# Patient Record
Sex: FEMALE | Race: BLACK OR AFRICAN AMERICAN | NOT HISPANIC OR LATINO | Employment: FULL TIME | URBAN - METROPOLITAN AREA
[De-identification: names, ages, dates, MRNs, and addresses within clinical notes are randomized per-mention and may not be internally consistent; named-entity substitution may affect disease eponyms.]

---

## 2017-01-05 ENCOUNTER — OFFICE VISIT (OUTPATIENT)
Dept: OBSTETRICS AND GYNECOLOGY | Facility: CLINIC | Age: 37
End: 2017-01-05
Attending: OBSTETRICS & GYNECOLOGY
Payer: COMMERCIAL

## 2017-01-05 VITALS
SYSTOLIC BLOOD PRESSURE: 100 MMHG | DIASTOLIC BLOOD PRESSURE: 60 MMHG | WEIGHT: 191.13 LBS | HEIGHT: 61 IN | BODY MASS INDEX: 36.09 KG/M2

## 2017-01-05 DIAGNOSIS — N93.9 ABNORMAL UTERINE BLEEDING (AUB): Primary | ICD-10-CM

## 2017-01-05 DIAGNOSIS — D25.9 UTERINE LEIOMYOMA, UNSPECIFIED LOCATION: ICD-10-CM

## 2017-01-05 DIAGNOSIS — Z87.42 HISTORY OF ABNORMAL CERVICAL PAP SMEAR: ICD-10-CM

## 2017-01-05 PROCEDURE — 3074F SYST BP LT 130 MM HG: CPT | Mod: S$GLB,,, | Performed by: OBSTETRICS & GYNECOLOGY

## 2017-01-05 PROCEDURE — 3078F DIAST BP <80 MM HG: CPT | Mod: S$GLB,,, | Performed by: OBSTETRICS & GYNECOLOGY

## 2017-01-05 PROCEDURE — 99999 PR PBB SHADOW E&M-EST. PATIENT-LVL II: CPT | Mod: PBBFAC,,, | Performed by: OBSTETRICS & GYNECOLOGY

## 2017-01-05 PROCEDURE — 99213 OFFICE O/P EST LOW 20 MIN: CPT | Mod: S$GLB,,, | Performed by: OBSTETRICS & GYNECOLOGY

## 2017-01-05 PROCEDURE — 1159F MED LIST DOCD IN RCRD: CPT | Mod: S$GLB,,, | Performed by: OBSTETRICS & GYNECOLOGY

## 2017-01-05 NOTE — LETTER
January 8, 2017      Davian Damon MD  [address] [facility]  [address]  [facility] [address]           [facility] [address]  [address] [facility] [address]  Phone: [phone]          Patient: Marlyn Camacho   MR Number: [id number]   YOB: 1980   Date of Visit: 1/5/2017       Dear Dr. Davian Damon:    Thank you for referring Marlyn Camacho to me for evaluation. Attached you will find relevant portions of my assessment and plan of care.    If you have questions, please do not hesitate to call me. I look forward to following Marlyn Camacho along with you.    Sincerely,    Lucy Daly MD    Enclosure  CC:  No Recipients    If you would like to receive this communication electronically, please contact [email or web address] or [phone] to request more information on [employer] Link access.    For providers and/or their staff who would like to refer a patient to [facility], please contact us through our one-stop-shop provider referral line, [facility], at [phone].    If you feel you have received this communication in error or would no longer like to receive these types of communications, please e-mail [email or web address]

## 2017-01-05 NOTE — MR AVS SNAPSHOT
[facility] [address]  [address] [facility] [address]  Phone: [phone]                  Marlyn MURILLO Doug   2017 1:00 PM   Office Visit    Description:  Female : 1980   Provider:  Lucy Daly MD   Department:  [facility] [address]           Reason for Visit     Discuss Surgery                To Do List           Goals (5 Years of Data)     None      [facility]     [facility] Nurse Care Line -  Assistance  Registered nurses in the [facility] On Call Center provide clinical advisement, health education, appointment booking, and other advisory services.  Call for this free service at [phone].             Medications           Message regarding Medications     Verify the changes and/or additions to your medication regime listed below are the same as discussed with your clinician today.  If any of these changes or additions are incorrect, please notify your healthcare provider.        STOP taking these medications     predniSONE (DELTASONE) 20 MG tablet     ranitidine (ZANTAC) 150 MG tablet            Verify that the below list of medications is an accurate representation of the medications you are currently taking.  If none reported, the list may be blank. If incorrect, please contact your healthcare provider. Carry this list with you in case of emergency.           Current Medications     desoximetasone 0.25 % ointment Apply topically 2 (two) times daily. To affected areas on arms and neck    epinephrine (EPIPEN) 0.3 mg/0.3 mL AtIn Inject 0.3 mLs (0.3 mg total) into the muscle once.    hydrochlorothiazide (HYDRODIURIL) 25 MG tablet Take 1 tablet (25 mg total) by mouth once daily.    triamcinolone acetonide 0.1% (KENALOG) 0.1 % cream Apply topically 2 (two) times daily.           Clinical Reference Information           Vital Signs - Last Recorded  Most recent update: 2017  1:17 PM by Clare Laurent MA    BP Ht Wt LMP BMI    100/60 (BP Location:  "Right arm, Patient Position: Sitting, BP Method: Manual) 5' 1" (1.549 m) 86.7 kg (191 lb 2.2 oz) 12/23/2016 (Approximate) 36.12 kg/m2      Blood Pressure          Most Recent Value    BP  100/60      Allergies as of 1/5/2017     Dye      Immunizations Administered on Date of Encounter - 1/5/2017     None      "

## 2017-01-09 NOTE — PROGRESS NOTES
SUBJECTIVE:   36 y.o. female   for AUB. Patient's last menstrual period was 2016 (approximate)..  Reports heavy periods.  Ultrasound done shows normal uterus with 2 cm fibroids.  H/o BTL.  Has h/o abnormal PAP and CKC done for JENARO 2.  PAP 3- was normal.  Desires mgt with hysterectomy.         Past Medical History   Diagnosis Date    Abnormal Pap smear of vagina     Allergy     Joint pain      Past Surgical History   Procedure Laterality Date    Tubal ligation      Colposcopy      Ckc, ecc, fractional d&c      Conization-cervix      Dilation and curettage of uterus       Social History     Social History    Marital status:      Spouse name: N/A    Number of children: N/A    Years of education: N/A     Occupational History    Not on file.     Social History Main Topics    Smoking status: Never Smoker    Smokeless tobacco: Not on file    Alcohol use Yes      Comment: occasional    Drug use: No    Sexual activity: Yes     Partners: Male     Birth control/ protection: Other-see comments      Comment: Tubal Ligation     Other Topics Concern    Are You Pregnant Or Think You May Be? No    Breast-Feeding No     Social History Narrative     Family History   Problem Relation Age of Onset    Eczema Mother     Ovarian cancer Mother     Eczema Son     Eczema Maternal Grandmother     Eczema Son     Melanoma Neg Hx     Lupus Neg Hx     Psoriasis Neg Hx     Breast cancer Neg Hx     Colon cancer Neg Hx      OB History    Para Term  AB SAB TAB Ectopic Multiple Living   3 3        3      # Outcome Date GA Lbr Bennie/2nd Weight Sex Delivery Anes PTL Lv   3 Para      Vag-Spont   Y   2 Para      Vag-Spont   Y   1 Para      Vag-Spont   Y            Current Outpatient Prescriptions   Medication Sig Dispense Refill    desoximetasone 0.25 % ointment Apply topically 2 (two) times daily. To affected areas on arms and neck 60 g 2    epinephrine (EPIPEN) 0.3 mg/0.3 mL AtIn Inject 0.3  "mLs (0.3 mg total) into the muscle once. 2 Device 5    hydrochlorothiazide (HYDRODIURIL) 25 MG tablet Take 1 tablet (25 mg total) by mouth once daily. 90 tablet 3    triamcinolone acetonide 0.1% (KENALOG) 0.1 % cream Apply topically 2 (two) times daily. 80 g 1     No current facility-administered medications for this visit.      Allergies: Dye     ROS:  Constitutional: no weight loss, weight gain, fever, fatigue  Eyes:  No vision changes, glasses/contacts  ENT/Mouth: No ulcers, sinus problems, ears ringing, headache  Cardiovascular: No inability to lie flat, chest pain, exercise intolerance, swelling, heart palpitations  Respiratory: No wheezing, coughing blood, shortness of breath, or cough  Gastrointestinal: No diarrhea, bloody stool, nausea/vomiting, constipation, gas, hemorrhoids  Genitourinary: No blood in urine, painful urination, urgency of urination, frequency of urination, incomplete emptying, incontinence, +abnormal bleeding, +painful periods, heavy periods, vaginal discharge, vaginal odor, painful intercourse, sexual problems, bleeding after intercourse.  Musculoskeletal: No muscle weakness  Skin/Breast: No painful breasts, nipple discharge, masses, rash, ulcers  Neurological: No passing out, seizures, numbness, headache  Endocrine: No diabetes, hypothyroid, hyperthyroid, hot flashes, hair loss, abnormal hair growth, ance  Psychiatric: No depression, crying  Hematologic: No bruises, bleeding, swollen lymph nodes, anemia.      OBJECTIVE:   The patient appears well, alert, oriented x 3, in no distress.  Visit Vitals    /60 (BP Location: Right arm, Patient Position: Sitting, BP Method: Manual)    Ht 5' 1" (1.549 m)    Wt 86.7 kg (191 lb 2.2 oz)    LMP 12/23/2016 (Approximate)    BMI 36.12 kg/m2       ASSESSMENT:   1. Abnormal uterine bleeding (AUB)     2. Uterine leiomyoma, unspecified location     3. History of abnormal cervical Pap smear         PLAN:       Discussed AUB, surgical mgt options " with TLH/ BS.  Return to clinic for pre-op

## 2017-01-13 ENCOUNTER — TELEPHONE (OUTPATIENT)
Dept: OBSTETRICS AND GYNECOLOGY | Facility: CLINIC | Age: 37
End: 2017-01-13

## 2017-01-13 DIAGNOSIS — N93.9 ABNORMAL UTERINE BLEEDING: Primary | ICD-10-CM

## 2017-02-09 ENCOUNTER — PATIENT MESSAGE (OUTPATIENT)
Dept: OBSTETRICS AND GYNECOLOGY | Facility: CLINIC | Age: 37
End: 2017-02-09

## 2017-03-03 ENCOUNTER — PATIENT MESSAGE (OUTPATIENT)
Dept: OBSTETRICS AND GYNECOLOGY | Facility: CLINIC | Age: 37
End: 2017-03-03

## 2017-03-09 ENCOUNTER — OFFICE VISIT (OUTPATIENT)
Dept: OBSTETRICS AND GYNECOLOGY | Facility: CLINIC | Age: 37
End: 2017-03-09
Attending: OBSTETRICS & GYNECOLOGY
Payer: COMMERCIAL

## 2017-03-09 ENCOUNTER — HOSPITAL ENCOUNTER (OUTPATIENT)
Dept: PREADMISSION TESTING | Facility: OTHER | Age: 37
Discharge: HOME | End: 2017-03-09
Attending: OBSTETRICS & GYNECOLOGY
Payer: COMMERCIAL

## 2017-03-09 ENCOUNTER — ANESTHESIA EVENT (OUTPATIENT)
Dept: SURGERY | Facility: OTHER | Age: 37
End: 2017-03-09
Payer: COMMERCIAL

## 2017-03-09 VITALS
WEIGHT: 185 LBS | OXYGEN SATURATION: 100 % | TEMPERATURE: 99 F | DIASTOLIC BLOOD PRESSURE: 79 MMHG | BODY MASS INDEX: 34.93 KG/M2 | SYSTOLIC BLOOD PRESSURE: 117 MMHG | HEART RATE: 62 BPM | HEIGHT: 61 IN

## 2017-03-09 VITALS
HEIGHT: 61 IN | BODY MASS INDEX: 35.01 KG/M2 | DIASTOLIC BLOOD PRESSURE: 70 MMHG | SYSTOLIC BLOOD PRESSURE: 120 MMHG | WEIGHT: 185.44 LBS

## 2017-03-09 DIAGNOSIS — Z01.818 PREOPERATIVE EXAM FOR GYNECOLOGIC SURGERY: Primary | ICD-10-CM

## 2017-03-09 DIAGNOSIS — N93.9 ABNORMAL UTERINE BLEEDING (AUB): ICD-10-CM

## 2017-03-09 DIAGNOSIS — N87.1 MODERATE CERVICAL DYSPLASIA: ICD-10-CM

## 2017-03-09 DIAGNOSIS — D25.1 INTRAMURAL LEIOMYOMA OF UTERUS: ICD-10-CM

## 2017-03-09 LAB
ABO + RH BLD: NORMAL
ANION GAP SERPL CALC-SCNC: 5 MMOL/L
BASOPHILS # BLD AUTO: 0.03 K/UL
BASOPHILS NFR BLD: 0.4 %
BLD GP AB SCN CELLS X3 SERPL QL: NORMAL
BUN SERPL-MCNC: 6 MG/DL
CALCIUM SERPL-MCNC: 9.5 MG/DL
CANDIDA RRNA VAG QL PROBE: NEGATIVE
CHLORIDE SERPL-SCNC: 106 MMOL/L
CO2 SERPL-SCNC: 28 MMOL/L
CREAT SERPL-MCNC: 0.8 MG/DL
DIFFERENTIAL METHOD: NORMAL
EOSINOPHIL # BLD AUTO: 0.1 K/UL
EOSINOPHIL NFR BLD: 1.1 %
ERYTHROCYTE [DISTWIDTH] IN BLOOD BY AUTOMATED COUNT: 13.8 %
EST. GFR  (AFRICAN AMERICAN): >60 ML/MIN/1.73 M^2
EST. GFR  (NON AFRICAN AMERICAN): >60 ML/MIN/1.73 M^2
G VAGINALIS RRNA GENITAL QL PROBE: POSITIVE
GLUCOSE SERPL-MCNC: 60 MG/DL
HCT VFR BLD AUTO: 39.8 %
HGB BLD-MCNC: 13.5 G/DL
LYMPHOCYTES # BLD AUTO: 2.5 K/UL
LYMPHOCYTES NFR BLD: 33.7 %
MCH RBC QN AUTO: 30.8 PG
MCHC RBC AUTO-ENTMCNC: 33.9 %
MCV RBC AUTO: 91 FL
MONOCYTES # BLD AUTO: 0.5 K/UL
MONOCYTES NFR BLD: 7.3 %
NEUTROPHILS # BLD AUTO: 4.2 K/UL
NEUTROPHILS NFR BLD: 57.4 %
PLATELET # BLD AUTO: 329 K/UL
PMV BLD AUTO: 9.5 FL
POTASSIUM SERPL-SCNC: 4.2 MMOL/L
RBC # BLD AUTO: 4.38 M/UL
SODIUM SERPL-SCNC: 139 MMOL/L
T VAGINALIS RRNA GENITAL QL PROBE: NEGATIVE
WBC # BLD AUTO: 7.26 K/UL

## 2017-03-09 PROCEDURE — 87480 CANDIDA DNA DIR PROBE: CPT

## 2017-03-09 PROCEDURE — 86900 BLOOD TYPING SEROLOGIC ABO: CPT

## 2017-03-09 PROCEDURE — 99999 PR PBB SHADOW E&M-EST. PATIENT-LVL II: CPT | Mod: PBBFAC,,, | Performed by: OBSTETRICS & GYNECOLOGY

## 2017-03-09 PROCEDURE — 99499 UNLISTED E&M SERVICE: CPT | Mod: S$GLB,,, | Performed by: OBSTETRICS & GYNECOLOGY

## 2017-03-09 PROCEDURE — 80048 BASIC METABOLIC PNL TOTAL CA: CPT

## 2017-03-09 PROCEDURE — 86850 RBC ANTIBODY SCREEN: CPT

## 2017-03-09 PROCEDURE — 85025 COMPLETE CBC W/AUTO DIFF WBC: CPT

## 2017-03-09 RX ORDER — MIDAZOLAM HYDROCHLORIDE 1 MG/ML
5 INJECTION INTRAMUSCULAR; INTRAVENOUS
Status: ACTIVE | OUTPATIENT
Start: 2017-03-09 | End: 2017-03-10

## 2017-03-09 RX ORDER — LIDOCAINE HYDROCHLORIDE 10 MG/ML
1 INJECTION, SOLUTION EPIDURAL; INFILTRATION; INTRACAUDAL; PERINEURAL ONCE
Status: CANCELLED | OUTPATIENT
Start: 2017-03-09 | End: 2017-03-09

## 2017-03-09 RX ORDER — SODIUM CHLORIDE, SODIUM LACTATE, POTASSIUM CHLORIDE, CALCIUM CHLORIDE 600; 310; 30; 20 MG/100ML; MG/100ML; MG/100ML; MG/100ML
INJECTION, SOLUTION INTRAVENOUS CONTINUOUS
Status: CANCELLED | OUTPATIENT
Start: 2017-03-09

## 2017-03-09 NOTE — IP AVS SNAPSHOT
[facility] [place], [facility]  [address] [place], [place] [address]  Phone: [phone]           Patient Discharge Instructions    Our goal is to set you up for success. This packet includes information on your condition, medications, and your home care. It will help you to care for yourself so you don't get sicker.     Please ask your nurse if you have any questions.        There are many details to remember when preparing for your surgery. Here is what you will need to do, please ask your nurse if there are more specific instructions and if you have any questions:    1. 24 hours before procedure Do not smoke or drink alcoholic beverages 24 hours prior to your procedure    2. Eating before procedure Do not eat or drink anything 8 hours before your procedure - this includes gum, mints, and candy.     3. Day of procedure Please remove all jewelry for the procedure. If you wear contact lenses, dentures, hearing aids or glasses, bring a container to put them in during your surgery and give to a family member for safekeeping.  If your doctor has scheduled you for an overnight stay, bring a small overnight bag with any personal items that you need.    4. After procedure Make arrangements in advance for transportation home by a responsible adult. It is not safe to drive a vehicle during the 24 hours following surgery.     PLEASE NOTE: You may be contacted the day before your surgery to confirm your surgery date and arrival time. The Surgery schedule has many variables which may affect the time of your surgery case. Family members should be available if your surgery time changes.                [facility]  Unless otherwise directed by your provider, please contact [facility], our nurse care line that is available for 24/7 assistance.     [phone] (toll-free)    Registered nurses in the [facility] On Call Center provide clinical advisement, health education, appointment booking, and other  advisory services.                    ** Verify the list of medication(s) below is accurate and up to date. Carry this with you in case of emergency. If your medications have changed, please notify your healthcare provider.             Medication List      TAKE these medications        Additional Info                      desoximetasone 0.25 % ointment   Quantity:  60 g   Refills:  2    Instructions:  Apply topically 2 (two) times daily. To affected areas on arms and neck     Begin Date    AM    Noon    PM    Bedtime       epinephrine 0.3 mg/0.3 mL Atin   Commonly known as:  EPIPEN   Quantity:  2 Device   Refills:  5   Dose:  1 each    Instructions:  Inject 0.3 mLs (0.3 mg total) into the muscle once.     Begin Date    AM    Noon    PM    Bedtime       hydrochlorothiazide 25 MG tablet   Commonly known as:  HYDRODIURIL   Quantity:  90 tablet   Refills:  3   Dose:  25 mg    Instructions:  Take 1 tablet (25 mg total) by mouth once daily.     Begin Date    AM    Noon    PM    Bedtime       triamcinolone acetonide 0.1% 0.1 % cream   Commonly known as:  KENALOG   Quantity:  80 g   Refills:  1    Instructions:  Apply topically 2 (two) times daily.     Begin Date    AM    Noon    PM    Bedtime                  Please bring to all follow up appointments:    1. A copy of your discharge instructions.  2. All medicines you are currently taking in their original bottles.  3. Identification and insurance card.    Please arrive 15 minutes ahead of scheduled appointment time.    Please call 24 hours in advance if you must reschedule your appointment and/or time.        Your Future Surgeries/Procedures     Mar 13, 2017   Surgery with Lucy Daly MD   [facility] ([facility])    [address] [facility] [address]   [phone]                  Discharge Instructions       PRE-ADMIT TESTING -  [phone]    [address] [facility]        OUTPATIENT SURGERY UNIT -  [phone]    Your surgery has been scheduled at [facility]. We are pleased to have the opportunity to serve you. For Further Information please call [phone].    On the day of surgery please report to the Information Desk on the 1st floor.    CONTACT YOUR PHYSICIAN'S OFFICE THE DAY PRIOR TO YOUR SURGERY TO OBTAIN YOUR ARRIVAL TIME.     The evening before surgery do not eat anything after 9 p.m. ( this includes hard candy, chewing gum and mints).  You may have GATORADE, POWERADE AND WATER FROM 9 p.m. until leaving home to come to the hospital.   DO NOT DRINK ANY LIQUIDS ON THE WAY TO THE HOSPITAL.     SPECIAL MEDICATION INSTRUCTIONS: TAKE medications checked off by the Anesthesiologist on your Medication List.    Angiogram Patients: Take medications as instructed by your physician, including aspirin.     Surgery Patients:    If you take ASPIRIN - Your PHYSICIAN/SURGEON will need to inform you IF/OR when you need to stop taking aspirin prior to your surgery.     Do Not take any medications containing IBUPROFEN.  Do Not Wear any make-up or dark nail polish   (especially eye make-up) to surgery. If you come to surgery with makeup on you will be required to remove the makeup or nail polish.    Do not shave your surgical area at least 5 days prior to your surgery. The surgical prep will be performed at the hospital according to Infection Control regulations.    Leave all valuables at home.   Do Not wear any jewelry or watches, including any metal in body piercings.  Contact Lens must be removed before surgery. Either do not wear the contact lens or bring a case and solution for storage.  Please bring a container for eyeglasses or dentures as required.  Bring any paperwork your physician has provided, such as consent forms,  history and physicals, doctor's orders, etc.   Bring comfortable clothes that are loose fitting to wear upon discharge. Take into consideration the type of surgery being  "performed.  Maintain your diet as advised per your physician the day prior to surgery.      Adequate rest the night before surgery is advised.   Park in the Parking lot behind the hospital or in the [place] [facility] across the street from the parking lot. Parking is complimentary.  If you will be discharged the same day as your procedure, please arrange for a responsible adult to drive you home or to accompany you if traveling by taxi.   YOU WILL NOT BE PERMITTED TO DRIVE OR TO LEAVE THE HOSPITAL ALONE AFTER SURGERY.   It is strongly recommended that you arrange for someone to remain with you for the first 24 hrs following your surgery.       Thank you for your cooperation.  The Staff of [facility].        Bathing Instructions                                                                 Please shower the evening before and morning of your procedure with    ANTIBACTERIAL SOAP. ( DIAL, etc )  Concentrate on the surgical area   for at least 3 minutes and rinse completely. Dry off as usual.   Do not use any deodorant, powder, body lotions, perfume, after shave or    cologne.                                                Admission Information     Date & Time Provider Department CSN    3/9/2017 11:00 AM Lucy Daly MD [facility] [phone]      Care Providers     Provider Role Specialty Primary office phone    Lucy Daly MD Attending Provider Obstetrics [phone]      Your Vitals Were     BP Pulse Temp Height Weight Last Period    117/79 62 98.6 °F (37 °C) (Oral) 5' 1" (1.549 m) 83.9 kg (185 lb) 02/14/2017 (Approximate)    SpO2 BMI             100% 34.96 kg/m2         Recent Lab Values     No lab values to display.      Allergies as of 3/9/2017        Reactions    Dye Anaphylaxis    Hair lightener/bleach      Advance Directives     An advance directive is a document which, in the event you are no longer able to make decisions for yourself, tells your " healthcare team what kind of treatment you do or do not want to receive, or who you would like to make those decisions for you.  If you do not currently have an advance directive, [facility] encourages you to create one.  For more information call:  [phone]), [phone] ([phone]),  or log on to [email or web address].        Language Assistance Services     ATTENTION: Language assistance services are available, free of charge. Please call [phone].      ATENCIÓN: Si habla español, tiene a bernal disposición servicios gratuitos de asistencia lingüística. Llame al [phone].     CHÚ Ý: N?u b?n nói Ti?ng Vi?t, có các d?ch v? h? tr? ngôn ng? mi?n phí dành cho b?n. G?i s? [phone].         [facility] complies with applicable Federal civil rights laws and does not discriminate on the basis of race, color, national origin, age, disability, or sex.

## 2017-03-09 NOTE — MR AVS SNAPSHOT
[facility] [address]  [address] [facility] [address]  Phone: [phone]                  Marlyn Camacho   3/9/2017 9:45 AM   Office Visit    Description:  Female : 1980   Provider:  Lucy Daly MD   Department:  [facility] [address]           Reason for Visit     Pre-op Exam           Diagnoses this Visit        Comments    Preoperative exam for gynecologic surgery    -  Primary     Abnormal uterine bleeding (AUB)         Intramural leiomyoma of uterus         Moderate cervical dysplasia                To Do List           Future Appointments        Provider Department Dept Phone    3/9/2017 9:45 AM Lucy Daly MD [facility] [address] [phone]    3/9/2017 11:00 AM PRE-ADMIT, [facility] [phone]      Your Future Surgeries/Procedures     Mar 13, 2017   Surgery with Lucy Daly MD   [facility]    [address] [facility] [address] [phone]              Goals (5 Years of Data)     None      [facility]     [facility] On [facility] Nurse Care Line -  Assistance  Registered nurses in the [facility] On Call Center provide clinical advisement, health education, appointment booking, and other advisory services.  Call for this free service at [phone].             Medications           Message regarding Medications     Verify the changes and/or additions to your medication regime listed below are the same as discussed with your clinician today.  If any of these changes or additions are incorrect, please notify your healthcare provider.             Verify that the below list of medications is an accurate representation of the medications you are currently taking.  If none reported, the list may be blank. If incorrect, please contact your healthcare provider. Carry this list with you in case of emergency.           Current Medications      "hydrochlorothiazide (HYDRODIURIL) 25 MG tablet Take 1 tablet (25 mg total) by mouth once daily.    desoximetasone 0.25 % ointment Apply topically 2 (two) times daily. To affected areas on arms and neck    epinephrine (EPIPEN) 0.3 mg/0.3 mL AtIn Inject 0.3 mLs (0.3 mg total) into the muscle once.    triamcinolone acetonide 0.1% (KENALOG) 0.1 % cream Apply topically 2 (two) times daily.           Clinical Reference Information           Your Vitals Were     BP Height Weight Last Period BMI    120/70 (BP Location: Right arm, Patient Position: Sitting, BP Method: Manual) 5' 1" (1.549 m) 84.1 kg (185 lb 6.5 oz) 02/14/2017 (Approximate) 35.03 kg/m2      Blood Pressure          Most Recent Value    BP  120/70      Allergies as of 3/9/2017     Dye      Immunizations Administered on Date of Encounter - 3/9/2017     None      Orders Placed During Today's Visit      Normal Orders This Visit    Vaginosis Screen by DNA Probe       Language Assistance Services     ATTENTION: Language assistance services are available, free of charge. Please call [phone].      ATENCIÓN: Si habla nate, tiene a bernal disposición servicios gratuitos de asistencia lingüística. Llame al [phone].     CHÚ Ý: N?u b?n nói Ti?ng Vi?t, có các d?ch v? h? tr? ngôn ng? mi?n phí dành cho b?n. G?i s? [phone].         [facility] [address] complies with applicable Federal civil rights laws and does not discriminate on the basis of race, color, national origin, age, disability, or sex.        "

## 2017-03-09 NOTE — DISCHARGE INSTRUCTIONS
PRE-ADMIT TESTING -  [phone]    [address]  [facility]        OUTPATIENT SURGERY UNIT - [phone]    Your surgery has been scheduled at [facility]. We are pleased to have the opportunity to serve you. For Further Information please call [phone].    On the day of surgery please report to the Information Desk on the 1st floor.    CONTACT YOUR PHYSICIAN'S OFFICE THE DAY PRIOR TO YOUR SURGERY TO OBTAIN YOUR ARRIVAL TIME.     The evening before surgery do not eat anything after 9 p.m. ( this includes hard candy, chewing gum and mints).  You may have GATORADE, POWERADE AND WATER FROM 9 p.m. until leaving home to come to the hospital.   DO NOT DRINK ANY LIQUIDS ON THE WAY TO THE HOSPITAL.     SPECIAL MEDICATION INSTRUCTIONS: TAKE medications checked off by the Anesthesiologist on your Medication List.    Angiogram Patients: Take medications as instructed by your physician, including aspirin.     Surgery Patients:    If you take ASPIRIN - Your PHYSICIAN/SURGEON will need to inform you IF/OR when you need to stop taking aspirin prior to your surgery.     Do Not take any medications containing IBUPROFEN.  Do Not Wear any make-up or dark nail polish   (especially eye make-up) to surgery. If you come to surgery with makeup on you will be required to remove the makeup or nail polish.    Do not shave your surgical area at least 5 days prior to your surgery. The surgical prep will be performed at the hospital according to Infection Control regulations.    Leave all valuables at home.   Do Not wear any jewelry or watches, including any metal in body piercings.  Contact Lens must be removed before surgery. Either do not wear the contact lens or bring a case and solution for storage.  Please bring a container for eyeglasses or dentures as required.  Bring any paperwork your physician has provided, such as consent forms,  history and physicals, doctor's orders, etc.   Bring comfortable  clothes that are loose fitting to wear upon discharge. Take into consideration the type of surgery being performed.  Maintain your diet as advised per your physician the day prior to surgery.      Adequate rest the night before surgery is advised.   Park in the Parking lot behind the hospital or in the [place] [facility] across the street from the parking lot. Parking is complimentary.  If you will be discharged the same day as your procedure, please arrange for a responsible adult to drive you home or to accompany you if traveling by taxi.   YOU WILL NOT BE PERMITTED TO DRIVE OR TO LEAVE THE HOSPITAL ALONE AFTER SURGERY.   It is strongly recommended that you arrange for someone to remain with you for the first 24 hrs following your surgery.       Thank you for your cooperation.  The Staff of [facility].        Bathing Instructions                                                                 Please shower the evening before and morning of your procedure with    ANTIBACTERIAL SOAP. ( DIAL, etc )  Concentrate on the surgical area   for at least 3 minutes and rinse completely. Dry off as usual.   Do not use any deodorant, powder, body lotions, perfume, after shave or    cologne.

## 2017-03-09 NOTE — ANESTHESIA PREPROCEDURE EVALUATION
03/09/2017  Marlyn Camacho is a 37 y.o., female.    [facility] Anesthesia Evaluation    I have reviewed the Patient Summary Reports.    I have reviewed the Nursing Notes.   I have reviewed the Medications.     Review of Systems  Anesthesia Hx:  Denies Family Hx of Anesthesia complications.   Denies Personal Hx of Anesthesia complications.   Social:  Non-Smoker    Hematology/Oncology:  Hematology Normal   Oncology Normal     EENT/Dental:EENT/Dental Normal   Cardiovascular:   Hypertension    Pulmonary:  Pulmonary Normal    Renal/:  Renal/ Normal     Hepatic/GI:  Hepatic/GI Normal    Musculoskeletal:  Musculoskeletal Normal    Neurological:  Neurology Normal    Endocrine:  Endocrine Normal    Dermatological:  Skin Normal    Psych:  Psychiatric Normal           Physical Exam  General:  Obesity    Airway/Jaw/Neck:  Airway Findings: Mouth Opening: Normal Tongue: Normal  General Airway Assessment: Adult  Mallampati: II  TM Distance: Normal, at least 6 cm      Dental:  Dental Findings: lower braces, upper braces, In tact        Mental Status:  Mental Status Findings:  Alert and Oriented, Cooperative         Anesthesia Plan  Type of Anesthesia, risks & benefits discussed:  Anesthesia Type:  general  Patient's Preference:   Intra-op Monitoring Plan:   Intra-op Monitoring Plan Comments:   Post Op Pain Control Plan:   Post Op Pain Control Plan Comments:   Induction:   IV  Beta Blocker:         Informed Consent: Patient understands risks and agrees with Anesthesia plan.  Questions answered. Anesthesia consent signed with patient.  ASA Score: 2     Day of Surgery Review of History & Physical:    H&P update referred to the surgeon.         Ready For Surgery From Anesthesia Perspective.

## 2017-03-10 DIAGNOSIS — N76.0 BV (BACTERIAL VAGINOSIS): Primary | ICD-10-CM

## 2017-03-10 DIAGNOSIS — B96.89 BV (BACTERIAL VAGINOSIS): Primary | ICD-10-CM

## 2017-03-10 RX ORDER — METRONIDAZOLE 500 MG/1
500 TABLET ORAL 2 TIMES DAILY
Qty: 14 TABLET | Refills: 0 | Status: SHIPPED | OUTPATIENT
Start: 2017-03-10 | End: 2017-03-17

## 2017-03-13 ENCOUNTER — HOSPITAL ENCOUNTER (OUTPATIENT)
Facility: OTHER | Age: 37
Discharge: HOME | End: 2017-03-14
Attending: OBSTETRICS & GYNECOLOGY | Admitting: OBSTETRICS & GYNECOLOGY
Payer: COMMERCIAL

## 2017-03-13 ENCOUNTER — ANESTHESIA (OUTPATIENT)
Dept: SURGERY | Facility: OTHER | Age: 37
End: 2017-03-13
Payer: COMMERCIAL

## 2017-03-13 DIAGNOSIS — N93.9 ABNORMAL UTERINE BLEEDING (AUB): ICD-10-CM

## 2017-03-13 PROBLEM — Z90.710 S/P TOTAL HYSTERECTOMY: Status: ACTIVE | Noted: 2017-03-13

## 2017-03-13 LAB
B-HCG UR QL: NEGATIVE
CTP QC/QA: YES

## 2017-03-13 PROCEDURE — 63600175 PHARM REV CODE 636 W HCPCS: Performed by: OBSTETRICS & GYNECOLOGY

## 2017-03-13 PROCEDURE — 63600175 PHARM REV CODE 636 W HCPCS: Performed by: ANESTHESIOLOGY

## 2017-03-13 PROCEDURE — 25000003 PHARM REV CODE 250: Performed by: OBSTETRICS & GYNECOLOGY

## 2017-03-13 PROCEDURE — 88342 IMHCHEM/IMCYTCHM 1ST ANTB: CPT | Performed by: PATHOLOGY

## 2017-03-13 PROCEDURE — 37000009 HC ANESTHESIA EA ADD 15 MINS: Performed by: OBSTETRICS & GYNECOLOGY

## 2017-03-13 PROCEDURE — 71000039 HC RECOVERY, EACH ADD'L HOUR: Performed by: OBSTETRICS & GYNECOLOGY

## 2017-03-13 PROCEDURE — 25000003 PHARM REV CODE 250: Performed by: NURSE ANESTHETIST, CERTIFIED REGISTERED

## 2017-03-13 PROCEDURE — 36000711: Performed by: OBSTETRICS & GYNECOLOGY

## 2017-03-13 PROCEDURE — 27201423 OPTIME MED/SURG SUP & DEVICES STERILE SUPPLY: Performed by: OBSTETRICS & GYNECOLOGY

## 2017-03-13 PROCEDURE — 25000003 PHARM REV CODE 250: Performed by: ANESTHESIOLOGY

## 2017-03-13 PROCEDURE — 71000033 HC RECOVERY, INTIAL HOUR: Performed by: OBSTETRICS & GYNECOLOGY

## 2017-03-13 PROCEDURE — 63600175 PHARM REV CODE 636 W HCPCS: Performed by: NURSE ANESTHETIST, CERTIFIED REGISTERED

## 2017-03-13 PROCEDURE — 88309 TISSUE EXAM BY PATHOLOGIST: CPT | Mod: 26,,, | Performed by: PATHOLOGY

## 2017-03-13 PROCEDURE — 36000710: Performed by: OBSTETRICS & GYNECOLOGY

## 2017-03-13 PROCEDURE — 37000008 HC ANESTHESIA 1ST 15 MINUTES: Performed by: OBSTETRICS & GYNECOLOGY

## 2017-03-13 PROCEDURE — 88342 IMHCHEM/IMCYTCHM 1ST ANTB: CPT | Mod: 26,,, | Performed by: PATHOLOGY

## 2017-03-13 PROCEDURE — 58571 TLH W/T/O 250 G OR LESS: CPT | Mod: ,,, | Performed by: OBSTETRICS & GYNECOLOGY

## 2017-03-13 PROCEDURE — 94799 UNLISTED PULMONARY SVC/PX: CPT

## 2017-03-13 PROCEDURE — 81025 URINE PREGNANCY TEST: CPT | Performed by: OBSTETRICS & GYNECOLOGY

## 2017-03-13 RX ORDER — OXYCODONE AND ACETAMINOPHEN 10; 325 MG/1; MG/1
1 TABLET ORAL EVERY 4 HOURS PRN
Status: DISCONTINUED | OUTPATIENT
Start: 2017-03-13 | End: 2017-03-14 | Stop reason: HOSPADM

## 2017-03-13 RX ORDER — CEFAZOLIN SODIUM 2 G/50ML
2 SOLUTION INTRAVENOUS
Status: COMPLETED | OUTPATIENT
Start: 2017-03-13 | End: 2017-03-13

## 2017-03-13 RX ORDER — ONDANSETRON 8 MG/1
8 TABLET, ORALLY DISINTEGRATING ORAL EVERY 8 HOURS PRN
Status: DISCONTINUED | OUTPATIENT
Start: 2017-03-13 | End: 2017-03-14 | Stop reason: HOSPADM

## 2017-03-13 RX ORDER — LIDOCAINE HYDROCHLORIDE 10 MG/ML
1 INJECTION, SOLUTION EPIDURAL; INFILTRATION; INTRACAUDAL; PERINEURAL ONCE
Status: DISCONTINUED | OUTPATIENT
Start: 2017-03-13 | End: 2017-03-13 | Stop reason: HOSPADM

## 2017-03-13 RX ORDER — OXYCODONE AND ACETAMINOPHEN 5; 325 MG/1; MG/1
1 TABLET ORAL EVERY 4 HOURS PRN
Status: DISCONTINUED | OUTPATIENT
Start: 2017-03-13 | End: 2017-03-14 | Stop reason: HOSPADM

## 2017-03-13 RX ORDER — FENTANYL CITRATE 50 UG/ML
INJECTION, SOLUTION INTRAMUSCULAR; INTRAVENOUS
Status: DISCONTINUED | OUTPATIENT
Start: 2017-03-13 | End: 2017-03-13

## 2017-03-13 RX ORDER — LIDOCAINE HCL/PF 100 MG/5ML
SYRINGE (ML) INTRAVENOUS
Status: DISCONTINUED | OUTPATIENT
Start: 2017-03-13 | End: 2017-03-13

## 2017-03-13 RX ORDER — ONDANSETRON 2 MG/ML
INJECTION INTRAMUSCULAR; INTRAVENOUS
Status: DISCONTINUED | OUTPATIENT
Start: 2017-03-13 | End: 2017-03-13

## 2017-03-13 RX ORDER — OXYCODONE AND ACETAMINOPHEN 5; 325 MG/1; MG/1
1 TABLET ORAL EVERY 4 HOURS PRN
Qty: 30 TABLET | Refills: 0 | Status: SHIPPED | OUTPATIENT
Start: 2017-03-13 | End: 2017-08-23 | Stop reason: ALTCHOICE

## 2017-03-13 RX ORDER — FENTANYL CITRATE 50 UG/ML
25 INJECTION, SOLUTION INTRAMUSCULAR; INTRAVENOUS EVERY 5 MIN PRN
Status: DISCONTINUED | OUTPATIENT
Start: 2017-03-13 | End: 2017-03-13 | Stop reason: HOSPADM

## 2017-03-13 RX ORDER — ACETAMINOPHEN 325 MG/1
650 TABLET ORAL EVERY 4 HOURS PRN
Status: DISCONTINUED | OUTPATIENT
Start: 2017-03-13 | End: 2017-03-14 | Stop reason: HOSPADM

## 2017-03-13 RX ORDER — SODIUM CHLORIDE 9 MG/ML
INJECTION, SOLUTION INTRAVENOUS CONTINUOUS
Status: DISCONTINUED | OUTPATIENT
Start: 2017-03-13 | End: 2017-03-14 | Stop reason: HOSPADM

## 2017-03-13 RX ORDER — DEXAMETHASONE SODIUM PHOSPHATE 4 MG/ML
INJECTION, SOLUTION INTRA-ARTICULAR; INTRALESIONAL; INTRAMUSCULAR; INTRAVENOUS; SOFT TISSUE
Status: DISCONTINUED | OUTPATIENT
Start: 2017-03-13 | End: 2017-03-13

## 2017-03-13 RX ORDER — SODIUM CHLORIDE, SODIUM LACTATE, POTASSIUM CHLORIDE, CALCIUM CHLORIDE 600; 310; 30; 20 MG/100ML; MG/100ML; MG/100ML; MG/100ML
INJECTION, SOLUTION INTRAVENOUS CONTINUOUS
Status: DISCONTINUED | OUTPATIENT
Start: 2017-03-13 | End: 2017-03-13

## 2017-03-13 RX ORDER — KETOROLAC TROMETHAMINE 30 MG/ML
30 INJECTION, SOLUTION INTRAMUSCULAR; INTRAVENOUS EVERY 6 HOURS
Status: COMPLETED | OUTPATIENT
Start: 2017-03-13 | End: 2017-03-14

## 2017-03-13 RX ORDER — ONDANSETRON 2 MG/ML
4 INJECTION INTRAMUSCULAR; INTRAVENOUS EVERY 4 HOURS PRN
Status: DISCONTINUED | OUTPATIENT
Start: 2017-03-13 | End: 2017-03-13 | Stop reason: HOSPADM

## 2017-03-13 RX ORDER — KETOROLAC TROMETHAMINE 30 MG/ML
INJECTION, SOLUTION INTRAMUSCULAR; INTRAVENOUS
Status: DISCONTINUED | OUTPATIENT
Start: 2017-03-13 | End: 2017-03-13

## 2017-03-13 RX ORDER — PROPOFOL 10 MG/ML
VIAL (ML) INTRAVENOUS
Status: DISCONTINUED | OUTPATIENT
Start: 2017-03-13 | End: 2017-03-13

## 2017-03-13 RX ORDER — SODIUM CHLORIDE 0.9 % (FLUSH) 0.9 %
3 SYRINGE (ML) INJECTION EVERY 8 HOURS
Status: DISCONTINUED | OUTPATIENT
Start: 2017-03-13 | End: 2017-03-13 | Stop reason: HOSPADM

## 2017-03-13 RX ORDER — NEOSTIGMINE METHYLSULFATE 1 MG/ML
INJECTION, SOLUTION INTRAVENOUS
Status: DISCONTINUED | OUTPATIENT
Start: 2017-03-13 | End: 2017-03-13

## 2017-03-13 RX ORDER — MEPERIDINE HYDROCHLORIDE 50 MG/ML
12.5 INJECTION INTRAMUSCULAR; INTRAVENOUS; SUBCUTANEOUS ONCE AS NEEDED
Status: DISCONTINUED | OUTPATIENT
Start: 2017-03-13 | End: 2017-03-13 | Stop reason: HOSPADM

## 2017-03-13 RX ORDER — DEXTROSE MONOHYDRATE, SODIUM CHLORIDE, AND POTASSIUM CHLORIDE 50; 1.49; 4.5 G/1000ML; G/1000ML; G/1000ML
INJECTION, SOLUTION INTRAVENOUS CONTINUOUS
Status: DISCONTINUED | OUTPATIENT
Start: 2017-03-13 | End: 2017-03-13

## 2017-03-13 RX ORDER — IBUPROFEN 400 MG/1
800 TABLET ORAL EVERY 8 HOURS
Status: DISCONTINUED | OUTPATIENT
Start: 2017-03-14 | End: 2017-03-14 | Stop reason: HOSPADM

## 2017-03-13 RX ORDER — HYDROMORPHONE HYDROCHLORIDE 2 MG/ML
0.4 INJECTION, SOLUTION INTRAMUSCULAR; INTRAVENOUS; SUBCUTANEOUS EVERY 5 MIN PRN
Status: DISCONTINUED | OUTPATIENT
Start: 2017-03-13 | End: 2017-03-13 | Stop reason: HOSPADM

## 2017-03-13 RX ORDER — SIMETHICONE 80 MG
80 TABLET,CHEWABLE ORAL EVERY 4 HOURS PRN
Status: DISCONTINUED | OUTPATIENT
Start: 2017-03-13 | End: 2017-03-14 | Stop reason: HOSPADM

## 2017-03-13 RX ORDER — HYDROMORPHONE HYDROCHLORIDE 1 MG/ML
0.5 INJECTION, SOLUTION INTRAMUSCULAR; INTRAVENOUS; SUBCUTANEOUS EVERY 4 HOURS PRN
Status: DISCONTINUED | OUTPATIENT
Start: 2017-03-13 | End: 2017-03-14 | Stop reason: HOSPADM

## 2017-03-13 RX ORDER — DIPHENHYDRAMINE HCL 25 MG
25 CAPSULE ORAL EVERY 4 HOURS PRN
Status: DISCONTINUED | OUTPATIENT
Start: 2017-03-13 | End: 2017-03-14 | Stop reason: HOSPADM

## 2017-03-13 RX ORDER — GLYCOPYRROLATE 0.2 MG/ML
INJECTION INTRAMUSCULAR; INTRAVENOUS
Status: DISCONTINUED | OUTPATIENT
Start: 2017-03-13 | End: 2017-03-13

## 2017-03-13 RX ORDER — SODIUM CHLORIDE 0.9 % (FLUSH) 0.9 %
3 SYRINGE (ML) INJECTION
Status: DISCONTINUED | OUTPATIENT
Start: 2017-03-13 | End: 2017-03-13

## 2017-03-13 RX ORDER — OXYCODONE HYDROCHLORIDE 5 MG/1
5 TABLET ORAL
Status: DISCONTINUED | OUTPATIENT
Start: 2017-03-13 | End: 2017-03-13 | Stop reason: HOSPADM

## 2017-03-13 RX ORDER — ROCURONIUM BROMIDE 10 MG/ML
INJECTION, SOLUTION INTRAVENOUS
Status: DISCONTINUED | OUTPATIENT
Start: 2017-03-13 | End: 2017-03-13

## 2017-03-13 RX ADMIN — NEOSTIGMINE METHYLSULFATE 5 MG: 1 INJECTION INTRAVENOUS at 09:03

## 2017-03-13 RX ADMIN — HYDROMORPHONE HYDROCHLORIDE 0.4 MG: 2 INJECTION, SOLUTION INTRAMUSCULAR; INTRAVENOUS; SUBCUTANEOUS at 09:03

## 2017-03-13 RX ADMIN — SODIUM CHLORIDE: 0.9 INJECTION, SOLUTION INTRAVENOUS at 11:03

## 2017-03-13 RX ADMIN — GLYCOPYRROLATE 0.8 MG: 0.2 INJECTION, SOLUTION INTRAMUSCULAR; INTRAVENOUS at 09:03

## 2017-03-13 RX ADMIN — SODIUM CHLORIDE: 0.9 INJECTION, SOLUTION INTRAVENOUS at 06:03

## 2017-03-13 RX ADMIN — KETOROLAC TROMETHAMINE 30 MG: 30 INJECTION, SOLUTION INTRAMUSCULAR at 03:03

## 2017-03-13 RX ADMIN — OXYCODONE HYDROCHLORIDE AND ACETAMINOPHEN 1 TABLET: 5; 325 TABLET ORAL at 08:03

## 2017-03-13 RX ADMIN — SODIUM CHLORIDE, SODIUM LACTATE, POTASSIUM CHLORIDE, AND CALCIUM CHLORIDE: 600; 310; 30; 20 INJECTION, SOLUTION INTRAVENOUS at 08:03

## 2017-03-13 RX ADMIN — OXYCODONE HYDROCHLORIDE 5 MG: 5 TABLET ORAL at 10:03

## 2017-03-13 RX ADMIN — OXYCODONE HYDROCHLORIDE AND ACETAMINOPHEN 1 TABLET: 5; 325 TABLET ORAL at 01:03

## 2017-03-13 RX ADMIN — ONDANSETRON 8 MG: 8 TABLET, ORALLY DISINTEGRATING ORAL at 01:03

## 2017-03-13 RX ADMIN — PROPOFOL 200 MG: 10 INJECTION, EMULSION INTRAVENOUS at 07:03

## 2017-03-13 RX ADMIN — FENTANYL CITRATE 100 MCG: 50 INJECTION, SOLUTION INTRAMUSCULAR; INTRAVENOUS at 07:03

## 2017-03-13 RX ADMIN — LIDOCAINE HYDROCHLORIDE 60 MG: 20 INJECTION, SOLUTION INTRAVENOUS at 07:03

## 2017-03-13 RX ADMIN — GLYCOPYRROLATE 0.2 MG: 0.2 INJECTION, SOLUTION INTRAMUSCULAR; INTRAVENOUS at 07:03

## 2017-03-13 RX ADMIN — DEXAMETHASONE SODIUM PHOSPHATE 8 MG: 4 INJECTION, SOLUTION INTRAMUSCULAR; INTRAVENOUS at 07:03

## 2017-03-13 RX ADMIN — PROPOFOL 20 MG: 10 INJECTION, EMULSION INTRAVENOUS at 09:03

## 2017-03-13 RX ADMIN — FENTANYL CITRATE 25 MCG: 50 INJECTION, SOLUTION INTRAMUSCULAR; INTRAVENOUS at 07:03

## 2017-03-13 RX ADMIN — HYDROMORPHONE HYDROCHLORIDE 0.4 MG: 2 INJECTION, SOLUTION INTRAMUSCULAR; INTRAVENOUS; SUBCUTANEOUS at 10:03

## 2017-03-13 RX ADMIN — ROCURONIUM BROMIDE 50 MG: 10 INJECTION, SOLUTION INTRAVENOUS at 07:03

## 2017-03-13 RX ADMIN — FENTANYL CITRATE 25 MCG: 50 INJECTION, SOLUTION INTRAMUSCULAR; INTRAVENOUS at 08:03

## 2017-03-13 RX ADMIN — CARBOXYMETHYLCELLULOSE SODIUM 2 DROP: 2.5 SOLUTION/ DROPS OPHTHALMIC at 07:03

## 2017-03-13 RX ADMIN — CEFAZOLIN SODIUM 2 G: 2 SOLUTION INTRAVENOUS at 07:03

## 2017-03-13 RX ADMIN — KETOROLAC TROMETHAMINE 30 MG: 30 INJECTION, SOLUTION INTRAMUSCULAR; INTRAVENOUS at 09:03

## 2017-03-13 RX ADMIN — SODIUM CHLORIDE, SODIUM LACTATE, POTASSIUM CHLORIDE, AND CALCIUM CHLORIDE: 600; 310; 30; 20 INJECTION, SOLUTION INTRAVENOUS at 06:03

## 2017-03-13 RX ADMIN — KETOROLAC TROMETHAMINE 30 MG: 30 INJECTION, SOLUTION INTRAMUSCULAR at 11:03

## 2017-03-13 RX ADMIN — ONDANSETRON 4 MG: 2 INJECTION INTRAMUSCULAR; INTRAVENOUS at 08:03

## 2017-03-13 NOTE — H&P
SUBJECTIVE:   37 y.o. female  for preoperative gyn exam. Patient's last menstrual period was 2017 (approximate).. Reports heavy periods. Ultrasound done shows normal uterus with 2 cm fibroids. H/o BTL. Has h/o abnormal PAP and CKC done for JENARO 2. PAP 3- was normal. Desires mgt with hysterectomy.                Past Medical History:   Diagnosis Date    Abnormal Pap smear of vagina      Allergy      Hypertension      Joint pain              Past Surgical History:   Procedure Laterality Date    CKC, ECC, fractional D&C        COLPOSCOPY        CONIZATION-CERVIX   2015    DILATION AND CURETTAGE OF UTERUS        TUBAL LIGATION           Social History    Social History      Social History    Marital status:        Spouse name: N/A    Number of children: N/A    Years of education: N/A          Occupational History    Not on file.              Social History Main Topics    Smoking status: Never Smoker    Smokeless tobacco: Not on file    Alcohol use Yes          Comment: occasional    Drug use: No    Sexual activity: Yes       Partners: Male       Birth control/ protection: Other-see comments         Comment: Tubal Ligation           Other Topics Concern    Are You Pregnant Or Think You May Be? No    Breast-Feeding No      Social History Narrative               Family History   Problem Relation Age of Onset    Eczema Mother      Ovarian cancer Mother      Eczema Son      Eczema Maternal Grandmother      Eczema Son      Melanoma Neg Hx      Lupus Neg Hx      Psoriasis Neg Hx      Breast cancer Neg Hx      Colon cancer Neg Hx                              OB History    Para Term  AB SAB TAB Ectopic Multiple Living   3 3               3       # Outcome Date GA Lbr Bennie/2nd Weight Sex Delivery Anes PTL Lv   3 Para           Vag-Spont     Y   2 Para           Vag-Spont     Y   1 Para           Vag-Spont     Y                 Current Medications           Current  Outpatient Prescriptions   Medication Sig Dispense Refill    hydrochlorothiazide (HYDRODIURIL) 25 MG tablet Take 1 tablet (25 mg total) by mouth once daily. 90 tablet 3    desoximetasone 0.25 % ointment Apply topically 2 (two) times daily. To affected areas on arms and neck 60 g 2    epinephrine (EPIPEN) 0.3 mg/0.3 mL AtIn Inject 0.3 mLs (0.3 mg total) into the muscle once. 2 Device 5    metronidazole (FLAGYL) 500 MG tablet Take 1 tablet (500 mg total) by mouth 2 (two) times daily. 14 tablet 0    triamcinolone acetonide 0.1% (KENALOG) 0.1 % cream Apply topically 2 (two) times daily. 80 g 1      No current facility-administered medications for this visit.          Allergies: Dye      ROS:  Constitutional: no weight loss, weight gain, fever, fatigue  Eyes: No vision changes, glasses/contacts  ENT/Mouth: No ulcers, sinus problems, ears ringing, headache  Cardiovascular: No inability to lie flat, chest pain, exercise intolerance, swelling, heart palpitations  Respiratory: No wheezing, coughing blood, shortness of breath, or cough  Gastrointestinal: No diarrhea, bloody stool, nausea/vomiting, constipation, gas, hemorrhoids  Genitourinary: No blood in urine, painful urination, urgency of urination, frequency of urination, incomplete emptying, incontinence,+abnormal bleeding, painful periods, heavy periods, vaginal discharge, vaginal odor, painful intercourse, sexual problems, bleeding after intercourse.  Musculoskeletal: No muscle weakness  Skin/Breast: No painful breasts, nipple discharge, masses, rash, ulcers  Neurological: No passing out, seizures, numbness, headache  Endocrine: No diabetes, hypothyroid, hyperthyroid, hot flashes, hair loss, abnormal hair growth, ance  Psychiatric: No depression, crying  Hematologic: No bruises, bleeding, swollen lymph nodes, anemia.        OBJECTIVE:   The patient appears well, alert, oriented x 3, in no distress.  /70 (BP Location: Right arm, Patient Position: Sitting, BP  "Method: Manual)  Ht 5' 1" (1.549 m)  Wt 84.1 kg (185 lb 6.5 oz)  LMP 02/14/2017 (Approximate)  BMI 35.03 kg/m2  NECK: no thyromegaly, trachea midline  SKIN: no acne, striae, hirsutism  CHEST: CTAB  CV: RRR  BREAST EXAM: breasts appear normal, no suspicious masses, no skin or nipple changes or axillary nodes  ABDOMEN: no hernias, masses, or hepatosplenomegaly  GENITALIA: normal external genitalia, no erythema, no discharge  URETHRA: normal urethra, normal urethral meatus  VAGINA: Normal  CERVIX: no lesions or cervical motion tenderness  UTERUS: normal size, contour, position, consistency, mobility, non-tender  ADNEXA: no mass, fullness, tenderness        ASSESSMENT:   1. Preoperative exam for gynecologic surgery  Vaginosis Screen by DNA Probe   2. Abnormal uterine bleeding (AUB)  Vaginosis Screen by DNA Probe   3. Intramural leiomyoma of uterus  Vaginosis Screen by DNA Probe   4. Moderate cervical dysplasia  Vaginosis Screen by DNA Probe         PLAN:       Orders Placed This Encounter    Vaginosis Screen by DNA Probe      TLH / BS  Return to clinic for post op    "

## 2017-03-13 NOTE — OP NOTE
OPERATIVE REPORT    DATE: 03/13/2017    PREOPERATIVE DIAGNOSIS  1. AUB-L  2. History of cervical dysplasia    POSTOPERATIVE DIAGNOSIS  1.same    PROCEDURE:  1. Total Laparoscopic Hysterectomy  2. Bilateral salpingectomy    SURGEON: Dr. Lucy Daly    ASSISTANT: Dr. Tia Soto    ANESTHESIA: General    COMPLICATIONS: None    EBL: 50 cc    IV FLUIDS: 1100 cc    URINE OUTPUT: 960 cc    FINDINGS: Enlarged uterus approximately 10 week size, normal tubes and ovaries bilaterally    PROCEDURE IN DETAIL:  Patient was taken to the operating room where general anesthesia was administered and found to be adequate. She was prepped and draped in the dorsal lithotomy position. A weighted sterile speculum was placed in the vagina. The anterior lip of the cervix was grasped with a single tooth tenaculum. The uterus was sounded to approximately 10 cm. A stay suture of 0-Vicryl was placed at 12 o'clock and 6 o'clock on the cervix. A BEN manipulator was placed inside the endometrial cavity. Cervical cap and balloon occluder were also placed.    Gloves were changed. A Veress needle was inserted into the umbilicus under tenting of the anterior abdominal wall. Placement into the peritoneal cavity was confirmed via saline drop test. The abdomen was insufflated to 15mm Hg using Carbon dioxide. A 10 mm infraumbilical skin incision was made with the scalpel. A 10 mm optiview trocar was advanced through this incision. Excellent hemostasis was noted. The patient was placed in deep Trendelenburg. A 5 mm left lateral skin incision was made with a scalpel and a 5 mm trocar was advanced through this incision under visualization of the camera. A 5 mm right lateral skin incision was made with the scalpel. A 5 mm trocar was advanced through this incision under visualization of the camera. Excellent hemostasis was noted.     A survey of the pelvis was taken.  Ureters were identified.  Attention was turned to the left round ligament. This was  cauterized and transected with the monopolar scissors and continued anteriorly to create the bladder flap to the midline. The left fallopian tube was then grasped at the fimbriated end and transected across the mesosalpinx with the Ligasure. Attention was turned to the left utero-ovarian ligament. This was doubly cauterized and transected with the Ligasure and carried down to the level of the uterine vessels. The vessels were doubly cauterized but not transected. Attention was turned to the right round ligament. It was cauterized and transected with the monopolar scissors and continued anteriorly to finish creating the bladder flap. The right fallopian tube was then grasped at the fimbriated end and transected across the mesosalpinx with the Ligasure. Attention was turned to the right utero-ovarian ligament. This was doubly cauterized and transected with the Ligasure and carried down to the level of the uterine vessels. The vessels were doubly cauterized but not transected. Attention was turned to the anterior portion of the cervix. The bladder was dissected off the cervix. Attention was turned to the posterior portion of the uterus. The posterior colpotomy was created with the spatula and carried around to the level of the uterine vessels bilaterally. TThe anterior colpotomy was created with the spatula. This was continued to the level of the uterine vessels bilaterally. The anterior and posterior colpotomies were connected.     The uterus was removed vaginally. The vaginal cuff angles were grasped with Allis clamps, and both vaginal cuff angles were closed with 0-Vicryl suture using a figure of eight stitch. The remainder of the vaginal cuff was closed using 0-Vicryl interrupted suture. Excellent hemostasis was noted.     Gloves were changed and the laparoscope was reinserted through the trocar. The pelvis was copiously irrigated and noted to be hemostatic. Ureters were peristalsing.  The 5mm camera was then used  to visualize closure of the fascia at the 10mm port using the Apolinar Thomasen in the normal fashion.    Attention was turned to the anterior abdominal wall. The abdomen was deflated and all trocars were removed. The skin was closed with 4-0 Monocryl in a subcuticular fashion. Sponge, lap, and needle counts were correct x 2. The patient was taken to the recovery room in stable condition with the hernandez draining urine.     Tia Soto MD  PGY-4  Pager: [phone]

## 2017-03-13 NOTE — PROGRESS NOTES
[facility]  Obstetrics & Gynecology  Progress Note    Patient Name: Marlyn Camacho  MRN: [id number]  Admission Date: 3/13/2017  Primary Care Provider: Mindi Donahue MD  Principal Problem: Abnormal uterine bleeding (AUB)    Subjective:     Interval History: Pt is POD#0 s/p TLH/BS for management of AUB-L.     Patient is doing well this afternoon. Pain is well controlled. Tolerating sips of water without complications. She denies fever/chills, nausea/vomiting, CP, and SOB. Sanchez in place, draining clear urine. She has not ambulated. Denies passing flatus or BM.         Scheduled Meds:   ketorolac  30 mg Intravenous Q6H    Followed by    [START ON 3/14/2017] ibuprofen  800 mg Oral Q8H     Continuous Infusions:   sodium chloride 0.9% 125 mL/hr at 03/13/17 1108     PRN Meds:acetaminophen, diphenhydrAMINE, HYDROmorphone, ondansetron, oxycodone-acetaminophen, oxycodone-acetaminophen, promethazine (PHENERGAN) IVPB, simethicone    Review of patient's allergies indicates:   Allergen Reactions    Dye Anaphylaxis     Hair lightener/bleach       Objective:     Vital Signs (Most Recent):  Temp: 98.4 °F (36.9 °C) (03/13/17 1537)  Pulse: 68 (03/13/17 1537)  Resp: 18 (03/13/17 1537)  BP: 119/71 (03/13/17 1537)  SpO2: 97 % (03/13/17 1537) Vital Signs (24h Range):  Temp:  [97.9 °F (36.6 °C)-98.7 °F (37.1 °C)] 98.4 °F (36.9 °C)  Pulse:  [61-75] 68  Resp:  [18] 18  SpO2:  [95 %-100 %] 97 %  BP: (119-141)/(67-87) 119/71     Weight: 83.9 kg (184 lb 15.5 oz)  Body mass index is 34.95 kg/(m^2).  Patient's last menstrual period was 02/14/2017 (approximate).    I&O (Last 24H):    Intake/Output Summary (Last 24 hours) at 03/13/17 1640  Last data filed at 03/13/17 1030   Gross per 24 hour   Intake             1700 ml   Output             1310 ml   Net              390 ml       Physical Exam:   Constitutional: She is oriented to person, place, and time. She appears well-developed and well-nourished. No distress.     HENT:   Head: Normocephalic and atraumatic.   Right Ear: External ear normal.   Left Ear: External ear normal.      Cardiovascular: Normal rate and regular rhythm.     Pulmonary/Chest: Effort normal and breath sounds normal. No respiratory distress.        Abdominal: Soft. She exhibits abdominal incision (incision sites clean dry and intact). She exhibits no distension. There is no tenderness.             Musculoskeletal: Normal range of motion. She exhibits no tenderness.       Neurological: She is alert and oriented to person, place, and time.    Skin: Skin is warm and dry. She is not diaphoretic.        Laboratory:  CBC: No results for input(s): WBC, RBC, HGB, HCT, PLT, MCV, MCH, MCHC in the last 48 hours.      Assessment/Plan:     Active Diagnoses:    Diagnosis Date Noted POA    PRINCIPAL PROBLEM:  Abnormal uterine bleeding (AUB) [N93.9] 12/23/2016 Yes    S/P TLH/BS [Z90.710] 03/13/2017 No    Fibroid, uterine [D25.9] 12/23/2016 Yes    Moderate cervical dysplasia [N87.1] 02/19/2014 Yes      Problems Resolved During this Admission:    Diagnosis Date Noted Date Resolved POA       POD#0 s/p TLH/BS for management of AUB-L    1. POSTOP  - continue routine postop care  - toradol scheduled q6 x24hrs  - PRN pain medication  - advanced diet as tolerated  - hernandez in place, remove in AM  - encourage ambulation  - IS to bedside    2. HTN  - BPs stable  - restart HCTZ on d/c    Philip Null MD  Obstetrics & Gynecology  [facility]

## 2017-03-13 NOTE — PROGRESS NOTES
Pt arrived to floor via stretcher with JADEN Shearer and transferred self to bed. VS taken and stable on RA and afebrile. IVF started, SCDs applied, oriented to room, call light placed within reach, bed low and locked, and family at bedside. Pt complains of pain 7 /10.  Sanchez noted draining clear yellow urine to gravity. Incisions noted to abdomen; lap sites x3, CDI. No acute distress noted at this time. Will continue to monitor.

## 2017-03-13 NOTE — IP AVS SNAPSHOT
[facility] Location ([facility])  [address] [place] [address]  Phone: [phone]           Patient Discharge Instructions     Our goal is to set you up for success. This packet includes information on your condition, medications, and your home care. It will help you to care for yourself so you don't get sicker and need to go back to the hospital.     Please ask your nurse if you have any questions.        There are many details to remember when preparing to leave the hospital. Here is what you will need to do:    1. Take your medicine. If you are prescribed medications, review your Medication List in the following pages. You may have new medications to  at the pharmacy and others that you'll need to stop taking. Review the instructions for how and when to take your medications. Talk with your doctor or nurses if you are unsure of what to do.     2. Go to your follow-up appointments. Specific follow-up information is listed in the following pages. Your may be contacted by a transition nurse or clinical provider about future appointments. Be sure we have all of the phone numbers to reach you, if needed. Please contact your provider's office if you are unable to make an appointment.     3. Watch for warning signs. Your doctor or nurse will give you detailed warning signs to watch for and when to call for assistance. These instructions may also include educational information about your condition. If you experience any of warning signs to your health, call your doctor.               ** Verify the list of medication(s) below is accurate and up to date. Carry this with you in case of emergency. If your medications have changed, please notify your healthcare provider.             Medication List      START taking these medications        Additional Info                      ibuprofen 600 MG tablet   Commonly known as:  ADVIL,MOTRIN   Quantity:  30 tablet   Refills:  3   Dose:  600 mg     Instructions:  Take 1 tablet (600 mg total) by mouth every 6 (six) hours as needed for Pain.     Begin Date    AM    Noon    PM    Bedtime       oxycodone-acetaminophen 5-325 mg per tablet   Commonly known as:  PERCOCET   Quantity:  30 tablet   Refills:  0   Dose:  1 tablet    Last time this was given:  1 tablet on 3/13/2017  8:35 PM   Instructions:  Take 1 tablet by mouth every 4 (four) hours as needed.     Begin Date    AM    Noon    PM    Bedtime         CONTINUE taking these medications        Additional Info                      desoximetasone 0.25 % ointment   Quantity:  60 g   Refills:  2    Instructions:  Apply topically 2 (two) times daily. To affected areas on arms and neck     Begin Date    AM    Noon    PM    Bedtime       epinephrine 0.3 mg/0.3 mL Atin   Commonly known as:  EPIPEN   Quantity:  2 Device   Refills:  5   Dose:  1 each    Instructions:  Inject 0.3 mLs (0.3 mg total) into the muscle once.     Begin Date    AM    Noon    PM    Bedtime       hydrochlorothiazide 25 MG tablet   Commonly known as:  HYDRODIURIL   Quantity:  90 tablet   Refills:  3   Dose:  25 mg    Instructions:  Take 1 tablet (25 mg total) by mouth once daily.     Begin Date    AM    Noon    PM    Bedtime       metronidazole 500 MG tablet   Commonly known as:  FLAGYL   Quantity:  14 tablet   Refills:  0   Dose:  500 mg    Instructions:  Take 1 tablet (500 mg total) by mouth 2 (two) times daily.     Begin Date    AM    Noon    PM    Bedtime         STOP taking these medications     triamcinolone acetonide 0.1% 0.1 % cream   Commonly known as:  KENALOG            Where to Get Your Medications      These medications were sent to [facility] [id number] [facility] [address] [place] [address]     Phone:  [phone]     ibuprofen 600 MG tablet         You can get these medications from any pharmacy     Bring a paper prescription for each of these  medications     oxycodone-acetaminophen 5-325 mg per tablet                  Please bring to all follow up appointments:    1. A copy of your discharge instructions.  2. All medicines you are currently taking in their original bottles.  3. Identification and insurance card.    Please arrive 15 minutes ahead of scheduled appointment time.    Please call 24 hours in advance if you must reschedule your appointment and/or time.        Your Scheduled Appointments     Apr 24, 2017  1:15 PM CDT   Post OP with Lucy Daly MD   [facility] [address] ([facility])    [address] [facility] [address] [phone]              Follow-up Information     Follow up with Lucy Daly MD. Schedule an appointment as soon as possible for a visit in 6 weeks.    Specialties:  Obstetrics, Obstetrics and Gynecology    Why:  surgery follow up    Contact information:    [address] [place] [address] [address]  [facility] [id number] [phone]          Discharge Instructions     Future Orders    Activity as tolerated     Call MD for:  difficulty breathing or increased cough     Call MD for:  persistent dizziness, light-headedness, or visual disturbances     Call MD for:  persistent nausea and vomiting or diarrhea     Call MD for:  redness, tenderness, or signs of infection (pain, swelling, redness, odor or green/yellow discharge around incision site)     Call MD for:  severe uncontrolled pain     Call MD for:  temperature >100.4     Diet general     Questions:    Total calories:      Fat restriction, if any:      Protein restriction, if any:      Na restriction, if any:      Fluid restriction:      Additional restrictions:        Discharge References/Attachments     HYSTERECTOMY, LAPAROSCOPIC: YOUR HOME RECOVERY (ENGLISH)        Primary Diagnosis     Your primary diagnosis was:  Abnormal Bleeding From Uterus      Admission Information     Date & Time Provider Department [facility]    3/13/2017  5:04 AM Lucy Daly MD  "[facility] [id number]      Care Providers     Provider Role Specialty Primary office phone    Lucy Daly MD Attending Provider Obstetrics [phone]    Lucy Daly MD Surgeon  Obstetrics [phone]      Your Vitals Were     BP Pulse Temp Resp Height Weight    114/75 (BP Location: Right arm, Patient Position: Lying, BP Method: Automatic) 74 98.4 °F (36.9 °C) (Oral) 18 5' 1" (1.549 m) 83.9 kg (184 lb 15.5 oz)    Last Period SpO2 BMI          02/14/2017 (Approximate) 98% 34.95 kg/m2        Recent Lab Values     No lab values to display.      Pending Labs     Order Current Status    Specimen to Pathology - Surgery In process      Allergies as of 3/14/2017        Reactions    Dye Anaphylaxis    Hair lightener/bleach      [facility]     [facility] Nurse Care Line - 24/7 Assistance  Unless otherwise directed by your provider, please contact [facility], our nurse care line that is available for 24/7 assistance.     Registered nurses in the [facility] provide clinical advisement, health education, appointment booking, and other advisory services.  Call for this free service at [phone].        Advance Directives     An advance directive is a document which, in the event you are no longer able to make decisions for yourself, tells your healthcare team what kind of treatment you do or do not want to receive, or who you would like to make those decisions for you.  If you do not currently have an advance directive, [facility] encourages you to create one.  For more information call:  [phone]), [phone] ([phone]),  or log on to [email or web address].        Language Assistance Services     ATTENTION: Language assistance services are available, free of charge. Please call [phone].      ATENCIÓN: Si habla español, tiene a bernal disposición servicios gratuitos de asistencia lingüística. Llame al [phone].     CHÚ Ý: N?u " b?n nói Ti?ng Vi?t, có các d?ch v? h? tr? ngôn ng? mi?n phí dành cho b?n. G?i s? [phone].         [facility] complies with applicable Federal civil rights laws and does not discriminate on the basis of race, color, national origin, age, disability, or sex.

## 2017-03-13 NOTE — TRANSFER OF CARE
"Anesthesia Transfer of Care Note    Patient: Marlyn Camacho    Procedure(s) Performed: Procedure(s) (LRB):  HYSTERECTOMY-TOTAL LAPAROSCOPIC (TLH) (N/A)    Patient location: PACU    Anesthesia Type: general    Transport from OR: Transported from OR on 2-3 L/min O2 by NC with adequate spontaneous ventilation    Post pain: adequate analgesia    Post assessment: no apparent anesthetic complications    Post vital signs: stable    Level of consciousness: awake    Nausea/Vomiting: no nausea/vomiting    Complications: none          Last vitals:   Visit Vitals    /70 (BP Location: Right arm, Patient Position: Lying, BP Method: Automatic)    Pulse 67    Temp 37.1 °C (98.7 °F) (Oral)    Resp 18    Ht 5' 1" (1.549 m)    Wt 83.9 kg (185 lb)    LMP 02/14/2017 (Approximate)    SpO2 100%    Breastfeeding No    BMI 34.96 kg/m2     "

## 2017-03-13 NOTE — PLAN OF CARE
Problem: Patient Care Overview  Goal: Plan of Care Review  Outcome: Ongoing (interventions implemented as appropriate)  IS done

## 2017-03-13 NOTE — PROGRESS NOTES
SUBJECTIVE:   37 y.o. female   for preoperative gyn exam. Patient's last menstrual period was 2017 (approximate)..  Reports heavy periods. Ultrasound done shows normal uterus with 2 cm fibroids. H/o BTL. Has h/o abnormal PAP and CKC done for JENARO 2. PAP 3- was normal. Desires mgt with hysterectomy.         Past Medical History:   Diagnosis Date    Abnormal Pap smear of vagina     Allergy     Hypertension     Joint pain      Past Surgical History:   Procedure Laterality Date    CKC, ECC, fractional D&C      COLPOSCOPY      CONIZATION-CERVIX  2015    DILATION AND CURETTAGE OF UTERUS      TUBAL LIGATION       Social History     Social History    Marital status:      Spouse name: N/A    Number of children: N/A    Years of education: N/A     Occupational History    Not on file.     Social History Main Topics    Smoking status: Never Smoker    Smokeless tobacco: Not on file    Alcohol use Yes      Comment: occasional    Drug use: No    Sexual activity: Yes     Partners: Male     Birth control/ protection: Other-see comments      Comment: Tubal Ligation     Other Topics Concern    Are You Pregnant Or Think You May Be? No    Breast-Feeding No     Social History Narrative     Family History   Problem Relation Age of Onset    Eczema Mother     Ovarian cancer Mother     Eczema Son     Eczema Maternal Grandmother     Eczema Son     Melanoma Neg Hx     Lupus Neg Hx     Psoriasis Neg Hx     Breast cancer Neg Hx     Colon cancer Neg Hx      OB History    Para Term  AB SAB TAB Ectopic Multiple Living   3 3        3      # Outcome Date GA Lbr Bennie/2nd Weight Sex Delivery Anes PTL Lv   3 Para      Vag-Spont   Y   2 Para      Vag-Spont   Y   1 Para      Vag-Spont   Y            Current Outpatient Prescriptions   Medication Sig Dispense Refill    hydrochlorothiazide (HYDRODIURIL) 25 MG tablet Take 1 tablet (25 mg total) by mouth once daily. 90 tablet 3     "desoximetasone 0.25 % ointment Apply topically 2 (two) times daily. To affected areas on arms and neck 60 g 2    epinephrine (EPIPEN) 0.3 mg/0.3 mL AtIn Inject 0.3 mLs (0.3 mg total) into the muscle once. 2 Device 5    metronidazole (FLAGYL) 500 MG tablet Take 1 tablet (500 mg total) by mouth 2 (two) times daily. 14 tablet 0    triamcinolone acetonide 0.1% (KENALOG) 0.1 % cream Apply topically 2 (two) times daily. 80 g 1     No current facility-administered medications for this visit.      Allergies: Dye     ROS:  Constitutional: no weight loss, weight gain, fever, fatigue  Eyes:  No vision changes, glasses/contacts  ENT/Mouth: No ulcers, sinus problems, ears ringing, headache  Cardiovascular: No inability to lie flat, chest pain, exercise intolerance, swelling, heart palpitations  Respiratory: No wheezing, coughing blood, shortness of breath, or cough  Gastrointestinal: No diarrhea, bloody stool, nausea/vomiting, constipation, gas, hemorrhoids  Genitourinary: No blood in urine, painful urination, urgency of urination, frequency of urination, incomplete emptying, incontinence,+abnormal bleeding, painful periods, heavy periods, vaginal discharge, vaginal odor, painful intercourse, sexual problems, bleeding after intercourse.  Musculoskeletal: No muscle weakness  Skin/Breast: No painful breasts, nipple discharge, masses, rash, ulcers  Neurological: No passing out, seizures, numbness, headache  Endocrine: No diabetes, hypothyroid, hyperthyroid, hot flashes, hair loss, abnormal hair growth, ance  Psychiatric: No depression, crying  Hematologic: No bruises, bleeding, swollen lymph nodes, anemia.      OBJECTIVE:   The patient appears well, alert, oriented x 3, in no distress.  /70 (BP Location: Right arm, Patient Position: Sitting, BP Method: Manual)  Ht 5' 1" (1.549 m)  Wt 84.1 kg (185 lb 6.5 oz)  LMP 02/14/2017 (Approximate)  BMI 35.03 kg/m2  NECK: no thyromegaly, trachea midline  SKIN: no acne, striae, " hirsutism  CHEST: CTAB  CV: RRR  BREAST EXAM: breasts appear normal, no suspicious masses, no skin or nipple changes or axillary nodes  ABDOMEN: no hernias, masses, or hepatosplenomegaly  GENITALIA: normal external genitalia, no erythema, no discharge  URETHRA: normal urethra, normal urethral meatus  VAGINA: Normal  CERVIX: no lesions or cervical motion tenderness  UTERUS: normal size, contour, position, consistency, mobility, non-tender  ADNEXA: no mass, fullness, tenderness      ASSESSMENT:   1. Preoperative exam for gynecologic surgery  Vaginosis Screen by DNA Probe   2. Abnormal uterine bleeding (AUB)  Vaginosis Screen by DNA Probe   3. Intramural leiomyoma of uterus  Vaginosis Screen by DNA Probe   4. Moderate cervical dysplasia  Vaginosis Screen by DNA Probe       PLAN:   Orders Placed This Encounter    Vaginosis Screen by DNA Probe     Discussed AUB, TLH, consents signed  Return to clinic for pos op

## 2017-03-13 NOTE — PLAN OF CARE
Problem: Patient Care Overview  Goal: Plan of Care Review  Outcome: Outcome(s) achieved Date Met:  03/13/17  Remains free from fall, injury, and skin breakdown. VSS stable on RA and afebrile. Positions self independently. Pain mildly controlled with PO PRN meds. TEDs/SCDs maintained.Tolerating ordered diet. Sanchez catheter care performed. Lap sites x3, CDI.  IV site WNL. Plan of care reviewed with patient and all questions answered. Bed low, locked w/ bed alarm on. Call light within reach. Purposeful rounding performed. No other complaints at this time.

## 2017-03-13 NOTE — ANESTHESIA POSTPROCEDURE EVALUATION
"Anesthesia Post Evaluation    Patient: Marlyn Camacho    Procedure(s) Performed: Procedure(s) (LRB):  HYSTERECTOMY-TOTAL LAPAROSCOPIC (TLH) (N/A)    Final Anesthesia Type: general  Patient location during evaluation: PACU  Patient participation: Yes- Able to Participate  Level of consciousness: awake and alert  Post-procedure vital signs: reviewed and stable  Pain management: adequate  Airway patency: patent  PONV status at discharge: No PONV  Anesthetic complications: no      Cardiovascular status: blood pressure returned to baseline  Respiratory status: unassisted  Hydration status: euvolemic  Follow-up not needed.        Visit Vitals    /67 (BP Location: Right arm, Patient Position: Lying, BP Method: Automatic)    Pulse 75    Temp 36.8 °C (98.2 °F) (Oral)    Resp 18    Ht 5' 1" (1.549 m)    Wt 83.9 kg (184 lb 15.5 oz)    LMP 02/14/2017 (Approximate)    SpO2 97%    Breastfeeding No    BMI 34.95 kg/m2       Pain/Miguel Score: Pain Assessment Performed: Yes (3/13/2017 11:22 AM)  Presence of Pain: complains of pain/discomfort (3/13/2017 11:22 AM)  Pain Rating Prior to Med Admin: 5 (3/13/2017  1:04 PM)  Pain Rating Post Med Admin: 2 (3/13/2017 10:30 AM)  Miguel Score: 8 (3/13/2017 10:30 AM)      "

## 2017-03-13 NOTE — PLAN OF CARE
Patient prefers to have  Sidney Camacho present for discharge teaching. Please contact them @[phone].

## 2017-03-13 NOTE — INTERVAL H&P NOTE
The patient has been examined and the H&P has been reviewed:    I concur with the findings and no changes have occurred since H&P was written.      Active Hospital Problems    Diagnosis  POA    *Abnormal uterine bleeding (AUB) [N93.9]  Yes    Fibroid, uterine [D25.9]  Yes    Moderate cervical dysplasia [N87.1]  Yes      Resolved Hospital Problems    Diagnosis Date Resolved POA   No resolved problems to display.

## 2017-03-14 VITALS
HEART RATE: 74 BPM | BODY MASS INDEX: 34.92 KG/M2 | RESPIRATION RATE: 18 BRPM | WEIGHT: 184.94 LBS | SYSTOLIC BLOOD PRESSURE: 114 MMHG | OXYGEN SATURATION: 98 % | TEMPERATURE: 98 F | HEIGHT: 61 IN | DIASTOLIC BLOOD PRESSURE: 75 MMHG

## 2017-03-14 LAB
BASOPHILS # BLD AUTO: 0.01 K/UL
BASOPHILS NFR BLD: 0.1 %
DIFFERENTIAL METHOD: ABNORMAL
EOSINOPHIL # BLD AUTO: 0 K/UL
EOSINOPHIL NFR BLD: 0.2 %
ERYTHROCYTE [DISTWIDTH] IN BLOOD BY AUTOMATED COUNT: 13.6 %
HCT VFR BLD AUTO: 36 %
HGB BLD-MCNC: 12.1 G/DL
LYMPHOCYTES # BLD AUTO: 2.4 K/UL
LYMPHOCYTES NFR BLD: 19 %
MCH RBC QN AUTO: 30.6 PG
MCHC RBC AUTO-ENTMCNC: 33.6 %
MCV RBC AUTO: 91 FL
MONOCYTES # BLD AUTO: 0.6 K/UL
MONOCYTES NFR BLD: 4.5 %
NEUTROPHILS # BLD AUTO: 9.4 K/UL
NEUTROPHILS NFR BLD: 75.9 %
PLATELET # BLD AUTO: 280 K/UL
PMV BLD AUTO: 9.5 FL
RBC # BLD AUTO: 3.96 M/UL
WBC # BLD AUTO: 12.39 K/UL

## 2017-03-14 PROCEDURE — 85025 COMPLETE CBC W/AUTO DIFF WBC: CPT

## 2017-03-14 PROCEDURE — 63600175 PHARM REV CODE 636 W HCPCS: Performed by: OBSTETRICS & GYNECOLOGY

## 2017-03-14 PROCEDURE — 25000003 PHARM REV CODE 250: Performed by: OBSTETRICS & GYNECOLOGY

## 2017-03-14 PROCEDURE — 94799 UNLISTED PULMONARY SVC/PX: CPT

## 2017-03-14 PROCEDURE — 36415 COLL VENOUS BLD VENIPUNCTURE: CPT

## 2017-03-14 RX ORDER — IBUPROFEN 600 MG/1
600 TABLET ORAL EVERY 6 HOURS PRN
Qty: 30 TABLET | Refills: 3 | Status: SHIPPED | OUTPATIENT
Start: 2017-03-14 | End: 2017-08-23 | Stop reason: ALTCHOICE

## 2017-03-14 RX ADMIN — SODIUM CHLORIDE: 0.9 INJECTION, SOLUTION INTRAVENOUS at 05:03

## 2017-03-14 RX ADMIN — KETOROLAC TROMETHAMINE 30 MG: 30 INJECTION, SOLUTION INTRAMUSCULAR at 05:03

## 2017-03-14 RX ADMIN — KETOROLAC TROMETHAMINE 30 MG: 30 INJECTION, SOLUTION INTRAMUSCULAR at 12:03

## 2017-03-14 NOTE — NURSING
Pt resting quietly in bed with family at bedside. Medicated x1 for c/opain. Pain moderately controlled on current regimen. VSS, 97 % on RA.NS @ 125 infusing per order.  Sanchez cath to gravity removed per order. Tolerated well. Ambulated in hallway x1 this shift. Teds/SCDs on. purposeful rounding done. Safety maintained. No acute distress noted at this time. Will continue to monitor.

## 2017-03-14 NOTE — PROGRESS NOTES
[facility]  Obstetrics & Gynecology  Progress Note    Patient Name: Marlyn Camacho  MRN: [id number]  Admission Date: 3/13/2017  Primary Care Provider: Mindi Donahue MD  Principal Problem: Abnormal uterine bleeding (AUB)    Subjective:     Interval History: Pt is POD#1 s/p TLH/BS for management of AUB-L.     Patient is doing well this morning. Pain is well controlled. Tolerated chicken noodle soup last night without complications. She denies fever/chills, nausea/vomiting, CP, and SOB. Sanchez removed this morning but patient has not attempted to void. She has ambulated up and down the hallway. She has passedflatus but denies BM.         Scheduled Meds:   ketorolac  30 mg Intravenous Q6H    Followed by    ibuprofen  800 mg Oral Q8H     Continuous Infusions:   sodium chloride 0.9% 125 mL/hr at 03/14/17 0529     PRN Meds:acetaminophen, diphenhydrAMINE, HYDROmorphone, ondansetron, oxycodone-acetaminophen, oxycodone-acetaminophen, promethazine (PHENERGAN) IVPB, simethicone    Review of patient's allergies indicates:   Allergen Reactions    Dye Anaphylaxis     Hair lightener/bleach       Objective:     Vital Signs (Most Recent):  Temp: 98.3 °F (36.8 °C) (03/14/17 0400)  Pulse: 72 (03/14/17 0400)  Resp: 18 (03/14/17 0400)  BP: 114/64 (03/14/17 0400)  SpO2: 97 % (03/14/17 0400) Vital Signs (24h Range):  Temp:  [97.9 °F (36.6 °C)-98.7 °F (37.1 °C)] 98.3 °F (36.8 °C)  Pulse:  [61-78] 72  Resp:  [16-18] 18  SpO2:  [95 %-100 %] 97 %  BP: (114-141)/(64-87) 114/64     Weight: 83.9 kg (184 lb 15.5 oz)  Body mass index is 34.95 kg/(m^2).  Patient's last menstrual period was 02/14/2017 (approximate).    I&O (Last 24H):    Intake/Output Summary (Last 24 hours) at 03/14/17 0605  Last data filed at 03/14/17 0000   Gross per 24 hour   Intake             4438 ml   Output             3860 ml   Net              578 ml       Physical Exam:   Constitutional: She is oriented to person, place, and time. She  appears well-developed and well-nourished. No distress.    HENT:   Head: Normocephalic and atraumatic.   Right Ear: External ear normal.   Left Ear: External ear normal.      Cardiovascular: Normal rate and regular rhythm.     Pulmonary/Chest: Effort normal and breath sounds normal. No respiratory distress.        Abdominal: Soft. She exhibits abdominal incision (incision sites clean dry and intact). She exhibits no distension. There is no tenderness.     Genitourinary:   Genitourinary Comments: Vaginal spotting           Musculoskeletal: Normal range of motion. She exhibits no tenderness.       Neurological: She is alert and oriented to person, place, and time.    Skin: Skin is warm and dry. She is not diaphoretic.        Laboratory:  CBC:     Recent Labs  Lab 03/14/17  0441   WBC 12.39   RBC 3.96*   HGB 12.1   HCT 36.0*      MCV 91   MCH 30.6   MCHC 33.6         Assessment/Plan:     Active Diagnoses:    Diagnosis Date Noted POA    PRINCIPAL PROBLEM:  Abnormal uterine bleeding (AUB) [N93.9] 12/23/2016 Yes    S/P TLH/BS [Z90.710] 03/13/2017 No    Fibroid, uterine [D25.9] 12/23/2016 Yes    Moderate cervical dysplasia [N87.1] 02/19/2014 Yes      Problems Resolved During this Admission:    Diagnosis Date Noted Date Resolved POA       POD#1 s/p TLH/BS for management of AUB-L. Patient is recovering well.    1. POSTOP  - continue routine postop care  - toradol scheduled q6 x24hrs  - PRN pain medication. Percocet, ibuprofen. Dilaudid for breakthrough pain  - Regular diet  - Sanchez removed, patient has not voided.  - Ambulating  - AM H/h 12.1/36 from 13.5/39.8  - IS to bedside    2. HTN  - BPs stable  - BP: (114-141)/(64-87) 114/64  - restart HCTZ on d/c    Dispo: patient is doing well this morning. Plan to discharge today as she continues to meet postoperative goals.    Philip Null MD  Obstetrics & Gynecology  [facility]

## 2017-03-14 NOTE — DISCHARGE SUMMARY
[facility]  Obstetrics & Gynecology  Discharge Summary    Patient Name: Marlyn Camacho  MRN: [id number]  Admission Date: 3/13/2017  Hospital Length of Stay: 0 days  Discharge Date and Time:  03/14/2017 6:48 AM  Attending Physician: Lucy Daly MD   Discharging Provider: Philip Null MD  Primary Care Provider: Mindi Donahue MD    HPI: Pt admitted for scheduled procedure. See op note for details. Postop course was uneventful. On POD#1, patient is meeting all postop milestones and is ready for discharge. Standard discharge precautions given. Patient discharged home in stable condition.    Hospital Course: see above    Procedure(s) (LRB):  HYSTERECTOMY-TOTAL LAPAROSCOPIC (TLH) (N/A)     Consults:     Significant Diagnostic Studies: Labs: CBC     Recent Labs  Lab 03/14/17  0441   WBC 12.39   HGB 12.1   HCT 36.0*          Pending Diagnostic Studies:     None        Final Active Diagnoses:    Diagnosis Date Noted POA    PRINCIPAL PROBLEM:  Abnormal uterine bleeding (AUB) [N93.9] 12/23/2016 Yes    S/P TLH/BS [Z90.710] 03/13/2017 No    Fibroid, uterine [D25.9] 12/23/2016 Yes    Moderate cervical dysplasia [N87.1] 02/19/2014 Yes      Problems Resolved During this Admission:    Diagnosis Date Noted Date Resolved POA        Discharged Condition: good    Disposition: Home or Self Care    Follow Up:  Follow-up Information     Follow up with Lucy Daly MD. Schedule an appointment as soon as possible for a visit in 6 weeks.    Specialties:  Obstetrics, Obstetrics and Gynecology    Why:  surgery follow up    Contact information:    [address] [place] [id number]  [phone]          Patient Instructions:     Diet general     Activity as tolerated     Call MD for:  temperature >100.4     Call MD for:  persistent nausea and vomiting or diarrhea     Call MD for:  severe uncontrolled pain     Call MD for:  redness, tenderness, or signs of infection  (pain, swelling, redness, odor or green/yellow discharge around incision site)     Call MD for:  difficulty breathing or increased cough     Call MD for:  persistent dizziness, light-headedness, or visual disturbances       Medications:  Reconciled Home Medications:   Current Discharge Medication List      START taking these medications    Details   ibuprofen (ADVIL,MOTRIN) 600 MG tablet Take 1 tablet (600 mg total) by mouth every 6 (six) hours as needed for Pain.  Qty: 30 tablet, Refills: 3      oxycodone-acetaminophen (PERCOCET) 5-325 mg per tablet Take 1 tablet by mouth every 4 (four) hours as needed.  Qty: 30 tablet, Refills: 0         CONTINUE these medications which have NOT CHANGED    Details   desoximetasone 0.25 % ointment Apply topically 2 (two) times daily. To affected areas on arms and neck  Qty: 60 g, Refills: 2    Associated Diagnoses: Eczema      hydrochlorothiazide (HYDRODIURIL) 25 MG tablet Take 1 tablet (25 mg total) by mouth once daily.  Qty: 90 tablet, Refills: 3      metronidazole (FLAGYL) 500 MG tablet Take 1 tablet (500 mg total) by mouth 2 (two) times daily.  Qty: 14 tablet, Refills: 0    Associated Diagnoses: BV (bacterial vaginosis)      epinephrine (EPIPEN) 0.3 mg/0.3 mL AtIn Inject 0.3 mLs (0.3 mg total) into the muscle once.  Qty: 2 Device, Refills: 5         STOP taking these medications       triamcinolone acetonide 0.1% (KENALOG) 0.1 % cream Comments:   Reason for Stopping:             Follow-up Information     Follow up with Lucy Daly MD. Schedule an appointment as soon as possible for a visit in 6 weeks.    Specialties:  Obstetrics, Obstetrics and Gynecology    Why:  surgery follow up    Contact information:    [address] [id number] [phone]            Philip Null MD  Obstetrics & Gynecology  [facility]

## 2017-03-14 NOTE — PLAN OF CARE
Problem: Patient Care Overview  Goal: Plan of Care Review  No significant events this shift. Remains free from fall, injury, and skin breakdown. Ambulates independently to bathroom. VSS stable on RA and afebrile. Positions self independently. Denied pain. Voided spontaneously; Dr Soto, GYN resident made aware and OK patient's DC. TEDs/SCDs maintained.Tolerating ordered diet. IV site WNL. Plan of care reviewed with patient and all questions answered. Bed low, locked w/ bed alarm on. Call light within reach. Purposeful rounding performed. No other complaints at this time.     Eager & in agreement w/ DC. VU of DC instructions-- [facility] paperwork & prescriptions passed. IV removed w/ cath tip intact, WNL. To be DCd home w/ family-- will be escorted downstairs via  transport team once dressed, ready & ride arrives. Free from falls, injury, or skin breakdown this hospital admission.

## 2017-04-24 ENCOUNTER — OFFICE VISIT (OUTPATIENT)
Dept: OBSTETRICS AND GYNECOLOGY | Facility: CLINIC | Age: 37
End: 2017-04-24
Attending: OBSTETRICS & GYNECOLOGY
Payer: COMMERCIAL

## 2017-04-24 VITALS
WEIGHT: 184.06 LBS | DIASTOLIC BLOOD PRESSURE: 78 MMHG | SYSTOLIC BLOOD PRESSURE: 118 MMHG | BODY MASS INDEX: 34.75 KG/M2 | HEIGHT: 61 IN

## 2017-04-24 DIAGNOSIS — Z09 POSTOP CHECK: Primary | ICD-10-CM

## 2017-04-24 PROBLEM — Z90.710 S/P TOTAL HYSTERECTOMY: Status: RESOLVED | Noted: 2017-03-13 | Resolved: 2017-04-24

## 2017-04-24 PROCEDURE — 99999 PR PBB SHADOW E&M-EST. PATIENT-LVL II: CPT | Mod: PBBFAC,,, | Performed by: OBSTETRICS & GYNECOLOGY

## 2017-04-24 PROCEDURE — 99024 POSTOP FOLLOW-UP VISIT: CPT | Mod: S$GLB,,, | Performed by: OBSTETRICS & GYNECOLOGY

## 2017-04-24 RX ORDER — EPINEPHRINE 0.3 MG/.3ML
INJECTION INTRAMUSCULAR
Refills: 4 | COMMUNITY
Start: 2017-04-15 | End: 2020-09-16 | Stop reason: SDUPTHER

## 2017-04-24 NOTE — MR AVS SNAPSHOT
[facility] [address]  [address] [address]  Phone: [phone]                  Marlyn Camacho   2017 1:15 PM   Office Visit    Description:  Female : 1980   Provider:  Lucy Daly MD   Department:  [facility] [address]           Reason for Visit     Follow-up                To Do List           Goals (5 Years of Data)     None      [facility]     [facility] On Call Nurse Care Line -  Assistance  Unless otherwise directed by your provider, please contact [facility] On-Call, our nurse care line that is available for  assistance.     Registered nurses in the [facility] On Call Center provide: appointment scheduling, clinical advisement, health education, and other advisory services.  Call: [phone] (toll free)               Medications           Message regarding Medications     Verify the changes and/or additions to your medication regime listed below are the same as discussed with your clinician today.  If any of these changes or additions are incorrect, please notify your healthcare provider.             Verify that the below list of medications is an accurate representation of the medications you are currently taking.  If none reported, the list may be blank. If incorrect, please contact your healthcare provider. Carry this list with you in case of emergency.           Current Medications     hydrochlorothiazide (HYDRODIURIL) 25 MG tablet Take 1 tablet (25 mg total) by mouth once daily.    desoximetasone 0.25 % ointment Apply topically 2 (two) times daily. To affected areas on arms and neck    EPIPEN 2-CHRISTY 0.3 mg/0.3 mL AtIn INJECT 1 PEN INTO THE MUSCLE ONCE UTD    ibuprofen (ADVIL,MOTRIN) 600 MG tablet Take 1 tablet (600 mg total) by mouth every 6 (six) hours as needed for Pain.    oxycodone-acetaminophen (PERCOCET) 5-325 mg per tablet Take 1 tablet by mouth every 4 (four) hours as needed.           Clinical Reference Information     "       Your Vitals Were     BP Height Weight Last Period BMI    118/78 5' 1" (1.549 m) 83.5 kg (184 lb 1.4 oz) 02/15/2017 (Approximate) 34.78 kg/m2      Blood Pressure          Most Recent Value    BP  118/78      Allergies as of 4/24/2017     Dye      Immunizations Administered on Date of Encounter - 4/24/2017     None      [facility] Sign-Up     Activating your [facility] account is as easy as 1-2-3!     1) Visit [email or web address], select Sign Up Now, enter this activation code and your date of birth, then select Next.  Activation code not generated  Current Patient Portal Status: Account disabled      2) Create a username and password to use when you visit [facility] in the future and select a security question in case you lose your password and select Next.    3) Enter your e-mail address and click Sign Up!    Additional Information  If you have questions, please e-mail [email or web address] or call [phone] to talk to our [facility] staff. Remember, [facility] is NOT to be used for urgent needs. For medical emergencies, dial 911.         Language Assistance Services     ATTENTION: Language assistance services are available, free of charge. Please call [phone].      ATENCIÓN: Si habla raymundodeion, tiene a bernal disposición servicios gratuitos de asistencia lingüística. Llame al [phone].     [facility] Ý: N?u b?n nói Ti?ng Vi?t, có các d?ch v? h? tr? ngôn ng? mi?n phí dành cho b?n. G?i s? [phone].         [facility] [address] complies with applicable Federal civil rights laws and does not discriminate on the basis of race, color, national origin, age, disability, or sex.        "

## 2017-04-24 NOTE — PROGRESS NOTES
SUBJECTIVE:   37 y.o. female   for post op check Patient's last menstrual period was 02/15/2017 (approximate)..  She has no unusual complaints.  Had TLH/ BS for AUB/ fibroids.  Path- neg         Past Medical History:   Diagnosis Date    Abnormal Pap smear of vagina     Allergy     Hypertension     Joint pain      Past Surgical History:   Procedure Laterality Date    CKC, ECC, fractional D&C      COLPOSCOPY      CONIZATION-CERVIX  2015    DILATION AND CURETTAGE OF UTERUS      HYSTERECTOMY  2017    AUB, fibroids    TUBAL LIGATION       Social History     Social History    Marital status:      Spouse name: N/A    Number of children: N/A    Years of education: N/A     Occupational History    Not on file.     Social History Main Topics    Smoking status: Never Smoker    Smokeless tobacco: Not on file    Alcohol use Yes      Comment: occasional    Drug use: No    Sexual activity: Yes     Partners: Male     Birth control/ protection: Other-see comments      Comment: Tubal Ligation     Other Topics Concern    Are You Pregnant Or Think You May Be? No    Breast-Feeding No     Social History Narrative     Family History   Problem Relation Age of Onset    Eczema Mother     Ovarian cancer Mother     Eczema Son     Eczema Maternal Grandmother     Eczema Son     Melanoma Neg Hx     Lupus Neg Hx     Psoriasis Neg Hx     Breast cancer Neg Hx     Colon cancer Neg Hx      OB History    Para Term  AB SAB TAB Ectopic Multiple Living   3 3        3      # Outcome Date GA Lbr Bennie/2nd Weight Sex Delivery Anes PTL Lv   3 Para      Vag-Spont   Y   2 Para      Vag-Spont   Y   1 Para      Vag-Spont   Y            Current Outpatient Prescriptions   Medication Sig Dispense Refill    hydrochlorothiazide (HYDRODIURIL) 25 MG tablet Take 1 tablet (25 mg total) by mouth once daily. 90 tablet 3    desoximetasone 0.25 % ointment Apply topically 2 (two) times daily. To affected areas on  "arms and neck 60 g 2    EPIPEN 2-CHRISTY 0.3 mg/0.3 mL AtIn INJECT 1 PEN INTO THE MUSCLE ONCE UTD  4    ibuprofen (ADVIL,MOTRIN) 600 MG tablet Take 1 tablet (600 mg total) by mouth every 6 (six) hours as needed for Pain. 30 tablet 3    oxycodone-acetaminophen (PERCOCET) 5-325 mg per tablet Take 1 tablet by mouth every 4 (four) hours as needed. 30 tablet 0     No current facility-administered medications for this visit.      Allergies: Dye     ROS:  Constitutional: no weight loss, weight gain, fever, fatigue  Eyes:  No vision changes, glasses/contacts  ENT/Mouth: No ulcers, sinus problems, ears ringing, headache  Cardiovascular: No inability to lie flat, chest pain, exercise intolerance, swelling, heart palpitations  Respiratory: No wheezing, coughing blood, shortness of breath, or cough  Gastrointestinal: No diarrhea, bloody stool, nausea/vomiting, constipation, gas, hemorrhoids  Genitourinary: No blood in urine, painful urination, urgency of urination, frequency of urination, incomplete emptying, incontinence, abnormal bleeding, painful periods, heavy periods, vaginal discharge, vaginal odor, painful intercourse, sexual problems, bleeding after intercourse.  Musculoskeletal: No muscle weakness  Skin/Breast: No painful breasts, nipple discharge, masses, rash, ulcers  Neurological: No passing out, seizures, numbness, headache  Endocrine: No diabetes, hypothyroid, hyperthyroid, hot flashes, hair loss, abnormal hair growth, ance  Psychiatric: No depression, crying  Hematologic: No bruises, bleeding, swollen lymph nodes, anemia.      OBJECTIVE:   The patient appears well, alert, oriented x 3, in no distress.  /78  Ht 5' 1" (1.549 m)  Wt 83.5 kg (184 lb 1.4 oz)  LMP 02/15/2017 (Approximate)  BMI 34.78 kg/m2  NECK: no thyromegaly, trachea midline  SKIN: no acne, striae, hirsutism  CHEST: CTAB  CV: RRR  BREAST EXAM: breasts appear normal, no suspicious masses, no skin or nipple changes or axillary nodes  ABDOMEN: " no hernias, masses, or hepatosplenomegaly  GENITALIA: normal external genitalia, no erythema, no discharge  URETHRA: normal urethra, normal urethral meatus  VAGINA: Normal  CERVIX: absent  UTERUS: absent  ADNEXA: no mass, fullness, tenderness      ASSESSMENT:   1. Postop check         PLAN:       Discussed surgery, path, etc.  Resume normal activity  Return to clinic in 1 year

## 2017-05-08 ENCOUNTER — PATIENT MESSAGE (OUTPATIENT)
Dept: OBSTETRICS AND GYNECOLOGY | Facility: CLINIC | Age: 37
End: 2017-05-08

## 2017-05-08 ENCOUNTER — TELEPHONE (OUTPATIENT)
Dept: OBSTETRICS AND GYNECOLOGY | Facility: CLINIC | Age: 37
End: 2017-05-08

## 2017-05-08 NOTE — TELEPHONE ENCOUNTER
Patient complains of vaginal discharge and irritation-scheduled for urgent care [address] 05/09/2017

## 2017-08-23 ENCOUNTER — LAB VISIT (OUTPATIENT)
Dept: LAB | Facility: HOSPITAL | Age: 37
End: 2017-08-23
Attending: FAMILY MEDICINE
Payer: COMMERCIAL

## 2017-08-23 ENCOUNTER — OFFICE VISIT (OUTPATIENT)
Dept: FAMILY MEDICINE | Facility: CLINIC | Age: 37
End: 2017-08-23
Attending: FAMILY MEDICINE
Payer: COMMERCIAL

## 2017-08-23 VITALS
DIASTOLIC BLOOD PRESSURE: 80 MMHG | HEART RATE: 66 BPM | SYSTOLIC BLOOD PRESSURE: 116 MMHG | OXYGEN SATURATION: 99 % | BODY MASS INDEX: 31.94 KG/M2 | HEIGHT: 61 IN | WEIGHT: 169.19 LBS

## 2017-08-23 DIAGNOSIS — I10 HTN (HYPERTENSION), BENIGN: ICD-10-CM

## 2017-08-23 DIAGNOSIS — Z00.00 ANNUAL PHYSICAL EXAM: ICD-10-CM

## 2017-08-23 DIAGNOSIS — Z00.00 ANNUAL PHYSICAL EXAM: Primary | ICD-10-CM

## 2017-08-23 DIAGNOSIS — K64.9 HEMORRHOIDS, UNSPECIFIED HEMORRHOID TYPE: ICD-10-CM

## 2017-08-23 DIAGNOSIS — K62.89 RECTAL PAIN: ICD-10-CM

## 2017-08-23 LAB
ALBUMIN SERPL BCP-MCNC: 3.8 G/DL
ALP SERPL-CCNC: 58 U/L
ALT SERPL W/O P-5'-P-CCNC: 11 U/L
ANION GAP SERPL CALC-SCNC: 11 MMOL/L
AST SERPL-CCNC: 18 U/L
BASOPHILS # BLD AUTO: 0.04 K/UL
BASOPHILS NFR BLD: 0.6 %
BILIRUB SERPL-MCNC: 0.8 MG/DL
BILIRUB SERPL-MCNC: NEGATIVE MG/DL
BLOOD URINE, POC: NEGATIVE
BUN SERPL-MCNC: 13 MG/DL
CALCIUM SERPL-MCNC: 10 MG/DL
CHLORIDE SERPL-SCNC: 103 MMOL/L
CHOLEST/HDLC SERPL: 3.3 {RATIO}
CO2 SERPL-SCNC: 25 MMOL/L
COLOR, POC UA: YELLOW
CREAT SERPL-MCNC: 1.2 MG/DL
DIFFERENTIAL METHOD: ABNORMAL
EOSINOPHIL # BLD AUTO: 0.1 K/UL
EOSINOPHIL NFR BLD: 1 %
ERYTHROCYTE [DISTWIDTH] IN BLOOD BY AUTOMATED COUNT: 13.4 %
EST. GFR  (AFRICAN AMERICAN): >60 ML/MIN/1.73 M^2
EST. GFR  (NON AFRICAN AMERICAN): 57.1 ML/MIN/1.73 M^2
GLUCOSE SERPL-MCNC: 81 MG/DL
GLUCOSE UR QL STRIP: NORMAL
HCT VFR BLD AUTO: 40.3 %
HDL/CHOLESTEROL RATIO: 30.6 %
HDLC SERPL-MCNC: 183 MG/DL
HDLC SERPL-MCNC: 56 MG/DL
HGB BLD-MCNC: 14.1 G/DL
KETONES UR QL STRIP: NEGATIVE
LDLC SERPL CALC-MCNC: 113.4 MG/DL
LEUKOCYTE ESTERASE URINE, POC: NEGATIVE
LYMPHOCYTES # BLD AUTO: 2.5 K/UL
LYMPHOCYTES NFR BLD: 36.3 %
MCH RBC QN AUTO: 31.5 PG
MCHC RBC AUTO-ENTMCNC: 35 G/DL
MCV RBC AUTO: 90 FL
MONOCYTES # BLD AUTO: 0.4 K/UL
MONOCYTES NFR BLD: 6.2 %
NEUTROPHILS # BLD AUTO: 3.8 K/UL
NEUTROPHILS NFR BLD: 55.8 %
NITRITE, POC UA: NEGATIVE
NONHDLC SERPL-MCNC: 127 MG/DL
PH, POC UA: 7
PLATELET # BLD AUTO: 302 K/UL
PMV BLD AUTO: 10.1 FL
POTASSIUM SERPL-SCNC: 4.1 MMOL/L
PROT SERPL-MCNC: 7.9 G/DL
PROTEIN, POC: NEGATIVE
RBC # BLD AUTO: 4.47 M/UL
SODIUM SERPL-SCNC: 139 MMOL/L
SPECIFIC GRAVITY, POC UA: 1.01
TRIGL SERPL-MCNC: 68 MG/DL
TSH SERPL DL<=0.005 MIU/L-ACNC: 0.42 UIU/ML
UROBILINOGEN, POC UA: NORMAL
WBC # BLD AUTO: 6.89 K/UL

## 2017-08-23 PROCEDURE — 99395 PREV VISIT EST AGE 18-39: CPT | Mod: S$GLB,,, | Performed by: FAMILY MEDICINE

## 2017-08-23 PROCEDURE — 90715 TDAP VACCINE 7 YRS/> IM: CPT | Mod: S$GLB,,, | Performed by: FAMILY MEDICINE

## 2017-08-23 PROCEDURE — 81001 URINALYSIS AUTO W/SCOPE: CPT | Mod: S$GLB,,, | Performed by: FAMILY MEDICINE

## 2017-08-23 PROCEDURE — 36415 COLL VENOUS BLD VENIPUNCTURE: CPT | Mod: PO

## 2017-08-23 PROCEDURE — 99999 PR PBB SHADOW E&M-EST. PATIENT-LVL III: CPT | Mod: PBBFAC,,, | Performed by: FAMILY MEDICINE

## 2017-08-23 PROCEDURE — 80053 COMPREHEN METABOLIC PANEL: CPT

## 2017-08-23 PROCEDURE — 85025 COMPLETE CBC W/AUTO DIFF WBC: CPT

## 2017-08-23 PROCEDURE — 84443 ASSAY THYROID STIM HORMONE: CPT

## 2017-08-23 PROCEDURE — 90471 IMMUNIZATION ADMIN: CPT | Mod: S$GLB,,, | Performed by: FAMILY MEDICINE

## 2017-08-23 PROCEDURE — 80061 LIPID PANEL: CPT

## 2017-08-23 RX ORDER — HYDROCHLOROTHIAZIDE 25 MG/1
25 TABLET ORAL DAILY
Qty: 90 TABLET | Refills: 3 | Status: SHIPPED | OUTPATIENT
Start: 2017-08-23 | End: 2018-10-24 | Stop reason: SDUPTHER

## 2017-08-23 NOTE — PROGRESS NOTES
Patient was given Tdap IM in Left Deltoid as per orders from Dr. Donahue. Aseptic tech used and pt tolerated well. Pt was monitored for 15 mins with no reaction noted.

## 2017-08-23 NOTE — MEDICAL/APP STUDENT
Subjective:       Patient ID: Marlyn Camacho is a 37 y.o. female.    Chief Complaint: GI Problem    HPI     Patient reports noticing blood in stool  for 1 month.  She states it is bright red within stool and visible upon wiping. Hemorrhoids have worsened over the past week. She had rubber ligand surgery for hemorr 3 years ago. Intensity waxes and wanes. She reports an increase in constipation, notices hemorrhoids worse when constipated. Constipation 3 days out of the week, citrate provides some relief.       Review of Systems   Constitutional: Negative for chills, diaphoresis and fever.   HENT: Negative for congestion, rhinorrhea, sinus pressure, sneezing and sore throat.    Eyes: Negative for visual disturbance.   Respiratory: Negative for chest tightness and shortness of breath.    Cardiovascular: Negative for chest pain and palpitations.   Gastrointestinal: Positive for blood in stool (Bright red mixed with stool), constipation (Chronic for 2 years) and nausea. Negative for abdominal pain and diarrhea.   Endocrine: Negative for polydipsia and polyuria.   Genitourinary: Negative for difficulty urinating, dysuria, hematuria, vaginal bleeding and vaginal discharge.   Musculoskeletal: Negative for arthralgias and back pain.   Skin: Negative for rash.   Neurological: Negative for dizziness, light-headedness, numbness and headaches.   Hematological: Does not bruise/bleed easily.   Psychiatric/Behavioral: Negative for decreased concentration and sleep disturbance. The patient is not nervous/anxious.        Objective:      Physical Exam   Constitutional: She is oriented to person, place, and time. She appears well-developed and well-nourished.   HENT:   Mouth/Throat: Oropharynx is clear and moist.   Eyes: EOM are normal. Pupils are equal, round, and reactive to light.   Neck: Normal range of motion. Neck supple. No thyromegaly present.   Cardiovascular: Normal rate, regular rhythm, normal heart sounds and intact  distal pulses.    Pulmonary/Chest: Effort normal and breath sounds normal.   Abdominal: Soft. She exhibits no distension and no mass. Bowel sounds are decreased. There is no hepatosplenomegaly. There is no tenderness.   Genitourinary:   Genitourinary Comments: SOLO not performed   Musculoskeletal: Normal range of motion. She exhibits no edema, tenderness or deformity.   Lymphadenopathy:     She has no cervical adenopathy.   Neurological: She is alert and oriented to person, place, and time. She has normal reflexes.   Skin: Skin is warm and dry. Capillary refill takes less than 2 seconds.   Psychiatric: She has a normal mood and affect. Her behavior is normal. Judgment and thought content normal.       Assessment:     Marlyn Camacho is a 36 yo female for per rectum blood loss.     Plan:     1. Hematochizia  - Advised to increase intake of dietary fiber intake and water intake (at least 1/2 gallon/day)  - Start using hydrocortisone-pramoxine rectal foam. Apply 2 times daily  - Referral to colorectal surgery    2. Health maintenance  - CBC  - CMP  - Lipid panel  - TSH  - POCT Urinalysis  - To receive Tdap vaccine in office    Valentine Romero  MS-4  -

## 2017-08-23 NOTE — PROGRESS NOTES
Subjective:       Patient ID: Marlyn Camacho is a 37 y.o. female.    Chief Complaint: Annual Exam and Hypertension    HPI   Pt is here for annual exam pt is well no sob/cp pt has hemorrhoids she has not been on a low fat high fiber diet had a recent flare   Pt has htn stable on hctz no muscle cramps admits she does not drink enough water no light headedness but bowels can be sluggish problem with h/o  Hemorrhoids    Review of Systems   Constitutional: Negative for activity change, chills, diaphoresis, fatigue and fever.   HENT: Negative for congestion, ear discharge, ear pain, hearing loss, postnasal drip, rhinorrhea, sinus pressure, sneezing, sore throat, trouble swallowing and voice change.    Eyes: Negative for photophobia, discharge, redness, itching and visual disturbance.   Respiratory: Negative for cough, chest tightness, shortness of breath and wheezing.    Cardiovascular: Negative for chest pain, palpitations and leg swelling.   Gastrointestinal: Negative for abdominal pain, anal bleeding, blood in stool, constipation, diarrhea, nausea, rectal pain and vomiting.   Genitourinary: Negative for dyspareunia, dysuria, flank pain, frequency, hematuria, menstrual problem, pelvic pain, urgency, vaginal bleeding and vaginal discharge.   Musculoskeletal: Negative for arthralgias, back pain, joint swelling and neck pain.   Skin: Negative for color change and rash.   Neurological: Negative for dizziness, speech difficulty, weakness, light-headedness, numbness and headaches.   Hematological: Does not bruise/bleed easily.   Psychiatric/Behavioral: Negative for agitation, confusion, decreased concentration, sleep disturbance and suicidal ideas. The patient is not nervous/anxious.        Objective:      Physical Exam   Constitutional: She appears well-developed and well-nourished.   HENT:   Head: Normocephalic and atraumatic.   Right Ear: External ear normal.   Left Ear: External ear normal.   Nose: Nose normal.  "  Mouth/Throat: Oropharynx is clear and moist.   Eyes: Conjunctivae and EOM are normal. Pupils are equal, round, and reactive to light. Right eye exhibits no discharge. Left eye exhibits no discharge.   Neck: Normal range of motion. Neck supple. No thyromegaly present.   Cardiovascular: Normal rate, regular rhythm, normal heart sounds and intact distal pulses.  Exam reveals no gallop and no friction rub.    Pulmonary/Chest: Effort normal and breath sounds normal. She has no wheezes. She has no rales.   Abdominal: Soft. Bowel sounds are normal. She exhibits no distension. There is no tenderness. There is no rebound and no guarding.   Genitourinary:   Genitourinary Comments: declines   Musculoskeletal: Normal range of motion. She exhibits no edema or tenderness.   Lymphadenopathy:     She has no cervical adenopathy.   Neurological: She is alert. No cranial nerve deficit. She exhibits normal muscle tone. Coordination normal.   Skin: Skin is warm and dry. No rash noted. No erythema.   Psychiatric: She has a normal mood and affect. Her behavior is normal. Judgment and thought content normal.       Assessment:       1. Annual physical exam    2. Hemorrhoids, unspecified hemorrhoid type    3. HTN (hypertension), benign        Plan:     orders cmp cbc lipid tsh urine  Cont meds  Low fat low salt diet  Graded exercise    Health maintenance  Pap with gyn  Breast exam with pap  Lipid ordered  Flu shot in fall  Tetanus q 10 years  rtc annually and prn        "This note will not be shared with the patient."   "

## 2017-09-26 ENCOUNTER — HOSPITAL ENCOUNTER (EMERGENCY)
Facility: HOSPITAL | Age: 37
Discharge: HOME | End: 2017-09-26
Attending: FAMILY MEDICINE
Payer: COMMERCIAL

## 2017-09-26 VITALS
WEIGHT: 186 LBS | DIASTOLIC BLOOD PRESSURE: 66 MMHG | HEART RATE: 85 BPM | OXYGEN SATURATION: 99 % | SYSTOLIC BLOOD PRESSURE: 116 MMHG | RESPIRATION RATE: 18 BRPM | BODY MASS INDEX: 35.12 KG/M2 | TEMPERATURE: 98 F | HEIGHT: 61 IN

## 2017-09-26 DIAGNOSIS — R51.9 NONINTRACTABLE HEADACHE, UNSPECIFIED CHRONICITY PATTERN, UNSPECIFIED HEADACHE TYPE: ICD-10-CM

## 2017-09-26 DIAGNOSIS — M79.10 MYALGIA: Primary | ICD-10-CM

## 2017-09-26 DIAGNOSIS — R11.2 NON-INTRACTABLE VOMITING WITH NAUSEA, UNSPECIFIED VOMITING TYPE: ICD-10-CM

## 2017-09-26 LAB
ALBUMIN SERPL BCP-MCNC: 3.6 G/DL
ALP SERPL-CCNC: 61 U/L
ALT SERPL W/O P-5'-P-CCNC: 10 U/L
ANION GAP SERPL CALC-SCNC: 5 MMOL/L
AST SERPL-CCNC: 17 U/L
BASOPHILS # BLD AUTO: 0.02 K/UL
BASOPHILS NFR BLD: 0.3 %
BILIRUB SERPL-MCNC: 0.7 MG/DL
BILIRUB UR QL STRIP: NEGATIVE
BUN SERPL-MCNC: 11 MG/DL
CALCIUM SERPL-MCNC: 9.7 MG/DL
CHLORIDE SERPL-SCNC: 104 MMOL/L
CLARITY UR REFRACT.AUTO: CLEAR
CO2 SERPL-SCNC: 32 MMOL/L
COLOR UR AUTO: NORMAL
CREAT SERPL-MCNC: 0.9 MG/DL
DIFFERENTIAL METHOD: ABNORMAL
EOSINOPHIL # BLD AUTO: 0.1 K/UL
EOSINOPHIL NFR BLD: 1.9 %
ERYTHROCYTE [DISTWIDTH] IN BLOOD BY AUTOMATED COUNT: 13 %
EST. GFR  (AFRICAN AMERICAN): >60 ML/MIN/1.73 M^2
EST. GFR  (NON AFRICAN AMERICAN): >60 ML/MIN/1.73 M^2
FLUAV AG SPEC QL IA: NEGATIVE
FLUBV AG SPEC QL IA: NEGATIVE
GLUCOSE SERPL-MCNC: 76 MG/DL
GLUCOSE UR QL STRIP: NEGATIVE
HCT VFR BLD AUTO: 37.9 %
HGB BLD-MCNC: 13.6 G/DL
HGB UR QL STRIP: NEGATIVE
KETONES UR QL STRIP: NEGATIVE
LEUKOCYTE ESTERASE UR QL STRIP: NEGATIVE
LIPASE SERPL-CCNC: 28 U/L
LYMPHOCYTES # BLD AUTO: 2.8 K/UL
LYMPHOCYTES NFR BLD: 45.2 %
MCH RBC QN AUTO: 31.6 PG
MCHC RBC AUTO-ENTMCNC: 35.9 G/DL
MCV RBC AUTO: 88 FL
MONOCYTES # BLD AUTO: 0.5 K/UL
MONOCYTES NFR BLD: 7.2 %
NEUTROPHILS # BLD AUTO: 2.8 K/UL
NEUTROPHILS NFR BLD: 45.1 %
NITRITE UR QL STRIP: NEGATIVE
PH UR STRIP: 5 [PH] (ref 5–8)
PLATELET # BLD AUTO: 312 K/UL
PMV BLD AUTO: 9.4 FL
POTASSIUM SERPL-SCNC: 3.9 MMOL/L
PROT SERPL-MCNC: 7.5 G/DL
PROT UR QL STRIP: NEGATIVE
RBC # BLD AUTO: 4.3 M/UL
SODIUM SERPL-SCNC: 141 MMOL/L
SP GR UR STRIP: 1.03 (ref 1–1.03)
SPECIMEN SOURCE: NORMAL
URN SPEC COLLECT METH UR: NORMAL
UROBILINOGEN UR STRIP-ACNC: 4 EU/DL
WBC # BLD AUTO: 6.22 K/UL

## 2017-09-26 PROCEDURE — 99283 EMERGENCY DEPT VISIT LOW MDM: CPT | Mod: ,,, | Performed by: PHYSICIAN ASSISTANT

## 2017-09-26 PROCEDURE — 85025 COMPLETE CBC W/AUTO DIFF WBC: CPT

## 2017-09-26 PROCEDURE — 63600175 PHARM REV CODE 636 W HCPCS: Performed by: PHYSICIAN ASSISTANT

## 2017-09-26 PROCEDURE — 80053 COMPREHEN METABOLIC PANEL: CPT

## 2017-09-26 PROCEDURE — 96374 THER/PROPH/DIAG INJ IV PUSH: CPT

## 2017-09-26 PROCEDURE — 83690 ASSAY OF LIPASE: CPT

## 2017-09-26 PROCEDURE — 87400 INFLUENZA A/B EACH AG IA: CPT

## 2017-09-26 PROCEDURE — 81003 URINALYSIS AUTO W/O SCOPE: CPT

## 2017-09-26 PROCEDURE — 99283 EMERGENCY DEPT VISIT LOW MDM: CPT | Mod: 25

## 2017-09-26 PROCEDURE — 25000003 PHARM REV CODE 250: Performed by: PHYSICIAN ASSISTANT

## 2017-09-26 RX ORDER — ONDANSETRON 4 MG/1
4 TABLET, ORALLY DISINTEGRATING ORAL EVERY 8 HOURS PRN
Qty: 12 TABLET | Refills: 0 | Status: SHIPPED | OUTPATIENT
Start: 2017-09-26 | End: 2017-11-21

## 2017-09-26 RX ORDER — ONDANSETRON 2 MG/ML
4 INJECTION INTRAMUSCULAR; INTRAVENOUS
Status: COMPLETED | OUTPATIENT
Start: 2017-09-26 | End: 2017-09-26

## 2017-09-26 RX ORDER — ACETAMINOPHEN 325 MG/1
650 TABLET ORAL
Status: COMPLETED | OUTPATIENT
Start: 2017-09-26 | End: 2017-09-26

## 2017-09-26 RX ADMIN — ACETAMINOPHEN 650 MG: 325 TABLET ORAL at 01:09

## 2017-09-26 RX ADMIN — ONDANSETRON 4 MG: 2 INJECTION INTRAMUSCULAR; INTRAVENOUS at 01:09

## 2017-09-26 RX ADMIN — SODIUM CHLORIDE 1000 ML: 0.9 INJECTION, SOLUTION INTRAVENOUS at 01:09

## 2017-09-26 NOTE — ED TRIAGE NOTES
For past 3 days report, headache, body aches, chills, didn't check temp.  Nauseated , emesis X3. No appetite.

## 2017-09-26 NOTE — ED PROVIDER NOTES
"Encounter Date: 9/26/2017       History     Chief Complaint   Patient presents with    Generalized Body Aches     nausea, started sat.      37-year-old -American female with past medical history of hypertension presents to the ED complaining of generalized myalgias since Friday.  She endorses associated headache, nausea, vomiting (×2), chills, generalized abdominal pain that is intermittent and "sharp" 4/10, constipation (last BM Saturday), lightheadedness, postnasal drip, decreased appetite.  She has taken over-the-counter Aleve and relief of her symptoms.  She denies any sick contacts.  She denies any recent travel and has not yet had a flu vaccine.  She denies chest pain, dysuria, hematuria, numbness, weakness.  She occasionally drinks alcohol and denies tobacco or drug use.      The history is provided by the patient.     Review of patient's allergies indicates:   Allergen Reactions    Dye Anaphylaxis     Hair lightener/bleach     Past Medical History:   Diagnosis Date    Abnormal Pap smear of vagina     Allergy     Hypertension     Joint pain      Past Surgical History:   Procedure Laterality Date    CKC, ECC, fractional D&C      COLPOSCOPY      CONIZATION-CERVIX  02/2015    DILATION AND CURETTAGE OF UTERUS      HYSTERECTOMY  03/2017    AUB, fibroids    TUBAL LIGATION       Family History   Problem Relation Age of Onset    Eczema Mother     Ovarian cancer Mother     Eczema Son     Eczema Maternal Grandmother     Eczema Son     Melanoma Neg Hx     Lupus Neg Hx     Psoriasis Neg Hx     Breast cancer Neg Hx     Colon cancer Neg Hx      Social History   Substance Use Topics    Smoking status: Never Smoker    Smokeless tobacco: Never Used    Alcohol use Yes      Comment: occasional     Review of Systems   Constitutional: Positive for appetite change (decreased), chills and fatigue. Negative for fever.   HENT: Positive for postnasal drip (chronic). Negative for congestion, rhinorrhea " and sore throat.    Eyes: Negative for photophobia and visual disturbance.   Respiratory: Positive for shortness of breath. Negative for cough.    Cardiovascular: Negative for chest pain, palpitations and leg swelling.   Gastrointestinal: Positive for abdominal pain, constipation, nausea and vomiting. Negative for diarrhea.   Genitourinary: Negative for dysuria and hematuria.   Musculoskeletal: Positive for myalgias. Negative for neck pain and neck stiffness.   Skin: Negative for rash and wound.   Neurological: Positive for light-headedness and headaches. Negative for dizziness, syncope, weakness and numbness.   Psychiatric/Behavioral: Negative for confusion.       Physical Exam     Initial Vitals [09/26/17 1141]   BP Pulse Resp Temp SpO2   116/66 85 18 98.3 °F (36.8 °C) 99 %      MAP       82.67         Physical Exam    Nursing note and vitals reviewed.  Constitutional: She appears well-developed and well-nourished. She is not diaphoretic. No distress.   HENT:   Head: Normocephalic and atraumatic.   Neck: Normal range of motion. Neck supple. Normal range of motion present. No neck rigidity.   Cardiovascular: Normal rate, regular rhythm and normal heart sounds. Exam reveals no gallop and no friction rub.    No murmur heard.  Pulmonary/Chest: Breath sounds normal. She has no wheezes. She has no rhonchi. She has no rales.   Abdominal: Soft. Bowel sounds are normal. There is generalized tenderness. There is no rigidity, no rebound, no guarding and no CVA tenderness.   Musculoskeletal: Normal range of motion.   Neurological: She is alert and oriented to person, place, and time.   Skin: Skin is warm and dry. No rash noted. No erythema.   Psychiatric: She has a normal mood and affect.         ED Course   Procedures  Labs Reviewed   CBC W/ AUTO DIFFERENTIAL - Abnormal; Notable for the following:        Result Value    MCH 31.6 (*)     All other components within normal limits   COMPREHENSIVE METABOLIC PANEL - Abnormal;  Notable for the following:     CO2 32 (*)     Anion Gap 5 (*)     All other components within normal limits   LIPASE   URINALYSIS, REFLEX TO URINE CULTURE    Narrative:     Preferred Collection Type->Urine, Clean Catch   INFLUENZA A AND B ANTIGEN             Medical Decision Making:   History:   Old Medical Records: I decided to obtain old medical records.  Clinical Tests:   Lab Tests: Ordered and Reviewed       APC / Resident Notes:   37-year-old -American female with past medical history of hypertension presents to the ED complaining of generalized myalgias since Friday.  Vital signs stable.  Regular rate and rhythm.  Lungs are clear.  Abdomen is soft generalized tenderness to palpation.  No CVA tenderness.  Differential diagnosis includes but isn't limited to influenza, viral syndrome, dehydration, acute kidney injury, electrolyte abnormality, UTI, pancreatitis.  Will obtain labs, give IV fluids, Zofran, Tylenol reassess.    CBC with no leukocytosis. CMP unremarkable. Lipase WNL. Influenza negative. UA with no infection.    She reports improvement of symptoms with treatment provided in the ED. Symptoms likely due to viral syndrome. Stable for discharge.    She was discharged with a prescription for zofran.  She will follow up with her PCP.  All of the patient's questions were answered.  I reviewed the patient's chart and labs and discussed the case with my supervising physician.                 ED Course      Clinical Impression:   The primary encounter diagnosis was Myalgia. Diagnoses of Non-intractable vomiting with nausea, unspecified vomiting type and Nonintractable headache, unspecified chronicity pattern, unspecified headache type were also pertinent to this visit.    Disposition:   Disposition: Discharged  Condition: Stable                        Rebecca Rivers PA-C  09/26/17 2041

## 2017-11-20 ENCOUNTER — PATIENT MESSAGE (OUTPATIENT)
Dept: FAMILY MEDICINE | Facility: CLINIC | Age: 37
End: 2017-11-20

## 2017-11-20 NOTE — TELEPHONE ENCOUNTER
Patient would like to know if she has to come in for an appt for a possible UTI?  Or would you be willing to treat her and is not any better come in for a follow up appt?  Please advise. thanks

## 2017-11-21 ENCOUNTER — OFFICE VISIT (OUTPATIENT)
Dept: FAMILY MEDICINE | Facility: CLINIC | Age: 37
End: 2017-11-21
Attending: FAMILY MEDICINE
Payer: COMMERCIAL

## 2017-11-21 VITALS
WEIGHT: 168.69 LBS | OXYGEN SATURATION: 97 % | BODY MASS INDEX: 31.85 KG/M2 | HEIGHT: 61 IN | SYSTOLIC BLOOD PRESSURE: 110 MMHG | RESPIRATION RATE: 16 BRPM | HEART RATE: 78 BPM | DIASTOLIC BLOOD PRESSURE: 76 MMHG

## 2017-11-21 DIAGNOSIS — N39.0 URINARY TRACT INFECTION WITHOUT HEMATURIA, SITE UNSPECIFIED: Primary | ICD-10-CM

## 2017-11-21 PROCEDURE — 87086 URINE CULTURE/COLONY COUNT: CPT

## 2017-11-21 PROCEDURE — 87077 CULTURE AEROBIC IDENTIFY: CPT

## 2017-11-21 PROCEDURE — 87186 SC STD MICRODIL/AGAR DIL: CPT

## 2017-11-21 PROCEDURE — 87088 URINE BACTERIA CULTURE: CPT

## 2017-11-21 PROCEDURE — 99999 PR PBB SHADOW E&M-EST. PATIENT-LVL III: CPT | Mod: PBBFAC,,, | Performed by: FAMILY MEDICINE

## 2017-11-21 PROCEDURE — 99213 OFFICE O/P EST LOW 20 MIN: CPT | Mod: S$GLB,,, | Performed by: FAMILY MEDICINE

## 2017-11-21 RX ORDER — NITROFURANTOIN 25; 75 MG/1; MG/1
100 CAPSULE ORAL 2 TIMES DAILY
Qty: 10 CAPSULE | Refills: 0 | Status: SHIPPED | OUTPATIENT
Start: 2017-11-21 | End: 2017-11-21 | Stop reason: SDUPTHER

## 2017-11-21 RX ORDER — NITROFURANTOIN 25; 75 MG/1; MG/1
100 CAPSULE ORAL 2 TIMES DAILY
Qty: 10 CAPSULE | Refills: 0 | Status: SHIPPED | OUTPATIENT
Start: 2017-11-21 | End: 2018-05-03

## 2017-11-21 NOTE — PATIENT INSTRUCTIONS
Your test results will be communicated to you via : [facility], Telephone or Letter.   If you have not received your test results in one week, please contact the clinic at [phone].

## 2017-11-24 LAB — BACTERIA UR CULT: NORMAL

## 2017-11-27 NOTE — PROGRESS NOTES
"Subjective:       Patient ID: Marlyn Camacho is a 37 y.o. female.    Chief Complaint: Urinary Tract Infection    HPI   Pt is here for several days of dysuria with frequency no pelvic pain no flank pain no fever/chills no vag discharge   Review of Systems   Constitutional: Negative for chills.   Respiratory: Negative for cough, chest tightness and shortness of breath.    Cardiovascular: Negative for chest pain and palpitations.   Gastrointestinal: Negative for abdominal distention, abdominal pain, constipation, nausea and vomiting.   Genitourinary: Positive for dysuria and frequency. Negative for flank pain, hematuria and urgency.   Skin: Negative for rash.       Objective:      Physical Exam   Constitutional: She appears well-developed and well-nourished. No distress.   Cardiovascular: Normal rate and regular rhythm.  Exam reveals no gallop.    Pulmonary/Chest: Effort normal and breath sounds normal. No respiratory distress. She has no rales.   Abdominal: Soft. Bowel sounds are normal. She exhibits no distension. There is no tenderness.     poct reviewed with pt   Assessment:       1. Urinary tract infection without hematuria, site unspecified        Plan:     orders poc ua urine culture   Increase water add cranberry juice  Start macrobid  rtc 2 weeks el       "This note will not be shared with the patient."   "

## 2018-02-10 ENCOUNTER — HOSPITAL ENCOUNTER (EMERGENCY)
Facility: OTHER | Age: 38
Discharge: HOME | End: 2018-02-10
Attending: EMERGENCY MEDICINE
Payer: COMMERCIAL

## 2018-02-10 VITALS
BODY MASS INDEX: 27.11 KG/M2 | HEART RATE: 88 BPM | DIASTOLIC BLOOD PRESSURE: 78 MMHG | RESPIRATION RATE: 14 BRPM | HEIGHT: 63 IN | SYSTOLIC BLOOD PRESSURE: 115 MMHG | TEMPERATURE: 99 F | WEIGHT: 153 LBS | OXYGEN SATURATION: 98 %

## 2018-02-10 DIAGNOSIS — J11.1 INFLUENZA: Primary | ICD-10-CM

## 2018-02-10 LAB
CTP QC/QA: YES
FLUAV AG NPH QL: NEGATIVE
FLUBV AG NPH QL: NEGATIVE

## 2018-02-10 PROCEDURE — 87804 INFLUENZA ASSAY W/OPTIC: CPT

## 2018-02-10 PROCEDURE — 99284 EMERGENCY DEPT VISIT MOD MDM: CPT

## 2018-02-10 PROCEDURE — 25000003 PHARM REV CODE 250: Performed by: EMERGENCY MEDICINE

## 2018-02-10 RX ORDER — IBUPROFEN 600 MG/1
600 TABLET ORAL
Status: COMPLETED | OUTPATIENT
Start: 2018-02-10 | End: 2018-02-10

## 2018-02-10 RX ORDER — FLUTICASONE PROPIONATE 50 MCG
1 SPRAY, SUSPENSION (ML) NASAL 2 TIMES DAILY PRN
Qty: 15 G | Refills: 0 | Status: SHIPPED | OUTPATIENT
Start: 2018-02-10 | End: 2018-03-03

## 2018-02-10 RX ORDER — OSELTAMIVIR PHOSPHATE 75 MG/1
75 CAPSULE ORAL 2 TIMES DAILY
Qty: 10 CAPSULE | Refills: 0 | Status: SHIPPED | OUTPATIENT
Start: 2018-02-10 | End: 2018-02-15

## 2018-02-10 RX ORDER — PROMETHAZINE HYDROCHLORIDE AND DEXTROMETHORPHAN HYDROBROMIDE 6.25; 15 MG/5ML; MG/5ML
5 SYRUP ORAL 3 TIMES DAILY PRN
Qty: 100 ML | Refills: 0 | Status: SHIPPED | OUTPATIENT
Start: 2018-02-10 | End: 2018-02-20

## 2018-02-10 RX ORDER — IBUPROFEN 600 MG/1
600 TABLET ORAL EVERY 6 HOURS PRN
Qty: 20 TABLET | Refills: 0 | Status: SHIPPED | OUTPATIENT
Start: 2018-02-10 | End: 2018-05-03

## 2018-02-10 RX ADMIN — IBUPROFEN 600 MG: 600 TABLET, FILM COATED ORAL at 10:02

## 2018-02-10 NOTE — ED NOTES
Neuro: AAOx3  Resp: Airway patent, respirations even/unlabored  Cardiac: Skin pink/warm/dry, pulses intact  Abdomen: Soft, non-tender to palpation, denies N/V/D  Musculoskeletal: Moves all extremities equally, ROM intact  Reports intermittent generalized abdominal pain for the past 3 days

## 2018-02-10 NOTE — ED NOTES
Neuro: AAOx3  Resp: Airway patent, respirations even/unlabored  Cardiac: Skin pink/warm/dry, pulses intact  Abdomen: Soft, non-tender to palpation, denies N/V/D  Musculoskeletal: Moves all extremities equally, ROM intact  C/o headache, congestion and fever

## 2018-02-10 NOTE — ED PROVIDER NOTES
Encounter Date: 2/10/2018       History     Chief Complaint   Patient presents with    Cough    Nasal Congestion    Generalized Body Aches     The history is provided by the patient.   URI   The primary symptoms include fever, cough and myalgias. Primary symptoms do not include sore throat, swollen glands, wheezing, abdominal pain, nausea or rash. The current episode started yesterday. This is a new problem. The problem has been gradually worsening. The fever began yesterday. The fever has been gradually worsening since its onset. The temperature was taken by no thermometer. The maximum temperature recorded prior to her arrival was unknown.   The cough began yesterday. The cough is non-productive.   Myalgias began yesterday. The myalgias have been gradually worsening since their onset. The myalgias are generalized. The myalgias are not associated with weakness, tenderness or swelling. The myalgia pain is at a severity of 6/10.   The onset of the illness is associated with exposure to sick contacts. Symptoms associated with the illness include chills, congestion and rhinorrhea. The illness is not associated with facial pain or sinus pressure. The following treatments were addressed: Acetaminophen was not tried. A decongestant was not tried. Aspirin was not tried. NSAIDs were not tried.     Review of patient's allergies indicates:   Allergen Reactions    Dye Anaphylaxis     Hair lightener/bleach     Past Medical History:   Diagnosis Date    Abnormal Pap smear of vagina     Allergy     Hypertension     Joint pain      Past Surgical History:   Procedure Laterality Date    CKC, ECC, fractional D&C      COLPOSCOPY      CONIZATION-CERVIX  02/2015    DILATION AND CURETTAGE OF UTERUS      HYSTERECTOMY  03/2017    AUB, fibroids    TUBAL LIGATION       Family History   Problem Relation Age of Onset    Eczema Mother     Ovarian cancer Mother     Eczema Son     Eczema Maternal Grandmother     Eczema Son      Melanoma Neg Hx     Lupus Neg Hx     Psoriasis Neg Hx     Breast cancer Neg Hx     Colon cancer Neg Hx      Social History   Substance Use Topics    Smoking status: Never Smoker    Smokeless tobacco: Never Used    Alcohol use Yes      Comment: occasional     Review of Systems   Constitutional: Positive for chills and fever.   HENT: Positive for congestion and rhinorrhea. Negative for sinus pressure and sore throat.    Respiratory: Positive for cough. Negative for shortness of breath and wheezing.    Cardiovascular: Negative for chest pain.   Gastrointestinal: Negative for abdominal pain and nausea.   Genitourinary: Negative for dysuria.   Musculoskeletal: Positive for myalgias. Negative for back pain.   Skin: Negative for rash.   Neurological: Negative for weakness.   Hematological: Does not bruise/bleed easily.   All other systems reviewed and are negative.      Physical Exam     Initial Vitals [02/10/18 0852]   BP Pulse Resp Temp SpO2   115/78 88 14 99.2 °F (37.3 °C) 98 %      MAP       90.33         Physical Exam    Nursing note and vitals reviewed.  Constitutional: She appears well-developed and well-nourished.   HENT:   Head: Normocephalic and atraumatic.   Right Ear: Tympanic membrane and external ear normal.   Left Ear: Tympanic membrane and external ear normal.   Nose: Mucosal edema and rhinorrhea present.   Mouth/Throat: Uvula is midline. Mucous membranes are not pale, not dry and not cyanotic. Posterior oropharyngeal erythema present.   Eyes: Conjunctivae and EOM are normal. Pupils are equal, round, and reactive to light. Right eye exhibits no discharge. Left eye exhibits no discharge.   Neck: Normal range of motion. Neck supple.   Cardiovascular: Normal rate, regular rhythm, normal heart sounds and intact distal pulses. Exam reveals no gallop and no friction rub.    No murmur heard.  Pulmonary/Chest: Breath sounds normal. No respiratory distress. She has no wheezes. She has no rhonchi. She has no  rales. She exhibits no tenderness.   Abdominal: Soft. Bowel sounds are normal. She exhibits no distension and no mass. There is no tenderness. There is no rebound and no guarding.   Musculoskeletal: Normal range of motion. She exhibits no edema or tenderness.   Neurological: She is alert and oriented to person, place, and time. She has normal strength and normal reflexes. She displays normal reflexes. No cranial nerve deficit or sensory deficit.   Skin: Skin is warm and dry. Capillary refill takes less than 2 seconds. No rash noted. No pallor.   Psychiatric: She has a normal mood and affect.         ED Course   Procedures  Labs Reviewed   POCT INFLUENZA A/B                               ED Course    CC fever, congestion, and cough  DDx Viral illness, influenza,  URI, and pharyngitis.  Treatment in the ED Physical Exam.    Patient is in NAD.  Awake alert and interactive.    Tolerating PO without difficulty.     Discussed labs, imaging results, and out patient treatment plan.    Fill and take prescriptions as directed.  Return to the ED if symptoms worsen or do not resolve.   Answered questions and discussed discharge plan.    Follow up with PCP in 1days.  Clinical Impression:   The encounter diagnosis was Influenza.                           Marsha Colvin DO  02/10/18 1047

## 2018-04-17 ENCOUNTER — TELEPHONE (OUTPATIENT)
Dept: OBSTETRICS AND GYNECOLOGY | Facility: CLINIC | Age: 38
End: 2018-04-17

## 2018-04-17 ENCOUNTER — PATIENT MESSAGE (OUTPATIENT)
Dept: OBSTETRICS AND GYNECOLOGY | Facility: CLINIC | Age: 38
End: 2018-04-17

## 2018-04-17 DIAGNOSIS — Z12.31 VISIT FOR SCREENING MAMMOGRAM: Primary | ICD-10-CM

## 2018-04-17 NOTE — TELEPHONE ENCOUNTER
Received a portal message to place an order for Mammogram-message sent to also, schedule annual exam.

## 2018-05-03 ENCOUNTER — HOSPITAL ENCOUNTER (OUTPATIENT)
Dept: RADIOLOGY | Facility: OTHER | Age: 38
Discharge: HOME | End: 2018-05-03
Attending: OBSTETRICS & GYNECOLOGY
Payer: COMMERCIAL

## 2018-05-03 ENCOUNTER — OFFICE VISIT (OUTPATIENT)
Dept: OBSTETRICS AND GYNECOLOGY | Facility: CLINIC | Age: 38
End: 2018-05-03
Attending: OBSTETRICS & GYNECOLOGY
Payer: COMMERCIAL

## 2018-05-03 DIAGNOSIS — Z12.31 VISIT FOR SCREENING MAMMOGRAM: ICD-10-CM

## 2018-05-03 DIAGNOSIS — Z01.419 WELL FEMALE EXAM WITH ROUTINE GYNECOLOGICAL EXAM: Primary | ICD-10-CM

## 2018-05-03 PROCEDURE — 77063 BREAST TOMOSYNTHESIS BI: CPT | Mod: 26,,, | Performed by: RADIOLOGY

## 2018-05-03 PROCEDURE — 99396 PREV VISIT EST AGE 40-64: CPT | Mod: S$GLB,,, | Performed by: OBSTETRICS & GYNECOLOGY

## 2018-05-03 PROCEDURE — 99999 PR PBB SHADOW E&M-EST. PATIENT-LVL II: CPT | Mod: PBBFAC,,, | Performed by: OBSTETRICS & GYNECOLOGY

## 2018-05-03 PROCEDURE — 77067 SCR MAMMO BI INCL CAD: CPT | Mod: 26,,, | Performed by: RADIOLOGY

## 2018-05-03 PROCEDURE — 77067 SCR MAMMO BI INCL CAD: CPT | Mod: TC

## 2018-05-04 NOTE — PROGRESS NOTES
SUBJECTIVE:   38 y.o. female   for routine gyn exam. Patient's last menstrual period was 02/15/2017 (approximate)..  She has no unusual complaints          Past Medical History:   Diagnosis Date    Abnormal Pap smear of vagina     Allergy     Hypertension     Joint pain      Past Surgical History:   Procedure Laterality Date    CKC, ECC, fractional D&C      COLPOSCOPY      CONIZATION-CERVIX  2015    DILATION AND CURETTAGE OF UTERUS      HYSTERECTOMY  2017    AUB, fibroids    TUBAL LIGATION       Social History     Social History    Marital status:      Spouse name: N/A    Number of children: N/A    Years of education: N/A     Occupational History    Not on file.     Social History Main Topics    Smoking status: Never Smoker    Smokeless tobacco: Never Used    Alcohol use Yes      Comment: occasional    Drug use: No    Sexual activity: Yes     Partners: Male     Birth control/ protection: Other-see comments, See Surgical Hx      Comment: Tubal Ligation/Hysterectomy     Other Topics Concern    Are You Pregnant Or Think You May Be? No    Breast-Feeding No     Social History Narrative    No narrative on file     Family History   Problem Relation Age of Onset    Eczema Mother     Ovarian cancer Mother     Eczema Son     Eczema Maternal Grandmother     Eczema Son     Breast cancer Maternal Aunt         THREE AUNTS    Melanoma Neg Hx     Lupus Neg Hx     Psoriasis Neg Hx     Colon cancer Neg Hx      OB History    Para Term  AB Living   3 3 0     3   SAB TAB Ectopic Multiple Live Births           3      # Outcome Date GA Lbr Bennie/2nd Weight Sex Delivery Anes PTL Lv   3 Para      Vag-Spont   KATIE   2 Para      Vag-Spont   KATIE   1 Para      Vag-Spont   KATIE            Current Outpatient Prescriptions   Medication Sig Dispense Refill    EPIPEN 2-CHRISTY 0.3 mg/0.3 mL AtIn INJECT 1 PEN INTO THE MUSCLE ONCE UTD  4    hydrochlorothiazide (HYDRODIURIL) 25 MG tablet Take  1 tablet (25 mg total) by mouth once daily. 90 tablet 3     No current facility-administered medications for this visit.      Allergies: Dye     ROS:  Constitutional: no weight loss, weight gain, fever, fatigue  Eyes:  No vision changes, glasses/contacts  ENT/Mouth: No ulcers, sinus problems, ears ringing, headache  Cardiovascular: No inability to lie flat, chest pain, exercise intolerance, swelling, heart palpitations  Respiratory: No wheezing, coughing blood, shortness of breath, or cough  Gastrointestinal: No diarrhea, bloody stool, nausea/vomiting, constipation, gas, hemorrhoids  Genitourinary: No blood in urine, painful urination, urgency of urination, frequency of urination, incomplete emptying, incontinence, abnormal bleeding, painful periods, heavy periods, vaginal discharge, vaginal odor, painful intercourse, sexual problems, bleeding after intercourse.  Musculoskeletal: No muscle weakness  Skin/Breast: No painful breasts, nipple discharge, masses, rash, ulcers  Neurological: No passing out, seizures, numbness, headache  Endocrine: No diabetes, hypothyroid, hyperthyroid, hot flashes, hair loss, abnormal hair growth, ance  Psychiatric: No depression, crying  Hematologic: No bruises, bleeding, swollen lymph nodes, anemia.      OBJECTIVE:   The patient appears well, alert, oriented x 3, in no distress.  LMP 02/15/2017 (Approximate)   NECK: no thyromegaly, trachea midline  SKIN: no acne, striae, hirsutism  CHEST: CTAB  CV: RRR  BREAST EXAM: breasts appear normal, no suspicious masses, no skin or nipple changes or axillary nodes  ABDOMEN: no hernias, masses, or hepatosplenomegaly  GENITALIA: normal external genitalia, no erythema, no discharge  URETHRA: normal urethra, normal urethral meatus  VAGINA: Normal  CERVIX: absent  UTERUS: absent  ADNEXA: no mass, fullness, tenderness      ASSESSMENT:   1. Well female exam with routine gynecological exam         PLAN:      Return to clinic in 1 year

## 2018-06-13 ENCOUNTER — HOSPITAL ENCOUNTER (EMERGENCY)
Facility: HOSPITAL | Age: 38
Discharge: HOME | End: 2018-06-13
Attending: EMERGENCY MEDICINE

## 2018-06-13 VITALS
TEMPERATURE: 98 F | HEIGHT: 61 IN | OXYGEN SATURATION: 97 % | BODY MASS INDEX: 32.85 KG/M2 | DIASTOLIC BLOOD PRESSURE: 92 MMHG | SYSTOLIC BLOOD PRESSURE: 134 MMHG | WEIGHT: 174 LBS | RESPIRATION RATE: 18 BRPM | HEART RATE: 92 BPM

## 2018-06-13 DIAGNOSIS — R06.02 SOB (SHORTNESS OF BREATH): ICD-10-CM

## 2018-06-13 DIAGNOSIS — M54.9 BACK PAIN, UNSPECIFIED BACK LOCATION, UNSPECIFIED BACK PAIN LATERALITY, UNSPECIFIED CHRONICITY: Primary | ICD-10-CM

## 2018-06-13 DIAGNOSIS — R52 PAIN: ICD-10-CM

## 2018-06-13 PROCEDURE — 63600175 PHARM REV CODE 636 W HCPCS: Performed by: PHYSICIAN ASSISTANT

## 2018-06-13 PROCEDURE — 96372 THER/PROPH/DIAG INJ SC/IM: CPT

## 2018-06-13 PROCEDURE — 99284 EMERGENCY DEPT VISIT MOD MDM: CPT | Mod: 25

## 2018-06-13 PROCEDURE — 25000003 PHARM REV CODE 250: Performed by: PHYSICIAN ASSISTANT

## 2018-06-13 PROCEDURE — 99284 EMERGENCY DEPT VISIT MOD MDM: CPT | Mod: ,,, | Performed by: PHYSICIAN ASSISTANT

## 2018-06-13 RX ORDER — KETOROLAC TROMETHAMINE 30 MG/ML
10 INJECTION, SOLUTION INTRAMUSCULAR; INTRAVENOUS
Status: COMPLETED | OUTPATIENT
Start: 2018-06-13 | End: 2018-06-13

## 2018-06-13 RX ORDER — TRAMADOL HYDROCHLORIDE 50 MG/1
50 TABLET ORAL EVERY 6 HOURS PRN
Qty: 5 TABLET | Refills: 0 | Status: SHIPPED | OUTPATIENT
Start: 2018-06-13 | End: 2018-06-23

## 2018-06-13 RX ORDER — LIDOCAINE 50 MG/G
1 PATCH TOPICAL DAILY
Qty: 10 PATCH | Refills: 0 | Status: SHIPPED | OUTPATIENT
Start: 2018-06-13 | End: 2019-04-04

## 2018-06-13 RX ORDER — NAPROXEN 500 MG/1
500 TABLET ORAL 2 TIMES DAILY WITH MEALS
Qty: 14 TABLET | Refills: 0 | Status: SHIPPED | OUTPATIENT
Start: 2018-06-13 | End: 2018-06-20

## 2018-06-13 RX ORDER — TRAMADOL HYDROCHLORIDE 50 MG/1
50 TABLET ORAL
Status: COMPLETED | OUTPATIENT
Start: 2018-06-13 | End: 2018-06-13

## 2018-06-13 RX ORDER — LIDOCAINE 50 MG/G
1 PATCH TOPICAL DAILY
Status: DISCONTINUED | OUTPATIENT
Start: 2018-06-13 | End: 2018-06-13 | Stop reason: HOSPADM

## 2018-06-13 RX ADMIN — KETOROLAC TROMETHAMINE 10 MG: 30 INJECTION, SOLUTION INTRAMUSCULAR at 09:06

## 2018-06-13 RX ADMIN — TRAMADOL HYDROCHLORIDE 50 MG: 50 TABLET, FILM COATED ORAL at 09:06

## 2018-06-13 RX ADMIN — LIDOCAINE 1 PATCH: 50 PATCH TOPICAL at 09:06

## 2018-06-13 NOTE — ED TRIAGE NOTES
Patient started having right shoulder pain about 530 am and pain started to get worse. Patient also started to experience numbness to right hand. Denies any chest pain, pain is mainly in shoulder and back.

## 2018-06-13 NOTE — ED PROVIDER NOTES
Encounter Date: 6/13/2018       History     Chief Complaint   Patient presents with    Shoulder Pain     awakened w/ rt shoulder pain , painful to breath and tingling in rt arm. Denies trauma. Took a flexeril PTA.     Patient is a 38-year-old female with past medical history of hypertension who presents to the emergency department due to shoulder and back pain.  Patient states that she woke this morning and was having pain in her right shoulder around 5:00 a.m..  Patient states that the pain radiated to her right upper back and caused a decreased sensation in her left hand.  Patient states that the pain is worse when she takes of breath. 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  Patient denies any fevers, chills, chest pain, shortness of breath, or any other complaints.          Review of patient's allergies indicates:   Allergen Reactions    Dye Anaphylaxis     Hair lightener/bleach     Past Medical History:   Diagnosis Date    Abnormal Pap smear of vagina     Allergy     Hypertension     Joint pain      Past Surgical History:   Procedure Laterality Date    CKC, ECC, fractional D&C      COLPOSCOPY      CONIZATION-CERVIX  02/2015    DILATION AND CURETTAGE OF UTERUS      HYSTERECTOMY  03/2017    AUB, fibroids    TUBAL LIGATION       Family History   Problem Relation Age of Onset    Eczema Mother     Ovarian cancer Mother     Eczema Son     Eczema Maternal Grandmother     Eczema Son     Breast cancer Maternal Aunt         THREE AUNTS    Melanoma Neg Hx     Lupus Neg Hx     Psoriasis Neg Hx     Colon cancer Neg Hx      Social History   Substance Use Topics    Smoking status: Never Smoker    Smokeless tobacco: Never Used    Alcohol use Yes      Comment: occasional     Review of Systems   Constitutional: Negative for activity change, appetite change, diaphoresis, fatigue and fever.   HENT: Negative for congestion, dental problem,  drooling, ear pain, facial swelling, sore throat and trouble swallowing.    Eyes: Negative for pain, discharge and visual disturbance.   Respiratory: Negative for apnea, cough, chest tightness and shortness of breath.    Cardiovascular: Negative for chest pain and palpitations.   Gastrointestinal: Negative for abdominal distention, anal bleeding, blood in stool, diarrhea, nausea and vomiting.   Endocrine: Negative for cold intolerance and polydipsia.   Genitourinary: Negative for decreased urine volume, difficulty urinating, enuresis, frequency and hematuria.   Musculoskeletal: Positive for back pain. Negative for arthralgias, gait problem, myalgias and neck stiffness.        Shoulder pain   Skin: Negative for color change and pallor.   Allergic/Immunologic: Negative for environmental allergies.   Neurological: Negative for dizziness, syncope, numbness and headaches.   Psychiatric/Behavioral: Negative for agitation, confusion and dysphoric mood.       Physical Exam     Initial Vitals [06/13/18 0832]   BP Pulse Resp Temp SpO2   (!) 134/92 92 18 98.4 °F (36.9 °C) 97 %      MAP       --         Physical Exam    Nursing note and vitals reviewed.  Constitutional: She appears well-developed and well-nourished. She is not diaphoretic. No distress.   HENT:   Head: Normocephalic and atraumatic.   Neck: Normal range of motion. Neck supple.   Cardiovascular: Normal rate, regular rhythm and normal heart sounds. Exam reveals no gallop and no friction rub.    No murmur heard.  Pulmonary/Chest: Breath sounds normal. She has no wheezes. She has no rhonchi. She has no rales.   Abdominal: Soft. Bowel sounds are normal. There is no tenderness. There is no rebound and no guarding.   Musculoskeletal: Normal range of motion.   Pain on palpation to right back    Neurological: She is alert and oriented to person, place, and time.   Skin: Skin is warm and dry. No rash noted. No erythema.   Psychiatric: She has a normal mood and affect.          ED Course   Procedures  Labs Reviewed - No data to display       X-Ray Cervical Spine AP And Lateral   Final Result      Normal findings         Electronically signed by: Yung Cat MD   Date:    06/13/2018   Time:    10:03      X-Ray Chest 1 View   Final Result      No active cardiopulmonary disease and no significant interval change         Electronically signed by: Yung Cat MD   Date:    06/13/2018   Time:    10:01                 APC / Resident Notes:   Patient is a 38-year-old female with past medical history of hypertension who presents to the emergency department due to shoulder and back pain. Physical exam reveals female no acute distress.  Heart regular rate and rhythm.  Lungs clear to auscultation bilaterally.  Abdomen soft nontender nondistended. Pain on palpation to right upper back.  Will obtain x-ray and give patient Toradol, tramadol and lidocaine patch.  Will reassess.    Patient states that symptoms improved with above treatment.  Patient will be discharged home with lidocaine patches, Ultram, and naproxen.  Patient is to follow up PCP in near future.  Plan treatment discussed with attending physician she is agreeable.                 Clinical Impression:   The primary encounter diagnosis was Back pain, unspecified back location, unspecified back pain laterality, unspecified chronicity. Diagnoses of Pain and SOB (shortness of breath) were also pertinent to this visit.      Disposition:   Disposition: Discharged  Condition: Stable                        Debbie Berman PA-C  06/13/18 3720

## 2018-07-16 ENCOUNTER — PATIENT MESSAGE (OUTPATIENT)
Dept: FAMILY MEDICINE | Facility: CLINIC | Age: 38
End: 2018-07-16

## 2018-10-24 RX ORDER — HYDROCHLOROTHIAZIDE 25 MG/1
TABLET ORAL
Qty: 90 TABLET | Refills: 0 | Status: SHIPPED | OUTPATIENT
Start: 2018-10-24 | End: 2019-07-15 | Stop reason: SDUPTHER

## 2018-11-27 ENCOUNTER — OFFICE VISIT (OUTPATIENT)
Dept: OBSTETRICS AND GYNECOLOGY | Facility: CLINIC | Age: 38
End: 2018-11-27
Payer: COMMERCIAL

## 2018-11-27 VITALS
DIASTOLIC BLOOD PRESSURE: 74 MMHG | SYSTOLIC BLOOD PRESSURE: 122 MMHG | HEIGHT: 61 IN | BODY MASS INDEX: 35.53 KG/M2 | WEIGHT: 188.19 LBS

## 2018-11-27 DIAGNOSIS — N89.8 VAGINAL ITCHING: Primary | ICD-10-CM

## 2018-11-27 DIAGNOSIS — N89.8 VAGINAL DISCHARGE: ICD-10-CM

## 2018-11-27 DIAGNOSIS — Z11.3 SCREEN FOR STD (SEXUALLY TRANSMITTED DISEASE): ICD-10-CM

## 2018-11-27 LAB
CANDIDA RRNA VAG QL PROBE: NEGATIVE
G VAGINALIS RRNA GENITAL QL PROBE: POSITIVE
T VAGINALIS RRNA GENITAL QL PROBE: POSITIVE

## 2018-11-27 PROCEDURE — 3008F BODY MASS INDEX DOCD: CPT | Mod: CPTII,S$GLB,, | Performed by: OBSTETRICS & GYNECOLOGY

## 2018-11-27 PROCEDURE — 87491 CHLMYD TRACH DNA AMP PROBE: CPT

## 2018-11-27 PROCEDURE — 87591 N.GONORRHOEAE DNA AMP PROB: CPT

## 2018-11-27 PROCEDURE — 99999 PR PBB SHADOW E&M-EST. PATIENT-LVL III: CPT | Mod: PBBFAC,,,

## 2018-11-27 PROCEDURE — 3078F DIAST BP <80 MM HG: CPT | Mod: CPTII,S$GLB,, | Performed by: OBSTETRICS & GYNECOLOGY

## 2018-11-27 PROCEDURE — 99213 OFFICE O/P EST LOW 20 MIN: CPT | Mod: S$GLB,,, | Performed by: OBSTETRICS & GYNECOLOGY

## 2018-11-27 PROCEDURE — 87660 TRICHOMONAS VAGIN DIR PROBE: CPT

## 2018-11-27 PROCEDURE — 3074F SYST BP LT 130 MM HG: CPT | Mod: CPTII,S$GLB,, | Performed by: OBSTETRICS & GYNECOLOGY

## 2018-11-27 NOTE — PROGRESS NOTES
History & Physical  Gynecology      SUBJECTIVE:     Chief Complaint: Vaginal Itching (Patient is also experiencing odor.)       History of Present Illness:  Marlyn Camacho is a 38 y.o. here today with vaginal itching, watery discharge and odor.   Patient is s/p TLH/BS in 2017.   Sexually active with male partner, no known exposure to STDs but does desire screen.       Review of patient's allergies indicates:   Allergen Reactions    Dye Anaphylaxis     Hair lightener/bleach       Past Medical History:   Diagnosis Date    Abnormal Pap smear of cervix     Abnormal Pap smear of vagina     Allergy     Hypertension     Joint pain      Past Surgical History:   Procedure Laterality Date    CKC, ECC, fractional D&C      COLPOSCOPY      CONE BIOPSY, CERVIX, USING COLD KNIFE N/A 2014    [facility]    CONIZATION-CERVIX  2015    DILATION AND CURETTAGE OF UTERUS      DILATION AND CURETTAGE, UTERUS, FRACTIONAL N/A 2014    [facility] at [facility] OR    HYSTERECTOMY  2017    AUB, fibroids    HYSTERECTOMY-TOTAL LAPAROSCOPIC ([facility]) N/A 3/13/2017    Performed by Lucy [facility]aly [facility] at [facility] OR    SALPINGECTOMY-LAPAROSCOPIC Bilateral 3/13/2017    [facility] at [facility] OR    TUBAL LIGATION       OB History      Para Term  AB Living    3 3 0     3    SAB TAB Ectopic Multiple Live Births            3        Family History   Problem Relation Age of Onset    Eczema Mother     Ovarian cancer Mother     Eczema Son     Eczema Maternal Grandmother     Eczema Son     Breast cancer Maternal Aunt         THREE AUNTS    Melanoma Neg Hx     Lupus Neg Hx     Psoriasis Neg Hx     Colon cancer Neg Hx      Social History     Tobacco Use    Smoking status: Never Smoker    Smokeless tobacco: Never Used   Substance Use Topics    Alcohol use: Yes     Comment: occasional    Drug use: No       Current  Outpatient Medications   Medication Sig    EPIPEN 2-CHRISTY 0.3 mg/0.3 mL AtIn INJECT 1 PEN INTO THE MUSCLE ONCE UTD    hydroCHLOROthiazide (HYDRODIURIL) 25 MG tablet TAKE 1 TABLET(25 MG) BY MOUTH EVERY DAY    lidocaine (LIDODERM) 5 % Place 1 patch onto the skin once daily. Remove & Discard patch within 12 hours or as directed by MD     No current facility-administered medications for this visit.          Review of Systems:  Review of Systems     OBJECTIVE:     Physical Exam:  Physical Exam   Constitutional: She is oriented to person, place, and time. She appears well-developed and well-nourished.   Cardiovascular: Normal rate.   Pulmonary/Chest: Effort normal. No respiratory distress.   Abdominal: Soft. She exhibits no distension and no mass. There is no tenderness. There is no guarding.   Genitourinary:   Genitourinary Comments: URETHRA: normal appearance, no lesions  VULVA: normal appearance, no lesions   VAGINA: normal appearing vaginal mucosa, no lesions, no masses, no bleeding. +white/yellow vaginal discharge   CERVIX: SURGICALLY ABSENT  UTERUS: SURGICALLY ABSENT   ADNEXA: No palpable masses, no tenderness      Neurological: She is alert and oriented to person, place, and time.   Psychiatric: She has a normal mood and affect. Her behavior is normal.   Vitals reviewed.        ASSESSMENT:       ICD-10-CM ICD-9-CM    1. Vaginal itching N89.8 698.1 Vaginosis Screen by DNA Probe   2. Vaginal discharge N89.8 623.5 Vaginosis Screen by DNA Probe   3. Screen for STD (sexually transmitted disease) Z11.3 V74.5 C. trachomatis/N. gonorrhoeae by AMP DNA          Plan:      Marlyn was seen today for vaginal itching.    Diagnoses and all orders for this visit:    Vaginal itching  Vaginal discharge  Screen for STD (sexually transmitted disease)  -     Vaginosis Screen by DNA Probe  -     C. trachomatis/N. gonorrhoeae by AMP DNA    Treatment pending results.       Orders Placed This Encounter   Procedures    Vaginosis Screen  by DNA Probe    C. trachomatis/N. gonorrhoeae by AMP DNA       Follow-up if symptoms worsen or fail to improve.     Counseling time: 15 mins     Niyah Polanco

## 2018-11-28 ENCOUNTER — PATIENT MESSAGE (OUTPATIENT)
Dept: OBSTETRICS AND GYNECOLOGY | Facility: CLINIC | Age: 38
End: 2018-11-28

## 2018-11-28 ENCOUNTER — TELEPHONE (OUTPATIENT)
Dept: OBSTETRICS AND GYNECOLOGY | Facility: CLINIC | Age: 38
End: 2018-11-28

## 2018-11-28 LAB
C TRACH DNA SPEC QL NAA+PROBE: NOT DETECTED
N GONORRHOEA DNA SPEC QL NAA+PROBE: NOT DETECTED

## 2018-11-28 RX ORDER — METRONIDAZOLE 500 MG/1
2000 TABLET ORAL ONCE
Qty: 4 TABLET | Refills: 0 | Status: SHIPPED | OUTPATIENT
Start: 2018-11-28 | End: 2018-11-28

## 2019-03-28 ENCOUNTER — PATIENT OUTREACH (OUTPATIENT)
Dept: ADMINISTRATIVE | Facility: HOSPITAL | Age: 39
End: 2019-03-28

## 2019-03-28 DIAGNOSIS — Z12.39 SCREENING FOR BREAST CANCER: Primary | ICD-10-CM

## 2019-04-04 ENCOUNTER — OFFICE VISIT (OUTPATIENT)
Dept: FAMILY MEDICINE | Facility: CLINIC | Age: 39
End: 2019-04-04
Attending: FAMILY MEDICINE
Payer: COMMERCIAL

## 2019-04-04 VITALS
HEIGHT: 61 IN | DIASTOLIC BLOOD PRESSURE: 84 MMHG | TEMPERATURE: 98 F | OXYGEN SATURATION: 97 % | HEART RATE: 64 BPM | SYSTOLIC BLOOD PRESSURE: 122 MMHG | BODY MASS INDEX: 35.75 KG/M2 | WEIGHT: 189.38 LBS

## 2019-04-04 DIAGNOSIS — J30.1 SEASONAL ALLERGIC RHINITIS DUE TO POLLEN: Primary | ICD-10-CM

## 2019-04-04 DIAGNOSIS — K21.9 GASTROESOPHAGEAL REFLUX DISEASE, ESOPHAGITIS PRESENCE NOT SPECIFIED: ICD-10-CM

## 2019-04-04 PROCEDURE — 3074F PR MOST RECENT SYSTOLIC BLOOD PRESSURE < 130 MM HG: ICD-10-PCS | Mod: CPTII,S$GLB,, | Performed by: FAMILY MEDICINE

## 2019-04-04 PROCEDURE — 3079F PR MOST RECENT DIASTOLIC BLOOD PRESSURE 80-89 MM HG: ICD-10-PCS | Mod: CPTII,S$GLB,, | Performed by: FAMILY MEDICINE

## 2019-04-04 PROCEDURE — 99214 PR OFFICE/OUTPT VISIT, EST, LEVL IV, 30-39 MIN: ICD-10-PCS | Mod: S$GLB,,, | Performed by: FAMILY MEDICINE

## 2019-04-04 PROCEDURE — 3079F DIAST BP 80-89 MM HG: CPT | Mod: CPTII,S$GLB,, | Performed by: FAMILY MEDICINE

## 2019-04-04 PROCEDURE — 3074F SYST BP LT 130 MM HG: CPT | Mod: CPTII,S$GLB,, | Performed by: FAMILY MEDICINE

## 2019-04-04 PROCEDURE — 3008F BODY MASS INDEX DOCD: CPT | Mod: CPTII,S$GLB,, | Performed by: FAMILY MEDICINE

## 2019-04-04 PROCEDURE — 3008F PR BODY MASS INDEX (BMI) DOCUMENTED: ICD-10-PCS | Mod: CPTII,S$GLB,, | Performed by: FAMILY MEDICINE

## 2019-04-04 PROCEDURE — 99999 PR PBB SHADOW E&M-EST. PATIENT-LVL III: ICD-10-PCS | Mod: PBBFAC,,, | Performed by: FAMILY MEDICINE

## 2019-04-04 PROCEDURE — 99999 PR PBB SHADOW E&M-EST. PATIENT-LVL III: CPT | Mod: PBBFAC,,, | Performed by: FAMILY MEDICINE

## 2019-04-04 PROCEDURE — 99214 OFFICE O/P EST MOD 30 MIN: CPT | Mod: S$GLB,,, | Performed by: FAMILY MEDICINE

## 2019-04-04 RX ORDER — OMEPRAZOLE 40 MG/1
40 CAPSULE, DELAYED RELEASE ORAL DAILY
Qty: 30 CAPSULE | Refills: 0 | Status: SHIPPED | OUTPATIENT
Start: 2019-04-04 | End: 2019-04-30 | Stop reason: SDUPTHER

## 2019-04-04 RX ORDER — FLUTICASONE PROPIONATE 50 MCG
1 SPRAY, SUSPENSION (ML) NASAL DAILY
Qty: 16 G | Refills: 2 | Status: SHIPPED | OUTPATIENT
Start: 2019-04-04 | End: 2020-08-02 | Stop reason: SDUPTHER

## 2019-04-04 RX ORDER — LORATADINE 10 MG/1
10 TABLET ORAL DAILY
Qty: 90 TABLET | Refills: 0 | Status: SHIPPED | OUTPATIENT
Start: 2019-04-04 | End: 2020-08-02 | Stop reason: SDUPTHER

## 2019-04-04 NOTE — PATIENT INSTRUCTIONS
Marlyn,     We are always striving for excellence. Should you receive a patient experience survey in the mail, we would appreciate if you would take a few moments to give us your feedback. These surveys let us know our strengths as well as areas of opportunity for improvement to better serve you.    Thank you for your time,  Arian Dumas MA    Your test results will be communicated to you via : My [facility], Telephone or Letter.   If you have not received your test results in one week, please contact the clinic at [phone].

## 2019-04-22 NOTE — PROGRESS NOTES
"Subjective:       Patient ID: Marlyn Camacho is a 39 y.o. female.    Chief Complaint: Sinus Problem    HPI   Pt is here for c/o nasal congestion with post nasal drip and sneezing several weeks taking prn otc but nothing on a schedule.  No facial pain no fever/chills pt has gerd no break through heart burn when on ppi no sob/cp  Review of Systems   Constitutional: Negative for chills, fatigue and fever.   HENT: Positive for congestion, postnasal drip and sneezing. Negative for ear pain, sinus pressure and sore throat.    Respiratory: Negative for cough, chest tightness and shortness of breath.    Cardiovascular: Negative for chest pain and palpitations.   Gastrointestinal: Negative for abdominal distention and abdominal pain.       Objective:      Physical Exam   Constitutional: She appears well-developed and well-nourished. No distress.   HENT:   Head: Normocephalic and atraumatic.   Mouth/Throat: Oropharynx is clear and moist.   Tm pearly bilaterally nares patent bilaterally with thin clear mucus no facial tenderness   Cardiovascular: Normal rate and regular rhythm. Exam reveals no gallop.   Pulmonary/Chest: Effort normal and breath sounds normal. No stridor. No respiratory distress.   Abdominal: Soft. Bowel sounds are normal. She exhibits no distension.       Assessment:       1. Seasonal allergic rhinitis due to pollen    2. Gastroesophageal reflux disease, esophagitis presence not specified        Plan:     avoid allergens/triggers   cont ppi  Start flonase and claritin daily during season  rtc prn    "This note will not be shared with the patient."   "

## 2019-05-01 RX ORDER — OMEPRAZOLE 40 MG/1
CAPSULE, DELAYED RELEASE ORAL
Qty: 30 CAPSULE | Refills: 0 | Status: SHIPPED | OUTPATIENT
Start: 2019-05-01 | End: 2019-07-15

## 2019-05-07 ENCOUNTER — HOSPITAL ENCOUNTER (OUTPATIENT)
Dept: RADIOLOGY | Facility: OTHER | Age: 39
Discharge: HOME | End: 2019-05-07
Attending: FAMILY MEDICINE
Payer: COMMERCIAL

## 2019-05-07 DIAGNOSIS — Z12.39 SCREENING FOR BREAST CANCER: ICD-10-CM

## 2019-05-07 PROCEDURE — 77067 MAMMO DIGITAL SCREENING BILAT WITH TOMOSYNTHESIS_CAD: ICD-10-PCS | Mod: 26,,, | Performed by: RADIOLOGY

## 2019-05-07 PROCEDURE — 77067 SCR MAMMO BI INCL CAD: CPT | Mod: TC

## 2019-05-07 PROCEDURE — 77063 MAMMO DIGITAL SCREENING BILAT WITH TOMOSYNTHESIS_CAD: ICD-10-PCS | Mod: 26,,, | Performed by: RADIOLOGY

## 2019-05-07 PROCEDURE — 77067 SCR MAMMO BI INCL CAD: CPT | Mod: 26,,, | Performed by: RADIOLOGY

## 2019-05-07 PROCEDURE — 77063 BREAST TOMOSYNTHESIS BI: CPT | Mod: 26,,, | Performed by: RADIOLOGY

## 2019-05-09 ENCOUNTER — OFFICE VISIT (OUTPATIENT)
Dept: OBSTETRICS AND GYNECOLOGY | Facility: CLINIC | Age: 39
End: 2019-05-09
Attending: OBSTETRICS & GYNECOLOGY
Payer: COMMERCIAL

## 2019-05-09 VITALS
WEIGHT: 190.94 LBS | DIASTOLIC BLOOD PRESSURE: 70 MMHG | BODY MASS INDEX: 36.05 KG/M2 | SYSTOLIC BLOOD PRESSURE: 110 MMHG | HEIGHT: 61 IN

## 2019-05-09 DIAGNOSIS — Z01.419 WELL FEMALE EXAM WITH ROUTINE GYNECOLOGICAL EXAM: Primary | ICD-10-CM

## 2019-05-09 DIAGNOSIS — L91.0 KELOID SCAR OF SKIN: ICD-10-CM

## 2019-05-09 PROCEDURE — 3074F PR MOST RECENT SYSTOLIC BLOOD PRESSURE < 130 MM HG: ICD-10-PCS | Mod: CPTII,S$GLB,, | Performed by: OBSTETRICS & GYNECOLOGY

## 2019-05-09 PROCEDURE — 99999 PR PBB SHADOW E&M-EST. PATIENT-LVL III: CPT | Mod: PBBFAC,,, | Performed by: OBSTETRICS & GYNECOLOGY

## 2019-05-09 PROCEDURE — 99999 PR PBB SHADOW E&M-EST. PATIENT-LVL III: ICD-10-PCS | Mod: PBBFAC,,, | Performed by: OBSTETRICS & GYNECOLOGY

## 2019-05-09 PROCEDURE — 3078F PR MOST RECENT DIASTOLIC BLOOD PRESSURE < 80 MM HG: ICD-10-PCS | Mod: CPTII,S$GLB,, | Performed by: OBSTETRICS & GYNECOLOGY

## 2019-05-09 PROCEDURE — 99396 PREV VISIT EST AGE 40-64: CPT | Mod: S$GLB,,, | Performed by: OBSTETRICS & GYNECOLOGY

## 2019-05-09 PROCEDURE — 3074F SYST BP LT 130 MM HG: CPT | Mod: CPTII,S$GLB,, | Performed by: OBSTETRICS & GYNECOLOGY

## 2019-05-09 PROCEDURE — 3078F DIAST BP <80 MM HG: CPT | Mod: CPTII,S$GLB,, | Performed by: OBSTETRICS & GYNECOLOGY

## 2019-05-09 PROCEDURE — 99396 PR PREVENTIVE VISIT,EST,40-64: ICD-10-PCS | Mod: S$GLB,,, | Performed by: OBSTETRICS & GYNECOLOGY

## 2019-05-10 NOTE — PROGRESS NOTES
SUBJECTIVE:   39 y.o. female   for routine gyn exam. Patient's last menstrual period was 02/15/2017 (approximate)..  She reports bothersome keloid scars at trocar sites from [facility].         Past Medical History:   Diagnosis Date    Abnormal Pap smear of cervix     Allergy     Hypertension     Joint pain      Past Surgical History:   Procedure Laterality Date    CKC, ECC, fractional D&C      COLPOSCOPY      CONE BIOPSY, CERVIX, USING COLD KNIFE N/A 2014    Performed by [facility] at [facility] OR    CONIZATION-CERVIX  2015    DILATION AND CURETTAGE OF UTERUS      DILATION AND CURETTAGE, UTERUS, FRACTIONAL N/A 2014    [facility] at [facility] OR    HYSTERECTOMY  2017    AUB, fibroids    HYSTERECTOMY-TOTAL LAPAROSCOPIC ([facility]) N/A 3/13/2017    Performed by Lucy [facility]aly [facility] at [facility] OR    SALPINGECTOMY-LAPAROSCOPIC Bilateral 3/13/2017    [facility] OR    TUBAL LIGATION       Social History     Socioeconomic History    Marital status:      Spouse name: Not on file    Number of children: Not on file    Years of education: Not on file    Highest education level: Not on file   Occupational History    Not on file   Social Needs    Financial resource strain: Not on file    Food insecurity:     Worry: Not on file     Inability: Not on file    Transportation needs:     Medical: Not on file     Non-medical: Not on file   Tobacco Use    Smoking status: Never Smoker    Smokeless tobacco: Never Used   Substance and Sexual Activity    Alcohol use: Yes     Comment: occasional    Drug use: No    Sexual activity: Yes     Partners: Male     Birth control/protection: Other-see comments, See Surgical Hx     Comment: Tubal Ligation/Hysterectomy   Lifestyle    Physical activity:     Days per week: Not on file     Minutes per session: Not on file    Stress: Not on file   Relationships    Social connections:     Talks on  phone: Not on file     Gets together: Not on file     Attends [employer] service: Not on file     Active member of club or organization: Not on file     Attends meetings of clubs or organizations: Not on file     Relationship status: Not on file   Other Topics Concern    Are you pregnant or think you may be? No    Breast-feeding No   Social History Narrative    Not on file     Family History   Problem Relation Age of Onset    Eczema Mother     Ovarian cancer Mother     Eczema Son     Eczema Maternal Grandmother     Eczema Son     Breast cancer Maternal Aunt         THREE AUNTS    Melanoma Neg Hx     Lupus Neg Hx     Psoriasis Neg Hx     Colon cancer Neg Hx      OB History    Para Term  AB Living   3 3 0     3   SAB TAB Ectopic Multiple Live Births           3      # Outcome Date GA Lbr Bennie/2nd Weight Sex Delivery Anes PTL Lv   3 Para      Vag-Spont   KATIE   2 Para      Vag-Spont   KATIE   1 Para      Vag-Spont   KATIE         Current Outpatient Medications   Medication Sig Dispense Refill    EPIPEN 2-CHRISTY 0.3 mg/0.3 mL AtIn INJECT 1 PEN INTO THE MUSCLE ONCE UTD  4    fluticasone (FLONASE) 50 mcg/actuation nasal spray 1 spray (50 mcg total) by Each Nare route once daily. 16 g 2    hydroCHLOROthiazide (HYDRODIURIL) 25 MG tablet TAKE 1 TABLET(25 MG) BY MOUTH EVERY DAY 90 tablet 0    loratadine (CLARITIN) 10 mg tablet Take 1 tablet (10 mg total) by mouth once daily. 90 tablet 0    omeprazole (PRILOSEC) 40 MG capsule TAKE 1 CAPSULE(40 MG) BY MOUTH EVERY DAY 30 capsule 0     No current facility-administered medications for this visit.      Allergies: Dye     ROS:  Constitutional: no weight loss, weight gain, fever, fatigue  Eyes:  No vision changes, glasses/contacts  ENT/Mouth: No ulcers, sinus problems, ears ringing, headache  Cardiovascular: No inability to lie flat, chest pain, exercise intolerance, swelling, heart palpitations  Respiratory: No wheezing, coughing blood, shortness of breath, or  "cough  Gastrointestinal: No diarrhea, bloody stool, nausea/vomiting, constipation, gas, hemorrhoids  Genitourinary: No blood in urine, painful urination, urgency of urination, frequency of urination, incomplete emptying, incontinence, abnormal bleeding, painful periods, heavy periods, vaginal discharge, vaginal odor, painful intercourse, sexual problems, bleeding after intercourse.  Musculoskeletal: No muscle weakness  Skin/Breast: No painful breasts, nipple discharge, masses, rash, ulcers  Neurological: No passing out, seizures, numbness, headache  Endocrine: No diabetes, hypothyroid, hyperthyroid, hot flashes, hair loss, abnormal hair growth, ance  Psychiatric: No depression, crying  Hematologic: No bruises, bleeding, swollen lymph nodes, anemia.      OBJECTIVE:   The patient appears well, alert, oriented x 3, in no distress.  /70 (BP Location: Right arm, Patient Position: Sitting, BP Method: Large (Manual))   Ht 5' 1" (1.549 m)   Wt 86.6 kg (190 lb 14.7 oz)   LMP 02/15/2017 (Approximate)   BMI 36.07 kg/m²   NECK: no thyromegaly, trachea midline  SKIN: no acne, striae, hirsutism  CHEST: CTAB  CV: RRR  BREAST EXAM: breasts appear normal, no suspicious masses, no skin or nipple changes or axillary nodes  ABDOMEN: no hernias, masses, or hepatosplenomegaly.  1-2 cm raised keloid scars at 3 trocar incision sites.  GENITALIA: normal external genitalia, no erythema, no discharge  URETHRA: normal urethra, normal urethral meatus  VAGINA: Normal  CERVIX: absent  UTERUS: absent  ADNEXA: no mass, fullness, tenderness      ASSESSMENT:   1. Well female exam with routine gynecological exam     2. Keloid scar of skin  Ambulatory consult to Dermatology       PLAN:   Orders Placed This Encounter    Ambulatory consult to Dermatology     Discussed keloid scars.  Consider kenalog, Mederma, plastic surgery consult.  Return to clinic in 1 year  "

## 2019-05-13 ENCOUNTER — INITIAL CONSULT (OUTPATIENT)
Dept: DERMATOLOGY | Facility: CLINIC | Age: 39
End: 2019-05-13
Payer: COMMERCIAL

## 2019-05-13 DIAGNOSIS — L91.0 KELOID: Primary | ICD-10-CM

## 2019-05-13 PROCEDURE — 99999 PR PBB SHADOW E&M-EST. PATIENT-LVL II: ICD-10-PCS | Mod: PBBFAC,,, | Performed by: PHYSICIAN ASSISTANT

## 2019-05-13 PROCEDURE — 99999 PR PBB SHADOW E&M-EST. PATIENT-LVL II: CPT | Mod: PBBFAC,,, | Performed by: PHYSICIAN ASSISTANT

## 2019-05-13 PROCEDURE — 99202 PR OFFICE/OUTPT VISIT, NEW, LEVL II, 15-29 MIN: ICD-10-PCS | Mod: 25,S$GLB,, | Performed by: PHYSICIAN ASSISTANT

## 2019-05-13 PROCEDURE — 99202 OFFICE O/P NEW SF 15 MIN: CPT | Mod: 25,S$GLB,, | Performed by: PHYSICIAN ASSISTANT

## 2019-05-13 PROCEDURE — 11900 INJECT SKIN LESIONS </W 7: CPT | Mod: S$GLB,,, | Performed by: PHYSICIAN ASSISTANT

## 2019-05-13 PROCEDURE — 11900 PR INJECTION INTO SKIN LESIONS, UP TO 7: ICD-10-PCS | Mod: S$GLB,,, | Performed by: PHYSICIAN ASSISTANT

## 2019-05-13 RX ORDER — TRIAMCINOLONE ACETONIDE 40 MG/ML
40 INJECTION, SUSPENSION INTRA-ARTICULAR; INTRAMUSCULAR
Status: DISCONTINUED | OUTPATIENT
Start: 2019-05-13 | End: 2021-07-02

## 2019-05-13 NOTE — LETTER
May 13, 2019      Lucy Daly MD  [address]  [address]  [facility] [address]           [facility] - Dermatology  [address]  [place] [address]  Phone: [phone]  Fax: [phone]          Patient: Marlyn Camacho   MR Number: [id number]   YOB: 1980   Date of Visit: 5/13/2019       Dear Dr. Lucy Daly:    Thank you for referring Marlyn Camacho to me for evaluation. Attached you will find relevant portions of my assessment and plan of care.    If you have questions, please do not hesitate to call me. I look forward to following Marlyn Camacho along with you.    Sincerely,    Natasha Lynn PA-C    Enclosure  CC:  No Recipients    If you would like to receive this communication electronically, please contact [email or web address] or [phone] to request more information on [employer] access.    For providers and/or their staff who would like to refer a patient to [facility], please contact us through our one-stop-shop provider referral line, [facility], at [phone].    If you feel you have received this communication in error or would no longer like to receive these types of communications, please e-mail [email or web address]

## 2019-05-13 NOTE — PROGRESS NOTES
Subjective:       Patient ID:  Marlyn Camacho is a 39 y.o. female who presents for   Chief Complaint   Patient presents with    Keloid     Abdomen     Keloid  - Initial  Affected locations: abdomen  Duration: 2 years  Signs / symptoms: tender and itching  Exacerbated by: clothing rubbing   Relieving factors/Treatments tried: nothing    Pt reports scars are 2/2 surgery (hysterectomy).    Review of Systems   Constitutional: Negative for fever and chills.   Skin: Positive for tendency to form keloidal scars. Negative for sensitivity to antibiotic ointment and sensitivity to bandage adhesive.   Hematologic/Lymphatic: Does not bruise/bleed easily.        Objective:    Physical Exam   Constitutional: She appears well-developed and well-nourished. No distress.   Neurological: She is alert and oriented to person, place, and time. She is not disoriented.   Psychiatric: She has a normal mood and affect.   Skin:   Areas Examined (abnormalities noted in diagram):   Abdomen Inspection Performed              Diagram Legend     Erythematous scaling macule/papule c/w actinic keratosis       Vascular papule c/w angioma      Pigmented verrucoid papule/plaque c/w seborrheic keratosis      Yellow umbilicated papule c/w sebaceous hyperplasia      Irregularly shaped tan macule c/w lentigo     1-2 mm smooth white papules consistent with Milia      Movable subcutaneous cyst with punctum c/w epidermal inclusion cyst      Subcutaneous movable cyst c/w pilar cyst      Firm pink to brown papule c/w dermatofibroma      Pedunculated fleshy papule(s) c/w skin tag(s)      Evenly pigmented macule c/w junctional nevus     Mildly variegated pigmented, slightly irregular-bordered macule c/w mildly atypical nevus      Flesh colored to evenly pigmented papule c/w intradermal nevus       Pink pearly papule/plaque c/w basal cell carcinoma      Erythematous hyperkeratotic cursted plaque c/w SCC      Surgical scar with no sign of skin cancer  recurrence      Open and closed comedones      Inflammatory papules and pustules      Verrucoid papule consistent consistent with wart     Erythematous eczematous patches and plaques     Dystrophic onycholytic nail with subungual debris c/w onychomycosis     Umbilicated papule    Erythematous-base heme-crusted tan verrucoid plaque consistent with inflamed seborrheic keratosis     Erythematous Silvery Scaling Plaque c/w Psoriasis     See annotation      Assessment / Plan:      Keloid  -     triamcinolone acetonide injection 40 mg    Intralesional Kenalog 40mg/cc (0.8 cc total) injected into 3 lesions on the abdomen today after obtaining verbal consent including risk of surrounding hypopigmentation. Patient tolerated procedure well.    Units:1  NDC for Kenalog 40mg/cc:  4294-5252-81    Counseled pt that the keloid in her umbilicus may not respond well to therapy 2/2 its size and thickness. Consider referral to Dr. Lino (plastics).       Follow up in about 1 month (around 6/10/2019).

## 2019-06-13 ENCOUNTER — OFFICE VISIT (OUTPATIENT)
Dept: DERMATOLOGY | Facility: CLINIC | Age: 39
End: 2019-06-13
Payer: COMMERCIAL

## 2019-06-13 DIAGNOSIS — L91.0 KELOID: Primary | ICD-10-CM

## 2019-06-13 PROCEDURE — 99999 PR PBB SHADOW E&M-EST. PATIENT-LVL II: CPT | Mod: PBBFAC,,, | Performed by: PHYSICIAN ASSISTANT

## 2019-06-13 PROCEDURE — 99499 UNLISTED E&M SERVICE: CPT | Mod: S$GLB,,, | Performed by: PHYSICIAN ASSISTANT

## 2019-06-13 PROCEDURE — 11900 PR INJECTION INTO SKIN LESIONS, UP TO 7: ICD-10-PCS | Mod: S$GLB,,, | Performed by: PHYSICIAN ASSISTANT

## 2019-06-13 PROCEDURE — 99999 PR PBB SHADOW E&M-EST. PATIENT-LVL II: ICD-10-PCS | Mod: PBBFAC,,, | Performed by: PHYSICIAN ASSISTANT

## 2019-06-13 PROCEDURE — 11900 INJECT SKIN LESIONS </W 7: CPT | Mod: S$GLB,,, | Performed by: PHYSICIAN ASSISTANT

## 2019-06-13 PROCEDURE — 99499 NO LOS: ICD-10-PCS | Mod: S$GLB,,, | Performed by: PHYSICIAN ASSISTANT

## 2019-06-13 NOTE — PROGRESS NOTES
Subjective:       Patient ID:  Marlyn Camacho is a 39 y.o. female who presents for   Chief Complaint   Patient presents with    Keloid     Follow up     Keloid  - Follow-up  Symptom course: improving (last seen 5/13/19)  Currently using: s/p ILK 40mg/cc.  Affected locations: abdomen  Signs / symptoms: asymptomatic    Pt reports improvement in all 3 keloids - smaller and no longer itching or tender.    Review of Systems   Constitutional: Negative for fever and chills.   Skin: Positive for tendency to form keloidal scars. Negative for sensitivity to antibiotic ointment and sensitivity to bandage adhesive.   Hematologic/Lymphatic: Does not bruise/bleed easily.        Objective:    Physical Exam   Constitutional: She appears well-developed and well-nourished. No distress.   Neurological: She is alert and oriented to person, place, and time. She is not disoriented.   Psychiatric: She has a normal mood and affect.   Skin:   Areas Examined (abnormalities noted in diagram):   Abdomen Inspection Performed              Diagram Legend     Erythematous scaling macule/papule c/w actinic keratosis       Vascular papule c/w angioma      Pigmented verrucoid papule/plaque c/w seborrheic keratosis      Yellow umbilicated papule c/w sebaceous hyperplasia      Irregularly shaped tan macule c/w lentigo     1-2 mm smooth white papules consistent with Milia      Movable subcutaneous cyst with punctum c/w epidermal inclusion cyst      Subcutaneous movable cyst c/w pilar cyst      Firm pink to brown papule c/w dermatofibroma      Pedunculated fleshy papule(s) c/w skin tag(s)      Evenly pigmented macule c/w junctional nevus     Mildly variegated pigmented, slightly irregular-bordered macule c/w mildly atypical nevus      Flesh colored to evenly pigmented papule c/w intradermal nevus       Pink pearly papule/plaque c/w basal cell carcinoma      Erythematous hyperkeratotic cursted plaque c/w SCC      Surgical scar with no sign of  skin cancer recurrence      Open and closed comedones      Inflammatory papules and pustules      Verrucoid papule consistent consistent with wart     Erythematous eczematous patches and plaques     Dystrophic onycholytic nail with subungual debris c/w onychomycosis     Umbilicated papule    Erythematous-base heme-crusted tan verrucoid plaque consistent with inflamed seborrheic keratosis     Erythematous Silvery Scaling Plaque c/w Psoriasis     See annotation    Assessment / Plan:      Keloid    Intralesional Kenalog 40mg/cc (0.8 cc total) injected into 3 lesions on the abdomen today after obtaining verbal consent including risk of surrounding hypopigmentation. Patient tolerated procedure well.    Units:1  NDC for Kenalog 40mg/cc:  8232-0297-31    Consider referral to plastic surgery if no improvement at f/u appt.       Follow up in about 1 month (around 7/13/2019).

## 2019-07-15 ENCOUNTER — PATIENT MESSAGE (OUTPATIENT)
Dept: FAMILY MEDICINE | Facility: CLINIC | Age: 39
End: 2019-07-15

## 2019-07-16 RX ORDER — HYDROCHLOROTHIAZIDE 25 MG/1
25 TABLET ORAL DAILY
Qty: 90 TABLET | Refills: 3 | Status: SHIPPED | OUTPATIENT
Start: 2019-07-16 | End: 2020-09-16 | Stop reason: SDUPTHER

## 2019-10-29 ENCOUNTER — PATIENT MESSAGE (OUTPATIENT)
Dept: FAMILY MEDICINE | Facility: CLINIC | Age: 39
End: 2019-10-29

## 2019-12-29 ENCOUNTER — HOSPITAL ENCOUNTER (EMERGENCY)
Facility: HOSPITAL | Age: 39
Discharge: HOME | End: 2019-12-29
Attending: EMERGENCY MEDICINE
Payer: COMMERCIAL

## 2019-12-29 VITALS
DIASTOLIC BLOOD PRESSURE: 78 MMHG | BODY MASS INDEX: 37.19 KG/M2 | OXYGEN SATURATION: 100 % | WEIGHT: 197 LBS | HEIGHT: 61 IN | HEART RATE: 67 BPM | TEMPERATURE: 98 F | SYSTOLIC BLOOD PRESSURE: 127 MMHG | RESPIRATION RATE: 18 BRPM

## 2019-12-29 DIAGNOSIS — R51.9 ACUTE NONINTRACTABLE HEADACHE, UNSPECIFIED HEADACHE TYPE: Primary | ICD-10-CM

## 2019-12-29 DIAGNOSIS — E87.6 HYPOKALEMIA: ICD-10-CM

## 2019-12-29 LAB
ALBUMIN SERPL-MCNC: 3.6 G/DL (ref 3.3–5.5)
ALP SERPL-CCNC: 46 U/L (ref 42–141)
BILIRUB SERPL-MCNC: 0.7 MG/DL (ref 0.2–1.6)
BILIRUBIN, POC UA: NEGATIVE
BLOOD, POC UA: NEGATIVE
BUN SERPL-MCNC: 8 MG/DL (ref 7–22)
CALCIUM SERPL-MCNC: 9.8 MG/DL (ref 8–10.3)
CHLORIDE SERPL-SCNC: 98 MMOL/L (ref 98–108)
CLARITY, POC UA: CLEAR
COLOR, POC UA: YELLOW
CREAT SERPL-MCNC: 1.1 MG/DL (ref 0.6–1.2)
GLUCOSE SERPL-MCNC: 75 MG/DL (ref 73–118)
GLUCOSE, POC UA: NEGATIVE
KETONES, POC UA: NEGATIVE
LEUKOCYTE EST, POC UA: NEGATIVE
NITRITE, POC UA: NEGATIVE
PH UR STRIP: 8.5 [PH]
POC ALT (SGPT): 15 U/L (ref 10–47)
POC AST (SGOT): 29 U/L (ref 11–38)
POC TCO2: 28 MMOL/L (ref 18–33)
POTASSIUM BLD-SCNC: 3.2 MMOL/L (ref 3.6–5.1)
PROTEIN, POC UA: NEGATIVE
PROTEIN, POC: 7.4 G/DL (ref 6.4–8.1)
SODIUM BLD-SCNC: 143 MMOL/L (ref 128–145)
SPECIFIC GRAVITY, POC UA: 1.02
UROBILINOGEN, POC UA: 0.2 E.U./DL

## 2019-12-29 PROCEDURE — 80053 COMPREHEN METABOLIC PANEL: CPT | Mod: ER

## 2019-12-29 PROCEDURE — 85025 COMPLETE CBC W/AUTO DIFF WBC: CPT | Mod: ER

## 2019-12-29 PROCEDURE — 25000003 PHARM REV CODE 250: Mod: ER | Performed by: NURSE PRACTITIONER

## 2019-12-29 PROCEDURE — 81003 URINALYSIS AUTO W/O SCOPE: CPT | Mod: ER

## 2019-12-29 PROCEDURE — 63600175 PHARM REV CODE 636 W HCPCS: Mod: ER | Performed by: NURSE PRACTITIONER

## 2019-12-29 PROCEDURE — 96374 THER/PROPH/DIAG INJ IV PUSH: CPT | Mod: ER

## 2019-12-29 PROCEDURE — 99284 EMERGENCY DEPT VISIT MOD MDM: CPT | Mod: 25,ER

## 2019-12-29 RX ORDER — KETOROLAC TROMETHAMINE 10 MG/1
10 TABLET, FILM COATED ORAL EVERY 6 HOURS PRN
Qty: 14 TABLET | Refills: 0 | Status: SHIPPED | OUTPATIENT
Start: 2019-12-29 | End: 2019-12-29 | Stop reason: SDUPTHER

## 2019-12-29 RX ORDER — KETOROLAC TROMETHAMINE 30 MG/ML
15 INJECTION, SOLUTION INTRAMUSCULAR; INTRAVENOUS
Status: COMPLETED | OUTPATIENT
Start: 2019-12-29 | End: 2019-12-29

## 2019-12-29 RX ORDER — CLONIDINE HYDROCHLORIDE 0.1 MG/1
0.1 TABLET ORAL
Status: DISCONTINUED | OUTPATIENT
Start: 2019-12-29 | End: 2019-12-29 | Stop reason: HOSPADM

## 2019-12-29 RX ORDER — POTASSIUM CHLORIDE 20 MEQ/1
40 TABLET, EXTENDED RELEASE ORAL ONCE
Status: COMPLETED | OUTPATIENT
Start: 2019-12-29 | End: 2019-12-29

## 2019-12-29 RX ORDER — KETOROLAC TROMETHAMINE 10 MG/1
10 TABLET, FILM COATED ORAL EVERY 6 HOURS PRN
Qty: 14 TABLET | Refills: 0 | Status: SHIPPED | OUTPATIENT
Start: 2019-12-29 | End: 2020-09-16

## 2019-12-29 RX ADMIN — POTASSIUM CHLORIDE 40 MEQ: 1500 TABLET, EXTENDED RELEASE ORAL at 12:12

## 2019-12-29 RX ADMIN — KETOROLAC TROMETHAMINE 15 MG: 30 INJECTION, SOLUTION INTRAMUSCULAR; INTRAVENOUS at 11:12

## 2019-12-29 NOTE — ED PROVIDER NOTES
Encounter Date: 12/29/2019    SCRIBE #1 NOTE: I, Lori Villagomez, am scribing for, and in the presence of,  Loreto Nava NP . I have scribed the following portions of the note - Other sections scribed: HPI, ROS, PE .       History     Chief Complaint   Patient presents with    Headache     Headache since last night. Reports took BP meds this morning.     39 y.o female with HTN and allergies presents to the ED complaining of a frontal HA since last night that has worsened this morning with nausea. She took her HTN medication this morning. She takes her HTN medication as prescribed. (hydrochlorothiazide 25 mg for 5 years). She denies congestion. She notes mild dizziness. She states her blood pressure runs around 150/96. She reports being under lots of stress. She states this is not the worst headache of her life. B/P 148/1010 upon arrival.     The history is provided by the patient. No  was used.   Headache    This is a new problem. The current episode started yesterday. The problem has been unchanged. The pain is located in the frontal region. The pain does not radiate. The pain quality is similar to prior headaches. The quality of the pain is described as dull. The pain is at a severity of 4/10. Associated symptoms include dizziness, nausea and photophobia. Pertinent negatives include no abdominal pain, blurred vision, fever, loss of balance, rhinorrhea, sinus pressure, sore throat, visual change or vomiting. The symptoms are aggravated by bright light. She has tried nothing for the symptoms. Her past medical history is significant for hypertension.     Review of patient's allergies indicates:   Allergen Reactions    Dye Anaphylaxis     Hair lightener/bleach     Past Medical History:   Diagnosis Date    Abnormal Pap smear of cervix     Allergy     Hypertension     Joint pain     Keloid cicatrix      Past Surgical History:   Procedure Laterality Date    CKC, ECC, fractional D&C       COLPOSCOPY      CONIZATION-CERVIX  02/2015    DILATION AND CURETTAGE OF UTERUS      HYSTERECTOMY  03/2017    AUB, fibroids    TUBAL LIGATION       Family History   Problem Relation Age of Onset    Eczema Mother     Ovarian cancer Mother     Eczema Son     Eczema Maternal Grandmother     Eczema Son     Breast cancer Maternal Aunt         THREE AUNTS    Melanoma Neg Hx     Lupus Neg Hx     Psoriasis Neg Hx     Colon cancer Neg Hx      Social History     Tobacco Use    Smoking status: Never Smoker    Smokeless tobacco: Never Used   Substance Use Topics    Alcohol use: Yes     Comment: occasional    Drug use: No     Review of Systems   Constitutional: Negative.  Negative for fever.   HENT: Negative.  Negative for congestion, rhinorrhea, sinus pressure and sore throat.    Eyes: Positive for photophobia. Negative for blurred vision.   Respiratory: Negative.  Negative for shortness of breath.    Cardiovascular: Negative.  Negative for chest pain.   Gastrointestinal: Positive for nausea. Negative for abdominal pain and vomiting.   Endocrine: Negative.    Genitourinary: Negative.    Musculoskeletal: Negative.    Skin: Negative.    Allergic/Immunologic: Negative.    Neurological: Positive for dizziness and headaches. Negative for loss of balance.   Hematological: Negative.    Psychiatric/Behavioral: Negative.    All other systems reviewed and are negative.      Physical Exam     Initial Vitals [12/29/19 1025]   BP Pulse Resp Temp SpO2   (!) 148/101 96 16 97.9 °F (36.6 °C) 100 %      MAP       --         Physical Exam    Nursing note and vitals reviewed.  Constitutional: She appears well-developed.   HENT:   Right Ear: External ear normal.   Left Ear: External ear normal.   Nose: Nose normal.   Mouth/Throat: Uvula is midline and mucous membranes are normal. No trismus in the jaw. No uvula swelling. No posterior oropharyngeal edema, posterior oropharyngeal erythema or tonsillar abscesses.   Eyes: Conjunctivae  are normal.   Neck: Normal range of motion. Neck supple.   Cardiovascular: Normal rate, regular rhythm, normal heart sounds and intact distal pulses.   Pulmonary/Chest: Effort normal and breath sounds normal.   Abdominal: Soft.   Musculoskeletal: Normal range of motion. She exhibits no edema or tenderness.   Neurological: She is alert and oriented to person, place, and time. GCS score is 15. GCS eye subscore is 4. GCS verbal subscore is 5. GCS motor subscore is 6.   CN II- XII intact    Skin: Skin is warm and dry.   Psychiatric: She has a normal mood and affect. Her behavior is normal.         ED Course   Procedures  Labs Reviewed   POCT CMP - Abnormal; Notable for the following components:       Result Value    POC Potassium 3.2 (*)     All other components within normal limits   POCT CBC   POCT URINALYSIS W/O SCOPE   POCT URINALYSIS W/O SCOPE   POCT CMP          Imaging Results    None          Medical Decision Making:   Initial Assessment:   39 y.o female with HTN and allergies presents to the ED complaining of a frontal HA since last night that has worsened this morning with nausea. She took her HTN medication this morning. She take josé miguel HTN medication as prescribed. (hydrochlorothiazide 25 mg). She denies congestion. She notes mild dizziness. She states her blood pressure runs around 150/96.     Differential Diagnosis:   Uncontrolled HTN, Acute headache, Tension headache  Clinical Tests:   Lab Tests: Reviewed and Ordered  The following lab test(s) were unremarkable: CBC  ED Management:  I will medicated with Toradol 15 mg IV and continue to observe. Pain improved.   Potassium replaced with KCl 40 mEq po.   B/P rechecked and wnl.   Pt discharged with Toradol prn.   Follow-up with PCP in 2 days.   Return ED for worsening of symptoms.              Scribe Attestation:   Scribe #1: I performed the above scribed service and the documentation accurately describes the services I performed. I attest to the accuracy of  the note.               This document was produced by a scribe under my direction and in my presence. I agree with the content of the note and have made any necessary edits.     DAKSHA Sherman    12/29/2019 6:01 PM       Clinical Impression:     1. Acute nonintractable headache, unspecified headache type    2. Hypokalemia                              DAKSHA Kiser  12/29/19 [id number]

## 2020-08-02 ENCOUNTER — TELEPHONE (OUTPATIENT)
Dept: EMERGENCY MEDICINE | Facility: HOSPITAL | Age: 40
End: 2020-08-02

## 2020-08-02 ENCOUNTER — HOSPITAL ENCOUNTER (EMERGENCY)
Facility: HOSPITAL | Age: 40
Discharge: HOME | End: 2020-08-02
Payer: COMMERCIAL

## 2020-08-02 VITALS
HEART RATE: 80 BPM | DIASTOLIC BLOOD PRESSURE: 75 MMHG | RESPIRATION RATE: 18 BRPM | SYSTOLIC BLOOD PRESSURE: 122 MMHG | BODY MASS INDEX: 35.3 KG/M2 | HEIGHT: 61 IN | TEMPERATURE: 98 F | OXYGEN SATURATION: 99 % | WEIGHT: 187 LBS

## 2020-08-02 DIAGNOSIS — H60.501 ACUTE OTITIS EXTERNA OF RIGHT EAR, UNSPECIFIED TYPE: Primary | ICD-10-CM

## 2020-08-02 DIAGNOSIS — J30.2 SEASONAL ALLERGIES: ICD-10-CM

## 2020-08-02 DIAGNOSIS — J30.1 SEASONAL ALLERGIC RHINITIS DUE TO POLLEN: ICD-10-CM

## 2020-08-02 PROCEDURE — 25000003 PHARM REV CODE 250: Mod: ER | Performed by: EMERGENCY MEDICINE

## 2020-08-02 PROCEDURE — 99284 EMERGENCY DEPT VISIT MOD MDM: CPT | Mod: ER

## 2020-08-02 RX ORDER — FLUTICASONE PROPIONATE 50 MCG
1 SPRAY, SUSPENSION (ML) NASAL 2 TIMES DAILY
Qty: 16 G | Refills: 0 | Status: SHIPPED | OUTPATIENT
Start: 2020-08-02 | End: 2022-02-03 | Stop reason: ALTCHOICE

## 2020-08-02 RX ORDER — CIPROFLOXACIN AND DEXAMETHASONE 3; 1 MG/ML; MG/ML
4 SUSPENSION/ DROPS AURICULAR (OTIC) 2 TIMES DAILY
Qty: 7.5 ML | Refills: 0 | Status: SHIPPED | OUTPATIENT
Start: 2020-08-02 | End: 2020-09-16

## 2020-08-02 RX ORDER — ACETAMINOPHEN 325 MG/1
650 TABLET ORAL
Status: COMPLETED | OUTPATIENT
Start: 2020-08-02 | End: 2020-08-02

## 2020-08-02 RX ORDER — FLUTICASONE PROPIONATE 50 MCG
1 SPRAY, SUSPENSION (ML) NASAL DAILY
Qty: 16 G | Refills: 0 | Status: SHIPPED | OUTPATIENT
Start: 2020-08-02 | End: 2020-09-16 | Stop reason: SDUPTHER

## 2020-08-02 RX ORDER — IBUPROFEN 600 MG/1
600 TABLET ORAL EVERY 6 HOURS PRN
Qty: 20 TABLET | Refills: 0 | Status: SHIPPED | OUTPATIENT
Start: 2020-08-02 | End: 2020-09-16

## 2020-08-02 RX ORDER — LORATADINE 10 MG/1
10 TABLET ORAL DAILY
Qty: 30 TABLET | Refills: 0 | Status: SHIPPED | OUTPATIENT
Start: 2020-08-02 | End: 2020-09-16 | Stop reason: SDUPTHER

## 2020-08-02 RX ORDER — ACETAMINOPHEN 500 MG
1000 TABLET ORAL EVERY 6 HOURS PRN
Qty: 30 TABLET | Refills: 0 | Status: SHIPPED | OUTPATIENT
Start: 2020-08-02 | End: 2022-02-03 | Stop reason: ALTCHOICE

## 2020-08-02 RX ADMIN — ACETAMINOPHEN 650 MG: 325 TABLET ORAL at 08:08

## 2020-08-02 NOTE — ED PROVIDER NOTES
Encounter Date: 8/2/2020    SCRIBE #1 NOTE: I, Joie Tomlin, am scribing for, and in the presence of,  Dr. Colvin. I have scribed the following portions of the note - Other sections scribed: HPI, ROS, PE.       History     Chief Complaint   Patient presents with    Otalgia     right ear pain x 2 days with drainage today     Marlyn Camacho is a 40 y.o. female who presents to the ED complaining of right ear pain x 2 days. States she noticed dried blood in ear. Rates pain 6/10. Doesn't recall injury. Reports rhinorrhea and sneezing. No attempted treatment. Denies fever, chills, CP, SOB, nausea, vomiting, and diarrhea. PMHx includes allergies. Patient reports she is out of her allergy medications.     The history is provided by the patient. No  was used.     Review of patient's allergies indicates:   Allergen Reactions    Dye Anaphylaxis     Hair lightener/bleach     Past Medical History:   Diagnosis Date    Abnormal Pap smear of cervix     Allergy     Hypertension     Joint pain     Keloid cicatrix      Past Surgical History:   Procedure Laterality Date    CKC, ECC, fractional D&C      COLPOSCOPY      CONIZATION-CERVIX  02/2015    DILATION AND CURETTAGE OF UTERUS      HYSTERECTOMY  03/2017    AUB, fibroids    TUBAL LIGATION       Family History   Problem Relation Age of Onset    Eczema Mother     Ovarian cancer Mother     Eczema Son     Eczema Maternal Grandmother     Eczema Son     Breast cancer Maternal Aunt         THREE AUNTS    Melanoma Neg Hx     Lupus Neg Hx     Psoriasis Neg Hx     Colon cancer Neg Hx      Social History     Tobacco Use    Smoking status: Never Smoker    Smokeless tobacco: Never Used   Substance Use Topics    Alcohol use: Yes     Comment: occasional    Drug use: No     Review of Systems   Constitutional: Negative for chills and fever.   HENT: Positive for ear pain, rhinorrhea and sneezing.    Respiratory: Negative for shortness of  breath.    Cardiovascular: Negative for chest pain.   Gastrointestinal: Negative for diarrhea, nausea and vomiting.   Neurological: Negative for speech difficulty.   All other systems reviewed and are negative.      Physical Exam     Initial Vitals [08/02/20 0755]   BP Pulse Resp Temp SpO2   122/75 80 18 97.9 °F (36.6 °C) 99 %      MAP       --         Patient gave consent to have physical exam performed.    Physical Exam    Nursing note and vitals reviewed.  Constitutional: She appears well-developed and well-nourished.   HENT:   Head: Normocephalic and atraumatic.   Right Ear: Tympanic membrane and external ear normal.   Left Ear: Tympanic membrane and external ear normal.   Nose: Mucosal edema and rhinorrhea present.   Mouth/Throat: Oropharynx is clear and moist.   Bilateral TM normal. Right external ear canal area of trauma with surrounding erythema with some dried blood.   +Post nasal drip.    Eyes: Conjunctivae and EOM are normal. Pupils are equal, round, and reactive to light.   Neck: Normal range of motion and phonation normal. Neck supple.   Cardiovascular: Normal rate, regular rhythm, normal heart sounds and intact distal pulses. Exam reveals no gallop and no friction rub.    No murmur heard.  Pulmonary/Chest: Effort normal and breath sounds normal. No stridor. No respiratory distress. She has no wheezes. She has no rhonchi. She has no rales. She exhibits no tenderness.   Abdominal: Soft. Bowel sounds are normal. She exhibits no distension. There is no abdominal tenderness. There is no rigidity, no rebound and no guarding.   Musculoskeletal: Normal range of motion. No tenderness or edema.   Lymphadenopathy:     She has no cervical adenopathy.   Neurological: She is alert and oriented to person, place, and time. She has normal strength. No cranial nerve deficit or sensory deficit. GCS score is 15. GCS eye subscore is 4. GCS verbal subscore is 5. GCS motor subscore is 6.   Skin: Skin is warm and dry.  Capillary refill takes less than 2 seconds. No rash noted.   Psychiatric: She has a normal mood and affect. Her behavior is normal.         ED Course   Procedures            Medical Decision Making:   History:   Old Medical Records: I decided to obtain old medical records.  Chief complaint: right ear pain  Differential diagnosis: otitis media, otitis externa, seasonal allergies, rhinitis    Treatment in the ED: PE, acetaminophen   Patient reports feeling better after treatment in the ER.      Discussed treatment, prescriptions, labs, and imaging results.    Fill and take prescriptions as directed.  Return to the ED if symptoms worsen or do not resolve.   Answered questions and discussed discharge plan.    Patient feels better and is ready for discharge.  Follow up with PCP/specialist in 1 day.            Scribe Attestation:   Scribe #1: I performed the above scribed service and the documentation accurately describes the services I performed. I attest to the accuracy of the note.     I, Dr. Marsha Colvin, personally performed the services described in this documentation. This document was produced by a scribe under my direction and in my presence. All medical record entries made by the scribe were at my direction and in my presence.  I have reviewed the chart and agree that the record reflects my personal performance and is accurate and complete. Marsha Colvin DO.     08/02/2020 8:20 AM                        Clinical Impression:     1. Acute otitis externa of right ear, unspecified type    2. Seasonal allergies    3. Seasonal allergic rhinitis due to pollen                ED Disposition Condition    Discharge Stable        ED Prescriptions     Medication Sig Dispense Start Date End Date Auth. Provider    loratadine (CLARITIN) 10 mg tablet Take 1 tablet (10 mg total) by mouth once daily. 30 tablet 8/2/2020 8/2/2021 Marsha Colvin DO    fluticasone propionate (FLONASE) 50 mcg/actuation nasal spray 1 spray (50  mcg total) by Each Nostril route 2 (two) times daily. 16 g 8/2/2020  Marsha Colvin DO    fluticasone propionate (FLONASE) 50 mcg/actuation nasal spray 1 spray (50 mcg total) by Each Nostril route once daily. 16 g 8/2/2020  Marsha Colvin, DO    ciprofloxacin-dexamethasone 0.3-0.1% (CIPRODEX) 0.3-0.1 % DrpS Place 4 drops into both ears 2 (two) times daily. 7.5 mL 8/2/2020  Marsha Colvin DO    ibuprofen (ADVIL,MOTRIN) 600 MG tablet Take 1 tablet (600 mg total) by mouth every 6 (six) hours as needed for Pain (Take with food as needed for mild-to-moderate pain). 20 tablet 8/2/2020  Marsha Colvin DO    acetaminophen (TYLENOL) 500 MG tablet Take 2 tablets (1,000 mg total) by mouth every 6 (six) hours as needed for Pain. 30 tablet 8/2/2020  Marsha Colvin DO        Follow-up Information     Follow up With Specialties Details Why Contact Info    Mindi Donahue MD Family Medicine Schedule an appointment as soon as possible for a visit in 1 day  [address]  [address]  [facility] [address]  [phone]      Feng Saenz MD Otolaryngology Schedule an appointment as soon as possible for a visit in 1 day  [address]  [address]  [facility] [address]  [phone]                                       Marsha Colvin DO  08/02/20 0735

## 2020-08-03 NOTE — ED NOTES
8/2/20 2030:  Pt called back to ED stating she was unable to fill RX for cipro-dex ear drops due to price. Discussed with dr. Elizondo and substitution orders rec'd for corticosporin otic drops with same course as directed for ciprodex. Relayed information to patient who requested for RX to be called/sent into [facility] [address]. Called and spoke with [facility] and was able to call new prescription in at this time.

## 2020-09-02 ENCOUNTER — PATIENT OUTREACH (OUTPATIENT)
Dept: ADMINISTRATIVE | Facility: HOSPITAL | Age: 40
End: 2020-09-02

## 2020-09-02 DIAGNOSIS — Z11.59 NEED FOR HEPATITIS C SCREENING TEST: ICD-10-CM

## 2020-09-02 DIAGNOSIS — Z12.31 ENCOUNTER FOR SCREENING MAMMOGRAM FOR BREAST CANCER: Primary | ICD-10-CM

## 2020-09-11 ENCOUNTER — LAB VISIT (OUTPATIENT)
Dept: LAB | Facility: HOSPITAL | Age: 40
End: 2020-09-11
Attending: FAMILY MEDICINE
Payer: COMMERCIAL

## 2020-09-11 DIAGNOSIS — Z11.59 NEED FOR HEPATITIS C SCREENING TEST: ICD-10-CM

## 2020-09-11 PROCEDURE — 86803 HEPATITIS C AB TEST: CPT

## 2020-09-11 PROCEDURE — 36415 COLL VENOUS BLD VENIPUNCTURE: CPT | Mod: PO

## 2020-09-14 LAB — HCV AB SERPL QL IA: NEGATIVE

## 2020-09-14 RX ORDER — HYDROCHLOROTHIAZIDE 25 MG/1
25 TABLET ORAL DAILY
Qty: 90 TABLET | Refills: 3 | Status: CANCELLED | OUTPATIENT
Start: 2020-09-14

## 2020-09-15 ENCOUNTER — HOSPITAL ENCOUNTER (OUTPATIENT)
Dept: RADIOLOGY | Facility: OTHER | Age: 40
Discharge: HOME | End: 2020-09-15
Attending: FAMILY MEDICINE
Payer: COMMERCIAL

## 2020-09-15 DIAGNOSIS — Z12.31 ENCOUNTER FOR SCREENING MAMMOGRAM FOR BREAST CANCER: ICD-10-CM

## 2020-09-15 PROCEDURE — 77063 BREAST TOMOSYNTHESIS BI: CPT | Mod: 26,,, | Performed by: INTERNAL MEDICINE

## 2020-09-15 PROCEDURE — 77067 SCR MAMMO BI INCL CAD: CPT | Mod: TC

## 2020-09-15 PROCEDURE — 77067 MAMMO DIGITAL SCREENING BILAT WITH TOMOSYNTHESIS_CAD: ICD-10-PCS | Mod: 26,,, | Performed by: INTERNAL MEDICINE

## 2020-09-15 PROCEDURE — 77067 SCR MAMMO BI INCL CAD: CPT | Mod: 26,,, | Performed by: INTERNAL MEDICINE

## 2020-09-15 PROCEDURE — 77063 MAMMO DIGITAL SCREENING BILAT WITH TOMOSYNTHESIS_CAD: ICD-10-PCS | Mod: 26,,, | Performed by: INTERNAL MEDICINE

## 2020-09-16 ENCOUNTER — LAB VISIT (OUTPATIENT)
Dept: LAB | Facility: HOSPITAL | Age: 40
End: 2020-09-16
Attending: FAMILY MEDICINE
Payer: COMMERCIAL

## 2020-09-16 ENCOUNTER — OFFICE VISIT (OUTPATIENT)
Dept: FAMILY MEDICINE | Facility: CLINIC | Age: 40
End: 2020-09-16
Attending: FAMILY MEDICINE
Payer: COMMERCIAL

## 2020-09-16 VITALS
HEART RATE: 66 BPM | DIASTOLIC BLOOD PRESSURE: 80 MMHG | WEIGHT: 199.88 LBS | SYSTOLIC BLOOD PRESSURE: 116 MMHG | BODY MASS INDEX: 37.74 KG/M2 | OXYGEN SATURATION: 98 % | HEIGHT: 61 IN

## 2020-09-16 DIAGNOSIS — Z00.00 ANNUAL PHYSICAL EXAM: ICD-10-CM

## 2020-09-16 DIAGNOSIS — J30.1 SEASONAL ALLERGIC RHINITIS DUE TO POLLEN: ICD-10-CM

## 2020-09-16 DIAGNOSIS — I10 ESSENTIAL HYPERTENSION: ICD-10-CM

## 2020-09-16 DIAGNOSIS — Z00.00 ANNUAL PHYSICAL EXAM: Primary | ICD-10-CM

## 2020-09-16 LAB
ALBUMIN SERPL BCP-MCNC: 3.9 G/DL (ref 3.5–5.2)
ALP SERPL-CCNC: 43 U/L (ref 55–135)
ALT SERPL W/O P-5'-P-CCNC: 15 U/L (ref 10–44)
ANION GAP SERPL CALC-SCNC: 9 MMOL/L (ref 8–16)
AST SERPL-CCNC: 19 U/L (ref 10–40)
BACTERIA #/AREA URNS AUTO: NORMAL /HPF
BASOPHILS # BLD AUTO: 0.05 K/UL (ref 0–0.2)
BASOPHILS NFR BLD: 0.7 % (ref 0–1.9)
BILIRUB SERPL-MCNC: 0.4 MG/DL (ref 0.1–1)
BILIRUB UR QL STRIP: NEGATIVE
BUN SERPL-MCNC: 9 MG/DL (ref 6–20)
CALCIUM SERPL-MCNC: 9.3 MG/DL (ref 8.7–10.5)
CHLORIDE SERPL-SCNC: 102 MMOL/L (ref 95–110)
CLARITY UR REFRACT.AUTO: CLEAR
CO2 SERPL-SCNC: 28 MMOL/L (ref 23–29)
COLOR UR AUTO: NORMAL
CREAT SERPL-MCNC: 0.9 MG/DL (ref 0.5–1.4)
DIFFERENTIAL METHOD: ABNORMAL
EOSINOPHIL # BLD AUTO: 0.1 K/UL (ref 0–0.5)
EOSINOPHIL NFR BLD: 0.7 % (ref 0–8)
ERYTHROCYTE [DISTWIDTH] IN BLOOD BY AUTOMATED COUNT: 13.2 % (ref 11.5–14.5)
EST. GFR  (AFRICAN AMERICAN): >60 ML/MIN/1.73 M^2
EST. GFR  (NON AFRICAN AMERICAN): >60 ML/MIN/1.73 M^2
GLUCOSE SERPL-MCNC: 79 MG/DL (ref 70–110)
GLUCOSE UR QL STRIP: NEGATIVE
HCT VFR BLD AUTO: 42.3 % (ref 37–48.5)
HGB BLD-MCNC: 13.5 G/DL (ref 12–16)
HGB UR QL STRIP: NEGATIVE
IMM GRANULOCYTES # BLD AUTO: 0.03 K/UL (ref 0–0.04)
IMM GRANULOCYTES NFR BLD AUTO: 0.4 % (ref 0–0.5)
KETONES UR QL STRIP: NEGATIVE
LEUKOCYTE ESTERASE UR QL STRIP: NEGATIVE
LYMPHOCYTES # BLD AUTO: 2.4 K/UL (ref 1–4.8)
LYMPHOCYTES NFR BLD: 35 % (ref 18–48)
MCH RBC QN AUTO: 31 PG (ref 27–31)
MCHC RBC AUTO-ENTMCNC: 31.9 G/DL (ref 32–36)
MCV RBC AUTO: 97 FL (ref 82–98)
MICROSCOPIC COMMENT: NORMAL
MONOCYTES # BLD AUTO: 0.4 K/UL (ref 0.3–1)
MONOCYTES NFR BLD: 5.2 % (ref 4–15)
NEUTROPHILS # BLD AUTO: 3.9 K/UL (ref 1.8–7.7)
NEUTROPHILS NFR BLD: 58 % (ref 38–73)
NITRITE UR QL STRIP: NEGATIVE
NRBC BLD-RTO: 0 /100 WBC
PH UR STRIP: 6 [PH] (ref 5–8)
PLATELET # BLD AUTO: 300 K/UL (ref 150–350)
PMV BLD AUTO: 9.9 FL (ref 9.2–12.9)
POTASSIUM SERPL-SCNC: 3.5 MMOL/L (ref 3.5–5.1)
PROT SERPL-MCNC: 7.3 G/DL (ref 6–8.4)
PROT UR QL STRIP: NEGATIVE
RBC # BLD AUTO: 4.35 M/UL (ref 4–5.4)
SODIUM SERPL-SCNC: 139 MMOL/L (ref 136–145)
SP GR UR STRIP: 1.01 (ref 1–1.03)
SQUAMOUS #/AREA URNS AUTO: 2 /HPF
TSH SERPL DL<=0.005 MIU/L-ACNC: 0.63 UIU/ML (ref 0.4–4)
URN SPEC COLLECT METH UR: NORMAL
WBC # BLD AUTO: 6.77 K/UL (ref 3.9–12.7)
WBC #/AREA URNS AUTO: 0 /HPF (ref 0–5)

## 2020-09-16 PROCEDURE — 90471 FLU VACCINE (QUAD) GREATER THAN OR EQUAL TO 3YO PRESERVATIVE FREE IM: ICD-10-PCS | Mod: S$GLB,,, | Performed by: FAMILY MEDICINE

## 2020-09-16 PROCEDURE — 99999 PR PBB SHADOW E&M-EST. PATIENT-LVL IV: ICD-10-PCS | Mod: PBBFAC,,, | Performed by: FAMILY MEDICINE

## 2020-09-16 PROCEDURE — 99396 PR PREVENTIVE VISIT,EST,40-64: ICD-10-PCS | Mod: 25,S$GLB,, | Performed by: FAMILY MEDICINE

## 2020-09-16 PROCEDURE — 3074F SYST BP LT 130 MM HG: CPT | Mod: CPTII,S$GLB,, | Performed by: FAMILY MEDICINE

## 2020-09-16 PROCEDURE — 85025 COMPLETE CBC W/AUTO DIFF WBC: CPT

## 2020-09-16 PROCEDURE — 86787 VARICELLA-ZOSTER ANTIBODY: CPT

## 2020-09-16 PROCEDURE — 99396 PREV VISIT EST AGE 40-64: CPT | Mod: 25,S$GLB,, | Performed by: FAMILY MEDICINE

## 2020-09-16 PROCEDURE — 86703 HIV-1/HIV-2 1 RESULT ANTBDY: CPT

## 2020-09-16 PROCEDURE — 86803 HEPATITIS C AB TEST: CPT

## 2020-09-16 PROCEDURE — 90686 IIV4 VACC NO PRSV 0.5 ML IM: CPT | Mod: S$GLB,,, | Performed by: FAMILY MEDICINE

## 2020-09-16 PROCEDURE — 99999 PR PBB SHADOW E&M-EST. PATIENT-LVL IV: CPT | Mod: PBBFAC,,, | Performed by: FAMILY MEDICINE

## 2020-09-16 PROCEDURE — 90471 IMMUNIZATION ADMIN: CPT | Mod: S$GLB,,, | Performed by: FAMILY MEDICINE

## 2020-09-16 PROCEDURE — 80053 COMPREHEN METABOLIC PANEL: CPT

## 2020-09-16 PROCEDURE — 90686 FLU VACCINE (QUAD) GREATER THAN OR EQUAL TO 3YO PRESERVATIVE FREE IM: ICD-10-PCS | Mod: S$GLB,,, | Performed by: FAMILY MEDICINE

## 2020-09-16 PROCEDURE — 86580 TB INTRADERMAL TEST: CPT | Mod: S$GLB,,, | Performed by: FAMILY MEDICINE

## 2020-09-16 PROCEDURE — 3008F BODY MASS INDEX DOCD: CPT | Mod: CPTII,S$GLB,, | Performed by: FAMILY MEDICINE

## 2020-09-16 PROCEDURE — 84443 ASSAY THYROID STIM HORMONE: CPT

## 2020-09-16 PROCEDURE — 36415 COLL VENOUS BLD VENIPUNCTURE: CPT | Mod: PO

## 2020-09-16 PROCEDURE — 3008F PR BODY MASS INDEX (BMI) DOCUMENTED: ICD-10-PCS | Mod: CPTII,S$GLB,, | Performed by: FAMILY MEDICINE

## 2020-09-16 PROCEDURE — 81001 URINALYSIS AUTO W/SCOPE: CPT

## 2020-09-16 PROCEDURE — 3079F DIAST BP 80-89 MM HG: CPT | Mod: CPTII,S$GLB,, | Performed by: FAMILY MEDICINE

## 2020-09-16 PROCEDURE — 3079F PR MOST RECENT DIASTOLIC BLOOD PRESSURE 80-89 MM HG: ICD-10-PCS | Mod: CPTII,S$GLB,, | Performed by: FAMILY MEDICINE

## 2020-09-16 PROCEDURE — 86580 POCT TB SKIN TEST: ICD-10-PCS | Mod: S$GLB,,, | Performed by: FAMILY MEDICINE

## 2020-09-16 PROCEDURE — 86706 HEP B SURFACE ANTIBODY: CPT

## 2020-09-16 PROCEDURE — 3074F PR MOST RECENT SYSTOLIC BLOOD PRESSURE < 130 MM HG: ICD-10-PCS | Mod: CPTII,S$GLB,, | Performed by: FAMILY MEDICINE

## 2020-09-16 RX ORDER — EPINEPHRINE 0.3 MG/.3ML
INJECTION INTRAMUSCULAR
Qty: 1 EACH | Refills: 4 | Status: SHIPPED | OUTPATIENT
Start: 2020-09-16 | End: 2021-01-14 | Stop reason: SDUPTHER

## 2020-09-16 RX ORDER — HYDROCHLOROTHIAZIDE 25 MG/1
25 TABLET ORAL DAILY
Qty: 90 TABLET | Refills: 3 | Status: SHIPPED | OUTPATIENT
Start: 2020-09-16 | End: 2021-11-15

## 2020-09-16 RX ORDER — LORATADINE 10 MG/1
10 TABLET ORAL DAILY
Qty: 30 TABLET | Refills: 0 | OUTPATIENT
Start: 2020-09-16 | End: 2020-09-18 | Stop reason: SDUPTHER

## 2020-09-16 NOTE — PROGRESS NOTES
"Subjective:       Patient ID: Marlyn Camacho is a 40 y.o. female.    Chief Complaint: Annual Exam    HPI   Pt is here for annual exam pt is generally well no sob/cp no change in bowel habits no brbpr  Pt has htn stable on hctz no sob/cp no muscle cramps bp fine today   Review of Systems   Constitutional: Negative for activity change, chills, diaphoresis, fatigue, fever and unexpected weight change.   HENT: Negative for congestion, ear discharge, ear pain, hearing loss, postnasal drip, rhinorrhea, sinus pressure, sneezing, sore throat, trouble swallowing and voice change.    Eyes: Negative for photophobia, discharge, redness, itching and visual disturbance.   Respiratory: Negative for cough, chest tightness, shortness of breath and wheezing.    Cardiovascular: Negative for chest pain, palpitations and leg swelling.   Gastrointestinal: Positive for constipation. Negative for abdominal pain, anal bleeding, blood in stool, diarrhea, nausea, rectal pain and vomiting.   Endocrine: Negative for polydipsia and polyuria.   Genitourinary: Negative for difficulty urinating, dyspareunia, dysuria, flank pain, frequency, hematuria, menstrual problem, pelvic pain, urgency, vaginal bleeding and vaginal discharge.   Musculoskeletal: Positive for arthralgias. Negative for back pain, joint swelling and neck pain.   Skin: Negative for color change and rash.   Neurological: Negative for dizziness, speech difficulty, weakness, light-headedness, numbness and headaches.   Hematological: Does not bruise/bleed easily.   Psychiatric/Behavioral: Negative for agitation, confusion, decreased concentration, dysphoric mood, sleep disturbance and suicidal ideas. The patient is not nervous/anxious.        Objective:     /80   Pulse 66   Ht 5' 1" (1.549 m)   Wt 90.7 kg (199 lb 14.4 oz)   LMP 02/15/2017 (Approximate)   SpO2 98%   BMI 37.77 kg/m²     Physical Exam  Constitutional:       Appearance: Normal appearance. She is " well-developed. She is not ill-appearing.   HENT:      Head: Normocephalic and atraumatic.      Right Ear: External ear normal.      Left Ear: External ear normal.      Nose: Nose normal.   Eyes:      General:         Right eye: No discharge.         Left eye: No discharge.      Conjunctiva/sclera: Conjunctivae normal.      Pupils: Pupils are equal, round, and reactive to light.   Neck:      Musculoskeletal: Normal range of motion and neck supple.      Thyroid: No thyromegaly.   Cardiovascular:      Rate and Rhythm: Normal rate and regular rhythm.      Heart sounds: Normal heart sounds. No murmur. No friction rub. No gallop.    Pulmonary:      Effort: Pulmonary effort is normal.      Breath sounds: Normal breath sounds. No wheezing or rales.   Abdominal:      General: Bowel sounds are normal. There is no distension.      Palpations: Abdomen is soft.      Tenderness: There is no abdominal tenderness. There is no guarding or rebound.   Genitourinary:     Vagina: Normal.      Comments: declined  Musculoskeletal: Normal range of motion.         General: No tenderness.   Lymphadenopathy:      Cervical: No cervical adenopathy.   Skin:     General: Skin is warm and dry.      Findings: No erythema or rash.   Neurological:      General: No focal deficit present.      Mental Status: She is alert and oriented to person, place, and time.      Cranial Nerves: No cranial nerve deficit.      Motor: No abnormal muscle tone.      Coordination: Coordination normal.   Psychiatric:         Mood and Affect: Mood normal.         Behavior: Behavior normal.         Thought Content: Thought content normal.         Judgment: Judgment normal.         Assessment:       1. Annual physical exam    2. Seasonal allergic rhinitis due to pollen    3. Essential hypertension        Plan:     orders cmp cbc lipid tsh urine vaccination serologies  Low fat low salt diet  Graded exercise  rtc annually and 6 months    Health maintenance   Mammogram  "discussed  Lipid declined pt not fasting  Flu discussed  Tetanus q 10 years         "This note will not be shared with the patient."     "

## 2020-09-16 NOTE — PROGRESS NOTES
Patient was given Influenza IM in Left Deltoid and TB Skin Test Placement Sub-Q in Left Deltoid as per orders from MD. Aseptic tech used and pt tolerated well. Pt was monitored for 15 mins with no reaction noted. Patient will return to clinic on Friday 09/18/2020 to have skin test read.

## 2020-09-17 ENCOUNTER — PATIENT MESSAGE (OUTPATIENT)
Dept: FAMILY MEDICINE | Facility: CLINIC | Age: 40
End: 2020-09-17

## 2020-09-17 DIAGNOSIS — Z11.59 NEED FOR HEPATITIS B SCREENING TEST: Primary | ICD-10-CM

## 2020-09-17 DIAGNOSIS — Z92.29 HISTORY OF HEPATITIS B VACCINATION: ICD-10-CM

## 2020-09-17 LAB
HBV SURFACE AB SER-ACNC: NEGATIVE M[IU]/ML
HCV AB SERPL QL IA: NEGATIVE
HIV 1+2 AB+HIV1 P24 AG SERPL QL IA: NEGATIVE

## 2020-09-18 DIAGNOSIS — J30.1 SEASONAL ALLERGIC RHINITIS DUE TO POLLEN: ICD-10-CM

## 2020-09-18 RX ORDER — LORATADINE 10 MG/1
10 TABLET ORAL DAILY
Qty: 30 TABLET | Refills: 0 | Status: SHIPPED | OUTPATIENT
Start: 2020-09-18 | End: 2020-10-15 | Stop reason: SDUPTHER

## 2020-09-18 NOTE — TELEPHONE ENCOUNTER
Dr. Donahue patient would like to start her Hep B series. Please enter order. Patient is coming to the office today to have her PPD read and would like to schedule her appt today to start her Hep B series.     Thank you!

## 2020-09-18 NOTE — TELEPHONE ENCOUNTER
Patient states her pharmacy never received the Rx for Claritin. Rx was refilled but it was set to print. Will pend the order and change to e-prescription.

## 2020-09-19 LAB
VARICELLA INTERPRETATION: POSITIVE
VARICELLA ZOSTER IGG: 2.62 ISR (ref 0–0.9)

## 2020-09-22 ENCOUNTER — CLINICAL SUPPORT (OUTPATIENT)
Dept: INFECTIOUS DISEASES | Facility: CLINIC | Age: 40
End: 2020-09-22
Payer: COMMERCIAL

## 2020-09-22 PROCEDURE — 90746 HEPB VACCINE 3 DOSE ADULT IM: CPT | Mod: S$GLB,,, | Performed by: INTERNAL MEDICINE

## 2020-09-22 PROCEDURE — 90471 IMMUNIZATION ADMIN: CPT | Mod: S$GLB,,, | Performed by: INTERNAL MEDICINE

## 2020-09-22 PROCEDURE — 90471 HEPATITIS B VACCINE ADULT IM: ICD-10-PCS | Mod: S$GLB,,, | Performed by: INTERNAL MEDICINE

## 2020-09-22 PROCEDURE — 90746 HEPATITIS B VACCINE ADULT IM: ICD-10-PCS | Mod: S$GLB,,, | Performed by: INTERNAL MEDICINE

## 2020-10-15 ENCOUNTER — PATIENT MESSAGE (OUTPATIENT)
Dept: FAMILY MEDICINE | Facility: CLINIC | Age: 40
End: 2020-10-15

## 2020-10-15 DIAGNOSIS — J30.1 SEASONAL ALLERGIC RHINITIS DUE TO POLLEN: ICD-10-CM

## 2020-10-15 RX ORDER — LORATADINE 10 MG/1
10 TABLET ORAL DAILY
Qty: 30 TABLET | Refills: 0 | Status: SHIPPED | OUTPATIENT
Start: 2020-10-15 | End: 2021-03-24 | Stop reason: SDUPTHER

## 2020-10-22 ENCOUNTER — CLINICAL SUPPORT (OUTPATIENT)
Dept: INFECTIOUS DISEASES | Facility: CLINIC | Age: 40
End: 2020-10-22
Payer: COMMERCIAL

## 2020-10-22 PROCEDURE — 90746 HEPATITIS B VACCINE ADULT IM: ICD-10-PCS | Mod: S$GLB,,, | Performed by: INTERNAL MEDICINE

## 2020-10-22 PROCEDURE — 90471 HEPATITIS B VACCINE ADULT IM: ICD-10-PCS | Mod: S$GLB,,, | Performed by: INTERNAL MEDICINE

## 2020-10-22 PROCEDURE — 90746 HEPB VACCINE 3 DOSE ADULT IM: CPT | Mod: S$GLB,,, | Performed by: INTERNAL MEDICINE

## 2020-10-22 PROCEDURE — 90471 IMMUNIZATION ADMIN: CPT | Mod: S$GLB,,, | Performed by: INTERNAL MEDICINE

## 2021-01-13 ENCOUNTER — PATIENT MESSAGE (OUTPATIENT)
Dept: FAMILY MEDICINE | Facility: CLINIC | Age: 41
End: 2021-01-13

## 2021-01-13 RX ORDER — EPINEPHRINE 0.3 MG/.3ML
INJECTION INTRAMUSCULAR
Qty: 1 EACH | Refills: 4 | Status: CANCELLED | OUTPATIENT
Start: 2021-01-13

## 2021-01-14 ENCOUNTER — PATIENT MESSAGE (OUTPATIENT)
Dept: FAMILY MEDICINE | Facility: CLINIC | Age: 41
End: 2021-01-14

## 2021-01-14 RX ORDER — EPINEPHRINE 0.3 MG/.3ML
INJECTION INTRAMUSCULAR
Qty: 1 EACH | Refills: 4 | Status: SHIPPED | OUTPATIENT
Start: 2021-01-14 | End: 2021-01-14 | Stop reason: SDUPTHER

## 2021-01-14 RX ORDER — EPINEPHRINE 0.3 MG/.3ML
INJECTION INTRAMUSCULAR
Qty: 1 EACH | Refills: 4 | Status: CANCELLED | OUTPATIENT
Start: 2021-01-14

## 2021-01-14 RX ORDER — EPINEPHRINE 0.3 MG/.3ML
INJECTION SUBCUTANEOUS
Qty: 2 EACH | Refills: 4 | OUTPATIENT
Start: 2021-01-14 | End: 2021-06-22

## 2021-01-28 ENCOUNTER — PATIENT MESSAGE (OUTPATIENT)
Dept: FAMILY MEDICINE | Facility: CLINIC | Age: 41
End: 2021-01-28

## 2021-01-29 DIAGNOSIS — K31.9 STOMACH PROBLEMS: Primary | ICD-10-CM

## 2021-02-02 ENCOUNTER — PATIENT MESSAGE (OUTPATIENT)
Dept: FAMILY MEDICINE | Facility: CLINIC | Age: 41
End: 2021-02-02

## 2021-02-23 ENCOUNTER — OFFICE VISIT (OUTPATIENT)
Dept: GASTROENTEROLOGY | Facility: CLINIC | Age: 41
End: 2021-02-23
Payer: COMMERCIAL

## 2021-02-23 ENCOUNTER — TELEPHONE (OUTPATIENT)
Dept: ENDOSCOPY | Facility: HOSPITAL | Age: 41
End: 2021-02-23

## 2021-02-23 VITALS
BODY MASS INDEX: 37.42 KG/M2 | DIASTOLIC BLOOD PRESSURE: 76 MMHG | HEART RATE: 87 BPM | HEIGHT: 61 IN | WEIGHT: 198.19 LBS | SYSTOLIC BLOOD PRESSURE: 106 MMHG

## 2021-02-23 DIAGNOSIS — Z12.11 COLON CANCER SCREENING: Primary | ICD-10-CM

## 2021-02-23 DIAGNOSIS — K31.9 STOMACH PROBLEMS: ICD-10-CM

## 2021-02-23 DIAGNOSIS — Z01.818 PRE-OP TESTING: Primary | ICD-10-CM

## 2021-02-23 DIAGNOSIS — K62.5 BRBPR (BRIGHT RED BLOOD PER RECTUM): ICD-10-CM

## 2021-02-23 DIAGNOSIS — K21.9 GASTROESOPHAGEAL REFLUX DISEASE, UNSPECIFIED WHETHER ESOPHAGITIS PRESENT: Primary | ICD-10-CM

## 2021-02-23 PROCEDURE — 3008F PR BODY MASS INDEX (BMI) DOCUMENTED: ICD-10-PCS | Mod: CPTII,S$GLB,, | Performed by: INTERNAL MEDICINE

## 2021-02-23 PROCEDURE — 99204 OFFICE O/P NEW MOD 45 MIN: CPT | Mod: S$GLB,,, | Performed by: INTERNAL MEDICINE

## 2021-02-23 PROCEDURE — 99999 PR PBB SHADOW E&M-EST. PATIENT-LVL IV: CPT | Mod: PBBFAC,,, | Performed by: INTERNAL MEDICINE

## 2021-02-23 PROCEDURE — 99999 PR PBB SHADOW E&M-EST. PATIENT-LVL IV: ICD-10-PCS | Mod: PBBFAC,,, | Performed by: INTERNAL MEDICINE

## 2021-02-23 PROCEDURE — 3078F PR MOST RECENT DIASTOLIC BLOOD PRESSURE < 80 MM HG: ICD-10-PCS | Mod: CPTII,S$GLB,, | Performed by: INTERNAL MEDICINE

## 2021-02-23 PROCEDURE — 3074F SYST BP LT 130 MM HG: CPT | Mod: CPTII,S$GLB,, | Performed by: INTERNAL MEDICINE

## 2021-02-23 PROCEDURE — 3074F PR MOST RECENT SYSTOLIC BLOOD PRESSURE < 130 MM HG: ICD-10-PCS | Mod: CPTII,S$GLB,, | Performed by: INTERNAL MEDICINE

## 2021-02-23 PROCEDURE — 99204 PR OFFICE/OUTPT VISIT, NEW, LEVL IV, 45-59 MIN: ICD-10-PCS | Mod: S$GLB,,, | Performed by: INTERNAL MEDICINE

## 2021-02-23 PROCEDURE — 1126F PR PAIN SEVERITY QUANTIFIED, NO PAIN PRESENT: ICD-10-PCS | Mod: S$GLB,,, | Performed by: INTERNAL MEDICINE

## 2021-02-23 PROCEDURE — 3078F DIAST BP <80 MM HG: CPT | Mod: CPTII,S$GLB,, | Performed by: INTERNAL MEDICINE

## 2021-02-23 PROCEDURE — 3008F BODY MASS INDEX DOCD: CPT | Mod: CPTII,S$GLB,, | Performed by: INTERNAL MEDICINE

## 2021-02-23 PROCEDURE — 1126F AMNT PAIN NOTED NONE PRSNT: CPT | Mod: S$GLB,,, | Performed by: INTERNAL MEDICINE

## 2021-02-23 RX ORDER — OMEPRAZOLE 40 MG/1
40 CAPSULE, DELAYED RELEASE ORAL DAILY
Qty: 30 CAPSULE | Refills: 2 | Status: SHIPPED | OUTPATIENT
Start: 2021-02-23 | End: 2021-05-20

## 2021-02-23 RX ORDER — SODIUM, POTASSIUM,MAG SULFATES 17.5-3.13G
1 SOLUTION, RECONSTITUTED, ORAL ORAL ONCE
Qty: 1 BOTTLE | Refills: 0 | Status: SHIPPED | OUTPATIENT
Start: 2021-02-23 | End: 2021-02-23

## 2021-03-22 ENCOUNTER — CLINICAL SUPPORT (OUTPATIENT)
Dept: INFECTIOUS DISEASES | Facility: CLINIC | Age: 41
End: 2021-03-22
Payer: COMMERCIAL

## 2021-03-22 PROCEDURE — 90471 IMMUNIZATION ADMIN: CPT | Mod: S$GLB,,, | Performed by: INTERNAL MEDICINE

## 2021-03-22 PROCEDURE — 90471 HEPATITIS B VACCINE ADULT IM: ICD-10-PCS | Mod: S$GLB,,, | Performed by: INTERNAL MEDICINE

## 2021-03-22 PROCEDURE — 90746 HEPATITIS B VACCINE ADULT IM: ICD-10-PCS | Mod: S$GLB,,, | Performed by: INTERNAL MEDICINE

## 2021-03-22 PROCEDURE — 90746 HEPB VACCINE 3 DOSE ADULT IM: CPT | Mod: S$GLB,,, | Performed by: INTERNAL MEDICINE

## 2021-03-22 PROCEDURE — 99999 PR PBB SHADOW E&M-EST. PATIENT-LVL II: ICD-10-PCS | Mod: PBBFAC,,,

## 2021-03-22 PROCEDURE — 99999 PR PBB SHADOW E&M-EST. PATIENT-LVL II: CPT | Mod: PBBFAC,,,

## 2021-04-27 DIAGNOSIS — J30.1 SEASONAL ALLERGIC RHINITIS DUE TO POLLEN: ICD-10-CM

## 2021-04-29 RX ORDER — LORATADINE 10 MG/1
10 TABLET ORAL DAILY
Qty: 30 TABLET | Refills: 0 | Status: SHIPPED | OUTPATIENT
Start: 2021-04-29 | End: 2023-05-25 | Stop reason: CLARIF

## 2021-06-10 ENCOUNTER — LAB VISIT (OUTPATIENT)
Dept: LAB | Facility: HOSPITAL | Age: 41
End: 2021-06-10
Attending: NURSE PRACTITIONER
Payer: COMMERCIAL

## 2021-06-10 DIAGNOSIS — Z12.11 SCREENING FOR COLON CANCER: ICD-10-CM

## 2021-06-10 PROCEDURE — 82274 ASSAY TEST FOR BLOOD FECAL: CPT | Performed by: NURSE PRACTITIONER

## 2021-06-22 ENCOUNTER — TELEPHONE (OUTPATIENT)
Dept: FAMILY MEDICINE | Facility: CLINIC | Age: 41
End: 2021-06-22

## 2021-06-22 ENCOUNTER — HOSPITAL ENCOUNTER (EMERGENCY)
Facility: HOSPITAL | Age: 41
Discharge: HOME | End: 2021-06-22
Attending: EMERGENCY MEDICINE
Payer: COMMERCIAL

## 2021-06-22 VITALS
HEART RATE: 70 BPM | BODY MASS INDEX: 37.57 KG/M2 | SYSTOLIC BLOOD PRESSURE: 115 MMHG | RESPIRATION RATE: 20 BRPM | TEMPERATURE: 99 F | HEIGHT: 61 IN | DIASTOLIC BLOOD PRESSURE: 76 MMHG | WEIGHT: 199 LBS | OXYGEN SATURATION: 99 %

## 2021-06-22 DIAGNOSIS — R42 DIZZINESS: ICD-10-CM

## 2021-06-22 DIAGNOSIS — T78.1XXA ADVERSE REACTION TO FOOD, INITIAL ENCOUNTER: Primary | ICD-10-CM

## 2021-06-22 LAB
ALBUMIN SERPL BCP-MCNC: 4.1 G/DL (ref 3.5–5.2)
ALP SERPL-CCNC: 53 U/L (ref 55–135)
ALT SERPL W/O P-5'-P-CCNC: 14 U/L (ref 10–44)
ANION GAP SERPL CALC-SCNC: 10 MMOL/L (ref 8–16)
AST SERPL-CCNC: 14 U/L (ref 10–40)
BASOPHILS # BLD AUTO: 0.06 K/UL (ref 0–0.2)
BASOPHILS NFR BLD: 0.7 % (ref 0–1.9)
BILIRUB SERPL-MCNC: 0.4 MG/DL (ref 0.1–1)
BILIRUB UR QL STRIP: NEGATIVE
BUN SERPL-MCNC: 7 MG/DL (ref 6–20)
CALCIUM SERPL-MCNC: 9.5 MG/DL (ref 8.7–10.5)
CHLORIDE SERPL-SCNC: 104 MMOL/L (ref 95–110)
CLARITY UR: CLEAR
CO2 SERPL-SCNC: 27 MMOL/L (ref 23–29)
COLOR UR: COLORLESS
CREAT SERPL-MCNC: 1 MG/DL (ref 0.5–1.4)
DIFFERENTIAL METHOD: ABNORMAL
EOSINOPHIL # BLD AUTO: 0.1 K/UL (ref 0–0.5)
EOSINOPHIL NFR BLD: 1.1 % (ref 0–8)
ERYTHROCYTE [DISTWIDTH] IN BLOOD BY AUTOMATED COUNT: 12.4 % (ref 11.5–14.5)
EST. GFR  (AFRICAN AMERICAN): >60 ML/MIN/1.73 M^2
EST. GFR  (NON AFRICAN AMERICAN): >60 ML/MIN/1.73 M^2
GLUCOSE SERPL-MCNC: 92 MG/DL (ref 70–110)
GLUCOSE UR QL STRIP: NEGATIVE
HCT VFR BLD AUTO: 39 % (ref 37–48.5)
HGB BLD-MCNC: 14 G/DL (ref 12–16)
HGB UR QL STRIP: NEGATIVE
IMM GRANULOCYTES # BLD AUTO: 0.02 K/UL (ref 0–0.04)
IMM GRANULOCYTES NFR BLD AUTO: 0.2 % (ref 0–0.5)
KETONES UR QL STRIP: NEGATIVE
LEUKOCYTE ESTERASE UR QL STRIP: NEGATIVE
LYMPHOCYTES # BLD AUTO: 3.3 K/UL (ref 1–4.8)
LYMPHOCYTES NFR BLD: 39 % (ref 18–48)
MCH RBC QN AUTO: 32.5 PG (ref 27–31)
MCHC RBC AUTO-ENTMCNC: 35.9 G/DL (ref 32–36)
MCV RBC AUTO: 91 FL (ref 82–98)
MONOCYTES # BLD AUTO: 0.5 K/UL (ref 0.3–1)
MONOCYTES NFR BLD: 5.5 % (ref 4–15)
NEUTROPHILS # BLD AUTO: 4.5 K/UL (ref 1.8–7.7)
NEUTROPHILS NFR BLD: 53.5 % (ref 38–73)
NITRITE UR QL STRIP: NEGATIVE
NRBC BLD-RTO: 0 /100 WBC
PH UR STRIP: 7 [PH] (ref 5–8)
PLATELET # BLD AUTO: 374 K/UL (ref 150–450)
PMV BLD AUTO: 9.2 FL (ref 9.2–12.9)
POTASSIUM SERPL-SCNC: 3.1 MMOL/L (ref 3.5–5.1)
PROT SERPL-MCNC: 7.6 G/DL (ref 6–8.4)
PROT UR QL STRIP: NEGATIVE
RBC # BLD AUTO: 4.31 M/UL (ref 4–5.4)
SODIUM SERPL-SCNC: 141 MMOL/L (ref 136–145)
SP GR UR STRIP: 1.01 (ref 1–1.03)
URN SPEC COLLECT METH UR: ABNORMAL
UROBILINOGEN UR STRIP-ACNC: NEGATIVE EU/DL
WBC # BLD AUTO: 8.43 K/UL (ref 3.9–12.7)

## 2021-06-22 PROCEDURE — 25000003 PHARM REV CODE 250: Performed by: EMERGENCY MEDICINE

## 2021-06-22 PROCEDURE — 63600175 PHARM REV CODE 636 W HCPCS: Performed by: EMERGENCY MEDICINE

## 2021-06-22 PROCEDURE — 93005 ELECTROCARDIOGRAM TRACING: CPT

## 2021-06-22 PROCEDURE — 96374 THER/PROPH/DIAG INJ IV PUSH: CPT

## 2021-06-22 PROCEDURE — 93010 EKG 12-LEAD: ICD-10-PCS | Mod: ,,, | Performed by: INTERNAL MEDICINE

## 2021-06-22 PROCEDURE — 93010 ELECTROCARDIOGRAM REPORT: CPT | Mod: ,,, | Performed by: INTERNAL MEDICINE

## 2021-06-22 PROCEDURE — 85025 COMPLETE CBC W/AUTO DIFF WBC: CPT | Performed by: PHYSICIAN ASSISTANT

## 2021-06-22 PROCEDURE — 99284 EMERGENCY DEPT VISIT MOD MDM: CPT | Mod: 25

## 2021-06-22 PROCEDURE — 96361 HYDRATE IV INFUSION ADD-ON: CPT

## 2021-06-22 PROCEDURE — 80053 COMPREHEN METABOLIC PANEL: CPT | Performed by: PHYSICIAN ASSISTANT

## 2021-06-22 PROCEDURE — 81003 URINALYSIS AUTO W/O SCOPE: CPT | Performed by: EMERGENCY MEDICINE

## 2021-06-22 RX ORDER — ONDANSETRON 4 MG/1
4 TABLET, ORALLY DISINTEGRATING ORAL EVERY 8 HOURS PRN
Qty: 9 TABLET | Refills: 0 | Status: SHIPPED | OUTPATIENT
Start: 2021-06-22 | End: 2021-06-25

## 2021-06-22 RX ORDER — SODIUM CHLORIDE 9 MG/ML
INJECTION, SOLUTION INTRAVENOUS
Status: COMPLETED | OUTPATIENT
Start: 2021-06-22 | End: 2021-06-22

## 2021-06-22 RX ORDER — PREDNISONE 20 MG/1
40 TABLET ORAL DAILY
Qty: 10 TABLET | Refills: 0 | Status: SHIPPED | OUTPATIENT
Start: 2021-06-22 | End: 2021-06-27

## 2021-06-22 RX ORDER — ONDANSETRON 2 MG/ML
4 INJECTION INTRAMUSCULAR; INTRAVENOUS
Status: COMPLETED | OUTPATIENT
Start: 2021-06-22 | End: 2021-06-22

## 2021-06-22 RX ORDER — EPINEPHRINE 0.3 MG/.3ML
1 INJECTION SUBCUTANEOUS
Qty: 1 DEVICE | Refills: 0 | Status: SHIPPED | OUTPATIENT
Start: 2021-06-22 | End: 2023-05-25 | Stop reason: CLARIF

## 2021-06-22 RX ADMIN — SODIUM CHLORIDE 500 ML/HR: 0.9 INJECTION, SOLUTION INTRAVENOUS at 05:06

## 2021-06-22 RX ADMIN — ONDANSETRON 4 MG: 2 INJECTION INTRAMUSCULAR; INTRAVENOUS at 05:06

## 2021-06-25 ENCOUNTER — DOCUMENTATION ONLY (OUTPATIENT)
Dept: FAMILY MEDICINE | Facility: CLINIC | Age: 41
End: 2021-06-25

## 2021-06-25 ENCOUNTER — OFFICE VISIT (OUTPATIENT)
Dept: FAMILY MEDICINE | Facility: CLINIC | Age: 41
End: 2021-06-25
Payer: COMMERCIAL

## 2021-06-25 VITALS
OXYGEN SATURATION: 95 % | SYSTOLIC BLOOD PRESSURE: 109 MMHG | WEIGHT: 204 LBS | HEIGHT: 61 IN | DIASTOLIC BLOOD PRESSURE: 70 MMHG | HEART RATE: 79 BPM | BODY MASS INDEX: 38.51 KG/M2

## 2021-06-25 DIAGNOSIS — Z12.11 SCREENING FOR COLON CANCER: ICD-10-CM

## 2021-06-25 DIAGNOSIS — R07.9 CHEST PAIN, UNSPECIFIED TYPE: ICD-10-CM

## 2021-06-25 DIAGNOSIS — I10 ESSENTIAL HYPERTENSION: Primary | ICD-10-CM

## 2021-06-25 PROCEDURE — 99999 PR PBB SHADOW E&M-EST. PATIENT-LVL IV: CPT | Mod: PBBFAC,,, | Performed by: NURSE PRACTITIONER

## 2021-06-25 PROCEDURE — 3008F PR BODY MASS INDEX (BMI) DOCUMENTED: ICD-10-PCS | Mod: CPTII,S$GLB,, | Performed by: NURSE PRACTITIONER

## 2021-06-25 PROCEDURE — 1126F PR PAIN SEVERITY QUANTIFIED, NO PAIN PRESENT: ICD-10-PCS | Mod: S$GLB,,, | Performed by: NURSE PRACTITIONER

## 2021-06-25 PROCEDURE — 99214 OFFICE O/P EST MOD 30 MIN: CPT | Mod: S$GLB,,, | Performed by: NURSE PRACTITIONER

## 2021-06-25 PROCEDURE — 1126F AMNT PAIN NOTED NONE PRSNT: CPT | Mod: S$GLB,,, | Performed by: NURSE PRACTITIONER

## 2021-06-25 PROCEDURE — 3008F BODY MASS INDEX DOCD: CPT | Mod: CPTII,S$GLB,, | Performed by: NURSE PRACTITIONER

## 2021-06-25 PROCEDURE — 99999 PR PBB SHADOW E&M-EST. PATIENT-LVL IV: ICD-10-PCS | Mod: PBBFAC,,, | Performed by: NURSE PRACTITIONER

## 2021-06-25 PROCEDURE — 99214 PR OFFICE/OUTPT VISIT, EST, LEVL IV, 30-39 MIN: ICD-10-PCS | Mod: S$GLB,,, | Performed by: NURSE PRACTITIONER

## 2021-06-25 RX ORDER — SODIUM, POTASSIUM,MAG SULFATES 17.5-3.13G
SOLUTION, RECONSTITUTED, ORAL ORAL
COMMUNITY
Start: 2021-02-23 | End: 2022-02-03

## 2021-06-29 LAB — HEMOCCULT STL QL IA: NEGATIVE

## 2021-07-02 ENCOUNTER — OFFICE VISIT (OUTPATIENT)
Dept: CARDIOLOGY | Facility: CLINIC | Age: 41
End: 2021-07-02
Payer: COMMERCIAL

## 2021-07-02 VITALS
SYSTOLIC BLOOD PRESSURE: 110 MMHG | DIASTOLIC BLOOD PRESSURE: 80 MMHG | HEART RATE: 72 BPM | WEIGHT: 204.81 LBS | BODY MASS INDEX: 38.7 KG/M2 | OXYGEN SATURATION: 98 %

## 2021-07-02 DIAGNOSIS — R07.9 CHEST PAIN, UNSPECIFIED TYPE: ICD-10-CM

## 2021-07-02 DIAGNOSIS — I10 ESSENTIAL HYPERTENSION: Primary | ICD-10-CM

## 2021-07-02 PROCEDURE — 1126F PR PAIN SEVERITY QUANTIFIED, NO PAIN PRESENT: ICD-10-PCS | Mod: S$GLB,,, | Performed by: INTERNAL MEDICINE

## 2021-07-02 PROCEDURE — 99203 PR OFFICE/OUTPT VISIT, NEW, LEVL III, 30-44 MIN: ICD-10-PCS | Mod: S$GLB,,, | Performed by: INTERNAL MEDICINE

## 2021-07-02 PROCEDURE — 1126F AMNT PAIN NOTED NONE PRSNT: CPT | Mod: S$GLB,,, | Performed by: INTERNAL MEDICINE

## 2021-07-02 PROCEDURE — 3008F PR BODY MASS INDEX (BMI) DOCUMENTED: ICD-10-PCS | Mod: CPTII,S$GLB,, | Performed by: INTERNAL MEDICINE

## 2021-07-02 PROCEDURE — 3008F BODY MASS INDEX DOCD: CPT | Mod: CPTII,S$GLB,, | Performed by: INTERNAL MEDICINE

## 2021-07-02 PROCEDURE — 99999 PR PBB SHADOW E&M-EST. PATIENT-LVL IV: CPT | Mod: PBBFAC,,, | Performed by: INTERNAL MEDICINE

## 2021-07-02 PROCEDURE — 99203 OFFICE O/P NEW LOW 30 MIN: CPT | Mod: S$GLB,,, | Performed by: INTERNAL MEDICINE

## 2021-07-02 PROCEDURE — 99999 PR PBB SHADOW E&M-EST. PATIENT-LVL IV: ICD-10-PCS | Mod: PBBFAC,,, | Performed by: INTERNAL MEDICINE

## 2021-07-15 ENCOUNTER — HOSPITAL ENCOUNTER (OUTPATIENT)
Dept: RADIOLOGY | Facility: OTHER | Age: 41
Discharge: HOME | End: 2021-07-15
Attending: INTERNAL MEDICINE
Payer: COMMERCIAL

## 2021-07-15 ENCOUNTER — HOSPITAL ENCOUNTER (OUTPATIENT)
Dept: CARDIOLOGY | Facility: OTHER | Age: 41
Discharge: HOME | End: 2021-07-15
Attending: INTERNAL MEDICINE
Payer: COMMERCIAL

## 2021-07-15 VITALS
SYSTOLIC BLOOD PRESSURE: 116 MMHG | HEART RATE: 74 BPM | DIASTOLIC BLOOD PRESSURE: 82 MMHG | WEIGHT: 204 LBS | HEIGHT: 61 IN | BODY MASS INDEX: 38.51 KG/M2

## 2021-07-15 DIAGNOSIS — R07.9 CHEST PAIN, UNSPECIFIED TYPE: ICD-10-CM

## 2021-07-15 LAB
CV STRESS BASE HR: 74 BPM
DIASTOLIC BLOOD PRESSURE: 82 MMHG
OHS CV CPX 85 PERCENT MAX PREDICTED HEART RATE MALE: 143
OHS CV CPX ESTIMATED METS: 13
OHS CV CPX MAX PREDICTED HEART RATE: 168
OHS CV CPX PATIENT IS FEMALE: 1
OHS CV CPX PATIENT IS MALE: 0
OHS CV CPX PEAK DIASTOLIC BLOOD PRESSURE: 72 MMHG
OHS CV CPX PEAK HEAR RATE: 181 BPM
OHS CV CPX PEAK RATE PRESSURE PRODUCT: NORMAL
OHS CV CPX PEAK SYSTOLIC BLOOD PRESSURE: 143 MMHG
OHS CV CPX PERCENT MAX PREDICTED HEART RATE ACHIEVED: 108
OHS CV CPX RATE PRESSURE PRODUCT PRESENTING: 8584
STRESS ECHO POST EXERCISE DUR MIN: 10 MINUTES
STRESS ECHO POST EXERCISE DUR SEC: 0 SECONDS
SYSTOLIC BLOOD PRESSURE: 116 MMHG

## 2021-07-15 PROCEDURE — 93018 CV STRESS TEST I&R ONLY: CPT | Mod: ,,, | Performed by: INTERNAL MEDICINE

## 2021-07-15 PROCEDURE — 93016 EXERCISE STRESS - EKG (CUPID ONLY): ICD-10-PCS | Mod: ,,, | Performed by: INTERNAL MEDICINE

## 2021-07-15 PROCEDURE — 93018 EXERCISE STRESS - EKG (CUPID ONLY): ICD-10-PCS | Mod: ,,, | Performed by: INTERNAL MEDICINE

## 2021-07-15 PROCEDURE — 93016 CV STRESS TEST SUPVJ ONLY: CPT | Mod: ,,, | Performed by: INTERNAL MEDICINE

## 2021-07-15 PROCEDURE — 71046 XR CHEST PA AND LATERAL: ICD-10-PCS | Mod: 26,,, | Performed by: RADIOLOGY

## 2021-07-15 PROCEDURE — 71046 X-RAY EXAM CHEST 2 VIEWS: CPT | Mod: TC,FY

## 2021-07-15 PROCEDURE — 71046 X-RAY EXAM CHEST 2 VIEWS: CPT | Mod: 26,,, | Performed by: RADIOLOGY

## 2021-07-15 PROCEDURE — 93017 CV STRESS TEST TRACING ONLY: CPT

## 2021-08-16 ENCOUNTER — HOSPITAL ENCOUNTER (EMERGENCY)
Facility: HOSPITAL | Age: 41
Discharge: HOME | End: 2021-08-16
Attending: EMERGENCY MEDICINE
Payer: COMMERCIAL

## 2021-08-16 VITALS
OXYGEN SATURATION: 99 % | DIASTOLIC BLOOD PRESSURE: 85 MMHG | RESPIRATION RATE: 16 BRPM | BODY MASS INDEX: 37.76 KG/M2 | WEIGHT: 200 LBS | HEIGHT: 61 IN | TEMPERATURE: 98 F | SYSTOLIC BLOOD PRESSURE: 117 MMHG | HEART RATE: 81 BPM

## 2021-08-16 DIAGNOSIS — M79.622 LEFT AXILLARY PAIN: Primary | ICD-10-CM

## 2021-08-16 PROCEDURE — 96372 THER/PROPH/DIAG INJ SC/IM: CPT | Mod: ER

## 2021-08-16 PROCEDURE — 63600175 PHARM REV CODE 636 W HCPCS: Mod: ER | Performed by: NURSE PRACTITIONER

## 2021-08-16 PROCEDURE — 99284 EMERGENCY DEPT VISIT MOD MDM: CPT | Mod: 25,ER

## 2021-08-16 RX ORDER — CYCLOBENZAPRINE HCL 10 MG
10 TABLET ORAL 3 TIMES DAILY PRN
Qty: 15 TABLET | Refills: 0 | Status: SHIPPED | OUTPATIENT
Start: 2021-08-16 | End: 2021-08-21

## 2021-08-16 RX ORDER — SULINDAC 150 MG/1
150 TABLET ORAL 2 TIMES DAILY
Qty: 10 TABLET | Refills: 0 | Status: SHIPPED | OUTPATIENT
Start: 2021-08-16 | End: 2022-02-03

## 2021-08-16 RX ORDER — KETOROLAC TROMETHAMINE 30 MG/ML
30 INJECTION, SOLUTION INTRAMUSCULAR; INTRAVENOUS
Status: COMPLETED | OUTPATIENT
Start: 2021-08-16 | End: 2021-08-16

## 2021-08-16 RX ORDER — HYDROCODONE BITARTRATE AND ACETAMINOPHEN 5; 325 MG/1; MG/1
1 TABLET ORAL EVERY 6 HOURS PRN
Qty: 12 TABLET | Refills: 0 | Status: SHIPPED | OUTPATIENT
Start: 2021-08-16 | End: 2022-02-03 | Stop reason: ALTCHOICE

## 2021-08-16 RX ADMIN — KETOROLAC TROMETHAMINE 30 MG: 30 INJECTION, SOLUTION INTRAMUSCULAR; INTRAVENOUS at 06:08

## 2022-01-12 ENCOUNTER — PATIENT MESSAGE (OUTPATIENT)
Dept: OBSTETRICS AND GYNECOLOGY | Facility: CLINIC | Age: 42
End: 2022-01-12

## 2022-02-03 ENCOUNTER — OFFICE VISIT (OUTPATIENT)
Dept: OBSTETRICS AND GYNECOLOGY | Facility: CLINIC | Age: 42
End: 2022-02-03
Attending: OBSTETRICS & GYNECOLOGY
Payer: COMMERCIAL

## 2022-02-03 VITALS
HEIGHT: 61 IN | BODY MASS INDEX: 39 KG/M2 | SYSTOLIC BLOOD PRESSURE: 118 MMHG | DIASTOLIC BLOOD PRESSURE: 78 MMHG | WEIGHT: 206.56 LBS

## 2022-02-03 DIAGNOSIS — Z12.31 ENCOUNTER FOR SCREENING MAMMOGRAM FOR MALIGNANT NEOPLASM OF BREAST: ICD-10-CM

## 2022-02-03 DIAGNOSIS — Z01.419 WELL FEMALE EXAM WITH ROUTINE GYNECOLOGICAL EXAM: Primary | ICD-10-CM

## 2022-02-03 PROCEDURE — 1160F RVW MEDS BY RX/DR IN RCRD: CPT | Mod: CPTII,S$GLB,, | Performed by: OBSTETRICS & GYNECOLOGY

## 2022-02-03 PROCEDURE — 3078F DIAST BP <80 MM HG: CPT | Mod: CPTII,S$GLB,, | Performed by: OBSTETRICS & GYNECOLOGY

## 2022-02-03 PROCEDURE — 1159F PR MEDICATION LIST DOCUMENTED IN MEDICAL RECORD: ICD-10-PCS | Mod: CPTII,S$GLB,, | Performed by: OBSTETRICS & GYNECOLOGY

## 2022-02-03 PROCEDURE — 3008F PR BODY MASS INDEX (BMI) DOCUMENTED: ICD-10-PCS | Mod: CPTII,S$GLB,, | Performed by: OBSTETRICS & GYNECOLOGY

## 2022-02-03 PROCEDURE — 3008F BODY MASS INDEX DOCD: CPT | Mod: CPTII,S$GLB,, | Performed by: OBSTETRICS & GYNECOLOGY

## 2022-02-03 PROCEDURE — 1159F MED LIST DOCD IN RCRD: CPT | Mod: CPTII,S$GLB,, | Performed by: OBSTETRICS & GYNECOLOGY

## 2022-02-03 PROCEDURE — 99396 PR PREVENTIVE VISIT,EST,40-64: ICD-10-PCS | Mod: S$GLB,,, | Performed by: OBSTETRICS & GYNECOLOGY

## 2022-02-03 PROCEDURE — 99396 PREV VISIT EST AGE 40-64: CPT | Mod: S$GLB,,, | Performed by: OBSTETRICS & GYNECOLOGY

## 2022-02-03 PROCEDURE — 3074F PR MOST RECENT SYSTOLIC BLOOD PRESSURE < 130 MM HG: ICD-10-PCS | Mod: CPTII,S$GLB,, | Performed by: OBSTETRICS & GYNECOLOGY

## 2022-02-03 PROCEDURE — 99999 PR PBB SHADOW E&M-EST. PATIENT-LVL III: CPT | Mod: PBBFAC,,, | Performed by: OBSTETRICS & GYNECOLOGY

## 2022-02-03 PROCEDURE — 1160F PR REVIEW ALL MEDS BY PRESCRIBER/CLIN PHARMACIST DOCUMENTED: ICD-10-PCS | Mod: CPTII,S$GLB,, | Performed by: OBSTETRICS & GYNECOLOGY

## 2022-02-03 PROCEDURE — 3078F PR MOST RECENT DIASTOLIC BLOOD PRESSURE < 80 MM HG: ICD-10-PCS | Mod: CPTII,S$GLB,, | Performed by: OBSTETRICS & GYNECOLOGY

## 2022-02-03 PROCEDURE — 3074F SYST BP LT 130 MM HG: CPT | Mod: CPTII,S$GLB,, | Performed by: OBSTETRICS & GYNECOLOGY

## 2022-02-03 PROCEDURE — 99999 PR PBB SHADOW E&M-EST. PATIENT-LVL III: ICD-10-PCS | Mod: PBBFAC,,, | Performed by: OBSTETRICS & GYNECOLOGY

## 2022-02-03 NOTE — PROGRESS NOTES
SUBJECTIVE:   41 y.o. female   for routine gyn exam. Patient's last menstrual period was 02/15/2017 (approximate)..  She has no unusual complaints          Past Medical History:   Diagnosis Date    Abnormal Pap smear of cervix     Allergy     Hypertension     Joint pain     Keloid cicatrix      Past Surgical History:   Procedure Laterality Date    CKC, ECC, fractional D&C      COLPOSCOPY      CONIZATION-CERVIX  2015    DILATION AND CURETTAGE OF UTERUS      HYSTERECTOMY  2017    AUB, fibroids    TUBAL LIGATION       Social History     Socioeconomic History    Marital status:    Tobacco Use    Smoking status: Never Smoker    Smokeless tobacco: Never Used   Substance and Sexual Activity    Alcohol use: Yes     Comment: occasional    Drug use: No    Sexual activity: Yes     Partners: Male     Birth control/protection: Other-see comments, See Surgical Hx     Comment: Tubal Ligation/Hysterectomy   Other Topics Concern    Are you pregnant or think you may be? No    Breast-feeding No     Family History   Problem Relation Age of Onset    Eczema Mother     Ovarian cancer Mother     Hypertension Mother     Eczema Son     Eczema Maternal Grandmother     Eczema Son     Breast cancer Maternal Aunt         THREE AUNTS    Melanoma Neg Hx     Lupus Neg Hx     Psoriasis Neg Hx     Colon cancer Neg Hx      OB History    Para Term  AB Living   3 3 0     3   SAB IAB Ectopic Multiple Live Births           3      # Outcome Date GA Lbr Bennie/2nd Weight Sex Delivery Anes PTL Lv   3 Para      Vag-Spont   KATIE   2 Para      Vag-Spont   KATIE   1 Para      Vag-Spont   KATIE         Current Outpatient Medications   Medication Sig Dispense Refill    EPINEPHrine (EPIPEN) 0.3 mg/0.3 mL AtIn Inject 0.3 mLs (0.3 mg total) into the muscle as needed (anaphylaxis). 1 Device 0    hydroCHLOROthiazide (HYDRODIURIL) 25 MG tablet TAKE 1 TABLET(25 MG) BY MOUTH EVERY DAY 90 tablet 3    loratadine  "(CLARITIN) 10 mg tablet Take 1 tablet (10 mg total) by mouth once daily. 30 tablet 0     No current facility-administered medications for this visit.     Allergies: Patient has no known allergies.     ROS:  Constitutional: no weight loss, weight gain, fever, fatigue  Eyes:  No vision changes, glasses/contacts  ENT/Mouth: No ulcers, sinus problems, ears ringing, headache  Cardiovascular: No inability to lie flat, chest pain, exercise intolerance, swelling, heart palpitations  Respiratory: No wheezing, coughing blood, shortness of breath, or cough  Gastrointestinal: No diarrhea, bloody stool, nausea/vomiting, constipation, gas, hemorrhoids  Genitourinary: No blood in urine, painful urination, urgency of urination, frequency of urination, incomplete emptying, incontinence, abnormal bleeding, painful periods, heavy periods, vaginal discharge, vaginal odor, painful intercourse, sexual problems, bleeding after intercourse.  Musculoskeletal: No muscle weakness  Skin/Breast: No painful breasts, nipple discharge, masses, rash, ulcers  Neurological: No passing out, seizures, numbness, headache  Endocrine: No diabetes, hypothyroid, hyperthyroid, hot flashes, hair loss, abnormal hair growth, acne  Psychiatric: No depression, crying  Hematologic: No bruises, bleeding, swollen lymph nodes, anemia.      OBJECTIVE:   The patient appears well, alert, oriented x 3, in no distress.  /78 (BP Location: Left arm, Patient Position: Sitting, BP Method: Large (Manual))   Ht 5' 1" (1.549 m)   Wt 93.7 kg (206 lb 9.1 oz)   LMP 02/15/2017 (Approximate)   BMI 39.03 kg/m²   NECK: no thyromegaly, trachea midline  SKIN: no acne, striae, hirsutism  CHEST: CTAB  CV: RRR  BREAST EXAM: breasts appear normal, no suspicious masses, no skin or nipple changes or axillary nodes  ABDOMEN: no hernias, masses, or hepatosplenomegaly  GENITALIA: normal external genitalia, no erythema, no discharge  URETHRA: normal urethra, normal urethral " meatus  VAGINA: Normal  CERVIX: absent  UTERUS: absent  ADNEXA: no mass, fullness, tenderness      ASSESSMENT:   1. Well female exam with routine gynecological exam     2. Encounter for screening mammogram for malignant neoplasm of breast  Mammo Digital Screening Bilat w/ [facility]       PLAN:   Orders Placed This Encounter    Mammo Digital Screening Bilat w/ [facility]     Discussed healthy lifestyle including regular exercise, healthy eating, etc.  Return to clinic in 1 year

## 2022-02-25 ENCOUNTER — HOSPITAL ENCOUNTER (OUTPATIENT)
Dept: RADIOLOGY | Facility: OTHER | Age: 42
Discharge: HOME | End: 2022-02-25
Attending: OBSTETRICS & GYNECOLOGY
Payer: COMMERCIAL

## 2022-02-25 DIAGNOSIS — Z12.31 ENCOUNTER FOR SCREENING MAMMOGRAM FOR MALIGNANT NEOPLASM OF BREAST: ICD-10-CM

## 2022-02-25 PROCEDURE — 77067 MAMMO DIGITAL SCREENING BILAT WITH TOMO: ICD-10-PCS | Mod: 26,,, | Performed by: RADIOLOGY

## 2022-02-25 PROCEDURE — 77063 BREAST TOMOSYNTHESIS BI: CPT | Mod: 26,,, | Performed by: RADIOLOGY

## 2022-02-25 PROCEDURE — 77067 SCR MAMMO BI INCL CAD: CPT | Mod: 26,,, | Performed by: RADIOLOGY

## 2022-02-25 PROCEDURE — 77063 BREAST TOMOSYNTHESIS BI: CPT | Mod: TC

## 2022-02-25 PROCEDURE — 77063 MAMMO DIGITAL SCREENING BILAT WITH TOMO: ICD-10-PCS | Mod: 26,,, | Performed by: RADIOLOGY

## 2022-02-26 ENCOUNTER — HOSPITAL ENCOUNTER (EMERGENCY)
Facility: HOSPITAL | Age: 42
Discharge: HOME | End: 2022-02-26
Attending: EMERGENCY MEDICINE
Payer: COMMERCIAL

## 2022-02-26 VITALS
OXYGEN SATURATION: 98 % | SYSTOLIC BLOOD PRESSURE: 120 MMHG | BODY MASS INDEX: 37.76 KG/M2 | RESPIRATION RATE: 18 BRPM | DIASTOLIC BLOOD PRESSURE: 80 MMHG | WEIGHT: 200 LBS | HEART RATE: 68 BPM | TEMPERATURE: 98 F | HEIGHT: 61 IN

## 2022-02-26 DIAGNOSIS — S99.912A LEFT ANKLE INJURY, INITIAL ENCOUNTER: ICD-10-CM

## 2022-02-26 LAB
B-HCG UR QL: NEGATIVE
CTP QC/QA: YES

## 2022-02-26 PROCEDURE — 99283 EMERGENCY DEPT VISIT LOW MDM: CPT | Mod: 25,ER

## 2022-02-26 PROCEDURE — 81025 URINE PREGNANCY TEST: CPT | Mod: ER | Performed by: NURSE PRACTITIONER

## 2022-02-26 RX ORDER — NAPROXEN 250 MG/1
250 TABLET ORAL
Status: DISCONTINUED | OUTPATIENT
Start: 2022-02-26 | End: 2022-02-26 | Stop reason: HOSPADM

## 2022-02-26 RX ORDER — SULINDAC 150 MG/1
150 TABLET ORAL 2 TIMES DAILY
Qty: 10 TABLET | Refills: 0 | Status: ON HOLD | OUTPATIENT
Start: 2022-02-26 | End: 2023-05-28 | Stop reason: HOSPADM

## 2022-02-26 NOTE — DISCHARGE INSTRUCTIONS
You have been prescribed clinoril (sulindac), an anti-inflammatory.  Take this medication whether you feel you need it or not.  Do not take ibuprofen, naproxen or other NSAID's medications while taking this medication. Do not take prescribed medications for at least 8h after medications given in the Emergency Department.  Return to the Emergency Department for any worsening, change in condition, or any emergent concerns.

## 2022-02-26 NOTE — ED PROVIDER NOTES
Encounter Date: 2/26/2022    SCRIBE #1 NOTE: I, Mehran Graham, am scribing for, and in the presence of,  Titi Schaffer. I have scribed the following portions of the note - Other sections scribed: HPI, ROS, PE.       History     Chief Complaint   Patient presents with    Ankle Pain     Pt tripped and twisted her left ankle at about 230 am      Marlyn Camacho 42 y.o. female, with HTN and others, presents to the ED with burning left ankle pain and swelling early this morning. Patient states she stumbled and twisted her left ankle. Patient denies any medications used for her symptoms. Patient denies any other associated symptoms. Occasional alcohol consumption.      The history is provided by the patient. No  was used.     Review of patient's allergies indicates:  No Known Allergies  Past Medical History:   Diagnosis Date    Abnormal Pap smear of cervix     Allergy     Hypertension     Joint pain     Keloid cicatrix      Past Surgical History:   Procedure Laterality Date    CKC, ECC, fractional D&C      COLPOSCOPY      CONIZATION-CERVIX  02/2015    DILATION AND CURETTAGE OF UTERUS      HYSTERECTOMY  03/2017    AUB, fibroids    TUBAL LIGATION       Family History   Problem Relation Age of Onset    Eczema Mother     Ovarian cancer Mother     Hypertension Mother     Breast cancer Maternal Aunt         THREE AUNTS    Breast cancer Maternal Aunt     Breast cancer Maternal Aunt     Eczema Maternal Grandmother     Eczema Son     Eczema Son     Melanoma Neg Hx     Lupus Neg Hx     Psoriasis Neg Hx     Colon cancer Neg Hx      Social History     Tobacco Use    Smoking status: Never Smoker    Smokeless tobacco: Never Used   Substance Use Topics    Alcohol use: Yes     Comment: occasional    Drug use: No     Review of Systems   Constitutional: Negative for chills, fatigue and fever.   HENT: Negative for congestion, ear discharge, ear pain, postnasal drip, rhinorrhea,  sinus pressure, sneezing, sore throat and voice change.    Eyes: Negative for discharge and itching.   Respiratory: Negative for cough, shortness of breath and wheezing.    Cardiovascular: Negative for chest pain, palpitations and leg swelling.   Gastrointestinal: Negative for abdominal pain, constipation, diarrhea, nausea and vomiting.   Endocrine: Negative for polydipsia, polyphagia and polyuria.   Genitourinary: Negative for dysuria, frequency, hematuria, urgency, vaginal bleeding, vaginal discharge and vaginal pain.   Musculoskeletal: Positive for arthralgias and joint swelling. Negative for myalgias.   Skin: Negative for rash and wound.   Neurological: Negative for dizziness, seizures, syncope, weakness and numbness.   Hematological: Negative for adenopathy. Does not bruise/bleed easily.   Psychiatric/Behavioral: Negative for self-injury and suicidal ideas. The patient is not nervous/anxious.        Physical Exam     Initial Vitals [02/26/22 1138]   BP Pulse Resp Temp SpO2   115/82 70 14 98.4 °F (36.9 °C) 98 %      MAP       --         Physical Exam    Nursing note and vitals reviewed.  Constitutional: She appears well-developed and well-nourished.   HENT:   Head: Normocephalic and atraumatic.   Right Ear: External ear normal.   Left Ear: External ear normal.   Nose: Nose normal.   Eyes: Conjunctivae and EOM are normal. Pupils are equal, round, and reactive to light. Right eye exhibits no discharge. Left eye exhibits no discharge.   Neck:   Normal range of motion.  Cardiovascular: Intact distal pulses.   Abdominal: She exhibits no distension.   Musculoskeletal:         General: Normal range of motion.      Cervical back: Normal range of motion.      Left ankle: Swelling (lateral malleolus) present. Tenderness (posterior aspect of lateral malleolus) present.      Left Achilles Tendon: Curry's test negative.     Neurological: She is alert and oriented to person, place, and time.   Skin: Skin is dry. Capillary  refill takes less than 2 seconds.         ED Course   Procedures  Labs Reviewed   POCT URINE PREGNANCY          Imaging Results          X-Ray Ankle Complete Left (Final result)  Result time 02/26/22 12:23:20    Final result by John Hanson MD (02/26/22 12:23:20)                 Impression:      Left ankle nonspecific soft tissue swelling without acute displaced fracture-dislocation identified, which may represent sprain.      Electronically signed by: John Hanson MD  Date:    02/26/2022  Time:    12:23             Narrative:    EXAMINATION:  XR ANKLE COMPLETE 3 VIEW LEFT    CLINICAL HISTORY:  Unspecified injury of left ankle, initial encounter    TECHNIQUE:  AP, lateral and oblique views of the left ankle were performed.    COMPARISON:  None    FINDINGS:  Bones are well mineralized. Overall alignment is within normal limits.  Prominence of the soft tissues about the ankle, greatest medially suggesting nonspecific swelling.  Ankle mortise is otherwise well aligned and intact.  No displaced fracture, dislocation or destructive osseous process. Joint spaces appear relatively maintained. No subcutaneous emphysema or radiodense retained foreign body.                                 Medications   naproxen tablet 250 mg (has no administration in time range)     Medical Decision Making:   Initial Assessment:   42-year-old female presents the emergency department having twisted her left ankle last night at 02:30.  There is swelling about the lateral malleolus with some tenderness posterior.  Negative Curry test, no other swelling, deformity or abnormalities exist.  Differential Diagnosis:   Includes but is not limited to: fracture, dislocation, sprain, strain  Independently Interpreted Test(s):   I have ordered and independently interpreted X-rays - see prior notes.  Clinical Tests:   Radiological Study: Ordered and Reviewed       APC / Resident Notes:   Scribe attestation: Titi FOSS DNP ACNP-BC FNP-C  AFSANEH,  personally performed the services described in this documentation. All medical record entries made by the scribe were at my direction and in my presence.  I have reviewed the chart and agree that the record reflects my personal performance and is accurate and complete.      Scribe Attestation:   Scribe #1: I performed the above scribed service and the documentation accurately describes the services I performed. I attest to the accuracy of the note.        ED Course as of 02/26/22 1513   Sat Feb 26, 2022   1156 BP: 115/82 [VC]   1156 Temp: 98.4 °F (36.9 °C) [VC]   1156 Temp src: Oral [VC]   1156 Pulse: 70 [VC]   1156 Resp: 14 [VC]   1156 SpO2: 98 % [VC]   1224 Preg Test, Ur: Negative [VC]   1232 X-Ray Ankle Complete Left  Left ankle nonspecific soft tissue swelling without acute displaced fracture-dislocation identified, which may represent sprain.    [VC]      ED Course User Index  [VC] Titi Schaffer DNP           Ankle air splint was applied.  The patient was discharged with anti-inflammatories she should follow up as directed.  See AVS for additional recommendations. Medications listed below were prescribed after reviewing the patient's allergies, medication list, history, most recent laboratories as available.  Referrals below were provided after reviewing the patient's previous medical providers. She understands she  should return for any worsening or changes in condition.  Prior to discharge the patient was asked if she  had any additional concerns or complaints and she declined. The patient was given an opportunity to ask questions and all were answered to her satisfaction.    Medications given in the ER During this Visit:  Medications   naproxen tablet 250 mg (has no administration in time range)       Clinical Impression:   Final diagnoses:  [S99.912A] Left ankle injury, initial encounter          ED Disposition Condition    Discharge Stable        ED Prescriptions     Medication Sig Dispense  Start Date End Date Auth. Provider    sulindac (CLINORIL) 150 MG tablet Take 1 tablet (150 mg total) by mouth 2 (two) times daily. 10 tablet 2/26/2022  Titi Schaffer DNP        Follow-up Information     Follow up With Specialties Details Why Contact Info    Mindi Donahue MD Family Medicine Schedule an appointment as soon as possible for a visit  As needed [address]  [facility] [address]  [phone]             Titi Schaffer DNP  02/26/22 1513       Titi Schaffer DNP  03/08/22 1438

## 2022-03-09 ENCOUNTER — TELEPHONE (OUTPATIENT)
Dept: FAMILY MEDICINE | Facility: CLINIC | Age: 42
End: 2022-03-09

## 2022-03-09 NOTE — TELEPHONE ENCOUNTER
Patient is requesting an order for her employer.  Please enter order for a HSD. Please advise. thanks

## 2022-03-17 NOTE — PLAN OF CARE
Problem: Patient Care Overview  Goal: Plan of Care Review  Outcome: Ongoing (interventions implemented as appropriate)  No changes made at this time. Will continue to monitor.       //

## 2022-06-16 NOTE — ED NOTES
LOC: The patient is awake, alert, and oriented to place, time, situation. Affect is appropriate.  Speech is appropriate and clear.     APPEARANCE: Patient resting comfortably in no acute distress.  Patient is clean and well groomed.    SKIN: The skin is warm and dry; color consistent with ethnicity.  Patient has normal skin turgor and moist mucus membranes.  Skin intact; no breakdown or bruising noted.     MUSCULOSKELETAL: Patient moving upper and lower extremities without difficulty.  Reports weakness.     RESPIRATORY: Airway is open and patent. Respirations spontaneous, even, easy, and non-labored.  Patient has a normal effort and rate.  No accessory muscle use noted. Denies cough.     CARDIAC: No peripheral edema noted. No complaints of chest pain.      ABDOMEN: Soft and tender to palpation.  No distention noted.     NEUROLOGIC: Eyes open spontaneously.  Behavior appropriate to situation.  Follows commands; facial expression symmetrical.  Purposeful motor response noted; normal sensation in all extremities.         [Time Spent: ___ minutes] : I have spent [unfilled] minutes of time on the encounter.

## 2022-07-22 ENCOUNTER — TELEPHONE (OUTPATIENT)
Dept: FAMILY MEDICINE | Facility: CLINIC | Age: 42
End: 2022-07-22
Payer: COMMERCIAL

## 2022-07-22 ENCOUNTER — OFFICE VISIT (OUTPATIENT)
Dept: FAMILY MEDICINE | Facility: CLINIC | Age: 42
End: 2022-07-22
Attending: FAMILY MEDICINE
Payer: COMMERCIAL

## 2022-07-22 DIAGNOSIS — U07.1 COVID: Primary | ICD-10-CM

## 2022-07-22 DIAGNOSIS — I10 ESSENTIAL HYPERTENSION: ICD-10-CM

## 2022-07-22 DIAGNOSIS — E66.01 SEVERE OBESITY (BMI 35.0-39.9) WITH COMORBIDITY: ICD-10-CM

## 2022-07-22 PROCEDURE — 99443 PR PHYSICIAN TELEPHONE EVALUATION 21-30 MIN: CPT | Mod: 95,,, | Performed by: FAMILY MEDICINE

## 2022-07-22 PROCEDURE — 99443 PR PHYSICIAN TELEPHONE EVALUATION 21-30 MIN: ICD-10-PCS | Mod: 95,,, | Performed by: FAMILY MEDICINE

## 2022-07-22 NOTE — TELEPHONE ENCOUNTER
----- Message from Lois Tracy sent at 7/22/2022  8:09 AM CDT -----  Contact: ELOISA ANDERSON [[id number]]  Type: Call Back      Who called: ELOISA ANDERSON [[id number]]      What is the request in detail: Patient is requesting a call back. Pt states that she took a at home COVID test and it was faint. She attempted to go to the urgent care, but was denied service due to her not having her $50 co pay. She would like to know what she should do.   Headaches, coughing, and chest tightness      Can the clinic reply by [facility]? No      Would the patient rather a call back or a response via [facility]? Call back       Best call back number: [phone] (mobile)      Additional Information:

## 2022-07-26 NOTE — PROGRESS NOTES
Established Patient - Audio Only Telehealth Visit     The patient location is: home  The chief complaint leading to consultation is: covid  Visit type: Virtual visit with audio only (telephone)  Total time spent with patient: 30       The reason for the audio only service rather than synchronous audio and video virtual visit was related to technical difficulties or patient preference/necessity.     Each patient to whom I provide medical services by telemedicine is:  (1) informed of the relationship between the physician and patient and the respective role of any other health care provider with respect to management of the patient; and (2) notified that they may decline to receive medical services by telemedicine and may withdraw from such care at any time. Patient verbally consented to receive this service via voice-only telephone call.       HPI: pt in audio visit for covid feeling better cough lingers no fever/chills no body aches she is getting through it  Pt has htn bp fine at home no sob/cp on hctz no muscle cramps  Pt is obese she is not on a weight loss diet consistently no regular exercise       Assessment and plan:  covid - cont otc symptom relief wash hands often do not touch face  htn - cont hctz increase water intake low salt diet   Obese - start weight loss diet graded exercise  rtc 3-6 months                        This service was not originating from a related E/M service provided within the previous 7 days nor will  to an E/M service or procedure within the next 24 hours or my soonest available appointment.  Prevailing standard of care was able to be met in this audio-only visit.

## 2022-08-31 DIAGNOSIS — I10 ESSENTIAL HYPERTENSION: ICD-10-CM

## 2022-10-21 ENCOUNTER — HOSPITAL ENCOUNTER (EMERGENCY)
Facility: HOSPITAL | Age: 42
Discharge: HOME | End: 2022-10-21
Attending: EMERGENCY MEDICINE
Payer: COMMERCIAL

## 2022-10-21 VITALS
WEIGHT: 200 LBS | RESPIRATION RATE: 16 BRPM | HEART RATE: 72 BPM | BODY MASS INDEX: 37.79 KG/M2 | DIASTOLIC BLOOD PRESSURE: 84 MMHG | TEMPERATURE: 98 F | SYSTOLIC BLOOD PRESSURE: 122 MMHG | OXYGEN SATURATION: 100 %

## 2022-10-21 DIAGNOSIS — R07.89 CHEST WALL PAIN: Primary | ICD-10-CM

## 2022-10-21 LAB
ALBUMIN SERPL BCP-MCNC: 4.1 G/DL (ref 3.5–5.2)
ALP SERPL-CCNC: 51 U/L (ref 55–135)
ALT SERPL W/O P-5'-P-CCNC: 13 U/L (ref 10–44)
ANION GAP SERPL CALC-SCNC: 10 MMOL/L (ref 8–16)
AST SERPL-CCNC: 17 U/L (ref 10–40)
BASOPHILS # BLD AUTO: 0.07 K/UL (ref 0–0.2)
BASOPHILS NFR BLD: 0.7 % (ref 0–1.9)
BILIRUB SERPL-MCNC: 0.4 MG/DL (ref 0.1–1)
BNP SERPL-MCNC: <10 PG/ML (ref 0–99)
BUN SERPL-MCNC: 10 MG/DL (ref 6–20)
CALCIUM SERPL-MCNC: 9.7 MG/DL (ref 8.7–10.5)
CHLORIDE SERPL-SCNC: 104 MMOL/L (ref 95–110)
CO2 SERPL-SCNC: 27 MMOL/L (ref 23–29)
CREAT SERPL-MCNC: 0.9 MG/DL (ref 0.5–1.4)
DIFFERENTIAL METHOD: ABNORMAL
EOSINOPHIL # BLD AUTO: 0.2 K/UL (ref 0–0.5)
EOSINOPHIL NFR BLD: 2.1 % (ref 0–8)
ERYTHROCYTE [DISTWIDTH] IN BLOOD BY AUTOMATED COUNT: 13.1 % (ref 11.5–14.5)
EST. GFR  (NO RACE VARIABLE): >60 ML/MIN/1.73 M^2
GLUCOSE SERPL-MCNC: 88 MG/DL (ref 70–110)
HCT VFR BLD AUTO: 39.8 % (ref 37–48.5)
HGB BLD-MCNC: 13.7 G/DL (ref 12–16)
IMM GRANULOCYTES # BLD AUTO: 0.04 K/UL (ref 0–0.04)
IMM GRANULOCYTES NFR BLD AUTO: 0.4 % (ref 0–0.5)
INR PPP: 1 (ref 0.8–1.2)
LYMPHOCYTES # BLD AUTO: 4.1 K/UL (ref 1–4.8)
LYMPHOCYTES NFR BLD: 42.1 % (ref 18–48)
MCH RBC QN AUTO: 32.2 PG (ref 27–31)
MCHC RBC AUTO-ENTMCNC: 34.4 G/DL (ref 32–36)
MCV RBC AUTO: 93 FL (ref 82–98)
MONOCYTES # BLD AUTO: 0.6 K/UL (ref 0.3–1)
MONOCYTES NFR BLD: 6.1 % (ref 4–15)
NEUTROPHILS # BLD AUTO: 4.8 K/UL (ref 1.8–7.7)
NEUTROPHILS NFR BLD: 48.6 % (ref 38–73)
NRBC BLD-RTO: 0 /100 WBC
PLATELET # BLD AUTO: 347 K/UL (ref 150–450)
PMV BLD AUTO: 9.5 FL (ref 9.2–12.9)
POTASSIUM SERPL-SCNC: 3.9 MMOL/L (ref 3.5–5.1)
PROT SERPL-MCNC: 7.6 G/DL (ref 6–8.4)
PROTHROMBIN TIME: 10.6 SEC (ref 9–12.5)
RBC # BLD AUTO: 4.26 M/UL (ref 4–5.4)
SODIUM SERPL-SCNC: 141 MMOL/L (ref 136–145)
TROPONIN I SERPL DL<=0.01 NG/ML-MCNC: <0.006 NG/ML (ref 0–0.03)
WBC # BLD AUTO: 9.75 K/UL (ref 3.9–12.7)

## 2022-10-21 PROCEDURE — 94761 N-INVAS EAR/PLS OXIMETRY MLT: CPT

## 2022-10-21 PROCEDURE — 83880 ASSAY OF NATRIURETIC PEPTIDE: CPT | Performed by: EMERGENCY MEDICINE

## 2022-10-21 PROCEDURE — 93010 ELECTROCARDIOGRAM REPORT: CPT | Mod: ,,, | Performed by: INTERNAL MEDICINE

## 2022-10-21 PROCEDURE — 93010 EKG 12-LEAD: ICD-10-PCS | Mod: ,,, | Performed by: INTERNAL MEDICINE

## 2022-10-21 PROCEDURE — 80053 COMPREHEN METABOLIC PANEL: CPT | Performed by: EMERGENCY MEDICINE

## 2022-10-21 PROCEDURE — 93005 ELECTROCARDIOGRAM TRACING: CPT

## 2022-10-21 PROCEDURE — 85025 COMPLETE CBC W/AUTO DIFF WBC: CPT | Performed by: EMERGENCY MEDICINE

## 2022-10-21 PROCEDURE — 99285 EMERGENCY DEPT VISIT HI MDM: CPT | Mod: 25

## 2022-10-21 PROCEDURE — 85610 PROTHROMBIN TIME: CPT | Performed by: EMERGENCY MEDICINE

## 2022-10-21 PROCEDURE — 84484 ASSAY OF TROPONIN QUANT: CPT | Performed by: EMERGENCY MEDICINE

## 2022-10-21 PROCEDURE — 99284 EMERGENCY DEPT VISIT MOD MDM: CPT | Mod: ,,, | Performed by: EMERGENCY MEDICINE

## 2022-10-21 PROCEDURE — 25000003 PHARM REV CODE 250: Performed by: EMERGENCY MEDICINE

## 2022-10-21 PROCEDURE — 99284 PR EMERGENCY DEPT VISIT,LEVEL IV: ICD-10-PCS | Mod: ,,, | Performed by: EMERGENCY MEDICINE

## 2022-10-21 RX ORDER — NAPROXEN 375 MG/1
375 TABLET ORAL 2 TIMES DAILY WITH MEALS
Qty: 10 TABLET | Refills: 0 | Status: SHIPPED | OUTPATIENT
Start: 2022-10-21 | End: 2022-10-26

## 2022-10-21 RX ORDER — LIDOCAINE 50 MG/G
1 PATCH TOPICAL DAILY
Qty: 15 PATCH | Refills: 3 | Status: SHIPPED | OUTPATIENT
Start: 2022-10-21 | End: 2023-05-25 | Stop reason: CLARIF

## 2022-10-21 RX ORDER — NAPROXEN SODIUM 220 MG/1
324 TABLET, FILM COATED ORAL
Status: COMPLETED | OUTPATIENT
Start: 2022-10-21 | End: 2022-10-21

## 2022-10-21 RX ORDER — LIDOCAINE 50 MG/G
1 PATCH TOPICAL
Status: DISCONTINUED | OUTPATIENT
Start: 2022-10-21 | End: 2022-10-22 | Stop reason: HOSPADM

## 2022-10-21 RX ORDER — BACLOFEN 20 MG/1
20 TABLET ORAL 3 TIMES DAILY
Qty: 9 TABLET | Refills: 0 | Status: SHIPPED | OUTPATIENT
Start: 2022-10-21 | End: 2023-05-25 | Stop reason: CLARIF

## 2022-10-21 RX ORDER — BACLOFEN 10 MG/1
20 TABLET ORAL
Status: COMPLETED | OUTPATIENT
Start: 2022-10-21 | End: 2022-10-21

## 2022-10-21 RX ORDER — NAPROXEN 500 MG/1
500 TABLET ORAL
Status: COMPLETED | OUTPATIENT
Start: 2022-10-21 | End: 2022-10-21

## 2022-10-21 RX ADMIN — BACLOFEN 20 MG: 10 TABLET ORAL at 10:10

## 2022-10-21 RX ADMIN — LIDOCAINE 5% 1 PATCH: 700 PATCH TOPICAL at 10:10

## 2022-10-21 RX ADMIN — ASPIRIN 81 MG CHEWABLE TABLET 324 MG: 81 TABLET CHEWABLE at 08:10

## 2022-10-21 RX ADMIN — NAPROXEN 500 MG: 500 TABLET ORAL at 10:10

## 2022-10-22 NOTE — ED TRIAGE NOTES
Marlyn Moore Doug, a 42 y.o. female presents to the ED w/ complaint of chest pain 8/10 that started around 3pm today. Pt stated pain radiates to right neck and left shoulder, denies SOB     Triage note:  Chief Complaint   Patient presents with    Chest Pain     R anterior chest pain since 3pm, radiates up R neck to jaw. Denies N/V. Reports she has felt like there has been a knot in her chest for the past few days     Review of patient's allergies indicates:  No Known Allergies  Past Medical History:   Diagnosis Date    Abnormal Pap smear of cervix     Allergy     Hypertension     Joint pain     Keloid cicatrix

## 2022-10-22 NOTE — DISCHARGE INSTRUCTIONS
Imaging Results              X-Ray Chest PA And Lateral (Final result)  Result time 10/21/22 21:48:55      Final result by Karel Hogue MD (10/21/22 21:48:55)                   Impression:      No acute cardiopulmonary process.      Electronically signed by: Karel Hogue MD  Date:    10/21/2022  Time:    21:48               Narrative:    EXAMINATION:  XR CHEST PA AND LATERAL    CLINICAL HISTORY:  Chest Pain;    TECHNIQUE:  PA and lateral views of the chest were performed.    COMPARISON:  07/15/2021.    FINDINGS:  There is no consolidation, effusion, or pneumothorax.    Cardiomediastinal silhouette is unremarkable.    Regional osseous structures are similar to prior.

## 2022-10-22 NOTE — ED PROVIDER NOTES
Encounter Date: 10/21/2022    SCRIBE #1 NOTE: I, Erica Hardin, am scribing for, and in the presence of,  Elio Stewart MD. I have scribed the following portions of the note - Other sections scribed: HPI, ROS, PE.   STAFF ATTENDING PHYSICIAN NOTE:  I provided and agree with the documentation provided by MOLLY on Marlyn Camacho.  ____________________  Leo LENI Stewart MD, [facility]  Emergency Medicine Staff    History     Chief Complaint   Patient presents with    Chest Pain     R anterior chest pain since 3pm, radiates up R neck to jaw. Denies N/V. Reports she has felt like there has been a knot in her chest for the past few days     42 y.o. female with a PMHx of HTN, and joint pain, who presents to the ED c/o localized, reproducible, sharp, non-radiating upper right chest wall pain with no assoc dyspnea n/v or diaphoresis. Patient states she has had this pain for 3 to 4 days and worsens when touching it and moving. She reports aggravating the area and experiencing a knot that has now gone to her right shoulder. Patient denies coughing, sore throat, SOB, CAMARILLO, diaphoresis, difficulty urinating, rhinorrhea, congestion, N/V/D, and abdominal pain.    The history is provided by the patient and medical records. No  was used.   Review of patient's allergies indicates:  No Known Allergies  Past Medical History:   Diagnosis Date    Abnormal Pap smear of cervix     Allergy     Hypertension     Joint pain     Keloid cicatrix      Past Surgical History:   Procedure Laterality Date    CKC, ECC, fractional D&C      COLPOSCOPY      CONIZATION-CERVIX  02/2015    DILATION AND CURETTAGE OF UTERUS      HYSTERECTOMY  03/2017    AUB, fibroids    TUBAL LIGATION       Family History   Problem Relation Age of Onset    Eczema Mother     Ovarian cancer Mother     Hypertension Mother     Breast cancer Maternal Aunt         THREE AUNTS    Breast cancer Maternal Aunt     Breast cancer Maternal Aunt     Eczema Maternal  Grandmother     Eczema Son     Eczema Son     Melanoma Neg Hx     Lupus Neg Hx     Psoriasis Neg Hx     Colon cancer Neg Hx      Social History     Tobacco Use    Smoking status: Never    Smokeless tobacco: Never   Substance Use Topics    Alcohol use: Yes     Comment: occasional    Drug use: No     Review of Systems   Constitutional:  Negative for diaphoresis and fever.   HENT:  Negative for congestion, rhinorrhea and sore throat.    Respiratory:  Negative for apnea, cough, choking, chest tightness, shortness of breath and wheezing.    Cardiovascular:  Positive for chest pain (localized sharp upper right area, reproducible and agg w/movement). Negative for palpitations and leg swelling.   Gastrointestinal:  Negative for abdominal pain, diarrhea, nausea and vomiting.   Genitourinary:  Negative for difficulty urinating and dysuria.   Musculoskeletal:  Negative for back pain.   Skin:  Negative for rash.   Neurological:  Negative for weakness.   Hematological:  Does not bruise/bleed easily.     Physical Exam     Initial Vitals [10/21/22 1919]   BP Pulse Resp Temp SpO2   (!) 142/87 67 20 98.1 °F (36.7 °C) 100 %      MAP       --         Physical Exam    Nursing note and vitals reviewed.  Constitutional: She appears well-developed and well-nourished.   HENT:   Head: Normocephalic and atraumatic.   Eyes: Conjunctivae and EOM are normal. Pupils are equal, round, and reactive to light.   Neck: Neck supple.   Normal range of motion.  Cardiovascular:  Normal rate and regular rhythm.           Pulmonary/Chest:   Tender to touch    Abdominal: Abdomen is soft.   Musculoskeletal:         General: Normal range of motion.      Cervical back: Normal range of motion and neck supple.     Neurological: She is alert and oriented to person, place, and time.   Skin: Skin is warm and dry.   Psychiatric: She has a normal mood and affect. Her behavior is normal. Judgment and thought content normal.       ED Course   Procedures  Labs Reviewed    CBC W/ AUTO DIFFERENTIAL - Abnormal; Notable for the following components:       Result Value    MCH 32.2 (*)     All other components within normal limits   COMPREHENSIVE METABOLIC PANEL - Abnormal; Notable for the following components:    Alkaline Phosphatase 51 (*)     All other components within normal limits   B-TYPE NATRIURETIC PEPTIDE   TROPONIN I   PROTIME-INR     EKG Readings: (Independently Interpreted)   Initial Reading: No STEMI. Rhythm: Normal Sinus Rhythm (with sinus arrhythmia). Heart Rate: 73 BPM.   ECG Results              EKG 12-lead (Final result)  Result time 10/22/22 10:24:49      Final result by Interface, Lab [facility] (10/22/22 10:24:49)                   Narrative:    Test Reason : R07.9,    Vent. Rate : 073 BPM     Atrial Rate : 073 BPM     P-R Int : 140 ms          QRS Dur : 082 ms      QT Int : 390 ms       P-R-T Axes : 020 054 044 degrees     QTc Int : 429 ms    Normal sinus rhythm with sinus arrhythmia  Normal ECG  When compared with ECG of 22-JUN-2021 15:53,  No significant change was found  Confirmed by Keyur TEMPLE MD ([id number]) on 10/22/2022 10:24:40 AM    Referred By: [email or web address]   SELF           Confirmed By:Keyur TEMPLE MD                                  Imaging Results              X-Ray Chest PA And Lateral (Final result)  Result time 10/21/22 21:48:55      Final result by Karel Hogue MD (10/21/22 21:48:55)                   Impression:      No acute cardiopulmonary process.      Electronically signed by: Karel Hogue MD  Date:    10/21/2022  Time:    21:48               Narrative:    EXAMINATION:  XR CHEST PA AND LATERAL    CLINICAL HISTORY:  Chest Pain;    TECHNIQUE:  PA and lateral views of the chest were performed.    COMPARISON:  07/15/2021.    FINDINGS:  There is no consolidation, effusion, or pneumothorax.    Cardiomediastinal silhouette is unremarkable.    Regional osseous structures are similar to prior.                                       Medications   aspirin  chewable tablet 324 mg (324 mg Oral Given 10/21/22 2031)   baclofen tablet 20 mg (20 mg Oral Given 10/21/22 2210)   naproxen tablet 500 mg (500 mg Oral Given 10/21/22 2210)     Medical Decision Making:   History:   Old Medical Records: I decided to obtain old medical records.  Initial Assessment:   Well appearing, atraumatic and hemodynamically stable F with no appreciated distress p/w Si/Sx of chest wall pain. No STEMI on ECG. Unlikley ACS. Analgesia and labs.  ____________________  Leo LENI Stewart MD, [facility]  Emergency Medicine Staff  6:56 AM 10/28/2022    Clinical Tests:   Lab Tests: Ordered and Reviewed        Scribe Attestation:   Scribe #1: I performed the above scribed service and the documentation accurately describes the services I performed. I attest to the accuracy of the note.                   Clinical Impression:   Final diagnoses:  [R07.89] Chest wall pain (Primary)        ED Disposition Condition    Discharge Stable          ED Prescriptions       Medication Sig Dispense Start Date End Date Auth. Provider    naproxen (NAPROSYN) 375 MG tablet () Take 1 tablet (375 mg total) by mouth 2 (two) times daily with meals. for 5 days 10 tablet 10/21/2022 10/26/2022 Elio Stewart MD    LIDOcaine (LIDODERM) 5 % Place 1 patch onto the skin once daily. Remove & Discard patch within 12 hours or as directed by MD 15 patch 10/21/2022 -- Elio Stewart MD    baclofen (LIORESAL) 20 MG tablet () Take 1 tablet (20 mg total) by mouth 3 (three) times daily. for 3 days 9 tablet 10/21/2022 10/24/2022 Elio Stewart MD          Follow-up Information       Follow up With Specialties Details Why Contact Info    [facility] Emergency Medicine  Two-view chest pain worsens, becomes short of breath, or any new concerns or symptoms [address]  [facility] [address] [phone]    Mindi Donahue MD Family Medicine Schedule an appointment as soon as possible for a visit   [address] [facility]  [address]  [facility] [address]  [phone]               Elio Stewart MD  10/28/22 0657

## 2022-11-08 ENCOUNTER — TELEPHONE (OUTPATIENT)
Dept: FAMILY MEDICINE | Facility: CLINIC | Age: 42
End: 2022-11-08
Payer: COMMERCIAL

## 2022-11-08 NOTE — TELEPHONE ENCOUNTER
----- Message from Lois Tracy sent at 11/8/2022  7:16 AM CST -----  Contact: ELOISA ANDERSON [[id number]]  Type:  Patient Returning Call      Who Called:   ELOISA ANDERSON [[id number]]      Who Left Message for Patient: Jeimy MURILLO      Does the patient know what this is regarding?: Yes      Would the patient rather a call back or a response via [facility]?        Best Call Back Number: [phone] (Mobile)       Additional Information:

## 2022-11-09 ENCOUNTER — OFFICE VISIT (OUTPATIENT)
Dept: FAMILY MEDICINE | Facility: CLINIC | Age: 42
End: 2022-11-09
Payer: COMMERCIAL

## 2022-11-09 VITALS
OXYGEN SATURATION: 98 % | HEIGHT: 61 IN | WEIGHT: 203 LBS | SYSTOLIC BLOOD PRESSURE: 106 MMHG | DIASTOLIC BLOOD PRESSURE: 72 MMHG | BODY MASS INDEX: 38.33 KG/M2 | HEART RATE: 74 BPM

## 2022-11-09 DIAGNOSIS — G89.29 CHRONIC PAIN OF LEFT KNEE: Primary | ICD-10-CM

## 2022-11-09 DIAGNOSIS — M25.562 CHRONIC PAIN OF LEFT KNEE: Primary | ICD-10-CM

## 2022-11-09 DIAGNOSIS — I10 ESSENTIAL HYPERTENSION: ICD-10-CM

## 2022-11-09 PROCEDURE — 99214 PR OFFICE/OUTPT VISIT, EST, LEVL IV, 30-39 MIN: ICD-10-PCS | Mod: ,,, | Performed by: FAMILY MEDICINE

## 2022-11-09 PROCEDURE — 99999 PR PBB SHADOW E&M-EST. PATIENT-LVL V: CPT | Mod: PBBFAC,,, | Performed by: FAMILY MEDICINE

## 2022-11-09 PROCEDURE — 73562 X-RAY EXAM OF KNEE 3: CPT | Mod: LT,S$GLB,, | Performed by: RADIOLOGY

## 2022-11-09 PROCEDURE — 73562 XR KNEE 3 VIEW LEFT: ICD-10-PCS | Mod: LT,S$GLB,, | Performed by: RADIOLOGY

## 2022-11-09 PROCEDURE — 99999 PR PBB SHADOW E&M-EST. PATIENT-LVL V: ICD-10-PCS | Mod: PBBFAC,,, | Performed by: FAMILY MEDICINE

## 2022-11-09 PROCEDURE — 99214 OFFICE O/P EST MOD 30 MIN: CPT | Mod: ,,, | Performed by: FAMILY MEDICINE

## 2022-11-09 NOTE — ASSESSMENT & PLAN NOTE
Suspect weight gain has played some contribution to her left knee and hip pain but she also has some muscle imbalance allowing her patella to deviate laterally.  Suspect some somatic dysfunction of the pelvis with some rotation causing her to have shorter left leg.  Will refer for OMT followed by physical therapy and recommended intermittent ibuprofen if needed.

## 2022-11-09 NOTE — PROGRESS NOTES
Subjective:       Patient ID: Marlyn Camacho is a 42 y.o. female.    Chief Complaint: Knee Pain    Pt presents with left sided knee and hip pain has been going on for 4-5 months  No trauma to area, woke up one day with pain     7/10 pain - hip (continuous) -  knee hard to get up or put weight on it, aching  Nothing makes hip better, she has to flex knee and stretch it out before bear weight   Has been just pushing though pain, hasn't stopped daily activities     Has tried stretching and exercise (walk, stairs), sleeps with heating blanket, sleeps on right side , leaning to one side helps her feel radu  Hasn't tried any medicines  First time this is happening, new problem     Sometimes it radiates all the way down to her left foot, shocking pins and needles pain, comes and go   Review of Systems   Musculoskeletal:  Positive for back pain (hip and knee pain on left side).   Neurological:  Positive for numbness (pins and needles pinching feeling).     Objective:      Physical Exam   Constitutional: She is oriented to person, place, and time.   HENT:   Head: Normocephalic.   Cardiovascular: Normal rate and regular rhythm.   Pulmonary/Chest: Effort normal and breath sounds normal.   Musculoskeletal:      Comments: With extension she had some lateral deviation of the patella.  Some tenderness to palpation of the medial aspect of her left knee joint.  Whenever standing she felt like she had to we will her leg a little bit before she was start walking.  Also has some discomfort in the SI joint area with internal and external rotation of her hip.  Noted possible 1 cm different in leg length with the left being shorter.   Neurological: She is alert and oriented to person, place, and time.   Psychiatric: Her behavior is normal. Mood, judgment and thought content normal.     Assessment:       Problem List Items Addressed This Visit          Cardiac/Vascular    Essential hypertension    Current Assessment & Plan      Blood pressure well controlled.  Continue on same medications              Orthopedic    Chronic pain of left knee - Primary    Current Assessment & Plan     Suspect weight gain has played some contribution to her left knee and hip pain but she also has some muscle imbalance allowing her patella to deviate laterally.  Suspect some somatic dysfunction of the pelvis with some rotation causing her to have shorter left leg.  Will refer for OMT followed by physical therapy and recommended intermittent ibuprofen if needed.         Relevant Orders    X-Ray Knee 3 View Left    Ambulatory referral/consult to Physical/Occupational Therapy    Ambulatory referral/consult to Sports Medicine

## 2022-11-10 ENCOUNTER — OFFICE VISIT (OUTPATIENT)
Dept: SPORTS MEDICINE | Facility: CLINIC | Age: 42
End: 2022-11-10
Payer: COMMERCIAL

## 2022-11-10 ENCOUNTER — HOSPITAL ENCOUNTER (OUTPATIENT)
Dept: RADIOLOGY | Facility: HOSPITAL | Age: 42
Discharge: HOME | End: 2022-11-10
Attending: ORTHOPAEDIC SURGERY
Payer: COMMERCIAL

## 2022-11-10 VITALS
DIASTOLIC BLOOD PRESSURE: 75 MMHG | HEIGHT: 61 IN | BODY MASS INDEX: 38.33 KG/M2 | SYSTOLIC BLOOD PRESSURE: 108 MMHG | WEIGHT: 203 LBS

## 2022-11-10 DIAGNOSIS — M25.551 BILATERAL HIP PAIN: ICD-10-CM

## 2022-11-10 DIAGNOSIS — M25.561 ACUTE PAIN OF BOTH KNEES: Primary | ICD-10-CM

## 2022-11-10 DIAGNOSIS — M25.552 LEFT HIP PAIN: ICD-10-CM

## 2022-11-10 DIAGNOSIS — M25.562 PAIN IN BOTH KNEES, UNSPECIFIED CHRONICITY: ICD-10-CM

## 2022-11-10 DIAGNOSIS — M25.552 BILATERAL HIP PAIN: ICD-10-CM

## 2022-11-10 DIAGNOSIS — M25.562 CHRONIC PAIN OF LEFT KNEE: ICD-10-CM

## 2022-11-10 DIAGNOSIS — G89.29 CHRONIC PAIN OF LEFT KNEE: ICD-10-CM

## 2022-11-10 DIAGNOSIS — M25.551 BILATERAL HIP PAIN: Primary | ICD-10-CM

## 2022-11-10 DIAGNOSIS — M25.362 PATELLAR INSTABILITY OF BOTH KNEES: ICD-10-CM

## 2022-11-10 DIAGNOSIS — M25.561 PAIN IN BOTH KNEES, UNSPECIFIED CHRONICITY: ICD-10-CM

## 2022-11-10 DIAGNOSIS — M25.361 PATELLAR INSTABILITY OF BOTH KNEES: ICD-10-CM

## 2022-11-10 DIAGNOSIS — M25.562 ACUTE PAIN OF BOTH KNEES: Primary | ICD-10-CM

## 2022-11-10 DIAGNOSIS — M25.552 BILATERAL HIP PAIN: Primary | ICD-10-CM

## 2022-11-10 PROCEDURE — 77073 BONE LENGTH STUDIES: CPT | Mod: 26,,, | Performed by: RADIOLOGY

## 2022-11-10 PROCEDURE — 73564 X-RAY EXAM KNEE 4 OR MORE: CPT | Mod: TC,50,59

## 2022-11-10 PROCEDURE — 73564 X-RAY EXAM KNEE 4 OR MORE: CPT | Mod: 26,59,RT, | Performed by: RADIOLOGY

## 2022-11-10 PROCEDURE — 73502 XR HIP WITH PELVIS WHEN PERFORMED, 2 OR 3 VIEWS LEFT: ICD-10-PCS | Mod: 26,59,LT, | Performed by: RADIOLOGY

## 2022-11-10 PROCEDURE — 99999 PR PBB SHADOW E&M-EST. PATIENT-LVL IV: CPT | Mod: PBBFAC,,, | Performed by: ORTHOPAEDIC SURGERY

## 2022-11-10 PROCEDURE — 77073 XR HIP TO ANKLE: ICD-10-PCS | Mod: 26,,, | Performed by: RADIOLOGY

## 2022-11-10 PROCEDURE — 73564 X-RAY EXAM KNEE 4 OR MORE: CPT | Mod: 26,59,LT, | Performed by: RADIOLOGY

## 2022-11-10 PROCEDURE — 99204 OFFICE O/P NEW MOD 45 MIN: CPT | Mod: S$GLB,,, | Performed by: ORTHOPAEDIC SURGERY

## 2022-11-10 PROCEDURE — 77073 BONE LENGTH STUDIES: CPT | Mod: TC

## 2022-11-10 PROCEDURE — 99204 PR OFFICE/OUTPT VISIT, NEW, LEVL IV, 45-59 MIN: ICD-10-PCS | Mod: S$GLB,,, | Performed by: ORTHOPAEDIC SURGERY

## 2022-11-10 PROCEDURE — 73502 X-RAY EXAM HIP UNI 2-3 VIEWS: CPT | Mod: 26,59,LT, | Performed by: RADIOLOGY

## 2022-11-10 PROCEDURE — 73564 PR  X-RAY KNEE 4+ VIEW: ICD-10-PCS | Mod: 26,59,LT, | Performed by: RADIOLOGY

## 2022-11-10 PROCEDURE — 73502 X-RAY EXAM HIP UNI 2-3 VIEWS: CPT | Mod: TC,LT

## 2022-11-10 PROCEDURE — 99999 PR PBB SHADOW E&M-EST. PATIENT-LVL IV: ICD-10-PCS | Mod: PBBFAC,,, | Performed by: ORTHOPAEDIC SURGERY

## 2022-11-10 NOTE — PROGRESS NOTES
Subjective:          Chief Complaint: Marlyn Camacho is a 42 y.o. female who had concerns including Pain of the Left Knee and Pain of the Right Knee.    HPI    Patient presents to clinic with left hip and knee pain x 5 months. Patient states she has an extensive history with injuries to her knees. 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  She states these occur approximately every 7 months and are usually more frequent when she wears heels or runs. She states this recent pain started in her left hip mostly localized along the posterior aspect. The pain in her knees she localizes along the medial inferior aspects of the patella.  Feels the pain is worse than the left, but the instability is worse in her right.  She denies any recent KIM or traumatic event. She rates the pain as 7/10, made worse with ambulation.  She has attempted multiple conservative measures that include activity modification, ice & elevation, and anti-inflammatories with little relief.  She denies any mechanical symptoms to include locking and catching, but does admit to some subjective instability.  Is affecting ADLs and limiting desired level of activity.  Denies any inability to bear weight.  She is here today to discuss treatment options.    No previous surgeries on bilateral knee        Review of Systems   Constitutional: Negative.   HENT: Negative.     Eyes: Negative.    Cardiovascular: Negative.    Respiratory: Negative.     Endocrine: Negative.    Hematologic/Lymphatic: Negative.    Skin: Negative.    Musculoskeletal:  Positive for joint pain and muscle weakness. Negative for arthritis, falls, joint swelling and stiffness.   Neurological: Negative.    Psychiatric/Behavioral: Negative.     Allergic/Immunologic: Negative.      Pain Related Questions  Over the past 3 days, what was your average pain during activity? (I.e. running, jogging, walking, climbing stairs, getting dressed, ect.):  7  Over the past 3 days, what was your highest pain level?: 9  Over the past 3 days, what was your lowest pain level? : 7    Other  How many nights a week are you awakened by your affected body part?: 3  Was the patient's HEIGHT measured or patient reported?: Patient Reported  Was the patient's WEIGHT measured or patient reported?: Measured      Objective:        General: Marlyn is well-developed, well-nourished, appears stated age, in no acute distress, alert and oriented to time, place and person.     General    Nursing note and vitals reviewed.  Constitutional: She is oriented to person, place, and time. She appears well-developed and well-nourished. No distress.   HENT:   Head: Normocephalic and atraumatic.   Nose: Nose normal.   Eyes: EOM are normal.   Cardiovascular:  Intact distal pulses.            Pulmonary/Chest: Effort normal. No respiratory distress.   Neurological: She is alert and oriented to person, place, and time.   Psychiatric: She has a normal mood and affect. Her behavior is normal. Judgment and thought content normal.           Right Knee Exam     Inspection   Alignment:  normal  Erythema: absent  Scars: absent  Swelling: absent  Effusion: absent  Deformity: absent  Bruising: absent    Tenderness   The patient is experiencing no tenderness.     Range of Motion   Extension:  -5   Flexion:  140     Tests   Meniscus   Eagle:  Medial - negative Lateral - negative  Ligament Examination   Lachman: normal (-1 to 2mm)   PCL-Posterior Drawer: normal (0 to 2mm)     MCL - Valgus: normal (0 to 2mm)  LCL - Varus: normal  Patella   Patellar apprehension: positive  Patellar Tracking: abnormal  Patellar Grind: negative    Other   Muscle Tightness: hamstring tightness  Sensation: normal    Left Knee Exam     Inspection   Alignment:  normal  Erythema: absent  Scars: absent  Swelling: absent  Effusion: absent  Deformity: absent  Bruising: absent    Tenderness   The patient tender to palpation of the medial  joint line and lateral joint line.    Range of Motion   Extension:  -5   Flexion:  130     Tests   Meniscus   Eagle:  Medial - positive   Stability   Lachman: normal (-1 to 2mm)   PCL-Posterior Drawer: normal (0 to 2mm)  MCL - Valgus: normal (0 to 2mm)  LCL - Varus: normal (0 to 2mm)  Patella   Patellar apprehension: positive  Patellar Tracking: abnormal  Patellar Grind: negative    Other   Muscle Tightness: hamstring tightness  Sensation: normal    Right Hip Exam     Inspection   Scars: absent  Swelling: absent  Bruising: absent  No deformity of hip.  Quadriceps Atrophy:  Negative  Erythema: absent    Range of Motion   Abduction:  45   Adduction:  30   Extension:  20   Flexion:  120   External rotation:  80   Internal rotation:  30     Tests   Pain w/ forced internal rotation (TAJ): absent  Pain w/ forced external rotation (FADIR): absent  Ashlee: negative  Trendelenburg Test: negative  Circumduction test: negative  Stinchfield test: negative  Log Roll: negative    Other   Sensation: normal  Left Hip Exam     Inspection   Scars: absent  Swelling: absent  No deformity of hip.  Quadriceps Atrophy:  negative  Erythema: absent  Bruising: absent    Crepitus   The patient has crepitus of the trochanteric bursa.    Range of Motion   Abduction:  45   Adduction:  30   Extension:  20   Flexion:  120   External rotation:  80   Internal rotation: 30     Tests   Pain w/ forced internal rotation (TAJ): present  Pain w/ forced external rotation (FADIR): present  Ashlee: positive  Trendelenburg Test: negative  Circumduction test: positive  Stinchfield test: positive  Log Roll: negative    Other   Sensation: normal          Muscle Strength   Right Lower Extremity   Hip Abduction: 5/5   Hip Adduction: 5/5   Hip Flexion: 5/5   Quadriceps:  5/5   Hamstrin/5   Ankle Dorsiflexion:  5/5   Left Lower Extremity   Hip Abduction: 4/5   Hip Adduction: 4/5   Hip Flexion: 4/5   Quadriceps:  5/5   Hamstrin/5   Ankle Dorsiflexion:   5/5     Vascular Exam     Right Pulses  Dorsalis Pedis:      2+  Posterior Tibial:      2+        Left Pulses  Dorsalis Pedis:      2+  Posterior Tibial:      2+        Capillary Refill  Right Hand: normal capillary refill        Edema  Right Upper Leg: absent  Left Upper Leg: absent    Radiographs bilateral knee     My interpretation:    Mild joint space narrowing in the medial compartments noted bilaterally    No other signs of fracture, or any other bony abnormalities      Radiographs left hip     My interpretation:    No signs of fracture, contusion, swelling, DJD, or any other bony abnormalities noted.            Assessment:       Encounter Diagnoses   Name Primary?    Chronic pain of left knee     Acute pain of both knees Yes    Left hip pain     Patellar instability of both knees           Plan:         1. I made the decision to order new imaging of the extremity or extremities evaluated. I independently reviewed and interpreted the radiographs and/or MRIs today. These images were shown to the patient where I then discussed my findings in detail.    2. We discussed at length different treatment options including conservative vs surgical management. These include anti-inflammatories, acetaminophen, rest, ice, heat, formal physical therapy including strengthening and stretching exercises, home exercise programs, dry needling, and finally surgical intervention.  We discussed multiple treatment options involving both knees and hips.  I feel that the primary source of her symptoms is coming from the knees due to the past injuries as well as frequent patellar subluxations.  I recommended formal physical therapy at this time, for which she agreed to.  We also discussed a possible J brace for her right knee, for which she declined at this time.    3. Ambulatory referral for formal physical therapy at [facility]  with focus on Patellofemoral and Quad strengthening    4. Ice compress to the affected area 2-3x a  day for 15-20 minutes as needed for pain management.  Ibuprofen p.r.n. pain    5. RTC to see Vazquez Vela PA-C in 7 weeks for follow-up.  Will reassess knee pain and determine if an MRI is warranted at that time.      All of the patient's questions were answered. Patient was advised to call the clinic or contact me through the patient portal for any questions or concerns.       Medical Dictation software was used during the dictation of portions or the entirety of this medical record.  Phonetic or grammatic errors may exist due to the use of this software. For clarification, refer to the author of the document.                    Patient questionnaires may have been collected.

## 2022-11-21 ENCOUNTER — CLINICAL SUPPORT (OUTPATIENT)
Dept: REHABILITATION | Facility: HOSPITAL | Age: 42
End: 2022-11-21
Payer: COMMERCIAL

## 2022-11-21 DIAGNOSIS — M25.361 PATELLAR INSTABILITY OF BOTH KNEES: ICD-10-CM

## 2022-11-21 DIAGNOSIS — M25.562 ACUTE PAIN OF BOTH KNEES: ICD-10-CM

## 2022-11-21 DIAGNOSIS — M25.561 ACUTE PAIN OF BOTH KNEES: ICD-10-CM

## 2022-11-21 DIAGNOSIS — M25.362 PATELLAR INSTABILITY OF BOTH KNEES: ICD-10-CM

## 2022-11-21 DIAGNOSIS — M25.552 LEFT HIP PAIN: ICD-10-CM

## 2022-11-21 PROCEDURE — 97162 PT EVAL MOD COMPLEX 30 MIN: CPT | Mod: PN

## 2022-11-21 PROCEDURE — 97110 THERAPEUTIC EXERCISES: CPT | Mod: PN

## 2022-11-21 PROCEDURE — 97530 THERAPEUTIC ACTIVITIES: CPT | Mod: PN

## 2022-11-21 NOTE — PLAN OF CARE
"[facility]   Physical Therapy Initial Evaluation     Date: 11/21/2022   Name: Marlyn Camacho  Clinic Number: [id number]    Therapy Diagnosis:   Encounter Diagnoses   Name Primary?    Acute pain of both knees     Left hip pain     Patellar instability of both knees      Physician: Sunny Vela*    Physician Orders: PT Eval and Treat  Medical Diagnosis from Referral:   M25.561,M25.562 (ICD-10-CM) - Acute pain of both knees   M25.552 (ICD-10-CM) - Left hip pain   M25.361,M25.362 (ICD-10-CM) - Patellar instability of both knees   Evaluation Date: 11/21/2022  Authorization Period Expiration: 12/31/2022  Plan of Care Expiration: 1/30/2023  Visit # / Visits authorized: 1/ 1   FOTO #2: 1 / 5   FOTO: #3: 0 / 5    Precautions: Standard    Time In: 0900  Time Out: 1000  Total Appointment Time (timed & untimed codes): 60 minutes    SUBJECTIVE   Date of onset: 5+ months ago    History of current condition - Marlyn reports: chronic bilateral knee pain Multiple patellar subluxations that have occurred since adolescence.  She states these occur approximately every 7 to 12 months and are usually more frequent when she wears heels or runs, but can be spontaneous with walking on level surface. Frequent complaints of instability (right > left) but pain is worse on left as compared to right. States that she can't sleep on her left side side due to hip pain. Left hip pain started before left knee pain. Numbness and tingling in Left leg from knee on down that is worse with sitting. Retropatellar mechanical symptoms. "Knee goes in" when giving way. Pain is circumferential around the knee - sharp on right and pressure on left; radiating symptoms from left hip to knee.    Falls: None.    Imaging, x-rays: "No acute fracture or dislocation. Normal alignment. Minimal bilateral medial tibiofemoral joint space narrowing. Remaining joint spaces are maintained. Soft tissues are otherwise " "unremarkable."  Prior Therapy: None  Social History: 13 steps; lives with their family  Occupation: Nurse  Prior Level of Function: mild pain and difficulties with work and activities of daily living.  Current Level of Function: moderate to severe pain and difficulties with work and activities of daily living.    Pain:  Current 5/10, worst 10/10, best 3/10   Location: bilateral knees and left hip  Description: Sharp and pressure.  Aggravating Factors: Sitting, Standing, Bending, Walking, Lifting, and transfers  Easing Factors: rest    Patients goals: "To be able to work and walk without knee giving out."     Medical History:   Past Medical History:   Diagnosis Date    Abnormal Pap smear of cervix     Allergy     Hypertension     Joint pain     Keloid cicatrix      Surgical History:   Marlyn Camacho  has a past surgical history that includes Tubal ligation; Colposcopy; CKC, ECC, fractional D&C; CONIZATION-CERVIX (02/2015); Dilation and curettage of uterus; and Hysterectomy (03/2017).    Medications:   Marlyn has a current medication list which includes the following prescription(s): baclofen, epinephrine, hydrochlorothiazide, lidocaine, loratadine, and sulindac.    Allergies:   Review of patient's allergies indicates:  No Known Allergies     OBJECTIVE     Observations:  Cervical: forward head and rounded shoulders  Thoracic: increased upper thoracic kyphosis  Lumbar: excessive lordosis  Standing: bilateral knee hyperextension (left > right)  Pelvis: no asymmetry or leg length discrepancy    Gait:  Increased trunk rotation and pelvic superior / inferior motion  Left lateral hip pain on initial contact    AROM:  Knee   Action Left Right   Flexion 120 degrees 120 degrees   Extension +7 degrees +5 degrees     PROM:  Lower Extremity   Action Left Right   Hip Flexion 95 degrees 110 degrees   Hip Extension 10 degrees 14 degrees   Hip External Rotation 40 degrees 45 degrees   Knee Extension +7 degrees +5 degrees "   Knee Flexion 123 degrees 125 degrees     Manual Muscle Testing:  Lower Extremity   Action Left Right   Hip Flexion 4+/5 4/5   Hip Extension 4-/5 4-/5   Hip Abduction 10.7 kg 11.6 kg   Hip External Rotation 4/5 4/5   Knee Extension 10.9 kg 21.3 kg   Knee Flexion 10.2 kg 9.3 kg     Special Tests:  Positive (+) Tests = right and left valgus stress test at 20 degrees, left patellar crepitus (worst at ~30 degrees), left resisted hip adduction, Ely's test (left > right)  Negative (-) Tests = varus testing, Lachman, Eagle (bilateral), Hyperflexion (bilateral), joint line tenderness    Balance:  Single Limb Stance:  Left / Eyes Open / Firm = 8 seconds; left trunk lean and reproduction of hip / knee symptoms  Right / Eyes Open / Firm = 15 seconds    Functional Movement & Mechanics:  Pain with left sidelying  No myotomal or dermatomal patterns present    Palpation:  Hypersensitive left tibial nerve (adverse neural tension due to hyperextension)  Hypermobility of bilateral patellae  Reproduction of symptoms with palpation of left greater trochanter and glute med / min muscle bellies.  Left hip slightly stiffer as compared to right      Limitation/Restriction for FOTO Knee Survey    Therapist reviewed FOTO scores for Marlyn Camacho on 11/21/2022.   FOTO documents entered into [employer] - see Media section.    Limitation Score: 46% > 29% (12 visits)       TREATMENT     Total Treatment time (time-based codes) separate from Evaluation: 18 minutes      Marlyn received the treatments listed below:      Therapeutic Exercises to develop strength, endurance, posture, and core stabilization for 8 minutes including:  Sidelying Clamshell - green theraband; 45 second holds x5  Wall Squats - green theraband; 45 second holds x5    Therapeutic Activities to improve functional performance for 10 minutes, including:  Education (see below)  Questions and answers    PATIENT EDUCATION AND HOME EXERCISES     Education provided:    Anatomy and Pathology.  Symptom management and plan of care progressions.  Home Exercise Program.    Written Home Exercises Provided: yes. Exercises were reviewed and Marlyn was able to demonstrate them prior to the end of the session.  Marlyn demonstrated good  understanding of the education provided. See EMR under Patient Instructions for exercises provided during therapy sessions.    ASSESSMENT     Marlyn is a 42 y.o. female referred to outpatient Physical Therapy with a medical diagnosis of Acute pain of both knees, Left hip pain, and patellar instability of both knees. Patient presents with excessive range of motion, decreased strength, gait deviation, postural dysfunction, joint hypermobility, and adverse neural tension. Signs and symptoms are consistent with bilateral knee pain and instability secondary to knee hyperextension, lower extremity weakness, and left gluteal tendinopathy. She will benefit from progressive physical therapy addressing her lower extremity strength, lower extremity proprioceptive retraining, and normalization of functional movement patterns.    Patient prognosis is Good.   Patientt will benefit from skilled outpatient Physical Therapy to address the deficits stated above and in the chart below, provide patient /family education, and to maximize patientt's level of independence.     Plan of care discussed with patient: Yes  Patient's spiritual, cultural and educational needs considered and patient is agreeable to the plan of care and goals as stated below:     Anticipated Barriers for therapy: work and chronicity of symptom    Medical Necessity is demonstrated by the following  History  Co-morbidities and personal factors that may impact the plan of care Co-morbidities:   high BMI and HTN    Personal Factors:   Work Duties (LPN)     moderate   Examination  Body Structures and Functions, activity limitations and participation restrictions that may impact the plan of care Body  Regions:   lower extremities    Body Systems:    gross symmetry  ROM  strength  balance  gait  transfers  motor control  Posture and peripheral nervous system    Participation Restrictions:   None    Activity limitations:   Learning and applying knowledge  no deficits    General Tasks and Commands  no deficits    Communication  no deficits    Mobility  lifting and carrying objects  walking  driving (bike, car, motorcycle)  Transfers, pushing / pulling    Self care  looking after one's health    Domestic Life  cooking  doing house work (cleaning house, washing dishes, laundry)    Interactions/Relationships  no deficits    Life Areas  employment    Community and Social Life  community life  recreation and leisure         high   Clinical Presentation evolving clinical presentation with changing clinical characteristics moderate   Decision Making/ Complexity Score: moderate     Goals:  Short Term Goals: 3-4 weeks   Patient will have reduced pain complaints from 10/10 to less than or equal to 6/10. (Not Met - Progressing)  Patient will demonstrate increased muscle strength of at least one-half grade or 3.0 kg as compared to the initial measurements. (Not Met - Progressing)    Long Term Goals: 6-8 weeks   Patient will have reduced pain complaints from 6/10 to less than or equal to 2/10. (Not Met - Progressing)  Patient will demonstrate Left single leg stance time of 20 seconds with no knee hyperextension or left lateral hip pain. (Not Met - Progressing)  Patient will demonstrate increased muscle strength of at least one grade or 5.0 kg as compared to the initial measurements. (Not Met - Progressing)  Patient will improve Knee FOTO limitation score from 46% to less than or equal to 29%. (Not Met - Progressing)  Patient will be independent with their home exercise program. (Not Met - Progressing)    PLAN   Plan of care Certification: 11/21/2022 to 1/30/2023.    Outpatient Physical Therapy 1 to 2 times weekly for 8 weeks to  include the following interventions: Electrical Stimulation with dry needling, Gait Training, Manual Therapy, Neuromuscular Re-ed, Patient Education, Therapeutic Activities, Therapeutic Exercise, and dry needling and taping.     Chun Dao, PT, DPT, OCS      I CERTIFY THE NEED FOR THESE SERVICES FURNISHED UNDER THIS PLAN OF TREATMENT AND WHILE UNDER MY CARE   Physician's comments:     Physician's Signature: ___________________________________________________

## 2022-12-06 ENCOUNTER — TELEPHONE (OUTPATIENT)
Dept: REHABILITATION | Facility: HOSPITAL | Age: 42
End: 2022-12-06
Payer: COMMERCIAL

## 2022-12-07 NOTE — TELEPHONE ENCOUNTER
Called regarding today's No Show, left a message on her voicemail reminding her that her next appointment is on 12/8/22 @ 5:00.

## 2022-12-08 ENCOUNTER — TELEPHONE (OUTPATIENT)
Dept: REHABILITATION | Facility: HOSPITAL | Age: 42
End: 2022-12-08
Payer: COMMERCIAL

## 2022-12-09 NOTE — TELEPHONE ENCOUNTER
Called regarding today's No Show, spoke with patient and she stated that she had informed the PAR on the day of her evaluation that she can only come to therapy at 3:30 or 4:00 as she is working 2 jobs.  I told the patient that I would relay the request for changes of times to the PARs and that they would call her with her new schedule.

## 2022-12-10 ENCOUNTER — HOSPITAL ENCOUNTER (EMERGENCY)
Facility: HOSPITAL | Age: 42
Discharge: HOME | End: 2022-12-10
Attending: STUDENT IN AN ORGANIZED HEALTH CARE EDUCATION/TRAINING PROGRAM
Payer: COMMERCIAL

## 2022-12-10 VITALS
BODY MASS INDEX: 38.36 KG/M2 | DIASTOLIC BLOOD PRESSURE: 98 MMHG | HEART RATE: 85 BPM | OXYGEN SATURATION: 98 % | TEMPERATURE: 98 F | RESPIRATION RATE: 16 BRPM | SYSTOLIC BLOOD PRESSURE: 143 MMHG | HEIGHT: 61 IN

## 2022-12-10 DIAGNOSIS — M25.462 PAIN AND SWELLING OF KNEE, LEFT: ICD-10-CM

## 2022-12-10 DIAGNOSIS — M25.569 KNEE PAIN: ICD-10-CM

## 2022-12-10 DIAGNOSIS — M25.562 POSTERIOR KNEE PAIN, LEFT: Primary | ICD-10-CM

## 2022-12-10 DIAGNOSIS — M25.562 PAIN AND SWELLING OF KNEE, LEFT: ICD-10-CM

## 2022-12-10 LAB
ANION GAP SERPL CALC-SCNC: 10 MMOL/L (ref 8–16)
BASOPHILS # BLD AUTO: 0.06 K/UL (ref 0–0.2)
BASOPHILS NFR BLD: 0.7 % (ref 0–1.9)
BUN SERPL-MCNC: 9 MG/DL (ref 6–20)
CALCIUM SERPL-MCNC: 9.8 MG/DL (ref 8.7–10.5)
CHLORIDE SERPL-SCNC: 101 MMOL/L (ref 95–110)
CO2 SERPL-SCNC: 26 MMOL/L (ref 23–29)
CREAT SERPL-MCNC: 0.9 MG/DL (ref 0.5–1.4)
CRP SERPL-MCNC: 0.8 MG/L (ref 0–8.2)
DIFFERENTIAL METHOD: ABNORMAL
EOSINOPHIL # BLD AUTO: 0.1 K/UL (ref 0–0.5)
EOSINOPHIL NFR BLD: 0.9 % (ref 0–8)
ERYTHROCYTE [DISTWIDTH] IN BLOOD BY AUTOMATED COUNT: 13 % (ref 11.5–14.5)
ERYTHROCYTE [SEDIMENTATION RATE] IN BLOOD BY PHOTOMETRIC METHOD: 29 MM/HR (ref 0–36)
EST. GFR  (NO RACE VARIABLE): >60 ML/MIN/1.73 M^2
GLUCOSE SERPL-MCNC: 82 MG/DL (ref 70–110)
HCT VFR BLD AUTO: 40.8 % (ref 37–48.5)
HGB BLD-MCNC: 13.8 G/DL (ref 12–16)
IMM GRANULOCYTES # BLD AUTO: 0.04 K/UL (ref 0–0.04)
IMM GRANULOCYTES NFR BLD AUTO: 0.5 % (ref 0–0.5)
LYMPHOCYTES # BLD AUTO: 3.6 K/UL (ref 1–4.8)
LYMPHOCYTES NFR BLD: 41.8 % (ref 18–48)
MCH RBC QN AUTO: 31.3 PG (ref 27–31)
MCHC RBC AUTO-ENTMCNC: 33.8 G/DL (ref 32–36)
MCV RBC AUTO: 93 FL (ref 82–98)
MONOCYTES # BLD AUTO: 0.6 K/UL (ref 0.3–1)
MONOCYTES NFR BLD: 7.3 % (ref 4–15)
NEUTROPHILS # BLD AUTO: 4.2 K/UL (ref 1.8–7.7)
NEUTROPHILS NFR BLD: 48.8 % (ref 38–73)
NRBC BLD-RTO: 0 /100 WBC
PLATELET # BLD AUTO: 348 K/UL (ref 150–450)
PMV BLD AUTO: 9 FL (ref 9.2–12.9)
POTASSIUM SERPL-SCNC: 3.3 MMOL/L (ref 3.5–5.1)
RBC # BLD AUTO: 4.41 M/UL (ref 4–5.4)
SODIUM SERPL-SCNC: 137 MMOL/L (ref 136–145)
URATE SERPL-MCNC: 5.2 MG/DL (ref 2.4–5.7)
WBC # BLD AUTO: 8.55 K/UL (ref 3.9–12.7)

## 2022-12-10 PROCEDURE — 63600175 PHARM REV CODE 636 W HCPCS: Performed by: PHYSICIAN ASSISTANT

## 2022-12-10 PROCEDURE — 85025 COMPLETE CBC W/AUTO DIFF WBC: CPT | Performed by: PHYSICIAN ASSISTANT

## 2022-12-10 PROCEDURE — 99285 EMERGENCY DEPT VISIT HI MDM: CPT | Mod: 25

## 2022-12-10 PROCEDURE — 99284 EMERGENCY DEPT VISIT MOD MDM: CPT | Mod: ,,, | Performed by: PHYSICIAN ASSISTANT

## 2022-12-10 PROCEDURE — 80048 BASIC METABOLIC PNL TOTAL CA: CPT | Performed by: PHYSICIAN ASSISTANT

## 2022-12-10 PROCEDURE — 86140 C-REACTIVE PROTEIN: CPT | Performed by: PHYSICIAN ASSISTANT

## 2022-12-10 PROCEDURE — 85652 RBC SED RATE AUTOMATED: CPT | Performed by: PHYSICIAN ASSISTANT

## 2022-12-10 PROCEDURE — 96372 THER/PROPH/DIAG INJ SC/IM: CPT | Mod: 59 | Performed by: PHYSICIAN ASSISTANT

## 2022-12-10 PROCEDURE — 29505 APPLICATION LONG LEG SPLINT: CPT | Mod: LT

## 2022-12-10 PROCEDURE — 84550 ASSAY OF BLOOD/URIC ACID: CPT | Performed by: PHYSICIAN ASSISTANT

## 2022-12-10 PROCEDURE — 99284 PR EMERGENCY DEPT VISIT,LEVEL IV: ICD-10-PCS | Mod: ,,, | Performed by: PHYSICIAN ASSISTANT

## 2022-12-10 RX ORDER — NABUMETONE 500 MG/1
500 TABLET, FILM COATED ORAL 2 TIMES DAILY PRN
Qty: 14 TABLET | Refills: 0 | Status: SHIPPED | OUTPATIENT
Start: 2022-12-10 | End: 2022-12-17

## 2022-12-10 RX ORDER — KETOROLAC TROMETHAMINE 30 MG/ML
15 INJECTION, SOLUTION INTRAMUSCULAR; INTRAVENOUS
Status: COMPLETED | OUTPATIENT
Start: 2022-12-10 | End: 2022-12-10

## 2022-12-10 RX ADMIN — KETOROLAC TROMETHAMINE 15 MG: 30 INJECTION, SOLUTION INTRAMUSCULAR; INTRAVENOUS at 08:12

## 2022-12-11 NOTE — ED PROVIDER NOTES
Encounter Date: 12/10/2022       History     Chief Complaint   Patient presents with    Knee Pain     States she has fluid built up behind her knee and has not been able to bend it for the last two days      The patient is a 42 year old female, who has a documented past medical history of HTN, obesity, and chronic low back pain. She reports that she has been suffering with atraumatic left hip and left knee pain for the past 5-6 months. She states that her left hip pain resolved weeks ago, but her left knee seems to be getting worse. She has been to her PCP for this and was referred to orthopedics. She had x rays completed, but is unsure of the results. She was sent to PT, but only made it to a single session due to her work schedule. She states that she works over night and that being on her feet for long hours exacerbates her pain. She states that yesterday morning after work she noticed increased pain and swelling to the back of her knee that is new. She states that she rested all day and elevated the leg, and that this afternoon she went to put on a lidocaine patch and was about to get ready for work, and she felt a sharp pain to the back of her knee again. She states that now she is unable to bend the knee or even stand at all due to the pain. She has not taken anything for the pain.      Review of patient's allergies indicates:  No Known Allergies  Past Medical History:   Diagnosis Date    Abnormal Pap smear of cervix     Allergy     Hypertension     Joint pain     Keloid cicatrix      Past Surgical History:   Procedure Laterality Date    CKC, ECC, fractional D&C      COLPOSCOPY      CONIZATION-CERVIX  02/2015    DILATION AND CURETTAGE OF UTERUS      HYSTERECTOMY  03/2017    AUB, fibroids    TUBAL LIGATION       Family History   Problem Relation Age of Onset    Eczema Mother     Ovarian cancer Mother     Hypertension Mother     Breast cancer Maternal Aunt         THREE AUNTS    Breast cancer Maternal Aunt      Breast cancer Maternal Aunt     Eczema Maternal Grandmother     Eczema Son     Eczema Son     Melanoma Neg Hx     Lupus Neg Hx     Psoriasis Neg Hx     Colon cancer Neg Hx      Social History     Tobacco Use    Smoking status: Never    Smokeless tobacco: Never   Substance Use Topics    Alcohol use: Yes     Comment: occasional    Drug use: No     Review of Systems   Constitutional:  Negative for chills and fever.   HENT:  Negative for congestion, rhinorrhea and sore throat.    Respiratory:  Negative for shortness of breath.    Cardiovascular:  Negative for chest pain and leg swelling.   Gastrointestinal:  Negative for abdominal pain, diarrhea, nausea and vomiting.   Genitourinary:  Negative for decreased urine volume and menstrual problem.   Musculoskeletal:  Positive for arthralgias, gait problem and joint swelling. Negative for back pain and neck pain.   Skin:  Negative for color change, rash and wound.   Allergic/Immunologic: Negative for immunocompromised state.   Neurological:  Negative for dizziness, syncope, weakness, light-headedness, numbness and headaches.   Psychiatric/Behavioral:  Negative for confusion. The patient is nervous/anxious.      Physical Exam     Initial Vitals   BP Pulse Resp Temp SpO2   12/10/22 1848 12/10/22 1848 12/10/22 1850 12/10/22 1850 12/10/22 1848   (!) 151/99 86 16 98.1 °F (36.7 °C) 98 %      MAP       --                Physical Exam    Nursing note and vitals reviewed.  Constitutional: She appears well-developed and well-nourished. She is not diaphoretic. She appears distressed.   Alert and interactive. Appears uncomfortable. Accompanied by family.    HENT:   Head: Normocephalic.   Eyes: Conjunctivae are normal.   Neck: Neck supple.   Cardiovascular:  Normal rate and intact distal pulses.           Pulmonary/Chest: No respiratory distress.   Abdominal: Abdomen is soft. There is no abdominal tenderness. There is no rebound and no guarding.   Musculoskeletal:      Cervical back: Neck  supple.      Comments: Left knee: No deformity. No anterior effusion noted. Not erythematous, ecchymotic, or warm to touch. Held in extension. Pain reported with flexion. No laxity on exam. Pain to palpation and mild swelling to left popliteal fossa. No calf tenderness. Compartments soft and compressible.      Neurological: She is alert and oriented to person, place, and time. She has normal strength. No sensory deficit.   Skin: Skin is warm and dry. No erythema.   Psychiatric: She has a normal mood and affect. Her behavior is normal.       ED Course   Orthopedic Injury    Date/Time: 12/10/2022 10:45 PM  Performed by: Philippe Galeano PA-C  Authorized by: Tam Ware DO     Location procedure was performed:  [facility] EMERGENCY DEPARTMENT  Injury:     Injury location:  Knee    Location details:  Left knee      Pre-procedure assessment:     Neurovascular status: Neurovascularly intact      Distal perfusion: normal      Neurological function: normal        Selections made in this section will also lock the Injury type section above.:     Immobilization:  Splint and crutches    Splint type: ACE wrap.    Supplies used:  Elastic bandage    Complications: No    Post-procedure assessment:     Neurovascular status: Neurovascularly intact      Distal perfusion: normal      Neurological function: normal      Patient tolerance:  Patient tolerated the procedure well with no immediate complications  Labs Reviewed   CBC W/ AUTO DIFFERENTIAL - Abnormal; Notable for the following components:       Result Value    MCH 31.3 (*)     MPV 9.0 (*)     All other components within normal limits   BASIC METABOLIC PANEL - Abnormal; Notable for the following components:    Potassium 3.3 (*)     All other components within normal limits   SEDIMENTATION RATE   C-REACTIVE PROTEIN   URIC ACID     Results for orders placed or performed during the hospital encounter of 12/10/22   CBC auto differential   Result Value Ref Range    WBC 8.55 3.90 -  12.70 K/uL    RBC 4.41 4.00 - 5.40 M/uL    Hemoglobin 13.8 12.0 - 16.0 g/dL    Hematocrit 40.8 37.0 - 48.5 %    MCV 93 82 - 98 fL    MCH 31.3 (H) 27.0 - 31.0 pg    MCHC 33.8 32.0 - 36.0 g/dL    RDW 13.0 11.5 - 14.5 %    Platelets 348 150 - 450 K/uL    MPV 9.0 (L) 9.2 - 12.9 fL    Immature Granulocytes 0.5 0.0 - 0.5 %    Gran # (ANC) 4.2 1.8 - 7.7 K/uL    Immature Grans (Abs) 0.04 0.00 - 0.04 K/uL    Lymph # 3.6 1.0 - 4.8 K/uL    Mono # 0.6 0.3 - 1.0 K/uL    Eos # 0.1 0.0 - 0.5 K/uL    Baso # 0.06 0.00 - 0.20 K/uL    nRBC 0 0 /100 WBC    Gran % 48.8 38.0 - 73.0 %    Lymph % 41.8 18.0 - 48.0 %    Mono % 7.3 4.0 - 15.0 %    Eosinophil % 0.9 0.0 - 8.0 %    Basophil % 0.7 0.0 - 1.9 %    Differential Method Automated    Basic metabolic panel   Result Value Ref Range    Sodium 137 136 - 145 mmol/L    Potassium 3.3 (L) 3.5 - 5.1 mmol/L    Chloride 101 95 - 110 mmol/L    CO2 26 23 - 29 mmol/L    Glucose 82 70 - 110 mg/dL    BUN 9 6 - 20 mg/dL    Creatinine 0.9 0.5 - 1.4 mg/dL    Calcium 9.8 8.7 - 10.5 mg/dL    Anion Gap 10 8 - 16 mmol/L    eGFR >60.0 >60 mL/min/1.73 m^2   Sedimentation rate   Result Value Ref Range    Sed Rate 29 0 - 36 mm/Hr   C-reactive protein   Result Value Ref Range    CRP 0.8 0.0 - 8.2 mg/L   Uric acid   Result Value Ref Range    Uric Acid 5.2 2.4 - 5.7 mg/dL            Imaging Results              US Lower Extremity Veins Left (Final result)  Result time 12/10/22 22:16:39      Final result by Joao Rodriges MD (12/10/22 22:16:39)                   Impression:      No evidence of deep venous thrombosis in the left lower extremity.    Electronically signed by resident: Stevie Cox  Date:    12/10/2022  Time:    22:01    Electronically signed by: Joao Rodriges MD  Date:    12/10/2022  Time:    22:16               Narrative:    EXAMINATION:  US LOWER EXTREMITY VEINS LEFT    CLINICAL HISTORY:  Pain in left knee.    TECHNIQUE:  Duplex and color flow Doppler evaluation and graded compression of the  left lower extremity veins was performed.    COMPARISON:  Ultrasound 10/04/2016.    FINDINGS:  Left thigh veins: The common femoral, femoral, popliteal, upper greater saphenous, and deep femoral veins are patent and free of thrombus. The veins are normally compressible and have normal phasic flow and augmentation response.    Left calf veins: The visualized calf veins are patent.    Contralateral CFV: The contralateral (right) common femoral vein is patent and free of thrombus.    Miscellaneous: None.                                       X-Ray Knee 3 View Left (Final result)  Result time 12/10/22 21:28:48      Final result by Amilcar Chinchilla MD (12/10/22 21:28:48)                   Impression:      No acute process.      Electronically signed by: Amilcar Chinchilla MD  Date:    12/10/2022  Time:    21:28               Narrative:    EXAMINATION:  XR KNEE 3 VIEW LEFT    CLINICAL HISTORY:  Pain in unspecified knee    TECHNIQUE:  AP, lateral, and Merchant views of the left knee were performed.    COMPARISON:  11/10/2022.    FINDINGS:  The bone mineralization is within normal limits.  There is no cortical step-off.  There is no evidence of periostitis.    The joint spaces are maintained.  The soft tissues are unremarkable.  No radiopaque foreign body is identified.    There is no evidence of a fracture or dislocation.                                       Medications   ketorolac injection 15 mg (15 mg Intramuscular Given 12/10/22 2035)     Medical Decision Making:   History:   Old Medical Records: I decided to obtain old medical records.  Initial Assessment:   43 yo female, reports left knee pain x 5-6 months, worse over the past 2 days with acute increased pain and swelling to the back of her knee, and now with decreased ROM, unable to bend knee due to the pain.   Differential Diagnosis:   Arthritis, Baker's cyst, joint effusion, DVT, meniscus injury, septic joint, gout, ligament injury, etc   Clinical Tests:   Lab Tests:  Ordered and Reviewed  Radiological Study: Ordered and Reviewed  ED Management:  Vital signs reviewed   Records reviewed   Analgesic provided   Case discussed with the ER attending physician   I considered but do not suspect septic arthritis or gout - no erythema or increased warmth appreciated on exam - normal WBC, SED, CRP, and uric acid   X ray completed - no acute findings   Venous US completed - no DVT   RICE instructions and ACE wrap provided   Pt advised to follow up closely with her PCP and/or orthopedist   Pt advised to resume PT as previously directed   Pt advised to return to the ER if worse in any way   Additional MDM:     X-Rays: I have independently interpreted X-Ray(s) - see notes.     Well's Criteria Score:  -Clinical symptoms of DVT (leg swelling, pain with palpation) = 3.0  -Other diagnosis less likely than pulmonary embolism =            0.0  -Heart Rate >100 =   0.0  -Immobilization (= or > than 3 days) or surgery in the previous 4 weeks = 0.0  -Previous DVT/PE = 0.0  -Hemoptysis =          0.0  -Malignancy =           0.0  Well's Probability Score =    3                         Clinical Impression:   Final diagnoses:  [M25.569] Knee pain  [M25.562] Posterior knee pain, left (Primary)  [M25.562, M25.462] Pain and swelling of knee, left          ED Disposition Condition    Discharge Stable          ED Prescriptions       Medication Sig Dispense Start Date End Date Auth. Provider    nabumetone (RELAFEN) 500 MG tablet Take 1 tablet (500 mg total) by mouth 2 (two) times daily as needed for Pain. 14 tablet 12/10/2022 12/17/2022 Philippe Galeano PA-C          Follow-up Information       Follow up With Specialties Details Why Contact Info Additional Information    Mindi Donahue MD Family Medicine Schedule an appointment as soon as possible for a visit in 2 days  [address]  [address]  [facility] [address]  [phone]       [facility] - Orthopedics [facility] Orthopedics Schedule an  appointment as soon as possible for a visit  Follow up with orthopedic clinic within the next 3-5 days for re-evaluation and further management. [address] [facility], 5th Floor  [facility] [address] [phone] [facility] - Main Building, 5th Floor Please park in [facility] and take [address] elevator             Philippe Galeano PA-C  12/11/22 9483

## 2022-12-11 NOTE — ED TRIAGE NOTES
Pt c/o 10/10 pain to left knee that began a few months ago and has progressively gotten worse over the past few days. Pt states her MD told her she hyperextended it months ago but pt was only able to go to PT once. Pt denies taking anything for pain. Pt states pain is worse when knee is bent. Pt states she's unable to bear weight on knee at the moment and states the pain and swelling is unbearable. Pt tearful in triage.

## 2022-12-29 ENCOUNTER — TELEPHONE (OUTPATIENT)
Dept: SPORTS MEDICINE | Facility: CLINIC | Age: 42
End: 2022-12-29
Payer: COMMERCIAL

## 2022-12-29 NOTE — TELEPHONE ENCOUNTER
Spoke with patient regarding her her appointment on today. Patient appointment is rescheduled to January. Patient stated that she is at work and couldn't make her appointment. Patient was informed of her new appointment and also was informed that a reminder will be sent to her patient portal. Patient confirmed.

## 2023-01-01 NOTE — LETTER
Addendum date/time: 11/21/23 2:49 AM  Addendum made to report by Marium Solis MD    2-week-old infant presents with sudden onset right periorbital hematoma and swelling.  No history of trauma, no clear story of how patient sustained the injury.  Patient seen by my colleague, had lab work, head CT, skeletal survey, and a [facility]SA report filed.  Patient signed out to me at the change of shift with plans to follow-up on pending lab and radiological study results and touch base with [facility]SA regarding additional management.  Patient is also being seen by ophthalmology and child protective team in the hospital has been made aware.    Received a call from radiology attending regarding CT read.  CT findings show right orbital roof nondisplaced fracture, question of a subdural/epidural hemorrhage in the interhemispheric space in the right frontal region.  And a question of suture versus nondisplaced fracture of the occipital bone.  Please see radiology report from Dr. Queen for details.  Neurosurgery contacted at 1730 and notified of these findings.  Recommendation to have patient admitted to PICU for every hour neurochecks.  Family, ophthalmology, child protective team, trauma surgery will notified of these recent findings and will continue to follow the patient in house.    Patient transferred to the PICU in stable condition.  Family updated of plan and are cooperative with investigation by child protective services team.    ED Diagnosis   1. Ecchymosis of eyelid        2. Orbital fracture, closed, initial encounter (CMD)        3. Non-accidental traumatic injury to child                 Marium Solis MD  11/21/23 0249     April 24, 2017    Marlyn Camacho  [address] [place] [address]         [facility] [address]  [address] [address]  Phone: [phone] April 24, 2017     Patient: Marlyn Camacho   YOB: 1980   Date of Visit: 4/24/2017       To Whom It May Concern:    It is my medical opinion that Marlyn Camacho may return to full duty immediately with no restrictions on 04/25/2017.    If you have any questions or concerns, please don't hesitate to call.    Sincerely,        Lucy Daly MD

## 2023-01-18 ENCOUNTER — TELEPHONE (OUTPATIENT)
Dept: FAMILY MEDICINE | Facility: CLINIC | Age: 43
End: 2023-01-18
Payer: COMMERCIAL

## 2023-01-18 DIAGNOSIS — Z12.31 ENCOUNTER FOR SCREENING MAMMOGRAM FOR BREAST CANCER: Primary | ICD-10-CM

## 2023-02-23 ENCOUNTER — HOSPITAL ENCOUNTER (EMERGENCY)
Facility: HOSPITAL | Age: 43
Discharge: HOME | End: 2023-02-23
Attending: EMERGENCY MEDICINE
Payer: COMMERCIAL

## 2023-02-23 VITALS
DIASTOLIC BLOOD PRESSURE: 86 MMHG | HEIGHT: 61 IN | TEMPERATURE: 99 F | WEIGHT: 192 LBS | HEART RATE: 78 BPM | SYSTOLIC BLOOD PRESSURE: 122 MMHG | BODY MASS INDEX: 36.25 KG/M2 | RESPIRATION RATE: 16 BRPM | OXYGEN SATURATION: 97 %

## 2023-02-23 DIAGNOSIS — S90.32XA CONTUSION OF LEFT FOOT, INITIAL ENCOUNTER: Primary | ICD-10-CM

## 2023-02-23 DIAGNOSIS — M79.672 LEFT FOOT PAIN: ICD-10-CM

## 2023-02-23 PROCEDURE — 99283 EMERGENCY DEPT VISIT LOW MDM: CPT | Mod: ER

## 2023-02-23 NOTE — ED PROVIDER NOTES
Encounter Date: 2/23/2023    SCRIBE #1 NOTE: I, Koki Bansal, am scribing for, and in the presence of,  Debbie Morelos MD. I have scribed the following portions of the note - Other sections scribed: HPI; ROS; PE.     History     Chief Complaint   Patient presents with    Foot Injury     Pt states ladder fell on left foot x2 days ago pt denies loc has mild swelling noted to left foot      This is a 44 y/o female with a PMHx of HTN who presents to the Emergency Department complaining of pain and swelling to the dorsum of the left foot for 2 days. Patient reports a ladder fell onto her foot 2 days ago and has had pain and swelling since. She states the swelling has gone down a little. Patient notes pain is exacerbated with weight bearing. No further complaints at this time.       The history is provided by the patient. No  was used.   Review of patient's allergies indicates:  No Known Allergies  Past Medical History:   Diagnosis Date    Abnormal Pap smear of cervix     Allergy     Hypertension     Joint pain     Keloid cicatrix      Past Surgical History:   Procedure Laterality Date    CKC, ECC, fractional D&C      COLPOSCOPY      CONIZATION-CERVIX  02/2015    DILATION AND CURETTAGE OF UTERUS      HYSTERECTOMY  03/2017    AUB, fibroids    TUBAL LIGATION       Family History   Problem Relation Age of Onset    Eczema Mother     Ovarian cancer Mother     Hypertension Mother     Breast cancer Maternal Aunt         THREE AUNTS    Breast cancer Maternal Aunt     Breast cancer Maternal Aunt     Eczema Maternal Grandmother     Eczema Son     Eczema Son     Melanoma Neg Hx     Lupus Neg Hx     Psoriasis Neg Hx     Colon cancer Neg Hx      Social History     Tobacco Use    Smoking status: Never    Smokeless tobacco: Never   Substance Use Topics    Alcohol use: Yes     Comment: occasional    Drug use: No     Review of Systems   Constitutional:  Negative for fever.   HENT:  Negative for facial swelling and  voice change.    Eyes:  Negative for pain.   Respiratory:  Negative for choking and shortness of breath.    Cardiovascular:  Negative for chest pain.   Gastrointestinal:  Negative for abdominal pain, nausea and vomiting.   Genitourinary:  Negative for dysuria and frequency.   Musculoskeletal:  Positive for arthralgias. Negative for back pain.   Skin:  Negative for rash.   Neurological:  Negative for weakness and numbness.   Psychiatric/Behavioral:  Negative for self-injury.      Physical Exam     Initial Vitals [02/23/23 0940]   BP Pulse Resp Temp SpO2   (!) 144/91 70 20 98.4 °F (36.9 °C) 99 %      MAP       --         Physical Exam    Nursing note and vitals reviewed.  Constitutional: She is not diaphoretic. No distress.   HENT:   Head: Normocephalic and atraumatic.   Protecting airway   Eyes: Conjunctivae and EOM are normal. No scleral icterus.   Neck: Neck supple. No tracheal deviation present.   Normal range of motion.  Cardiovascular:  Normal rate and intact distal pulses.           Pulmonary/Chest: No stridor. No respiratory distress.   Speaking in full sentences   Abdominal: Abdomen is soft. She exhibits no distension.   Musculoskeletal:         General: No tenderness.      Cervical back: Normal range of motion and neck supple.      Comments: Edema and tenderness over the dorsum of the left foot. No deformities.     Neurological: She is alert. She has normal strength. No cranial nerve deficit or sensory deficit.   Skin: Skin is warm and dry.   Psychiatric: She has a normal mood and affect.       ED Course   Procedures  Labs Reviewed - No data to display       Imaging Results              X-Ray Foot Complete Left (Final result)  Result time 02/23/23 10:37:23      Final result by John Amin MD (02/23/23 10:37:23)                   Impression:      No convincing evidence of acute fracture or dislocation.      Electronically signed by: John Amin  Date:    02/23/2023  Time:    10:37                Narrative:    EXAMINATION:  XR FOOT COMPLETE 3 VIEW LEFT    CLINICAL HISTORY:  .  Pain in left foot    TECHNIQUE:  AP, lateral and oblique views of the left foot were performed.    COMPARISON:  None    FINDINGS:  No definite evidence of acute fracture or dislocation.  Joint spaces appear maintained.  Lisfranc joint congruent.  No definite evidence of radiopaque foreign body.                                       Medications - No data to display  Medical Decision Making:   History:   Old Medical Records: I decided to obtain old medical records.  Differential Diagnosis:   Differential diagnosis include but are not limited to: Contusion, Strain, Sprain, Fracture, Dislocation.     Independently Interpreted Test(s):   I have ordered and independently interpreted X-rays - see prior notes.  Clinical Tests:   Radiological Study: Ordered and Reviewed  ED Management:  Patient is afebrile no acute distress.  She has no obvious focal neurological deficits.  There is no deformity.  X-rays without acute fracture dislocation.  Symptoms most consistent with contusion.  There are no overlying skin changes to suggest infectious process.  Patient is clinically stay for discharge on trial of management with RICE, follow-up with primary physician as well as Podiatry. counseled on supportive care, appropriate medication usage, concerning symptoms for which to return to ER and the importance of follow up. Understanding and agreement with treatment plan was expressed.   This chart was completed using dictation software, as a result there may be some transcription errors.           Scribe Attestation:   Scribe #1: I performed the above scribed service and the documentation accurately describes the services I performed. I attest to the accuracy of the note.            I, Debbie Morelos , personally performed the services described in this documentation. All medical record entries made by the scribe were at my direction and in my presence.  I  have reviewed the chart and agree that the record reflects my personal performance and is accurate and complete.         Clinical Impression:   Final diagnoses:  [M79.672] Left foot pain  [S90.32XA] Contusion of left foot, initial encounter (Primary)        ED Disposition Condition    Discharge Stable          ED Prescriptions    None       Follow-up Information       Follow up With Specialties Details Why Contact Info    Mindi Donahue MD Family Medicine Schedule an appointment as soon as possible for a visit   [address]  [address]  [facility] [address]  [phone]      [facility] Podiatry Schedule an appointment as soon as possible for a visit   [address] [facility] [address]  [phone]             Debbie Morelos MD  02/23/23 105

## 2023-02-23 NOTE — ED TRIAGE NOTES
Marlyn Oscar Camacho, a 43 y.o. female presents to the ED via PV with CC of L foot pain after ladder fell on her foot x 2 days ago. Pt denies loss of sensation, + for mobility, pulses are +2 bilaterally.

## 2023-02-23 NOTE — DISCHARGE INSTRUCTIONS
Thank you for coming to our Emergency Department today. It is important to remember that some problems are difficult to diagnose and may not be found during your first visit. Be sure to follow up with your primary care doctor and review any labs/imaging that was performed with them. If you do not have a primary care doctor, you may contact the one listed on your discharge paperwork or you may also call the [facility] Appointment Desk at [phone] to schedule an appointment with one.     All medications may potentially have side effects and it is impossible to predict which medications may give you side effects. If you feel that you are having a negative effect of any medication you should immediately stop taking them and seek medical attention.    Return to the ER with any questions/concerns, new/concerning symptoms, worsening or failure to improve. Do not drive or make any important decisions for 24 hours if you have received any pain medications, sedatives or mood altering drugs during your ER visit.

## 2023-03-07 ENCOUNTER — HOSPITAL ENCOUNTER (OUTPATIENT)
Dept: RADIOLOGY | Facility: OTHER | Age: 43
Discharge: HOME | End: 2023-03-07
Attending: FAMILY MEDICINE
Payer: COMMERCIAL

## 2023-03-07 DIAGNOSIS — Z12.31 ENCOUNTER FOR SCREENING MAMMOGRAM FOR BREAST CANCER: ICD-10-CM

## 2023-03-07 PROCEDURE — 77063 MAMMO DIGITAL SCREENING BILAT WITH TOMO: ICD-10-PCS | Mod: 26,,, | Performed by: RADIOLOGY

## 2023-03-07 PROCEDURE — 77067 MAMMO DIGITAL SCREENING BILAT WITH TOMO: ICD-10-PCS | Mod: 26,,, | Performed by: RADIOLOGY

## 2023-03-07 PROCEDURE — 77063 BREAST TOMOSYNTHESIS BI: CPT | Mod: 26,,, | Performed by: RADIOLOGY

## 2023-03-07 PROCEDURE — 77067 SCR MAMMO BI INCL CAD: CPT | Mod: 26,,, | Performed by: RADIOLOGY

## 2023-03-07 PROCEDURE — 77067 SCR MAMMO BI INCL CAD: CPT | Mod: TC

## 2023-03-13 NOTE — TELEPHONE ENCOUNTER
Left voicemail for patient to call office back   Please advise.  Patient is coming in for er PPD read on tomorrow.  Patient wants to know if she will need orders to receive additional vaccines at another location based upon her lab results.

## 2023-04-02 ENCOUNTER — PATIENT MESSAGE (OUTPATIENT)
Dept: ADMINISTRATIVE | Facility: HOSPITAL | Age: 43
End: 2023-04-02
Payer: COMMERCIAL

## 2023-04-02 DIAGNOSIS — Z12.11 SCREENING FOR COLON CANCER: ICD-10-CM

## 2023-05-08 ENCOUNTER — PATIENT MESSAGE (OUTPATIENT)
Dept: OBSTETRICS AND GYNECOLOGY | Facility: CLINIC | Age: 43
End: 2023-05-08
Payer: COMMERCIAL

## 2023-05-08 ENCOUNTER — PATIENT MESSAGE (OUTPATIENT)
Dept: FAMILY MEDICINE | Facility: CLINIC | Age: 43
End: 2023-05-08
Payer: COMMERCIAL

## 2023-05-25 ENCOUNTER — HOSPITAL ENCOUNTER (INPATIENT)
Facility: HOSPITAL | Age: 43
LOS: 3 days | Discharge: HOME | DRG: 062 | End: 2023-05-28
Attending: EMERGENCY MEDICINE | Admitting: HOSPITALIST
Payer: COMMERCIAL

## 2023-05-25 DIAGNOSIS — R29.898 WEAKNESS OF LEFT UPPER EXTREMITY: Primary | ICD-10-CM

## 2023-05-25 DIAGNOSIS — R07.9 CHEST PAIN: ICD-10-CM

## 2023-05-25 DIAGNOSIS — R20.2 PARESTHESIA OF LEFT UPPER AND LOWER EXTREMITY: ICD-10-CM

## 2023-05-25 DIAGNOSIS — I63.9 ACUTE ISCHEMIC STROKE: ICD-10-CM

## 2023-05-25 DIAGNOSIS — I63.9 STROKE: ICD-10-CM

## 2023-05-25 DIAGNOSIS — R20.8 DECREASED SENSATION: ICD-10-CM

## 2023-05-25 LAB
ALBUMIN SERPL BCP-MCNC: 3.9 G/DL (ref 3.5–5.2)
ALP SERPL-CCNC: 55 U/L (ref 55–135)
ALT SERPL W/O P-5'-P-CCNC: 14 U/L (ref 10–44)
ANION GAP SERPL CALC-SCNC: 11 MMOL/L (ref 8–16)
AST SERPL-CCNC: 17 U/L (ref 10–40)
BASOPHILS # BLD AUTO: 0.05 K/UL (ref 0–0.2)
BASOPHILS NFR BLD: 0.5 % (ref 0–1.9)
BILIRUB SERPL-MCNC: 0.4 MG/DL (ref 0.1–1)
BUN SERPL-MCNC: 13 MG/DL (ref 6–20)
CALCIUM SERPL-MCNC: 9.9 MG/DL (ref 8.7–10.5)
CHLORIDE SERPL-SCNC: 103 MMOL/L (ref 95–110)
CHOLEST SERPL-MCNC: 208 MG/DL (ref 120–199)
CHOLEST/HDLC SERPL: 3.8 {RATIO} (ref 2–5)
CO2 SERPL-SCNC: 25 MMOL/L (ref 23–29)
CREAT SERPL-MCNC: 1 MG/DL (ref 0.5–1.4)
DIFFERENTIAL METHOD: ABNORMAL
EOSINOPHIL # BLD AUTO: 0.1 K/UL (ref 0–0.5)
EOSINOPHIL NFR BLD: 1.3 % (ref 0–8)
ERYTHROCYTE [DISTWIDTH] IN BLOOD BY AUTOMATED COUNT: 12.8 % (ref 11.5–14.5)
EST. GFR  (NO RACE VARIABLE): >60 ML/MIN/1.73 M^2
GLUCOSE SERPL-MCNC: 92 MG/DL (ref 70–110)
HCT VFR BLD AUTO: 39.2 % (ref 37–48.5)
HDLC SERPL-MCNC: 55 MG/DL (ref 40–75)
HDLC SERPL: 26.4 % (ref 20–50)
HGB BLD-MCNC: 14 G/DL (ref 12–16)
IMM GRANULOCYTES # BLD AUTO: 0.04 K/UL (ref 0–0.04)
IMM GRANULOCYTES NFR BLD AUTO: 0.4 % (ref 0–0.5)
INR PPP: 0.9 (ref 0.8–1.2)
LDLC SERPL CALC-MCNC: 130.8 MG/DL (ref 63–159)
LYMPHOCYTES # BLD AUTO: 3.8 K/UL (ref 1–4.8)
LYMPHOCYTES NFR BLD: 40.6 % (ref 18–48)
MCH RBC QN AUTO: 31.9 PG (ref 27–31)
MCHC RBC AUTO-ENTMCNC: 35.7 G/DL (ref 32–36)
MCV RBC AUTO: 89 FL (ref 82–98)
MONOCYTES # BLD AUTO: 0.7 K/UL (ref 0.3–1)
MONOCYTES NFR BLD: 7.2 % (ref 4–15)
NEUTROPHILS # BLD AUTO: 4.7 K/UL (ref 1.8–7.7)
NEUTROPHILS NFR BLD: 50 % (ref 38–73)
NONHDLC SERPL-MCNC: 153 MG/DL
NRBC BLD-RTO: 0 /100 WBC
PLATELET # BLD AUTO: 365 K/UL (ref 150–450)
PMV BLD AUTO: 9.5 FL (ref 9.2–12.9)
POCT GLUCOSE: 86 MG/DL (ref 70–110)
POTASSIUM SERPL-SCNC: 3.3 MMOL/L (ref 3.5–5.1)
PROT SERPL-MCNC: 7.9 G/DL (ref 6–8.4)
PROTHROMBIN TIME: 10 SEC (ref 9–12.5)
RBC # BLD AUTO: 4.39 M/UL (ref 4–5.4)
SODIUM SERPL-SCNC: 139 MMOL/L (ref 136–145)
TRIGL SERPL-MCNC: 111 MG/DL (ref 30–150)
TROPONIN I SERPL DL<=0.01 NG/ML-MCNC: <0.006 NG/ML (ref 0–0.03)
TSH SERPL DL<=0.005 MIU/L-ACNC: 0.77 UIU/ML (ref 0.4–4)
WBC # BLD AUTO: 9.42 K/UL (ref 3.9–12.7)

## 2023-05-25 PROCEDURE — 99204 PR OFFICE/OUTPT VISIT, NEW, LEVL IV, 45-59 MIN: ICD-10-PCS | Mod: GT,,, | Performed by: PSYCHIATRY & NEUROLOGY

## 2023-05-25 PROCEDURE — 85025 COMPLETE CBC W/AUTO DIFF WBC: CPT | Performed by: EMERGENCY MEDICINE

## 2023-05-25 PROCEDURE — 93010 ELECTROCARDIOGRAM REPORT: CPT | Mod: ,,, | Performed by: INTERNAL MEDICINE

## 2023-05-25 PROCEDURE — 93005 ELECTROCARDIOGRAM TRACING: CPT

## 2023-05-25 PROCEDURE — 96374 THER/PROPH/DIAG INJ IV PUSH: CPT

## 2023-05-25 PROCEDURE — 25500020 PHARM REV CODE 255: Performed by: EMERGENCY MEDICINE

## 2023-05-25 PROCEDURE — 84484 ASSAY OF TROPONIN QUANT: CPT | Performed by: EMERGENCY MEDICINE

## 2023-05-25 PROCEDURE — 99291 CRITICAL CARE FIRST HOUR: CPT

## 2023-05-25 PROCEDURE — 20000000 HC ICU ROOM

## 2023-05-25 PROCEDURE — 82962 GLUCOSE BLOOD TEST: CPT

## 2023-05-25 PROCEDURE — 99204 OFFICE O/P NEW MOD 45 MIN: CPT | Mod: GT,,, | Performed by: PSYCHIATRY & NEUROLOGY

## 2023-05-25 PROCEDURE — 84443 ASSAY THYROID STIM HORMONE: CPT | Performed by: EMERGENCY MEDICINE

## 2023-05-25 PROCEDURE — 80061 LIPID PANEL: CPT | Performed by: EMERGENCY MEDICINE

## 2023-05-25 PROCEDURE — 85610 PROTHROMBIN TIME: CPT | Performed by: EMERGENCY MEDICINE

## 2023-05-25 PROCEDURE — 25000003 PHARM REV CODE 250: Performed by: EMERGENCY MEDICINE

## 2023-05-25 PROCEDURE — 93010 EKG 12-LEAD: ICD-10-PCS | Mod: 76,,, | Performed by: INTERNAL MEDICINE

## 2023-05-25 PROCEDURE — 80053 COMPREHEN METABOLIC PANEL: CPT | Performed by: EMERGENCY MEDICINE

## 2023-05-25 PROCEDURE — 93010 ELECTROCARDIOGRAM REPORT: CPT | Mod: 76,,, | Performed by: INTERNAL MEDICINE

## 2023-05-25 PROCEDURE — 63600175 PHARM REV CODE 636 W HCPCS: Mod: JG | Performed by: EMERGENCY MEDICINE

## 2023-05-25 RX ORDER — ACETAMINOPHEN 325 MG/1
650 TABLET ORAL
Status: COMPLETED | OUTPATIENT
Start: 2023-05-25 | End: 2023-05-25

## 2023-05-25 RX ORDER — POTASSIUM CHLORIDE 20 MEQ/1
40 TABLET, EXTENDED RELEASE ORAL ONCE
Status: COMPLETED | OUTPATIENT
Start: 2023-05-26 | End: 2023-05-25

## 2023-05-25 RX ORDER — HYDRALAZINE HYDROCHLORIDE 20 MG/ML
10 INJECTION INTRAMUSCULAR; INTRAVENOUS EVERY 6 HOURS PRN
Status: DISCONTINUED | OUTPATIENT
Start: 2023-05-25 | End: 2023-05-28 | Stop reason: HOSPADM

## 2023-05-25 RX ORDER — POTASSIUM CHLORIDE 7.45 MG/ML
10 INJECTION INTRAVENOUS
Status: DISCONTINUED | OUTPATIENT
Start: 2023-05-25 | End: 2023-05-25

## 2023-05-25 RX ADMIN — TENECTEPLASE 25 MG: KIT at 08:05

## 2023-05-25 RX ADMIN — POTASSIUM CHLORIDE 40 MEQ: 1500 TABLET, EXTENDED RELEASE ORAL at 11:05

## 2023-05-25 RX ADMIN — ACETAMINOPHEN 650 MG: 325 TABLET ORAL at 09:05

## 2023-05-25 RX ADMIN — IOHEXOL 75 ML: 350 INJECTION, SOLUTION INTRAVENOUS at 07:05

## 2023-05-26 PROBLEM — R20.8 DECREASED SENSATION: Status: RESOLVED | Noted: 2023-05-26 | Resolved: 2023-05-26

## 2023-05-26 PROBLEM — E87.6 HYPOKALEMIA: Status: ACTIVE | Noted: 2023-05-26

## 2023-05-26 PROBLEM — R07.9 CHEST PAIN: Status: ACTIVE | Noted: 2023-05-26

## 2023-05-26 PROBLEM — R29.898 WEAKNESS OF LEFT UPPER EXTREMITY: Status: ACTIVE | Noted: 2023-05-26

## 2023-05-26 PROBLEM — R20.8 DECREASED SENSATION: Status: ACTIVE | Noted: 2023-05-26

## 2023-05-26 PROBLEM — R29.898 WEAKNESS OF LEFT LOWER EXTREMITY: Status: ACTIVE | Noted: 2023-05-26

## 2023-05-26 PROBLEM — I63.9 STROKE: Status: ACTIVE | Noted: 2023-05-26

## 2023-05-26 PROBLEM — R20.2 PARESTHESIA OF LEFT UPPER AND LOWER EXTREMITY: Status: ACTIVE | Noted: 2023-05-26

## 2023-05-26 LAB
ANION GAP SERPL CALC-SCNC: 8 MMOL/L (ref 8–16)
BASOPHILS # BLD AUTO: 0.05 K/UL (ref 0–0.2)
BASOPHILS NFR BLD: 0.7 % (ref 0–1.9)
BUN SERPL-MCNC: 9 MG/DL (ref 6–20)
CALCIUM SERPL-MCNC: 9.3 MG/DL (ref 8.7–10.5)
CHLORIDE SERPL-SCNC: 106 MMOL/L (ref 95–110)
CO2 SERPL-SCNC: 25 MMOL/L (ref 23–29)
CREAT SERPL-MCNC: 0.9 MG/DL (ref 0.5–1.4)
DIFFERENTIAL METHOD: ABNORMAL
EOSINOPHIL # BLD AUTO: 0.1 K/UL (ref 0–0.5)
EOSINOPHIL NFR BLD: 1.5 % (ref 0–8)
ERYTHROCYTE [DISTWIDTH] IN BLOOD BY AUTOMATED COUNT: 13 % (ref 11.5–14.5)
EST. GFR  (NO RACE VARIABLE): >60 ML/MIN/1.73 M^2
ESTIMATED AVG GLUCOSE: 97 MG/DL (ref 68–131)
GLUCOSE SERPL-MCNC: 83 MG/DL (ref 70–110)
HBA1C MFR BLD: 5 % (ref 4–5.6)
HCT VFR BLD AUTO: 38.4 % (ref 37–48.5)
HGB BLD-MCNC: 13.5 G/DL (ref 12–16)
IMM GRANULOCYTES # BLD AUTO: 0.05 K/UL (ref 0–0.04)
IMM GRANULOCYTES NFR BLD AUTO: 0.7 % (ref 0–0.5)
LYMPHOCYTES # BLD AUTO: 2.4 K/UL (ref 1–4.8)
LYMPHOCYTES NFR BLD: 32.4 % (ref 18–48)
MCH RBC QN AUTO: 32.3 PG (ref 27–31)
MCHC RBC AUTO-ENTMCNC: 35.2 G/DL (ref 32–36)
MCV RBC AUTO: 92 FL (ref 82–98)
MONOCYTES # BLD AUTO: 0.5 K/UL (ref 0.3–1)
MONOCYTES NFR BLD: 6.8 % (ref 4–15)
NEUTROPHILS # BLD AUTO: 4.3 K/UL (ref 1.8–7.7)
NEUTROPHILS NFR BLD: 57.9 % (ref 38–73)
NRBC BLD-RTO: 0 /100 WBC
PLATELET # BLD AUTO: 332 K/UL (ref 150–450)
PMV BLD AUTO: 9.2 FL (ref 9.2–12.9)
POCT GLUCOSE: 81 MG/DL (ref 70–110)
POTASSIUM SERPL-SCNC: 3.9 MMOL/L (ref 3.5–5.1)
RBC # BLD AUTO: 4.18 M/UL (ref 4–5.4)
SODIUM SERPL-SCNC: 139 MMOL/L (ref 136–145)
TROPONIN I SERPL DL<=0.01 NG/ML-MCNC: <0.006 NG/ML (ref 0–0.03)
WBC # BLD AUTO: 7.48 K/UL (ref 3.9–12.7)

## 2023-05-26 PROCEDURE — 36415 COLL VENOUS BLD VENIPUNCTURE: CPT | Performed by: HOSPITALIST

## 2023-05-26 PROCEDURE — 99223 PR INITIAL HOSPITAL CARE,LEVL III: ICD-10-PCS | Mod: ,,, | Performed by: PSYCHIATRY & NEUROLOGY

## 2023-05-26 PROCEDURE — A9585 GADOBUTROL INJECTION: HCPCS | Performed by: HOSPITALIST

## 2023-05-26 PROCEDURE — 84484 ASSAY OF TROPONIN QUANT: CPT | Performed by: STUDENT IN AN ORGANIZED HEALTH CARE EDUCATION/TRAINING PROGRAM

## 2023-05-26 PROCEDURE — 36415 COLL VENOUS BLD VENIPUNCTURE: CPT | Performed by: STUDENT IN AN ORGANIZED HEALTH CARE EDUCATION/TRAINING PROGRAM

## 2023-05-26 PROCEDURE — 99223 1ST HOSP IP/OBS HIGH 75: CPT | Mod: ,,, | Performed by: PSYCHIATRY & NEUROLOGY

## 2023-05-26 PROCEDURE — 97535 SELF CARE MNGMENT TRAINING: CPT

## 2023-05-26 PROCEDURE — 25000003 PHARM REV CODE 250: Performed by: STUDENT IN AN ORGANIZED HEALTH CARE EDUCATION/TRAINING PROGRAM

## 2023-05-26 PROCEDURE — 83036 HEMOGLOBIN GLYCOSYLATED A1C: CPT | Performed by: STUDENT IN AN ORGANIZED HEALTH CARE EDUCATION/TRAINING PROGRAM

## 2023-05-26 PROCEDURE — 25000003 PHARM REV CODE 250: Performed by: INTERNAL MEDICINE

## 2023-05-26 PROCEDURE — 25500020 PHARM REV CODE 255: Performed by: HOSPITALIST

## 2023-05-26 PROCEDURE — 25000003 PHARM REV CODE 250: Performed by: HOSPITALIST

## 2023-05-26 PROCEDURE — 85025 COMPLETE CBC W/AUTO DIFF WBC: CPT | Performed by: HOSPITALIST

## 2023-05-26 PROCEDURE — 80048 BASIC METABOLIC PNL TOTAL CA: CPT | Performed by: HOSPITALIST

## 2023-05-26 PROCEDURE — 20000000 HC ICU ROOM

## 2023-05-26 PROCEDURE — 92610 EVALUATE SWALLOWING FUNCTION: CPT

## 2023-05-26 RX ORDER — MUPIROCIN 20 MG/G
OINTMENT TOPICAL 2 TIMES DAILY
Status: DISCONTINUED | OUTPATIENT
Start: 2023-05-26 | End: 2023-05-28 | Stop reason: HOSPADM

## 2023-05-26 RX ORDER — HYDROCHLOROTHIAZIDE 25 MG/1
25 TABLET ORAL DAILY
Status: DISCONTINUED | OUTPATIENT
Start: 2023-05-26 | End: 2023-05-28 | Stop reason: HOSPADM

## 2023-05-26 RX ORDER — SODIUM CHLORIDE 0.9 % (FLUSH) 0.9 %
10 SYRINGE (ML) INJECTION
Status: DISCONTINUED | OUTPATIENT
Start: 2023-05-26 | End: 2023-05-28 | Stop reason: HOSPADM

## 2023-05-26 RX ORDER — ACETAMINOPHEN 650 MG/20.3ML
1000 LIQUID ORAL ONCE
Status: COMPLETED | OUTPATIENT
Start: 2023-05-26 | End: 2023-05-26

## 2023-05-26 RX ORDER — ACETAMINOPHEN 650 MG/20.3ML
1000 LIQUID ORAL ONCE
Status: DISCONTINUED | OUTPATIENT
Start: 2023-05-26 | End: 2023-05-26

## 2023-05-26 RX ORDER — ACETAMINOPHEN 325 MG/1
650 TABLET ORAL EVERY 6 HOURS PRN
Status: DISCONTINUED | OUTPATIENT
Start: 2023-05-26 | End: 2023-05-26

## 2023-05-26 RX ORDER — GADOBUTROL 604.72 MG/ML
10 INJECTION INTRAVENOUS
Status: COMPLETED | OUTPATIENT
Start: 2023-05-26 | End: 2023-05-26

## 2023-05-26 RX ORDER — POTASSIUM CHLORIDE 20 MEQ/1
40 TABLET, EXTENDED RELEASE ORAL ONCE
Status: COMPLETED | OUTPATIENT
Start: 2023-05-26 | End: 2023-05-26

## 2023-05-26 RX ORDER — ATORVASTATIN CALCIUM 40 MG/1
40 TABLET, FILM COATED ORAL DAILY
Status: DISCONTINUED | OUTPATIENT
Start: 2023-05-27 | End: 2023-05-28 | Stop reason: HOSPADM

## 2023-05-26 RX ORDER — ONDANSETRON 8 MG/1
8 TABLET, ORALLY DISINTEGRATING ORAL EVERY 8 HOURS PRN
Status: DISCONTINUED | OUTPATIENT
Start: 2023-05-26 | End: 2023-05-28 | Stop reason: HOSPADM

## 2023-05-26 RX ORDER — ACETAMINOPHEN 325 MG/1
650 TABLET ORAL EVERY 4 HOURS PRN
Status: DISCONTINUED | OUTPATIENT
Start: 2023-05-26 | End: 2023-05-28 | Stop reason: HOSPADM

## 2023-05-26 RX ADMIN — ACETAMINOPHEN 650 MG: 325 TABLET ORAL at 01:05

## 2023-05-26 RX ADMIN — GADOBUTROL 10 ML: 604.72 INJECTION INTRAVENOUS at 12:05

## 2023-05-26 RX ADMIN — MUPIROCIN: 20 OINTMENT TOPICAL at 09:05

## 2023-05-26 RX ADMIN — POTASSIUM CHLORIDE 40 MEQ: 1500 TABLET, EXTENDED RELEASE ORAL at 06:05

## 2023-05-26 RX ADMIN — ACETAMINOPHEN 650 MG: 325 TABLET ORAL at 06:05

## 2023-05-26 RX ADMIN — ACETAMINOPHEN 999.01 MG: 160 SOLUTION ORAL at 09:05

## 2023-05-26 RX ADMIN — HYDROCHLOROTHIAZIDE 25 MG: 25 TABLET ORAL at 09:05

## 2023-05-26 NOTE — ED PROVIDER NOTES
Encounter Date: 5/25/2023    SCRIBE #1 NOTE: I, William Dhillon, am scribing for, and in the presence of,  Luis Enrique Gallegos MD. I have scribed the following portions of the note - Other sections scribed: HPI, ROS, PE.   SCRIBE #2 NOTE: I, Steve Almaguer, am scribing for, and in the presence of,  Luis Enrique Gallegos MD. I have scribed the remaining portions of the note not scribed by Scribe #1.   History     Chief Complaint   Patient presents with    Chest Pain    Numbness     Pt c/o left sided chest pain accompanied by numbness and tingling to left upper extremity which presented 1 hour PTA. Pt alsop states she was weak where she had difficulty getting out of the car. Pt denies sob, n/v/d     Marlyn Oscar Camacho is a 44 y/o female with a PMHx of HTN, HLD, who presents to the ED with chest tightness onset 5:55 pm. Patient states that she was pulling up at work when she began having complaints of chest tightness. Patient also notes numbness and weakness to the left arm. No alleviating or exacerbating factors noted. No medications taken PTA. Denies nausea, vomiting, diarrhea, or other associated symptoms. NKDA.    The history is provided by the patient. No  was used.   Review of patient's allergies indicates:  No Known Allergies  Past Medical History:   Diagnosis Date    Abnormal Pap smear of cervix     Allergy     Hypertension     Joint pain     Keloid cicatrix      Past Surgical History:   Procedure Laterality Date    CKC, ECC, fractional D&C      COLPOSCOPY      CONIZATION-CERVIX  02/2015    DILATION AND CURETTAGE OF UTERUS      HYSTERECTOMY  03/2017    AUB, fibroids    TUBAL LIGATION       Family History   Problem Relation Age of Onset    Eczema Mother     Ovarian cancer Mother     Hypertension Mother     Breast cancer Maternal Aunt         THREE AUNTS    Breast cancer Maternal Aunt     Breast cancer Maternal Aunt     Eczema Maternal Grandmother     Eczema Son     Eczema Son     Melanoma Neg Hx      Lupus Neg Hx     Psoriasis Neg Hx     Colon cancer Neg Hx      Social History     Tobacco Use    Smoking status: Never    Smokeless tobacco: Never   Substance Use Topics    Alcohol use: Yes     Comment: occasional    Drug use: No     Review of Systems   Constitutional:  Negative for chills and fever.   HENT:  Negative for sore throat.    Respiratory:  Positive for chest tightness. Negative for cough and shortness of breath.    Cardiovascular:  Negative for chest pain.   Gastrointestinal:  Negative for nausea.   Genitourinary:  Negative for dysuria.   Musculoskeletal:  Negative for back pain.   Skin:  Negative for rash.   Neurological:  Positive for weakness (Left UE) and numbness (Left UE).   Psychiatric/Behavioral:  Negative for confusion.      Physical Exam     Initial Vitals [05/25/23 1905]   BP Pulse Resp Temp SpO2   (!) 156/94 100 18 98.2 °F (36.8 °C) 100 %      MAP       --         Physical Exam    Nursing note and vitals reviewed.  Constitutional: She appears well-developed and well-nourished. She does not appear ill. No distress.   HENT:   Head: Normocephalic and atraumatic.   Mouth/Throat: Oropharynx is clear and moist.   Eyes: Conjunctivae and EOM are normal. Pupils are equal, round, and reactive to light. Right eye exhibits no nystagmus. Left eye exhibits no nystagmus.   Pupils are 3 mm bilat.   Neck:   Normal range of motion.  Cardiovascular:  Normal rate, regular rhythm and normal heart sounds.           No murmur heard.  Pulmonary/Chest: Breath sounds normal. No respiratory distress. She has no wheezes.   Abdominal: Abdomen is soft. There is no abdominal tenderness.   Musculoskeletal:         General: No edema.      Cervical back: Normal range of motion.     Neurological: She is alert. GCS score is 15.   No facial asymmetry. Minor LUE pronator drift. Reported decreased sensation in the LUE. Reported decreased sensation in the LLE. Strength intact BLE. Finger to nose intact bilaterally.    Skin: Skin is  warm.   Psychiatric: She has a normal mood and affect.       ED Course   Critical Care    Date/Time: 5/25/2023 8:25 PM  Performed by: Luis Enrique Gallegos MD  Authorized by: Luis Enrique Gallegos MD   Direct patient critical care time: 7 minutes  Additional history critical care time: 7 minutes  Ordering / reviewing critical care time: 7 minutes  Documentation critical care time: 7 minutes  Consulting other physicians critical care time: 7 minutes  Consult with family critical care time: 3 minutes  Other critical care time: 2 minutes  Total critical care time (exclusive of procedural time) : 40 minutes  Critical care time was exclusive of separately billable procedures and treating other patients and teaching time.  Critical care was necessary to treat or prevent imminent or life-threatening deterioration of the following conditions: CNS failure or compromise (Neuro deficits.).  Critical care was time spent personally by me on the following activities: development of treatment plan with patient or surrogate, discussions with consultants, interpretation of cardiac output measurements, evaluation of patient's response to treatment, examination of patient, obtaining history from patient or surrogate, ordering and performing treatments and interventions, ordering and review of laboratory studies, ordering and review of radiographic studies, re-evaluation of patient's condition and review of old charts.  Comments: Treatment with thrombolytics.       Labs Reviewed   CBC W/ AUTO DIFFERENTIAL - Abnormal; Notable for the following components:       Result Value    MCH 31.9 (*)     All other components within normal limits   COMPREHENSIVE METABOLIC PANEL - Abnormal; Notable for the following components:    Potassium 3.3 (*)     All other components within normal limits   LIPID PANEL - Abnormal; Notable for the following components:    Cholesterol 208 (*)     All other components within normal limits   PROTIME-INR   TSH   TROPONIN I    POCT GLUCOSE, HAND-HELD DEVICE   POCT GLUCOSE   ISTAT CHEM8   POCT PROTIME-INR     EKG Readings: (Independently Interpreted)   EKGs independently interpreted by Luis Enrique Gallegos MD reads: see ED course.     Imaging Results              CTA Head and Neck (xpd) (Final result)  Result time 05/25/23 20:11:19      Final result by Marleen Oliver MD (05/25/23 20:11:19)                   Impression:      No acute abnormality. No large vessel occlusion.      Electronically signed by: Marleen Oliver  Date:    05/25/2023  Time:    20:11               Narrative:    EXAMINATION:  CTA HEAD AND NECK (XPD)    CLINICAL HISTORY:  Left arm weakness, numbess;    TECHNIQUE:  Non contrast low dose axial images were obtained through the head. CT angiogram was performed from the level of the prince to the top of the head following the IV administration of 75mL of Omnipaque 350.   Sagittal and coronal reconstructions and maximum intensity projection reconstructions were performed. Arterial stenosis percentages are based on NASCET measurement criteria.    COMPARISON:  None    FINDINGS:  Intracranial Compartment:    Ventricles and sulci are normal in size for age without evidence of hydrocephalus. No extra-axial blood or fluid collections.    The brain parenchyma appears normal. No parenchymal mass, hemorrhage, edema, or major vascular distribution infarct.    Skull/Extracranial Contents (limited evaluation): No fracture. Mastoid air cells and paranasal sinuses are essentially clear.    Non-Vascular Structures of the Neck/Thoracic Inlet (limited evaluation): Normal.    Aorta: Normal 3 vessel arch.    Extracranial carotid circulation: No hemodynamically significant stenosis, aneurysmal dilatation, or dissection.    Extracranial vertebral circulation: No hemodynamically significant stenosis, aneurysmal dilatation, or dissection.    Intracranial Arteries: No focal high-grade stenosis, occlusion, or aneurysm.    Venous structures (limited  evaluation): Normal.                                       Medications   hydrALAZINE injection 10 mg (has no administration in time range)   iohexoL (OMNIPAQUE 350) injection 75 mL (75 mLs Intravenous Given 5/25/23 1931)   tenecteplase (TNKase) IV KIT 25 mg (25 mg Intravenous Given 5/25/23 2038)   acetaminophen tablet 650 mg (650 mg Oral Given 5/25/23 2151)   potassium chloride SA CR tablet 40 mEq (40 mEq Oral Given 5/25/23 2328)     Medical Decision Making:   History:   Old Medical Records: I decided to obtain old medical records.  Initial Assessment:     43-year-old female presenting to left arm weakness and decreased sensation.  Patient also reported left-sided chest pain.  On exam patient did have drift of the left upper extremity and subjective decreased sensation left arm and leg.  NIH score is 2.  The patient was evaluated by Neurology.  Given patient is within thrombolytics timeframe and with reported symptoms patient offered thrombolytics.  Patient agreeable following discussion of risk and benefit.  Patient provided T and K. patient denies any recent bleeding sources are bleeding problems in the past.  Patient admit to ICU.  Differential Diagnosis:   CVA, ACS, radiculopathy    Independently Interpreted Test(s):   I have ordered and independently interpreted EKG Reading(s) - see summary below  Clinical Tests:   Lab Tests: Ordered and Reviewed  Radiological Study: Ordered and Reviewed  Medical Tests: Ordered and Reviewed  ED Management:    Problems considered includes weakness, decreased sensation (Signs & symptoms, differentials)  Chronic problems impacting care includes hypertension, hypercholesteremia.  Please see workup section for emergency physician independent ECG interpretation.  Imaging independently reviewed.  Agree with radiologist's interpretation. Imaging includes CT show no sign of acute ischemia or hemorrhage.  All labs reviewed and considered in the patient's differential diagnosis.  Discussion of labs includes no elevation troponin..  The case was discussed with consultant, neurology. This included agreeance for thrombolytic administration.    Decision regarding hospitalization.  The patient requires additional care in the hospital overnight.   Dr. Dupont with hospital medicine will accept the patient. Labs, imaging, or procedures were discussed.    Plan was discussed with the patient.  This includes labs and imaging results, plan for admission, risks and benefits of thrombolytics.    All questions or concerns have been addressed.          Scribe Attestation:   Scribe #1: I performed the above scribed service and the documentation accurately describes the services I performed. I attest to the accuracy of the note.  Scribe #2: I performed the above scribed service and the documentation accurately describes the services I performed. I attest to the accuracy of the note.      ED Course as of 05/26/23 0024   Thu May 25, 2023   1917 EKG 12-lead  Time 7:08 a.m.     Rate 106, sinus, regular rhythm, normal axis.   QRS 86 .  No ST elevation or depression.  No T-wave inversion no Q-waves present.  No hyper QT waves.      Sinus tachycardia   []   1917 POCT Glucose: 86 []   1919 NIH score of 2. []   1952 Discussion with Dr Stoll.  Given timeframe and symptoms recommendation is for TNK. I had discussion with the patient regarding administration of benefits versus risk of thrombolytics.  Patient stated understanding.  Awaiting CT results. []   2010 Had verbal discussion with Dr. Oliver, radiology.  No hemorrhage noted on CT imaging.  I discussed with the patient patient would like to proceed with thrombolytics.  Patient still has no change in physical exam. []   2113 Troponin I: <0.006 []      ED Course User Index  [JM] Luis Enrique Gallegos MD                 Clinical Impression:   Final diagnoses:  [R07.9] Chest pain  [I63.9] Stroke  [R29.898] Weakness of left upper extremity  (Primary)  [R20.8] Decreased sensation        ED Disposition Condition    Admit Stable          I, Luis Enrique Gallegos, personally performed the services described in this documentation. All medical record entries made by the scribe were at my direction and in my presence. I have reviewed the chart and agree that the record reflects my personal performance and is accurate and complete.           Luis Enrique Gallegos MD  05/26/23 0024

## 2023-05-26 NOTE — ASSESSMENT & PLAN NOTE
42 y/o female with a PMHx of HTN, HLD, who presented to [facility] ED with  c/o chest tightness and LSW and numbness. Reports symptoms started at 5:55 pm on 5/25. CTA stroke MP showed no acute intracranial pathology no LVO. She was given TNK. Patient was transferred to [facility] for higher level of care. MRI of the brain showed no acute intracranial pathology. NIHSS 1. Echo pending. AIS likely aborted by TNK.     Antithrombotics for secondary stroke prevention: Antiplatelets: None: Hold all Antithrombotics x 24 hours after IV Thrombolytic therapy administration    Statins for secondary stroke prevention and hyperlipidemia, if present:   Statins: Atorvastatin- 40 mg daily    Aggressive risk factor modification: HTN     Rehab efforts: The patient has been evaluated by a stroke team provider and the therapy needs have been fully considered based off the presenting complaints and exam findings. The following therapy evaluations are needed: PT evaluate and treat, OT evaluate and treat, SLP evaluate and treat    Diagnostics ordered/pending: TTE to assess cardiac function/status     VTE prophylaxis: Heparin 5000 units SQ every 8 hours  Mechanical prophylaxis: Place SCDs    BP parameters: Infarct: Post Thrombolytic therapy, SBP <180

## 2023-05-26 NOTE — ASSESSMENT & PLAN NOTE
Body mass index is 38.7 kg/m².   Lifestyle modification  Morbid obesity complicates all aspects of disease management from diagnostic modalities to treatment.   Weight loss encouraged and health benefits explained to patient.

## 2023-05-26 NOTE — EICU
Intervention Initiated From:  Bedside    Rodrigo intervened regarding:  Medication    Nurse Notified:  Yes    Doctor Notified:  Yes    Comments: Telephone call received from bedside RN requesting that Potassium replacement be changed to PO, patient tolerating PO and passed Alvarado. Message relayed to Dr. Mak.

## 2023-05-26 NOTE — PLAN OF CARE
Case Management Assessment     PCP: Mindi Donahue  Pharmacy: [facility] [address]    Patient Arrived From: home  Existing Help at Home: Spouse    Barriers to Discharge: None    Discharge Plan:    A. Home with family   B. Home with family      SW completed initial assessment and discussed discharge planning with patient at her bedside. Patient stated that she lives with her  who is  her main support. Patient's  will provide transportation for her to get home when discharge from the hospital.     05/25/23 2141   Discharge Assessment   Assessment Type Discharge Planning Assessment   Confirmed/corrected address, phone number and insurance Yes   Confirmed Demographics Correct on Facesheet   Source of Information patient   When was your last doctors appointment?   (Patient stated that she has not been to the doctor since she last visite PCP last year.)   Communicated ONIEL with patient/caregiver Date not available/Unable to determine   People in Home spouse   Do you expect to return to your current living situation? Yes   Do you have help at home or someone to help you manage your care at home? Yes   Who are your caregiver(s) and their phone number(s)? Sidney Camacho Jr (Spouse)   [phone] (Mobile)   Prior to hospitilization cognitive status: Alert/Oriented   Current cognitive status: Alert/Oriented   Equipment Currently Used at Home none   Readmission within 30 days? No   Patient currently being followed by outpatient case management? No   Do you currently have service(s) that help you manage your care at home? No   Do you take prescription medications? Yes   Do you have prescription coverage? Yes   Coverage [employer]   Do you have any problems affording any of your prescribed medications? No   Is the patient taking medications as prescribed? yes   Who is going to help you get home at discharge? Sidney Camacho Jr (Spouse)   [phone] (Mobile)   How do you get to doctors  appointments? car, drives self   Are you on dialysis? No   Do you take coumadin? No   Discharge Plan A Home with family   Discharge Plan B Home with family   DME Needed Upon Discharge  none   Discharge Plan discussed with: Patient   Transition of Care Barriers None   OTHER   Name(s) of People in Home Sidney Camacho Jr (Spouse)   [phone] (Mobile)

## 2023-05-26 NOTE — ASSESSMENT & PLAN NOTE
She presented with sudden onset of left side weakness  And numbness,CT head without contrast was negative for hemorrhage  He was started on tenecteplase, will continue on statin  Hold off on other antiplatelets for 24 hours    Statins for secondary stroke prevention and hyperlipidemia, if present:   Statins: Atorvastatin- 40 mg daily    Aggressive risk factor modification: HTN, Obesity   Will obtain lipid panel     Rehab efforts: The patient has been evaluated by a stroke team provider and the therapy needs have been fully considered based off the presenting complaints and exam findings. The following therapy evaluations are needed: PT evaluate and treat, OT evaluate and treat    Diagnostics ordered/pending: HgbA1C to assess blood glucose levels, Lipid Profile to assess cholesterol levels, MRA head to assess vasculature    VTE prophylaxis: restart after 24 hours    BP parameters: She got TNK ;pretreatment goal 185/110,post treatment goal 180/105 mmhg

## 2023-05-26 NOTE — PLAN OF CARE
[facility] - Neuro Critical Care  Discharge Reassessment    Primary Care Provider: Mindi Donahue MD    Expected Discharge Date: 5/28/2023    [facility] S/p TNK.  Patient not medically ready for discharge.       Reassessment (most recent)       Discharge Reassessment - 05/26/23 1535          Discharge Reassessment    Assessment Type Discharge Planning Reassessment     Did the patient's condition or plan change since previous assessment? No     Communicated ONIEL with patient/caregiver Date not available/Unable to determine     Discharge Plan A Home with family     Discharge Plan B Home Health     DME Needed Upon Discharge  none     Transition of Care Barriers None     Why the patient remains in the hospital Requires continued medical care                     Andria Michael RN, CCRN-K, [facility]  Neuro-Critical Care   [phone]

## 2023-05-26 NOTE — PT/OT/SLP EVAL
Speech Language Pathology Evaluation  Bedside Swallow    Patient Name:  Marlyn Camacho   MRN:  [id number]  Admitting Diagnosis: Cerebrovascular accident (CVA) due to thrombosis    Recommendations:                 General Recommendations:  Follow-up not indicated  Diet recommendations:  Regular Diet - IDDSI Level 7, Thin liquids - IDDSI Level 0   Aspiration Precautions: Meds whole 1 at a time and Standard aspiration precautions   General Precautions: Standard,    Communication strategies:  none    Assessment:     Marlyn aCmacho is a 43 y.o. female with a dx of Cerebrovascular accident (CVA) due to thrombosis, however, MRI has not been completed to confirm CVA. Pt with functional swallowing abilities and is safe to resume a regular diet with thin liquids. Pt with functional cognitive-communicative abilities. No further ST is required at this time, as the pt is consuming the safest and least restrictive diet, as well as with functional cognition.     History:     Past Medical History:   Diagnosis Date    Abnormal Pap smear of cervix     Allergy     Hypertension     Joint pain     Keloid cicatrix        Past Surgical History:   Procedure Laterality Date    CKC, ECC, fractional D&C      COLPOSCOPY      CONIZATION-CERVIX  02/2015    DILATION AND CURETTAGE OF UTERUS      HYSTERECTOMY  03/2017    AUB, fibroids    TUBAL LIGATION         Social History: Patient lives with her  and was indep prior to admit.    Prior Intubation HX:  None    Modified Barium Swallow: None per EMR    Chest X-Rays: None this admit     CTA Head and Neck  5/25/23    Impression:     No acute abnormality. No large vessel occlusion.    Prior diet: Unrestricted per pt.    Subjective     ST obtained clearance from pt's nurse for b/s swallow evaluation prior to entrance. Pt was asleep upon ST's entrance, however, easily arouse to ST's greeting. Pt was able to recall all events that led to admit, with speech 100% intelligible.      Patient goals: Resume po intake      Pain/Comfort:  Pain Rating 1: 0/10    Respiratory Status: Room air    Objective:     Oral Musculature Evaluation  Oral Musculature: WFL  Dentition: present and adequate  Secretion Management: adequate  Mucosal Quality: good  Oral Labial Strength and Mobility: WFL  Lingual Strength and Mobility: WFL  Volitional Cough: Strong  Volitional Swallow: Intact  Voice Prior to PO Intake: Clear and audible    Bedside Swallow Eval:   Consistencies Assessed:  Thin liquids -half a cup of water via straw  Solids -x2 bite sized pieces of rhoda cracker       Oral Phase:   WFL    Pharyngeal Phase:   no overt clinical signs/symptoms of aspiration  no overt clinical signs/symptoms of pharyngeal dysphagia    Compensatory Strategies  None    Treatment: It should be noted that silent aspiration cannot be r/o via b/s assessment. ST educated the pt regarding recs for resuming regular diet with thin liquids, as well as No further ST is required at this time, as the she is consuming the safest and least restrictive diet, with no cognitive deficits noted.    ST notified pt's nurse and MD regarding diet recs.    Goals:   Multidisciplinary Problems       SLP Goals       Not on file                    Plan:     Patient to be seen:      Plan of Care expires:     Plan of Care reviewed with:  patient, daughter   SLP Follow-Up:  No       Discharge recommendations:      Barriers to Discharge:  None    Time Tracking:     SLP Treatment Date:   05/26/23  Speech Start Time:  0900  Speech Stop Time:  0923     Speech Total Time (min):  23 min    Billable Minutes: Eval Swallow and Oral Function 13 and Self Care/Home Management Training 10    05/26/2023

## 2023-05-26 NOTE — EICU
Intervention Initiated From:  Bedside    Rodrigo intervened regarding:  Medication    Nurse Notified:  No    Doctor Notified:  Yes    Comments: Telephone call received from bedside RN requesting an order for Tylenol for headache 6/10. Message relayed to Dr. Stoll.

## 2023-05-26 NOTE — ASSESSMENT & PLAN NOTE
43 year old woman admitted as a transfer for post TNK care/monitoring. Vital signs stable and within normal limits on arrival to unit. Reports headache which improves with PRN tylenol. Neurologic exam notable for decreased sensation of left facial/upper extremity/lower extremity, mild left upper extremity drift with distraction, and give way left upper extremity extensor/left lower extremity flexor weakness. Serum labs grossly within normal. NCCTH, CTA H/N, and MRI Brain W WO Contrast without acute intracranial pathology/large vessel stenosis/large vessel occlusion. Unclear if TIA vs stroke mimic. Monitor in NCC unit x 24 hrs post thrombolytic therapy; stable for stepdown otherwise.

## 2023-05-26 NOTE — SUBJECTIVE & OBJECTIVE
Past Medical History:   Diagnosis Date    Abnormal Pap smear of cervix     Allergy     Hypertension     Joint pain     Keloid cicatrix      Past Surgical History:   Procedure Laterality Date    CKC, ECC, fractional D&C      COLPOSCOPY      CONIZATION-CERVIX  02/2015    DILATION AND CURETTAGE OF UTERUS      HYSTERECTOMY  03/2017    AUB, fibroids    TUBAL LIGATION       Family History   Problem Relation Age of Onset    Eczema Mother     Ovarian cancer Mother     Hypertension Mother     Breast cancer Maternal Aunt         THREE AUNTS    Breast cancer Maternal Aunt     Breast cancer Maternal Aunt     Eczema Maternal Grandmother     Eczema Son     Eczema Son     Melanoma Neg Hx     Lupus Neg Hx     Psoriasis Neg Hx     Colon cancer Neg Hx      Social History     Tobacco Use    Smoking status: Never    Smokeless tobacco: Never   Substance Use Topics    Alcohol use: Yes     Comment: occasional    Drug use: No     Review of patient's allergies indicates:  No Known Allergies    Medications: I have reviewed the current medication administration record.    Medications Prior to Admission   Medication Sig Dispense Refill Last Dose    hydroCHLOROthiazide (HYDRODIURIL) 25 MG tablet TAKE 1 TABLET(25 MG) BY MOUTH EVERY DAY 90 tablet 3     sulindac (CLINORIL) 150 MG tablet Take 1 tablet (150 mg total) by mouth 2 (two) times daily. (Patient not taking: Reported on 11/9/2022) 10 tablet 0        Review of Systems   Constitutional:  Positive for activity change. Negative for fatigue.   HENT:  Negative for drooling and trouble swallowing.    Eyes:  Negative for photophobia and visual disturbance.   Respiratory:  Positive for chest tightness. Negative for cough and shortness of breath.    Cardiovascular:  Negative for chest pain and palpitations.   Gastrointestinal:  Negative for nausea and vomiting.   Musculoskeletal:  Negative for neck pain and neck stiffness.   Skin:  Negative for rash and wound.   Neurological:  Positive for  weakness. Negative for facial asymmetry, speech difficulty and headaches.   Psychiatric/Behavioral:  Negative for confusion. The patient is not nervous/anxious.    Objective:     Vital Signs (Most Recent):  Temp: 98.1 °F (36.7 °C) (05/26/23 1538)  Pulse: 76 (05/26/23 1538)  Resp: (!) 23 (05/26/23 1538)  BP: 109/69 (05/26/23 1538)  SpO2: 100 % (05/26/23 1538)    Vital Signs Range (Last 24H):  Temp:  [98.1 °F (36.7 °C)-98.6 °F (37 °C)]   Pulse:  []   Resp:  [13-32]   BP: (101-162)/()   SpO2:  [96 %-100 %]        Physical Exam  Constitutional:       General: She is not in acute distress.  HENT:      Head: Normocephalic.   Eyes:      Extraocular Movements: Extraocular movements intact.   Cardiovascular:      Rate and Rhythm: Normal rate.   Pulmonary:      Effort: Pulmonary effort is normal.   Abdominal:      General: Abdomen is flat.   Musculoskeletal:         General: Normal range of motion.   Skin:     General: Skin is warm and dry.   Neurological:      Mental Status: She is alert and oriented to person, place, and time.      Cranial Nerves: No dysarthria or facial asymmetry.      Motor: Weakness present.      Coordination: Finger-Nose-Finger Test normal.   Psychiatric:         Mood and Affect: Mood normal.            Neurological Exam:   LOC: alert  Attention Span: Good   Language: No aphasia  Articulation: No dysarthria  Orientation: Person, Place, Time   Visual Fields: Full  EOM (CN III, IV, VI): Full/intact  Facial Movement (CN VII): Symmetric facial expression    Motor: Arm left  Paresis: 4/5  Leg left  Normal 5/5  Arm right  Normal 5/5  Leg right Normal 5/5      Laboratory:  CMP:   Recent Labs   Lab 05/25/23 1942 05/26/23  0851   CALCIUM 9.9 9.3   ALBUMIN 3.9  --    PROT 7.9  --     139   K 3.3* 3.9   CO2 25 25    106   BUN 13 9   CREATININE 1.0 0.9   ALKPHOS 55  --    ALT 14  --    AST 17  --    BILITOT 0.4  --      CBC:   Recent Labs   Lab 05/26/23  0851   WBC 7.48   RBC 4.18   HGB  13.5   HCT 38.4      MCV 92   MCH 32.3*   MCHC 35.2     Lipid Panel:   Recent Labs   Lab 05/25/23 1942   CHOL 208*   LDLCALC 130.8   HDL 55   TRIG 111     Coagulation:   Recent Labs   Lab 05/25/23 1942   INR 0.9     Hgb A1C:   Recent Labs   Lab 05/26/23  0604   HGBA1C 5.0     TSH:   Recent Labs   Lab 05/25/23 1942   TSH 0.767       Diagnostic Results:      Brain imaging:  CTH 5/26/2023  No intracranial mass, hemorrhage, or evidence of acute infarction.    MRI Brain W WO 5/26/2023  No acute intracranial abnormalities.    Vessel Imaging:  CTA head and neck 5/25/2023  No acute abnormality. No large vessel occlusion.       Cardiac Evaluation:   Echo pending

## 2023-05-26 NOTE — PROGRESS NOTES
Telestroke is at . Symptoms are LS weakness and numbness. Last known well was today at 5:55 pm.  Patient seen on [facility].    NIH of 2 for decreased sensation left arm and minor drift left leg.    She is TNK candidate. Patient got TNK after risk vs benefit discussion   CT brain  -ve for acute intracranial process.    CTA -ve for LVO.  Patient will be admitted for post TNK care.    Full note to follow.

## 2023-05-26 NOTE — NURSING
Patient arrived to [facility] from [facility] wb >> [id number]    Report received from: JADEN Maya    Type of stroke/diagnosis:  stroke s/p TNK    Tenecteplase end 2038      Current symptoms: LSW, AAOx4, L foot numbness/tingling, decreased sensation on L side, peripheral vision loss    Skin assessment done: Yes  Wounds noted: none    Alvarado Completed? pending    Patient Belongings on Admit: phone, grey socks     NCC notified: MD Qing

## 2023-05-26 NOTE — CONSULTS
[facility]  Vascular Neurology  [facility]  Consult Note    Inpatient consult to Vascular (Stroke) Neurology  Consult performed by: Mari Simmons NP  Consult ordered by: Jerson Winters MD        Assessment/Plan:     Patient is a 43 y.o. year old female with:    * Stroke aborted by administration of thrombolytic agent  42 y/o female with a PMHx of HTN, HLD, who presented [facility] with  c/o chest tightness and LSW and numbness. Reports symptoms started at 5:55 pm on 5/25. CTA stroke MP showed no acute intracranial pathology no LVO. She was given TNK. Patient was transferred to [facility] for higher level of care. MRI of the brain showed no acute intracranial pathology. NIHSS 1. Echo pending. AIS likely aborted by TNK.     Antithrombotics for secondary stroke prevention: Antiplatelets: None: Hold all Antithrombotics x 24 hours after IV Thrombolytic therapy administration    Statins for secondary stroke prevention and hyperlipidemia, if present:   Statins: Atorvastatin- 40 mg daily    Aggressive risk factor modification: HTN     Rehab efforts: The patient has been evaluated by a stroke team provider and the therapy needs have been fully considered based off the presenting complaints and exam findings. The following therapy evaluations are needed: PT evaluate and treat, OT evaluate and treat, SLP evaluate and treat    Diagnostics ordered/pending: TTE to assess cardiac function/status     VTE prophylaxis: Heparin 5000 units SQ every 8 hours  Mechanical prophylaxis: Place SCDs    BP parameters: Infarct: Post Thrombolytic therapy, SBP <180        Weakness of left upper extremity  Due to stroke  Therapy to evaluate and treat     Essential hypertension  Stroke risk factor  <180 s/o TNK        STROKE DOCUMENTATION     Acute Stroke Times   Symptom Onset Date: 05/25/23  Symptom Onset Time: 1730    NIH Scale:  1a. Level of Consciousness: 0-->Alert, keenly responsive  1b. LOC Questions:  0-->Answers both questions correctly  1c. LOC Commands: 0-->Performs both tasks correctly  2. Best Gaze: 0-->Normal  3. Visual: 0-->No visual loss  4. Facial Palsy: 0-->Normal symmetrical movements  5a. Motor Arm, Left: 0-->No drift, limb holds 90 (or 45) degrees for full 10 secs  5b. Motor Arm, Right: 0-->No drift, limb holds 90 (or 45) degrees for full 10 secs  6a. Motor Leg, Left: 1-->Drift, leg falls by the end of the 5-sec period but does not hit bed  6b. Motor Leg, Right: 0-->No drift, leg holds 30 degree position for full 5 secs  7. Limb Ataxia: 0-->Absent  8. Sensory: 0-->Normal, no sensory loss  9. Best Language: 0-->No aphasia, normal  10. Dysarthria: 0-->Normal  11. Extinction and Inattention (formerly Neglect): 0-->No abnormality  Total (NIH Stroke Scale): 1    Modified Bk Score: 0  [place] Coma Scale:    ABCD2 Score:    [id number] Score:   HAS -BLED Score:   ICH Score:   Hunt & Davenport Classification:       Thrombolysis Candidate? Yes, given prior to arrival at outside hospital    Delays to Thrombolysis?  Not Applicable    Interventional Revascularization Candidate?   Is the patient eligible for mechanical endovascular reperfusion (KANU)?  No; No large vessel occlusion identified on imaging     Delays to Thrombectomy? Not Applicable    Hemorrhagic change of an Ischemic Stroke: Does this patient have an ischemic stroke with hemorrhagic changes? No     Subjective:     History of Present Illness:  44 y/o female with a PMHx of HTN, HLD, who presented to [facility] ED with  c/o chest tightness and LSW and numbness. Reports symptoms started at 5:55 pm on 5/25. CTA stroke MP showed no acute intracranial pathology no LVO. She was given TNK. [place] [facility] [place]. MRI of the brain showed no acute intracranial pathology. NIHSS 1. Echo pending. AIS likely aborted by TNK.           Past Medical History:   Diagnosis Date    Abnormal Pap smear of cervix     Allergy     Hypertension      Joint pain     Keloid cicatrix      Past Surgical History:   Procedure Laterality Date    CKC, ECC, fractional D&C      COLPOSCOPY      CONIZATION-CERVIX  02/2015    DILATION AND CURETTAGE OF UTERUS      HYSTERECTOMY  03/2017    AUB, fibroids    TUBAL LIGATION       Family History   Problem Relation Age of Onset    Eczema Mother     Ovarian cancer Mother     Hypertension Mother     Breast cancer Maternal Aunt         THREE AUNTS    Breast cancer Maternal Aunt     Breast cancer Maternal Aunt     Eczema Maternal Grandmother     Eczema Son     Eczema Son     Melanoma Neg Hx     Lupus Neg Hx     Psoriasis Neg Hx     Colon cancer Neg Hx      Social History     Tobacco Use    Smoking status: Never    Smokeless tobacco: Never   Substance Use Topics    Alcohol use: Yes     Comment: occasional    Drug use: No     Review of patient's allergies indicates:  No Known Allergies    Medications: I have reviewed the current medication administration record.    Medications Prior to Admission   Medication Sig Dispense Refill Last Dose    hydroCHLOROthiazide (HYDRODIURIL) 25 MG tablet TAKE 1 TABLET(25 MG) BY MOUTH EVERY DAY 90 tablet 3     sulindac (CLINORIL) 150 MG tablet Take 1 tablet (150 mg total) by mouth 2 (two) times daily. (Patient not taking: Reported on 11/9/2022) 10 tablet 0        Review of Systems   Constitutional:  Positive for activity change. Negative for fatigue.   HENT:  Negative for drooling and trouble swallowing.    Eyes:  Negative for photophobia and visual disturbance.   Respiratory:  Positive for chest tightness. Negative for cough and shortness of breath.    Cardiovascular:  Negative for chest pain and palpitations.   Gastrointestinal:  Negative for nausea and vomiting.   Musculoskeletal:  Negative for neck pain and neck stiffness.   Skin:  Negative for rash and wound.   Neurological:  Positive for weakness. Negative for facial asymmetry, speech difficulty and headaches.    Psychiatric/Behavioral:  Negative for confusion. The patient is not nervous/anxious.    Objective:     Vital Signs (Most Recent):  Temp: 98.1 °F (36.7 °C) (05/26/23 1538)  Pulse: 76 (05/26/23 1538)  Resp: (!) 23 (05/26/23 1538)  BP: 109/69 (05/26/23 1538)  SpO2: 100 % (05/26/23 1538)    Vital Signs Range (Last 24H):  Temp:  [98.1 °F (36.7 °C)-98.6 °F (37 °C)]   Pulse:  []   Resp:  [13-32]   BP: (101-162)/()   SpO2:  [96 %-100 %]        Physical Exam  Constitutional:       General: She is not in acute distress.  HENT:      Head: Normocephalic.   Eyes:      Extraocular Movements: Extraocular movements intact.   Cardiovascular:      Rate and Rhythm: Normal rate.   Pulmonary:      Effort: Pulmonary effort is normal.   Abdominal:      General: Abdomen is flat.   Musculoskeletal:         General: Normal range of motion.   Skin:     General: Skin is warm and dry.   Neurological:      Mental Status: She is alert and oriented to person, place, and time.      Cranial Nerves: No dysarthria or facial asymmetry.      Motor: Weakness present.      Coordination: Finger-Nose-Finger Test normal.   Psychiatric:         Mood and Affect: Mood normal.            Neurological Exam:   LOC: alert  Attention Span: Good   Language: No aphasia  Articulation: No dysarthria  Orientation: Person, Place, Time   Visual Fields: Full  EOM (CN III, IV, VI): Full/intact  Facial Movement (CN VII): Symmetric facial expression    Motor: Arm left  Paresis: 4/5  Leg left  Normal 5/5  Arm right  Normal 5/5  Leg right Normal 5/5      Laboratory:  CMP:   Recent Labs   Lab 05/25/23 1942 05/26/23  0851   CALCIUM 9.9 9.3   ALBUMIN 3.9  --    PROT 7.9  --     139   K 3.3* 3.9   CO2 25 25    106   BUN 13 9   CREATININE 1.0 0.9   ALKPHOS 55  --    ALT 14  --    AST 17  --    BILITOT 0.4  --      CBC:   Recent Labs   Lab 05/26/23  0851   WBC 7.48   RBC 4.18   HGB 13.5   HCT 38.4      MCV 92   MCH 32.3*   MCHC 35.2     Lipid Panel:    Recent Labs   Lab 05/25/23 1942   CHOL 208*   LDLCALC 130.8   HDL 55   TRIG 111     Coagulation:   Recent Labs   Lab 05/25/23 1942   INR 0.9     Hgb A1C:   Recent Labs   Lab 05/26/23  0604   HGBA1C 5.0     TSH:   Recent Labs   Lab 05/25/23 1942   TSH 0.767       Diagnostic Results:      Brain imaging:  CTH 5/26/2023  No intracranial mass, hemorrhage, or evidence of acute infarction.    MRI Brain W WO 5/26/2023  No acute intracranial abnormalities.    Vessel Imaging:  CTA head and neck 5/25/2023  No acute abnormality. No large vessel occlusion.       Cardiac Evaluation:   Echo pending        Mari Simmons NP  [facility]  Department of Vascular Neurology   [facility]

## 2023-05-26 NOTE — ASSESSMENT & PLAN NOTE
BP at presentation 118/78 mmhg peaked at 129/85  She has been on HCTZ 25 mg daily  She's at goal of BP following her TNK therapy  Low salt diet,<2g daily

## 2023-05-26 NOTE — NURSING
[facility]  Nurses Note -- 4 Eyes      5/26/2023       Skin assessed on: Q Shift      [x] No Pressure Injuries Present    [x]Prevention Measures Documented    [] Yes LDA  for Pressure Injury Previously documented     [] Yes New Pressure Injury Discovered   [] LDA for New Pressure Injury Added      Attending RN:  Zulma Armijo RN     Second RN:  uBshra Simons RN

## 2023-05-26 NOTE — ASSESSMENT & PLAN NOTE
Came in weakness and numbness of the left side  Voiced improvement following TNK administration  Will keep BP at goal  Consult PT/OT

## 2023-05-26 NOTE — H&P
[facility] Medicine  History & Physical    Patient Name: Marlyn Camacho  MRN: [id number]  Patient Class: IP- Inpatient  Admission Date: 5/25/2023  Attending Physician: Luis Enrique Colmenares MD   Primary Care Provider: Mindi Donahue MD         Patient information was obtained from patient, past medical records and ER records.     Subjective:     Principal Problem:Cerebrovascular accident (CVA) due to thrombosis    Chief Complaint:   Chief Complaint   Patient presents with    Chest Pain    Numbness     Pt c/o left sided chest pain accompanied by numbness and tingling to left upper extremity which presented 1 hour PTA. Pt alsop states she was weak where she had difficulty getting out of the car. Pt denies sob, n/v/d        HPI: 43-year-old  female with medical history significant for hypertension,class 2 obesity,who presented to the ED,after arriving at work here at [employer] and noticed left side numbness of both upper extremity and lower extremities,denied headache,blurry vision or double vision,no difficulty in talking or slurred speech noticed,she denied fever,chills or rigor,no neck pain or neck stiffness,denied similar episodes in the past.She also voiced associated retrosternal chest pain,described as dull ache,non radiating,denied associated diaphoresis,denied shortness of breath,orthopnea or PND,no nausea or vomiting,chest pain has improved at this time,she was code stroke in the ED and received Tenecteplase (TNK) with improvement in symptoms but still having numbness in her left lower extremity.  She denied tobacco use,not currently on any hormonal contraceptive,s/p hysterectomy for abnormal pap smear,no history of cardiac murmur,no reported atrial fibrillation,no previously known coagulopathy.      Past Medical History:   Diagnosis Date    Abnormal Pap smear of cervix     Allergy     Hypertension     Joint pain     Keloid cicatrix        Past Surgical  History:   Procedure Laterality Date    CKC, ECC, fractional D&C      COLPOSCOPY      CONIZATION-CERVIX  02/2015    DILATION AND CURETTAGE OF UTERUS      HYSTERECTOMY  03/2017    AUB, fibroids    TUBAL LIGATION         Review of patient's allergies indicates:  No Known Allergies    No current facility-administered medications on file prior to encounter.     Current Outpatient Medications on File Prior to Encounter   Medication Sig    hydroCHLOROthiazide (HYDRODIURIL) 25 MG tablet TAKE 1 TABLET(25 MG) BY MOUTH EVERY DAY    sulindac (CLINORIL) 150 MG tablet Take 1 tablet (150 mg total) by mouth 2 (two) times daily. (Patient not taking: Reported on 11/9/2022)     Family History       Problem Relation (Age of Onset)    Breast cancer Maternal Aunt, Maternal Aunt, Maternal Aunt    Eczema Mother, Maternal Grandmother, Son, Son    Hypertension Mother    Ovarian cancer Mother          Tobacco Use    Smoking status: Never    Smokeless tobacco: Never   Substance and Sexual Activity    Alcohol use: Yes     Comment: occasional    Drug use: No    Sexual activity: Yes     Partners: Male     Birth control/protection: Other-see comments, See Surgical Hx     Comment: Tubal Ligation/Hysterectomy     Review of Systems   Constitutional:  Negative for appetite change, chills, fatigue and fever.   HENT:  Negative for congestion, ear discharge, ear pain and sore throat.    Eyes:  Negative for photophobia, redness and visual disturbance.   Respiratory:  Negative for apnea, cough, chest tightness, shortness of breath and wheezing.    Cardiovascular:  Positive for chest pain (retrosternal). Negative for palpitations and leg swelling.   Gastrointestinal:  Negative for abdominal distention, abdominal pain, blood in stool, constipation, diarrhea, nausea and vomiting.   Endocrine: Negative for cold intolerance and heat intolerance.   Genitourinary:  Negative for difficulty urinating, dysuria, flank pain, frequency and hematuria.    Musculoskeletal:  Negative for back pain and joint swelling.   Skin:  Negative for rash and wound.   Neurological:  Positive for weakness (left upper and lower extremity). Negative for dizziness, tremors, seizures, syncope, light-headedness and headaches.   Psychiatric/Behavioral:  Negative for behavioral problems, confusion, hallucinations and suicidal ideas.    Objective:     Vital Signs (Most Recent):  Temp: 98.6 °F (37 °C) (05/26/23 0000)  Pulse: 63 (05/26/23 0030)  Resp: 18 (05/26/23 0030)  BP: 129/85 (05/26/23 0030)  SpO2: 99 % (05/26/23 0228) Vital Signs (24h Range):  Temp:  [98.2 °F (36.8 °C)-98.6 °F (37 °C)] 98.6 °F (37 °C)  Pulse:  [] 63  Resp:  [13-25] 18  SpO2:  [96 %-100 %] 99 %  BP: (115-156)/(76-98) 129/85     Weight: 92.9 kg (204 lb 12.9 oz)  Body mass index is 38.7 kg/m².     Physical Exam  Constitutional:       Appearance: Normal appearance. She is obese.   HENT:      Head: Normocephalic and atraumatic.      Nose: Nose normal. No congestion or rhinorrhea.      Mouth/Throat:      Mouth: Mucous membranes are dry.   Eyes:      Extraocular Movements: Extraocular movements intact and EOM normal.      Conjunctiva/sclera: Conjunctivae normal.      Pupils: Pupils are equal, round, and reactive to light.   Cardiovascular:      Rate and Rhythm: Normal rate and regular rhythm.      Pulses: Normal pulses.      Heart sounds: Normal heart sounds.   Pulmonary:      Effort: Pulmonary effort is normal.      Breath sounds: Normal breath sounds.   Abdominal:      General: Abdomen is flat. Bowel sounds are normal. There is no distension.      Palpations: Abdomen is soft.      Tenderness: There is no abdominal tenderness. There is no right CVA tenderness, left CVA tenderness, guarding or rebound.   Musculoskeletal:      Cervical back: Normal range of motion and neck supple. No rigidity or tenderness.   Skin:     General: Skin is warm and dry.   Neurological:      Mental Status: She is alert and oriented to  person, place, and time. Mental status is at baseline.      Cranial Nerves: No cranial nerve deficit.      Sensory: No sensory deficit.      Motor: Weakness (ALHAJI & LLL POWER 4/5) present.   Psychiatric:         Mood and Affect: Mood normal.         Behavior: Behavior normal.         Thought Content: Thought content normal.         Judgment: Judgment normal.            CRANIAL NERVES     CN I  cranial nerve I not tested    CN II   Visual acuity: normal    CN III, IV, VI   Pupils are equal, round, and reactive to light.  Extraocular motions are normal.   Right pupil: Size: 5 mm. Shape: regular. Reactivity: brisk.   Left pupil: Size: 5 mm. Shape: regular. Reactivity: brisk.   Nystagmus: none   Diplopia: none  Ophthalmoparesis: none    CN V   Right facial sensation deficit: complete  Left facial sensation deficit: complete  Right corneal reflex: normal  Left corneal reflex: normal    CN VII   Facial expression full, symmetric.     CN VIII   CN VIII normal.     CN IX, X   CN IX normal.   Palate: symmetric    CN XI   Right sternocleidomastoid strength: normal  Right trapezius strength: normal    CN XII   CN XII normal.   Tongue: not atrophic     Significant Labs: All pertinent labs within the past 24 hours have been reviewed.  A1C: No results for input(s): HGBA1C in the last 4320 hours.  CBC:   Recent Labs   Lab 05/25/23 1942   WBC 9.42   HGB 14.0   HCT 39.2        CMP:   Recent Labs   Lab 05/25/23 1942      K 3.3*      CO2 25   GLU 92   BUN 13   CREATININE 1.0   CALCIUM 9.9   PROT 7.9   ALBUMIN 3.9   BILITOT 0.4   ALKPHOS 55   AST 17   ALT 14   ANIONGAP 11     Cardiac Markers: No results for input(s): CKMB, MYOGLOBIN, BNP, TROPISTAT in the last 48 hours.  Lactic Acid: No results for input(s): LACTATE in the last 48 hours.  Lipase: No results for input(s): LIPASE in the last 48 hours.  Lipid Panel:   Recent Labs   Lab 05/25/23 1942   CHOL 208*   HDL 55   LDLCALC 130.8   TRIG 111   CHOLHDL 26.4      Magnesium: No results for input(s): MG in the last 48 hours.  Troponin:   Recent Labs   Lab 05/25/23 1918   TROPONINI <0.006     TSH:   Recent Labs   Lab 05/25/23 1942   TSH 0.767     Urine Culture: No results for input(s): LABURIN in the last 48 hours.    Significant Imaging: I have reviewed all pertinent imaging results/findings within the past 24 hours..  Imaging Results              CTA Head and Neck (xpd) (Final result)  Result time 05/25/23 20:11:19      Final result by Marleen Oliver MD (05/25/23 20:11:19)                   Impression:      No acute abnormality. No large vessel occlusion.      Electronically signed by: Marleen Oliver  Date:    05/25/2023  Time:    20:11               Narrative:    EXAMINATION:  CTA HEAD AND NECK (XPD)    CLINICAL HISTORY:  Left arm weakness, numbess;    TECHNIQUE:  Non contrast low dose axial images were obtained through the head. CT angiogram was performed from the level of the prince to the top of the head following the IV administration of 75mL of Omnipaque 350.   Sagittal and coronal reconstructions and maximum intensity projection reconstructions were performed. Arterial stenosis percentages are based on NASCET measurement criteria.    COMPARISON:  None    FINDINGS:  Intracranial Compartment:    Ventricles and sulci are normal in size for age without evidence of hydrocephalus. No extra-axial blood or fluid collections.    The brain parenchyma appears normal. No parenchymal mass, hemorrhage, edema, or major vascular distribution infarct.    Skull/Extracranial Contents (limited evaluation): No fracture. Mastoid air cells and paranasal sinuses are essentially clear.    Non-Vascular Structures of the Neck/Thoracic Inlet (limited evaluation): Normal.    Aorta: Normal 3 vessel arch.    Extracranial carotid circulation: No hemodynamically significant stenosis, aneurysmal dilatation, or dissection.    Extracranial vertebral circulation: No hemodynamically significant  stenosis, aneurysmal dilatation, or dissection.    Intracranial Arteries: No focal high-grade stenosis, occlusion, or aneurysm.    Venous structures (limited evaluation): Normal.                                        Assessment/Plan:     * Cerebrovascular accident (CVA) due to thrombosis  She presented with sudden onset of left side weakness  And numbness,CT head without contrast was negative for hemorrhage  He was started on tenecteplase, will continue on statin  Hold off on other antiplatelets for 24 hours    Statins for secondary stroke prevention and hyperlipidemia, if present:   Statins: Atorvastatin- 40 mg daily    Aggressive risk factor modification: HTN, Obesity   Will obtain lipid panel     Rehab efforts: The patient has been evaluated by a stroke team provider and the therapy needs have been fully considered based off the presenting complaints and exam findings. The following therapy evaluations are needed: PT evaluate and treat, OT evaluate and treat    Diagnostics ordered/pending: HgbA1C to assess blood glucose levels, Lipid Profile to assess cholesterol levels, MRA head to assess vasculature    VTE prophylaxis: restart after 24 hours    BP parameters: She got TNK ;pretreatment goal 185/110,post treatment goal 180/105 mmhg        Hypokalemia  Serum K was 3.3 at presentation  Will replete K at this time.      Weakness of left upper extremity  Came in weakness and numbness of the left side  Voiced improvement following TNK administration  Will keep BP at goal  Consult PT/OT      Chest pain  She presented with retrosternal chest pain  No was said to be dull,non radiating  Improved on its own  Troponin x 2 was within normal limit  Got TNK for suspected ischemic stroke  Will repeat serial troponin x 1  EKG showed no acute st-t wave changes      Essential hypertension  BP at presentation 118/78 mmhg peaked at 129/85  She has been on HCTZ 25 mg daily  She's at goal of BP following her TNK therapy  Low salt  diet,<2g daily       Severe obesity (BMI 35.0-39.9) with comorbidity  Body mass index is 38.7 kg/m².   Lifestyle modification  Morbid obesity complicates all aspects of disease management from diagnostic modalities to treatment.   Weight loss encouraged and health benefits explained to patient.           VTE Risk Mitigation (From admission, onward)    None        Critical care time spent on the evaluation and treatment of severe organ dysfunction, review of pertinent labs and imaging studies, discussions with consulting providers and discussions with patient/family: 35 minutes.       On 5/26/2023 she was admitted to the inpatient hospital medicine service under my care      Todd Dupont MD  Department of Hospital Medicine  [facility] - Intensive Care

## 2023-05-26 NOTE — HPI
43-year-old  female with medical history significant for hypertension,class 2 obesity,who presented to the ED,after arriving at work here at [employer] and noticed left side numbness of both upper extremity and lower extremities,denied headache,blurry vision or double vision,no difficulty in talking or slurred speech noticed,she denied fever,chills or rigor,no neck pain or neck stiffness,denied similar episodes in the past.She also voiced associated retrosternal chest pain,described as dull ache,non radiating,denied associated diaphoresis,denied shortness of breath,orthopnea or PND,no nausea or vomiting,chest pain has improved at this time,she was code stroke in the ED and received Tenecteplase (TNK) with improvement in symptoms but still having numbness in her left lower extremity.  She denied tobacco use,not currently on any hormonal contraceptive,s/p hysterectomy for abnormal pap smear,no history of cardiac murmur,no reported atrial fibrillation,no previously known coagulopathy.

## 2023-05-26 NOTE — HPI
Mrs Marlyn Camacho is a 43 y.o. female with history of hypertension and obesity admitted to Neurocritical Care as a transfer from [facility] for post TNK monitoring after initial presentation to [facility] ED with acute onset left sided weakness/numbness concerning for acute ischemic stroke.

## 2023-05-26 NOTE — PLAN OF CARE
[facility] Care Plan    POC reviewed with Marlyn Camacho and family at 1400. Pt verbalizes understanding. Questions and concerns addressed. No acute events today. Pt progressing toward goals. See below and flowsheets for full assessment and VS info.     MRI completed  Possible stepdown after TNK window completed        Is this a stroke patient? no    Neuro:  [place] Coma Scale  Best Eye Response: 4-->(E4) spontaneous  Best Motor Response: 6-->(M6) obeys commands  Best Verbal Response: 5-->(V5) oriented  [place] Coma Scale Score: 15  Assessment Qualifiers: patient not sedated/intubated  Pupil PERRLA: yes     24 hr Temp:  [98.1 °F (36.7 °C)-98.6 °F (37 °C)]     CV:   Rhythm: normal sinus rhythm  BP goals:   SBP < 180  MAP > 65    Resp:           Plan: N/A    GI/:     Diet/Nutrition Received: low saturated fat/low cholesterol, 2 gram sodium  Last Bowel Movement: 05/25/23       Intake/Output Summary (Last 24 hours) at 5/26/2023 1822  Last data filed at 5/26/2023 1546  Gross per 24 hour   Intake 225 ml   Output 850 ml   Net -625 ml     Unmeasured Output  Urine Occurrence: 1    Labs/Accuchecks:  Recent Labs   Lab 05/26/23  0851   WBC 7.48   RBC 4.18   HGB 13.5   HCT 38.4         Recent Labs   Lab 05/25/23 1942 05/26/23  0851    139   K 3.3* 3.9   CO2 25 25    106   BUN 13 9   CREATININE 1.0 0.9   ALKPHOS 55  --    ALT 14  --    AST 17  --    BILITOT 0.4  --       Recent Labs   Lab 05/25/23 1942   INR 0.9      Recent Labs   Lab 05/26/23  0604   TROPONINI <0.006       Electrolytes: N/A - electrolytes WDL  Accuchecks: none    Gtts:      LDA/Wounds:  Lines/Drains/Airways       Peripheral Intravenous Line  Duration                  Peripheral IV - Single Lumen 05/25/23 1935 20 G Anterior;Right Forearm <1 day         Peripheral IV - Single Lumen 05/26/23 1627 20 G Right Antecubital <1 day                  Wounds: No  Wound care consulted: No

## 2023-05-26 NOTE — NURSING
Notified that headache hasnt worsened at one hr check but now has pressure behind right eye, SANTOS noted. NADN at this time

## 2023-05-26 NOTE — HPI
42 y/o female with a PMHx of HTN, HLD, who presented to OSH ED with  c/o chest tightness and LSW and numbness. Reports symptoms started at 5:55 pm on 5/25. CTA stroke MP showed no acute intracranial pathology no LVO. She was given TNK. Patient was transferred to [facility] for higher level of care. MRI of the brain showed no acute intracranial pathology. NIHSS 1. Echo pending. AIS likely aborted by TNK.

## 2023-05-26 NOTE — ED NOTES
Bed: 17main  Expected date: 5/25/23  Expected time: 7:08 PM  Means of arrival: Personal Transportation  Comments:

## 2023-05-26 NOTE — PHARMACY MED REC
"Admission Medication History     The home medication history was taken by Catrachita Patel.    You may go to "Admission" then "Reconcile Home Medications" tabs to review and/or act upon these items.     The home medication list has been updated by the Pharmacy department.   Please read ALL comments highlighted in yellow.   Please address this information as you see fit.    Feel free to contact us if you have any questions or require assistance.        Medications listed below were obtained from: Patient/family and Analytic software- [employer]  (Not in a hospital admission)          Catrachita Patel  [phone]                 .        "

## 2023-05-26 NOTE — SUBJECTIVE & OBJECTIVE
Past Medical History:   Diagnosis Date    Abnormal Pap smear of cervix     Allergy     Hypertension     Joint pain     Keloid cicatrix        Past Surgical History:   Procedure Laterality Date    CKC, ECC, fractional D&C      COLPOSCOPY      CONIZATION-CERVIX  02/2015    DILATION AND CURETTAGE OF UTERUS      HYSTERECTOMY  03/2017    AUB, fibroids    TUBAL LIGATION         Review of patient's allergies indicates:  No Known Allergies    No current facility-administered medications on file prior to encounter.     Current Outpatient Medications on File Prior to Encounter   Medication Sig    hydroCHLOROthiazide (HYDRODIURIL) 25 MG tablet TAKE 1 TABLET(25 MG) BY MOUTH EVERY DAY    sulindac (CLINORIL) 150 MG tablet Take 1 tablet (150 mg total) by mouth 2 (two) times daily. (Patient not taking: Reported on 11/9/2022)     Family History       Problem Relation (Age of Onset)    Breast cancer Maternal Aunt, Maternal Aunt, Maternal Aunt    Eczema Mother, Maternal Grandmother, Son, Son    Hypertension Mother    Ovarian cancer Mother          Tobacco Use    Smoking status: Never    Smokeless tobacco: Never   Substance and Sexual Activity    Alcohol use: Yes     Comment: occasional    Drug use: No    Sexual activity: Yes     Partners: Male     Birth control/protection: Other-see comments, See Surgical Hx     Comment: Tubal Ligation/Hysterectomy     Review of Systems   Constitutional:  Negative for appetite change, chills, fatigue and fever.   HENT:  Negative for congestion, ear discharge, ear pain and sore throat.    Eyes:  Negative for photophobia, redness and visual disturbance.   Respiratory:  Negative for apnea, cough, chest tightness, shortness of breath and wheezing.    Cardiovascular:  Positive for chest pain (retrosternal). Negative for palpitations and leg swelling.   Gastrointestinal:  Negative for abdominal distention, abdominal pain, blood in stool, constipation, diarrhea, nausea and vomiting.   Endocrine: Negative for  cold intolerance and heat intolerance.   Genitourinary:  Negative for difficulty urinating, dysuria, flank pain, frequency and hematuria.   Musculoskeletal:  Negative for back pain and joint swelling.   Skin:  Negative for rash and wound.   Neurological:  Positive for weakness (left upper and lower extremity). Negative for dizziness, tremors, seizures, syncope, light-headedness and headaches.   Psychiatric/Behavioral:  Negative for behavioral problems, confusion, hallucinations and suicidal ideas.    Objective:     Vital Signs (Most Recent):  Temp: 98.6 °F (37 °C) (05/26/23 0000)  Pulse: 63 (05/26/23 0030)  Resp: 18 (05/26/23 0030)  BP: 129/85 (05/26/23 0030)  SpO2: 99 % (05/26/23 0228) Vital Signs (24h Range):  Temp:  [98.2 °F (36.8 °C)-98.6 °F (37 °C)] 98.6 °F (37 °C)  Pulse:  [] 63  Resp:  [13-25] 18  SpO2:  [96 %-100 %] 99 %  BP: (115-156)/(76-98) 129/85     Weight: 92.9 kg (204 lb 12.9 oz)  Body mass index is 38.7 kg/m².     Physical Exam  Constitutional:       Appearance: Normal appearance. She is obese.   HENT:      Head: Normocephalic and atraumatic.      Nose: Nose normal. No congestion or rhinorrhea.      Mouth/Throat:      Mouth: Mucous membranes are dry.   Eyes:      Extraocular Movements: Extraocular movements intact and EOM normal.      Conjunctiva/sclera: Conjunctivae normal.      Pupils: Pupils are equal, round, and reactive to light.   Cardiovascular:      Rate and Rhythm: Normal rate and regular rhythm.      Pulses: Normal pulses.      Heart sounds: Normal heart sounds.   Pulmonary:      Effort: Pulmonary effort is normal.      Breath sounds: Normal breath sounds.   Abdominal:      General: Abdomen is flat. Bowel sounds are normal. There is no distension.      Palpations: Abdomen is soft.      Tenderness: There is no abdominal tenderness. There is no right CVA tenderness, left CVA tenderness, guarding or rebound.   Musculoskeletal:      Cervical back: Normal range of motion and neck supple.  No rigidity or tenderness.   Skin:     General: Skin is warm and dry.   Neurological:      Mental Status: She is alert and oriented to person, place, and time. Mental status is at baseline.      Cranial Nerves: No cranial nerve deficit.      Sensory: No sensory deficit.      Motor: Weakness (ALHAJI & LLL POWER 4/5) present.   Psychiatric:         Mood and Affect: Mood normal.         Behavior: Behavior normal.         Thought Content: Thought content normal.         Judgment: Judgment normal.            CRANIAL NERVES     CN I  cranial nerve I not tested    CN II   Visual acuity: normal    CN III, IV, VI   Pupils are equal, round, and reactive to light.  Extraocular motions are normal.   Right pupil: Size: 5 mm. Shape: regular. Reactivity: brisk.   Left pupil: Size: 5 mm. Shape: regular. Reactivity: brisk.   Nystagmus: none   Diplopia: none  Ophthalmoparesis: none    CN V   Right facial sensation deficit: complete  Left facial sensation deficit: complete  Right corneal reflex: normal  Left corneal reflex: normal    CN VII   Facial expression full, symmetric.     CN VIII   CN VIII normal.     CN IX, X   CN IX normal.   Palate: symmetric    CN XI   Right sternocleidomastoid strength: normal  Right trapezius strength: normal    CN XII   CN XII normal.   Tongue: not atrophic     Significant Labs: All pertinent labs within the past 24 hours have been reviewed.  A1C: No results for input(s): HGBA1C in the last 4320 hours.  CBC:   Recent Labs   Lab 05/25/23 1942   WBC 9.42   HGB 14.0   HCT 39.2        CMP:   Recent Labs   Lab 05/25/23 1942      K 3.3*      CO2 25   GLU 92   BUN 13   CREATININE 1.0   CALCIUM 9.9   PROT 7.9   ALBUMIN 3.9   BILITOT 0.4   ALKPHOS 55   AST 17   ALT 14   ANIONGAP 11     Cardiac Markers: No results for input(s): CKMB, MYOGLOBIN, BNP, TROPISTAT in the last 48 hours.  Lactic Acid: No results for input(s): LACTATE in the last 48 hours.  Lipase: No results for input(s): LIPASE in  the last 48 hours.  Lipid Panel:   Recent Labs   Lab 05/25/23 1942   CHOL 208*   HDL 55   LDLCALC 130.8   TRIG 111   CHOLHDL 26.4     Magnesium: No results for input(s): MG in the last 48 hours.  Troponin:   Recent Labs   Lab 05/25/23 1918   TROPONINI <0.006     TSH:   Recent Labs   Lab 05/25/23 1942   TSH 0.767     Urine Culture: No results for input(s): LABURIN in the last 48 hours.    Significant Imaging: I have reviewed all pertinent imaging results/findings within the past 24 hours..  Imaging Results              CTA Head and Neck (xpd) (Final result)  Result time 05/25/23 20:11:19      Final result by Marleen Oliver MD (05/25/23 20:11:19)                   Impression:      No acute abnormality. No large vessel occlusion.      Electronically signed by: Marleen Oliver  Date:    05/25/2023  Time:    20:11               Narrative:    EXAMINATION:  CTA HEAD AND NECK (XPD)    CLINICAL HISTORY:  Left arm weakness, numbess;    TECHNIQUE:  Non contrast low dose axial images were obtained through the head. CT angiogram was performed from the level of the prince to the top of the head following the IV administration of 75mL of Omnipaque 350.   Sagittal and coronal reconstructions and maximum intensity projection reconstructions were performed. Arterial stenosis percentages are based on NASCET measurement criteria.    COMPARISON:  None    FINDINGS:  Intracranial Compartment:    Ventricles and sulci are normal in size for age without evidence of hydrocephalus. No extra-axial blood or fluid collections.    The brain parenchyma appears normal. No parenchymal mass, hemorrhage, edema, or major vascular distribution infarct.    Skull/Extracranial Contents (limited evaluation): No fracture. Mastoid air cells and paranasal sinuses are essentially clear.    Non-Vascular Structures of the Neck/Thoracic Inlet (limited evaluation): Normal.    Aorta: Normal 3 vessel arch.    Extracranial carotid circulation: No  hemodynamically significant stenosis, aneurysmal dilatation, or dissection.    Extracranial vertebral circulation: No hemodynamically significant stenosis, aneurysmal dilatation, or dissection.    Intracranial Arteries: No focal high-grade stenosis, occlusion, or aneurysm.    Venous structures (limited evaluation): Normal.

## 2023-05-26 NOTE — PLAN OF CARE
B/s swallow eval completed. ST recs regular diet with thin liquids. Meds whole. No further ST is required at this time, as the pt is consuming the safest and least restrictive diet.

## 2023-05-26 NOTE — NURSING TRANSFER
Nursing Transfer Note      5/26/2023     Reason patient is being transferred: order for MRI    Transfer To: MRI    Transfer via bed    Transfer with cardiac monitoring    Transported by this RN    Medicines sent: na    Any special needs or follow-up needed: na    Chart send with patient: No    Notified: daughter at bedside    1312 - Back in room

## 2023-05-26 NOTE — SUBJECTIVE & OBJECTIVE
Past Medical History:   Diagnosis Date    Abnormal Pap smear of cervix     Allergy     Hypertension     Joint pain     Keloid cicatrix      Past Surgical History:   Procedure Laterality Date    CKC, ECC, fractional D&C      COLPOSCOPY      CONIZATION-CERVIX  02/2015    DILATION AND CURETTAGE OF UTERUS      HYSTERECTOMY  03/2017    AUB, fibroids    TUBAL LIGATION        No current facility-administered medications on file prior to encounter.     Current Outpatient Medications on File Prior to Encounter   Medication Sig Dispense Refill    hydroCHLOROthiazide (HYDRODIURIL) 25 MG tablet TAKE 1 TABLET(25 MG) BY MOUTH EVERY DAY 90 tablet 3    sulindac (CLINORIL) 150 MG tablet Take 1 tablet (150 mg total) by mouth 2 (two) times daily. (Patient not taking: Reported on 11/9/2022) 10 tablet 0      Allergies: Patient has no known allergies.    Family History   Problem Relation Age of Onset    Eczema Mother     Ovarian cancer Mother     Hypertension Mother     Breast cancer Maternal Aunt         THREE AUNTS    Breast cancer Maternal Aunt     Breast cancer Maternal Aunt     Eczema Maternal Grandmother     Eczema Son     Eczema Son     Melanoma Neg Hx     Lupus Neg Hx     Psoriasis Neg Hx     Colon cancer Neg Hx      Social History     Tobacco Use    Smoking status: Never    Smokeless tobacco: Never   Substance Use Topics    Alcohol use: Yes     Comment: occasional    Drug use: No     Review of Systems   Constitutional:  Negative for appetite change, chills, fatigue and fever.   HENT:  Negative for congestion and sore throat.    Eyes:  Negative for photophobia, redness and visual disturbance.   Respiratory:  Negative for cough, chest tightness and shortness of breath.    Cardiovascular:  Positive for chest pain (resolved). Negative for palpitations and leg swelling.   Gastrointestinal:  Negative for abdominal pain, constipation, diarrhea, nausea and vomiting.   Endocrine: Negative for cold intolerance and heat intolerance.    Genitourinary:  Negative for dysuria, flank pain and frequency.   Musculoskeletal:  Negative for back pain and joint swelling.   Skin:  Negative for rash and wound.   Neurological:  Positive for weakness (improved), numbness (Left sided; face, upper and lower extremity) and headaches. Negative for dizziness, seizures and syncope.   Psychiatric/Behavioral:  Negative for behavioral problems and confusion.      Objective:     Vitals:    Temp: 98.1 °F (36.7 °C)  Pulse: 76  Rhythm: normal sinus rhythm  BP: 109/69  MAP (mmHg): 84  Resp: (!) 23  SpO2: 100 %    Temp  Min: 98.1 °F (36.7 °C)  Max: 98.6 °F (37 °C)  Pulse  Min: 58  Max: 102  BP  Min: 101/63  Max: 162/100  MAP (mmHg)  Min: 77  Max: 125  Resp  Min: 13  Max: 32  SpO2  Min: 96 %  Max: 100 %    No intake/output data recorded.   Unmeasured Output  Urine Occurrence: 1        Physical Exam  Vitals and nursing note reviewed.   Constitutional:       General: She is awake. She is not in acute distress.     Appearance: Normal appearance. She is obese. She is not ill-appearing, toxic-appearing or diaphoretic.   HENT:      Head: Normocephalic and atraumatic.      Right Ear: External ear normal.      Left Ear: External ear normal.      Nose: Nose normal.      Mouth/Throat:      Mouth: Mucous membranes are moist.      Pharynx: Oropharynx is clear.   Eyes:      Extraocular Movements: Extraocular movements intact.      Conjunctiva/sclera: Conjunctivae normal.      Pupils: Pupils are equal, round, and reactive to light.   Cardiovascular:      Rate and Rhythm: Normal rate and regular rhythm.      Pulses: Normal pulses.      Heart sounds: Normal heart sounds.   Pulmonary:      Effort: Pulmonary effort is normal. No respiratory distress.   Abdominal:      General: There is no distension.      Palpations: Abdomen is soft.      Tenderness: There is no abdominal tenderness.   Musculoskeletal:         General: No swelling, tenderness or deformity. Normal range of motion.      Cervical  back: Normal range of motion.   Skin:     General: Skin is warm and dry.      Capillary Refill: Capillary refill takes less than 2 seconds.   Neurological:      Mental Status: She is alert and oriented to person, place, and time.   Psychiatric:         Mood and Affect: Mood normal.         Behavior: Behavior normal. Behavior is cooperative.     Neurologic:  -GCS [id number]  -Mental status: Alert. Oriented. Speech fluent. Follows commands.  -Cranial nerves: Visual fields full. Pupils equal, round, and reactive bilateral. Extraocular movements intact. Facial sensation intact; reports decreased sensation to light touch on left. Facial strength symmetric. Palate elevation symmetric. Tongue protrusion midline.  -Motor: 5/5 throughout and symmetric except for:  Left upper extremity 4/5 wrist extension, 4/5 triceps extension  Left lower extremity 4-/5 knee flexion, 3/5 ankle flexion  -Sensation: Decreased to touch in left upper and lower extremities in comparison to right         Today I personally reviewed pertinent medications, lines/drains/airways, imaging, cardiology results, laboratory results, microbiology results,     Latest Reference Range & Units Most Recent   WBC 3.90 - 12.70 K/uL 7.48  5/26/23 08:51   RBC 4.00 - 5.40 M/uL 4.18  5/26/23 08:51   Hemoglobin 12.0 - 16.0 g/dL 13.5  5/26/23 08:51   Hematocrit 37.0 - 48.5 % 38.4  5/26/23 08:51   MCV 82 - 98 fL 92  5/26/23 08:51   MCH 27.0 - 31.0 pg 32.3 (H)  5/26/23 08:51   MCHC 32.0 - 36.0 g/dL 35.2  5/26/23 08:51   RDW 11.5 - 14.5 % 13.0  5/26/23 08:51   Platelets 150 - 450 K/uL 332  5/26/23 08:51   MPV 9.2 - 12.9 fL 9.2  5/26/23 08:51   Gran % 38.0 - 73.0 % 57.9  5/26/23 08:51   Lymph % 18.0 - 48.0 % 32.4  5/26/23 08:51   Mono % 4.0 - 15.0 % 6.8  5/26/23 08:51   Eosinophil % 0.0 - 8.0 % 1.5  5/26/23 08:51   Basophil % 0.0 - 1.9 % 0.7  5/26/23 08:51   Immature Granulocytes 0.0 - 0.5 % 0.7 (H)  5/26/23 08:51   Gran # (ANC) 1.8 - 7.7 K/uL 4.3  5/26/23 08:51   Lymph # 1.0 -  4.8 K/uL 2.4  5/26/23 08:51   Mono # 0.3 - 1.0 K/uL 0.5  5/26/23 08:51   Eos # 0.0 - 0.5 K/uL 0.1  5/26/23 08:51   Baso # 0.00 - 0.20 K/uL 0.05  5/26/23 08:51   Immature Grans (Abs) 0.00 - 0.04 K/uL 0.05 (H)  5/26/23 08:51   nRBC 0 /100 WBC 0  5/26/23 08:51   Differential Method  Automated  5/26/23 08:51   Protime 9.0 - 12.5 sec 10.0  5/25/23 19:42   INR 0.8 - 1.2  0.9  5/25/23 19:42   Sodium 136 - 145 mmol/L 139  5/26/23 08:51   Potassium 3.5 - 5.1 mmol/L 3.9  5/26/23 08:51   Chloride 95 - 110 mmol/L 106  5/26/23 08:51   CO2 23 - 29 mmol/L 25  5/26/23 08:51   Anion Gap 8 - 16 mmol/L 8  5/26/23 08:51   BUN 6 - 20 mg/dL 9  5/26/23 08:51   Creatinine 0.5 - 1.4 mg/dL 0.9  5/26/23 08:51   eGFR >60 mL/min/1.73 m^2 >60  5/26/23 08:51   Glucose 70 - 110 mg/dL 83  5/26/23 08:51   Calcium 8.7 - 10.5 mg/dL 9.3  5/26/23 08:51   Alkaline Phosphatase 55 - 135 U/L 55  5/25/23 19:42   PROTEIN TOTAL 6.0 - 8.4 g/dL 7.9  5/25/23 19:42   Albumin 3.5 - 5.2 g/dL 3.9  5/25/23 19:42   BILIRUBIN TOTAL 0.1 - 1.0 mg/dL 0.4  5/25/23 19:42   AST 10 - 40 U/L 17  5/25/23 19:42   ALT 10 - 44 U/L 14  5/25/23 19:42   Cholesterol 120 - 199 mg/dL 208 (H)  5/25/23 19:42   HDL 40 - 75 mg/dL 55  5/25/23 19:42   HDL/Cholesterol Ratio 20.0 - 50.0 % 26.4  5/25/23 19:42   LDL Cholesterol External 63.0 - 159.0 mg/dL 130.8  5/25/23 19:42   Non-HDL Cholesterol mg/dL 153  5/25/23 19:42   Total Cholesterol/HDL Ratio 2.0 - 5.0  3.8  5/25/23 19:42   Triglycerides 30 - 150 mg/dL 111  5/25/23 19:42   Troponin I 0.000 - 0.026 ng/mL <0.006  5/26/23 06:04   Hemoglobin A1C External 4.0 - 5.6 % 5.0  5/26/23 06:04   Estimated Avg Glucose 68 - 131 mg/dL 97  5/26/23 06:04   TSH 0.400 - 4.000 uIU/mL 0.767  5/25/23 19:42   POCT Glucose 70 - 110 mg/dL 81  5/26/23 11:20   CT HEAD WITHOUT CONTRAST  Rpt  5/26/23 10:13   CTA HEAD AND NECK (XPD)  Rpt  5/25/23 19:31   MRI BRAIN W WO CONTRAST  Rpt  5/26/23 13:33   CARDIAC MONITORING STRIPS  Rpt  5/26/23 12:47   (H): Data is  abnormally high  Rpt: View report in Results Review for more information    MRI Brain W WO Contrast 05/26/2023    Narrative  EXAMINATION:  MRI BRAIN W WO CONTRAST    CLINICAL HISTORY:  Neuro deficit, acute, stroke suspected;    TECHNIQUE:  Multiplanar multisequence MR imaging of the brain was performed before and after the administration of 10 mL Gadavist intravenous contrast.    COMPARISON:  CT head 05/26/2023.  CTA head neck 05/25/2023.    FINDINGS:  Ventricles are normal in size for age.  No hydrocephalus.    The brain demonstrates normal signal intensity.  No parenchymal mass, hemorrhage, edema or recent or remote major vascular distribution infarct.  No abnormal enhancing lesions.  Structures in the sellar region and craniocervical junction show no significant abnormalities.    No extra-axial blood or fluid collections.    The T2 skull base flow voids are preserved.  Bone marrow signal intensity unremarkable.    Impression  No acute intracranial abnormalities.    Electronically signed by resident: Pop Tsang  Date:    05/26/2023  Time:    13:39    Electronically signed by: Damir Doe MD  Date:    05/26/2023  Time:    15:37      CT Head Without Contrast 05/26/2023    Narrative  EXAMINATION:  CT HEAD WITHOUT CONTRAST    CLINICAL HISTORY:  Neuro deficit, acute, stroke suspected;s/p tenecteplase;    TECHNIQUE:  Low dose axial images were obtained through the head.  Coronal and sagittal reformations were also performed. Contrast was not administered.    COMPARISON:  CTA head neck 05/25/2023    FINDINGS:  BRAIN: The brain parenchyma demonstrates no significant abnormality. No intracranial mass or hemorrhage. No evidence of acute infarction.    CSF SPACES: Ventricles, sulci, and cisterns are normal in size and configuration.    VESSELS: The major intracranial vessels are unremarkable.    BONES: No fracture or focal osseous lesion. Paranasal sinuses and mastoid air cells are well aerated.    SOFT TISSUES:  Extracranial soft tissues are unremarkable.    Impression  No intracranial mass, hemorrhage, or evidence of acute infarction.      Electronically signed by: Paul Larsen  Date:    05/26/2023  Time:    10:23      CTA Head and Neck (xpd) 05/25/2023    Narrative  EXAMINATION:  CTA HEAD AND NECK (XPD)    CLINICAL HISTORY:  Left arm weakness, numbess;    TECHNIQUE:  Non contrast low dose axial images were obtained through the head. CT angiogram was performed from the level of the prince to the top of the head following the IV administration of 75mL of Omnipaque 350.   Sagittal and coronal reconstructions and maximum intensity projection reconstructions were performed. Arterial stenosis percentages are based on NASCET measurement criteria.    COMPARISON:  None    FINDINGS:  Intracranial Compartment:    Ventricles and sulci are normal in size for age without evidence of hydrocephalus. No extra-axial blood or fluid collections.    The brain parenchyma appears normal. No parenchymal mass, hemorrhage, edema, or major vascular distribution infarct.    Skull/Extracranial Contents (limited evaluation): No fracture. Mastoid air cells and paranasal sinuses are essentially clear.    Non-Vascular Structures of the Neck/Thoracic Inlet (limited evaluation): Normal.    Aorta: Normal 3 vessel arch.    Extracranial carotid circulation: No hemodynamically significant stenosis, aneurysmal dilatation, or dissection.    Extracranial vertebral circulation: No hemodynamically significant stenosis, aneurysmal dilatation, or dissection.    Intracranial Arteries: No focal high-grade stenosis, occlusion, or aneurysm.    Venous structures (limited evaluation): Normal.    Impression  No acute abnormality. No large vessel occlusion.      Electronically signed by: Marleen Oliver  Date:    05/25/2023  Time:    20:11

## 2023-05-26 NOTE — H&P
[facility]  Neurocritical Care  History & Physical    Admit Date: 5/25/2023  Service Date: 05/26/2023  Length of Stay: 1    Subjective:     Chief Complaint: Stroke aborted by administration of thrombolytic agent    History of Present Illness: Mrs Marlyn Camacho is a 43 y.o. female with history of hypertension and obesity admitted to Neurocritical Care as a transfer from [facility] for post TNK monitoring after initial presentation to [facility] ED with acute onset left sided weakness/numbness concerning for acute ischemic stroke.     Past Medical History:   Diagnosis Date    Abnormal Pap smear of cervix     Allergy     Hypertension     Joint pain     Keloid cicatrix      Past Surgical History:   Procedure Laterality Date    CKC, ECC, fractional D&C      COLPOSCOPY      CONIZATION-CERVIX  02/2015    DILATION AND CURETTAGE OF UTERUS      HYSTERECTOMY  03/2017    AUB, fibroids    TUBAL LIGATION        No current facility-administered medications on file prior to encounter.     Current Outpatient Medications on File Prior to Encounter   Medication Sig Dispense Refill    hydroCHLOROthiazide (HYDRODIURIL) 25 MG tablet TAKE 1 TABLET(25 MG) BY MOUTH EVERY DAY 90 tablet 3    sulindac (CLINORIL) 150 MG tablet Take 1 tablet (150 mg total) by mouth 2 (two) times daily. (Patient not taking: Reported on 11/9/2022) 10 tablet 0      Allergies: Patient has no known allergies.    Family History   Problem Relation Age of Onset    Eczema Mother     Ovarian cancer Mother     Hypertension Mother     Breast cancer Maternal Aunt         THREE AUNTS    Breast cancer Maternal Aunt     Breast cancer Maternal Aunt     Eczema Maternal Grandmother     Eczema Son     Eczema Son     Melanoma Neg Hx     Lupus Neg Hx     Psoriasis Neg Hx     Colon cancer Neg Hx      Social History     Tobacco Use    Smoking status: Never    Smokeless tobacco: Never   Substance Use Topics    Alcohol use: Yes     Comment: occasional     Drug use: No     Review of Systems   Constitutional:  Negative for appetite change, chills, fatigue and fever.   HENT:  Negative for congestion and sore throat.    Eyes:  Negative for photophobia, redness and visual disturbance.   Respiratory:  Negative for cough, chest tightness and shortness of breath.    Cardiovascular:  Positive for chest pain (resolved). Negative for palpitations and leg swelling.   Gastrointestinal:  Negative for abdominal pain, constipation, diarrhea, nausea and vomiting.   Endocrine: Negative for cold intolerance and heat intolerance.   Genitourinary:  Negative for dysuria, flank pain and frequency.   Musculoskeletal:  Negative for back pain and joint swelling.   Skin:  Negative for rash and wound.   Neurological:  Positive for weakness (improved), numbness (Left sided; face, upper and lower extremity) and headaches. Negative for dizziness, seizures and syncope.   Psychiatric/Behavioral:  Negative for behavioral problems and confusion.      Objective:     Vitals:    Temp: 98.1 °F (36.7 °C)  Pulse: 76  Rhythm: normal sinus rhythm  BP: 109/69  MAP (mmHg): 84  Resp: (!) 23  SpO2: 100 %    Temp  Min: 98.1 °F (36.7 °C)  Max: 98.6 °F (37 °C)  Pulse  Min: 58  Max: 102  BP  Min: 101/63  Max: 162/100  MAP (mmHg)  Min: 77  Max: 125  Resp  Min: 13  Max: 32  SpO2  Min: 96 %  Max: 100 %    No intake/output data recorded.   Unmeasured Output  Urine Occurrence: 1        Physical Exam  Vitals and nursing note reviewed.   Constitutional:       General: She is awake. She is not in acute distress.     Appearance: Normal appearance. She is obese. She is not ill-appearing, toxic-appearing or diaphoretic.   HENT:      Head: Normocephalic and atraumatic.      Right Ear: External ear normal.      Left Ear: External ear normal.      Nose: Nose normal.      Mouth/Throat:      Mouth: Mucous membranes are moist.      Pharynx: Oropharynx is clear.   Eyes:      Extraocular Movements: Extraocular movements intact.       Conjunctiva/sclera: Conjunctivae normal.      Pupils: Pupils are equal, round, and reactive to light.   Cardiovascular:      Rate and Rhythm: Normal rate and regular rhythm.      Pulses: Normal pulses.      Heart sounds: Normal heart sounds.   Pulmonary:      Effort: Pulmonary effort is normal. No respiratory distress.   Abdominal:      General: There is no distension.      Palpations: Abdomen is soft.      Tenderness: There is no abdominal tenderness.   Musculoskeletal:         General: No swelling, tenderness or deformity. Normal range of motion.      Cervical back: Normal range of motion.   Skin:     General: Skin is warm and dry.      Capillary Refill: Capillary refill takes less than 2 seconds.   Neurological:      Mental Status: She is alert and oriented to person, place, and time.   Psychiatric:         Mood and Affect: Mood normal.         Behavior: Behavior normal. Behavior is cooperative.     Neurologic:  -GCS [id number]  -Mental status: Alert. Oriented. Speech fluent. Follows commands.  -Cranial nerves: Visual fields full. Pupils equal, round, and reactive bilateral. Extraocular movements intact. Facial sensation intact; reports decreased sensation to light touch on left. Facial strength symmetric. Palate elevation symmetric. Tongue protrusion midline.  -Motor: 5/5 throughout and symmetric except for:  Left upper extremity 4/5 wrist extension, 4/5 triceps extension  Left lower extremity 4-/5 knee flexion, 3/5 ankle flexion  -Sensation: Decreased to touch in left upper and lower extremities in comparison to right         Today I personally reviewed pertinent medications, lines/drains/airways, imaging, cardiology results, laboratory results, microbiology results,     Latest Reference Range & Units Most Recent   WBC 3.90 - 12.70 K/uL 7.48  5/26/23 08:51   RBC 4.00 - 5.40 M/uL 4.18  5/26/23 08:51   Hemoglobin 12.0 - 16.0 g/dL 13.5  5/26/23 08:51   Hematocrit 37.0 - 48.5 % 38.4  5/26/23 08:51   MCV 82 - 98 fL  92  5/26/23 08:51   MCH 27.0 - 31.0 pg 32.3 (H)  5/26/23 08:51   MCHC 32.0 - 36.0 g/dL 35.2  5/26/23 08:51   RDW 11.5 - 14.5 % 13.0  5/26/23 08:51   Platelets 150 - 450 K/uL 332  5/26/23 08:51   MPV 9.2 - 12.9 fL 9.2  5/26/23 08:51   Gran % 38.0 - 73.0 % 57.9  5/26/23 08:51   Lymph % 18.0 - 48.0 % 32.4  5/26/23 08:51   Mono % 4.0 - 15.0 % 6.8  5/26/23 08:51   Eosinophil % 0.0 - 8.0 % 1.5  5/26/23 08:51   Basophil % 0.0 - 1.9 % 0.7  5/26/23 08:51   Immature Granulocytes 0.0 - 0.5 % 0.7 (H)  5/26/23 08:51   Gran # (ANC) 1.8 - 7.7 K/uL 4.3  5/26/23 08:51   Lymph # 1.0 - 4.8 K/uL 2.4  5/26/23 08:51   Mono # 0.3 - 1.0 K/uL 0.5  5/26/23 08:51   Eos # 0.0 - 0.5 K/uL 0.1  5/26/23 08:51   Baso # 0.00 - 0.20 K/uL 0.05  5/26/23 08:51   Immature Grans (Abs) 0.00 - 0.04 K/uL 0.05 (H)  5/26/23 08:51   nRBC 0 /100 WBC 0  5/26/23 08:51   Differential Method  Automated  5/26/23 08:51   Protime 9.0 - 12.5 sec 10.0  5/25/23 19:42   INR 0.8 - 1.2  0.9  5/25/23 19:42   Sodium 136 - 145 mmol/L 139  5/26/23 08:51   Potassium 3.5 - 5.1 mmol/L 3.9  5/26/23 08:51   Chloride 95 - 110 mmol/L 106  5/26/23 08:51   CO2 23 - 29 mmol/L 25  5/26/23 08:51   Anion Gap 8 - 16 mmol/L 8  5/26/23 08:51   BUN 6 - 20 mg/dL 9  5/26/23 08:51   Creatinine 0.5 - 1.4 mg/dL 0.9  5/26/23 08:51   eGFR >60 mL/min/1.73 m^2 >60  5/26/23 08:51   Glucose 70 - 110 mg/dL 83  5/26/23 08:51   Calcium 8.7 - 10.5 mg/dL 9.3  5/26/23 08:51   Alkaline Phosphatase 55 - 135 U/L 55  5/25/23 19:42   PROTEIN TOTAL 6.0 - 8.4 g/dL 7.9  5/25/23 19:42   Albumin 3.5 - 5.2 g/dL 3.9  5/25/23 19:42   BILIRUBIN TOTAL 0.1 - 1.0 mg/dL 0.4  5/25/23 19:42   AST 10 - 40 U/L 17  5/25/23 19:42   ALT 10 - 44 U/L 14  5/25/23 19:42   Cholesterol 120 - 199 mg/dL 208 (H)  5/25/23 19:42   HDL 40 - 75 mg/dL 55  5/25/23 19:42   HDL/Cholesterol Ratio 20.0 - 50.0 % 26.4  5/25/23 19:42   LDL Cholesterol External 63.0 - 159.0 mg/dL 130.8  5/25/23 19:42   Non-HDL Cholesterol mg/dL 153  5/25/23 19:42   Total  Cholesterol/HDL Ratio 2.0 - 5.0  3.8  5/25/23 19:42   Triglycerides 30 - 150 mg/dL 111  5/25/23 19:42   Troponin I 0.000 - 0.026 ng/mL <0.006  5/26/23 06:04   Hemoglobin A1C External 4.0 - 5.6 % 5.0  5/26/23 06:04   Estimated Avg Glucose 68 - 131 mg/dL 97  5/26/23 06:04   TSH 0.400 - 4.000 uIU/mL 0.767  5/25/23 19:42   POCT Glucose 70 - 110 mg/dL 81  5/26/23 11:20   CT HEAD WITHOUT CONTRAST  Rpt  5/26/23 10:13   CTA HEAD AND NECK (XPD)  Rpt  5/25/23 19:31   MRI BRAIN W WO CONTRAST  Rpt  5/26/23 13:33   CARDIAC MONITORING STRIPS  Rpt  5/26/23 12:47   (H): Data is abnormally high  Rpt: View report in Results Review for more information    MRI Brain W WO Contrast 05/26/2023    Narrative  EXAMINATION:  MRI BRAIN W WO CONTRAST    CLINICAL HISTORY:  Neuro deficit, acute, stroke suspected;    TECHNIQUE:  Multiplanar multisequence MR imaging of the brain was performed before and after the administration of 10 mL Gadavist intravenous contrast.    COMPARISON:  CT head 05/26/2023.  CTA head neck 05/25/2023.    FINDINGS:  Ventricles are normal in size for age.  No hydrocephalus.    The brain demonstrates normal signal intensity.  No parenchymal mass, hemorrhage, edema or recent or remote major vascular distribution infarct.  No abnormal enhancing lesions.  Structures in the sellar region and craniocervical junction show no significant abnormalities.    No extra-axial blood or fluid collections.    The T2 skull base flow voids are preserved.  Bone marrow signal intensity unremarkable.    Impression  No acute intracranial abnormalities.    Electronically signed by resident: Pop Tsang  Date:    05/26/2023  Time:    13:39    Electronically signed by: Damir Doe MD  Date:    05/26/2023  Time:    15:37      CT Head Without Contrast 05/26/2023    Narrative  EXAMINATION:  CT HEAD WITHOUT CONTRAST    CLINICAL HISTORY:  Neuro deficit, acute, stroke suspected;s/p tenecteplase;    TECHNIQUE:  Low dose axial images were obtained  through the head.  Coronal and sagittal reformations were also performed. Contrast was not administered.    COMPARISON:  CTA head neck 05/25/2023    FINDINGS:  BRAIN: The brain parenchyma demonstrates no significant abnormality. No intracranial mass or hemorrhage. No evidence of acute infarction.    CSF SPACES: Ventricles, sulci, and cisterns are normal in size and configuration.    VESSELS: The major intracranial vessels are unremarkable.    BONES: No fracture or focal osseous lesion. Paranasal sinuses and mastoid air cells are well aerated.    SOFT TISSUES: Extracranial soft tissues are unremarkable.    Impression  No intracranial mass, hemorrhage, or evidence of acute infarction.      Electronically signed by: Paul Larsen  Date:    05/26/2023  Time:    10:23      CTA Head and Neck (xpd) 05/25/2023    Narrative  EXAMINATION:  CTA HEAD AND NECK (XPD)    CLINICAL HISTORY:  Left arm weakness, numbess;    TECHNIQUE:  Non contrast low dose axial images were obtained through the head. CT angiogram was performed from the level of the prince to the top of the head following the IV administration of 75mL of Omnipaque 350.   Sagittal and coronal reconstructions and maximum intensity projection reconstructions were performed. Arterial stenosis percentages are based on NASCET measurement criteria.    COMPARISON:  None    FINDINGS:  Intracranial Compartment:    Ventricles and sulci are normal in size for age without evidence of hydrocephalus. No extra-axial blood or fluid collections.    The brain parenchyma appears normal. No parenchymal mass, hemorrhage, edema, or major vascular distribution infarct.    Skull/Extracranial Contents (limited evaluation): No fracture. Mastoid air cells and paranasal sinuses are essentially clear.    Non-Vascular Structures of the Neck/Thoracic Inlet (limited evaluation): Normal.    Aorta: Normal 3 vessel arch.    Extracranial carotid circulation: No hemodynamically significant stenosis,  aneurysmal dilatation, or dissection.    Extracranial vertebral circulation: No hemodynamically significant stenosis, aneurysmal dilatation, or dissection.    Intracranial Arteries: No focal high-grade stenosis, occlusion, or aneurysm.    Venous structures (limited evaluation): Normal.    Impression  No acute abnormality. No large vessel occlusion.      Electronically signed by: Marleen Oliver  Date:    05/25/2023  Time:    20:11      Assessment/Plan:     Neuro  * Stroke aborted by administration of thrombolytic agent  43 year old woman admitted as a transfer for post TNK care/monitoring. Vital signs stable and within normal limits on arrival to unit. Reports headache which improves with PRN tylenol. Neurologic exam notable for decreased sensation of left facial/upper extremity/lower extremity, mild left upper extremity drift with distraction, and give way left upper extremity extensor/left lower extremity flexor weakness. Serum labs grossly within normal. NCCTH, CTA H/N, and MRI Brain W WO Contrast without acute intracranial pathology/large vessel stenosis/large vessel occlusion. Unclear if stroke aborted with TNK vs stroke mimic. Vascular Neurology Consulted. Nonetheless, will monitor in NCC unit x 24 hrs post thrombolytic therapy; stable for stepdown otherwise.     Antithrombotics for secondary stroke prevention: Antiplatelets: None: Hold all Antithrombotics x 24 hours after IV Thrombolytic therapy administration    Statins for secondary stroke prevention and hyperlipidemia, if present:   Statins: Atorvastatin- 40 mg daily    Aggressive risk factor modification: HTN, HLD, Diet, Exercise, Obesity     Rehab efforts: The patient has been evaluated by a stroke team provider and the therapy needs have been fully considered based off the presenting complaints and exam findings. The following therapy evaluations are needed: PT evaluate and treat, OT evaluate and treat    Diagnostics ordered/pending: TTE to assess  cardiac function/status     VTE prophylaxis: None: Reason for No Pharmacological VTE Prophylaxis: Holding x 24 hours s/p treatment with Thrombolytic therapy    BP parameters: Infarct: Post Thrombolytic therapy, SBP <180      Cardiac/Vascular  Chest pain  - resolved    Essential hypertension  - Post Thrombolytic therapy, SBP <180     Renal/  Hypokalemia  - replace PRN    Endocrine  Severe obesity (BMI 35.0-39.9) with comorbidity  - Dietary/Nutrition Consults    Orthopedic  Weakness of left lower extremity  - see principle problem    Weakness of left upper extremity  - see principle problem    Other  Paresthesia of left upper and lower extremity  - see principle problem          The patient is being Prophylaxed for:  Venous Thromboembolism with: Mechanical; holding chemical x 24 hours post thrombolytic therapy  Stress Ulcer with: Not Applicable   Ventilator Pneumonia with: not applicable    Activity Orders            Diet Cardiac: Cardiac starting at 05/26 0924    Progressive Mobility Protocol (mobilize patient to their highest level of functioning at least twice daily) starting at 05/26 0800    Bed rest starting at 05/26 0449          Full Code    Jerson Winters MD  Neurocritical Care  [facility]

## 2023-05-26 NOTE — CARE UPDATE
Chart reviewed and patient was seen and examined.  Ms. Camacho is a 43 year old woman with hypertension, obesity and snoring who presented for evaluation of acute onset left arm and leg weakness and numbness.  CTA head was unremarkable and she received TNK for presumed acute ischemic stroke.  She was admitted to our ICU and monitored overnight.  Today she complains of mild headache (improved with tylenol) and notes some improvement in left arm strength, but left leg remains weaker than right.  Left foot and shin also has some slight diminished sensation to light touch as well as paresthesias in her foot.  Her blood pressure remains normal.  She is not anemic or thrombocytopenic and aside from one aunt who had strokes in there 30s and 40s, has no family history of strokes.  She does not smoke tobacco.  Her LDL is 130.  A1c is pending.  CTA head and neck on admission was unremarkable.  Repeat CT Head today remains unremarkable.  Thus far no evidence of cardiac arrhythmia.  MRI and echo with bubble are ordered.  Neurology, PT, OT and SLP consults are placed.  Noting her obesity and nocturnal snoring, TITA may play a role here and she will need referral to Sleep Medicine at discharge for sleep study.  I discussed her care with Dr. Jiang, on-call at [facility] for Neuro-critical care.  She will require at least 24 more hours in ICU and we need to ensure SBP remains less than 180.  Need to continue hourly neuro-checks.  No anti-platelets until at least 24 hours after receiving TNK.  Continue statin and current anti-hypertensives.  She is being transferred to [facility] Neuro-critical care which is our standard of care for patients receiving TNK after stroke.  I discussed with the patient and her daughter and updated them to plan of care and they were in agreement.  35 minutes critical care time spent today.

## 2023-05-26 NOTE — ASSESSMENT & PLAN NOTE
She presented with retrosternal chest pain  No was said to be dull,non radiating  Improved on its own  Troponin x 2 was within normal limit  Got TNK for suspected ischemic stroke  Will repeat serial troponin x 1  EKG showed no acute st-t wave changes

## 2023-05-26 NOTE — PROGRESS NOTES
42 yo femal non smoker w/ PMHx HTN, HL here w/ new onset L arm wkness and numbness now sp tPA.     In the ICU for observation.     HD stable. C/o headache but stable now.     Glu 86  All other labs normal.     K 3.3  Potassium replacement ordered.     NPO  Aspiration precautions    Q1h neuro checks.     CTA head and neck:  - No acute abnormality. No large vessel occlusion.    DVT prophy w/ SCDs.   Neuro opinion in am.     Coags normal, would f/up for s/s bleeding.     PRN IV Hydralazine ordered for BP control.     Please call for further assistance.     Follow up note:  - RN reporting pt is able to swallow well without issues.   Requesting to change to PO Kcl, done as per her request.

## 2023-05-26 NOTE — NURSING TRANSFER
Nursing Transfer Note      5/26/2023     Reason patient is being transferred: CVA, transfer to main [place]      Transfer To: [facility], [facility]    Transfer via stretcher, ambulance    Transfer with cardiac monitoring    Transported by [facility]    Telemetry: Telemetry  EMS    Medicines sent: none    Any special needs or follow-up needed: none    Chart send with patient: Yes    Notified: daughter

## 2023-05-27 LAB
ALBUMIN SERPL BCP-MCNC: 3.5 G/DL (ref 3.5–5.2)
ALP SERPL-CCNC: 51 U/L (ref 55–135)
ALT SERPL W/O P-5'-P-CCNC: 11 U/L (ref 10–44)
ANION GAP SERPL CALC-SCNC: 11 MMOL/L (ref 8–16)
AST SERPL-CCNC: 12 U/L (ref 10–40)
BASOPHILS # BLD AUTO: 0.06 K/UL (ref 0–0.2)
BASOPHILS NFR BLD: 0.8 % (ref 0–1.9)
BILIRUB SERPL-MCNC: 0.5 MG/DL (ref 0.1–1)
BUN SERPL-MCNC: 10 MG/DL (ref 6–20)
CALCIUM SERPL-MCNC: 9 MG/DL (ref 8.7–10.5)
CHLORIDE SERPL-SCNC: 104 MMOL/L (ref 95–110)
CO2 SERPL-SCNC: 23 MMOL/L (ref 23–29)
CREAT SERPL-MCNC: 0.9 MG/DL (ref 0.5–1.4)
DIFFERENTIAL METHOD: ABNORMAL
EOSINOPHIL # BLD AUTO: 0.2 K/UL (ref 0–0.5)
EOSINOPHIL NFR BLD: 1.9 % (ref 0–8)
ERYTHROCYTE [DISTWIDTH] IN BLOOD BY AUTOMATED COUNT: 13.2 % (ref 11.5–14.5)
EST. GFR  (NO RACE VARIABLE): >60 ML/MIN/1.73 M^2
GLUCOSE SERPL-MCNC: 91 MG/DL (ref 70–110)
HCT VFR BLD AUTO: 39.9 % (ref 37–48.5)
HGB BLD-MCNC: 13.2 G/DL (ref 12–16)
IMM GRANULOCYTES # BLD AUTO: 0.05 K/UL (ref 0–0.04)
IMM GRANULOCYTES NFR BLD AUTO: 0.6 % (ref 0–0.5)
LYMPHOCYTES # BLD AUTO: 2.9 K/UL (ref 1–4.8)
LYMPHOCYTES NFR BLD: 36.8 % (ref 18–48)
MAGNESIUM SERPL-MCNC: 2.3 MG/DL (ref 1.6–2.6)
MCH RBC QN AUTO: 31.1 PG (ref 27–31)
MCHC RBC AUTO-ENTMCNC: 33.1 G/DL (ref 32–36)
MCV RBC AUTO: 94 FL (ref 82–98)
MONOCYTES # BLD AUTO: 0.5 K/UL (ref 0.3–1)
MONOCYTES NFR BLD: 6.4 % (ref 4–15)
NEUTROPHILS # BLD AUTO: 4.2 K/UL (ref 1.8–7.7)
NEUTROPHILS NFR BLD: 53.5 % (ref 38–73)
NRBC BLD-RTO: 0 /100 WBC
PHOSPHATE SERPL-MCNC: 4.6 MG/DL (ref 2.7–4.5)
PLATELET # BLD AUTO: 345 K/UL (ref 150–450)
PMV BLD AUTO: 9.6 FL (ref 9.2–12.9)
POTASSIUM SERPL-SCNC: 4.2 MMOL/L (ref 3.5–5.1)
PROT SERPL-MCNC: 6.5 G/DL (ref 6–8.4)
RBC # BLD AUTO: 4.25 M/UL (ref 4–5.4)
SODIUM SERPL-SCNC: 138 MMOL/L (ref 136–145)
WBC # BLD AUTO: 7.78 K/UL (ref 3.9–12.7)

## 2023-05-27 PROCEDURE — 94761 N-INVAS EAR/PLS OXIMETRY MLT: CPT

## 2023-05-27 PROCEDURE — 97161 PT EVAL LOW COMPLEX 20 MIN: CPT

## 2023-05-27 PROCEDURE — 11000001 HC ACUTE MED/SURG PRIVATE ROOM

## 2023-05-27 PROCEDURE — 99233 PR SUBSEQUENT HOSPITAL CARE,LEVL III: ICD-10-PCS | Mod: ,,, | Performed by: PSYCHIATRY & NEUROLOGY

## 2023-05-27 PROCEDURE — 99233 PR SUBSEQUENT HOSPITAL CARE,LEVL III: ICD-10-PCS | Mod: ,,, | Performed by: STUDENT IN AN ORGANIZED HEALTH CARE EDUCATION/TRAINING PROGRAM

## 2023-05-27 PROCEDURE — 25000003 PHARM REV CODE 250: Performed by: HOSPITALIST

## 2023-05-27 PROCEDURE — 85025 COMPLETE CBC W/AUTO DIFF WBC: CPT | Performed by: HOSPITALIST

## 2023-05-27 PROCEDURE — 80053 COMPREHEN METABOLIC PANEL: CPT | Performed by: PSYCHIATRY & NEUROLOGY

## 2023-05-27 PROCEDURE — 97165 OT EVAL LOW COMPLEX 30 MIN: CPT

## 2023-05-27 PROCEDURE — 99233 SBSQ HOSP IP/OBS HIGH 50: CPT | Mod: ,,, | Performed by: STUDENT IN AN ORGANIZED HEALTH CARE EDUCATION/TRAINING PROGRAM

## 2023-05-27 PROCEDURE — 25000003 PHARM REV CODE 250: Performed by: STUDENT IN AN ORGANIZED HEALTH CARE EDUCATION/TRAINING PROGRAM

## 2023-05-27 PROCEDURE — 84100 ASSAY OF PHOSPHORUS: CPT | Performed by: PSYCHIATRY & NEUROLOGY

## 2023-05-27 PROCEDURE — 63600175 PHARM REV CODE 636 W HCPCS: Performed by: PSYCHIATRY & NEUROLOGY

## 2023-05-27 PROCEDURE — 83735 ASSAY OF MAGNESIUM: CPT | Performed by: PSYCHIATRY & NEUROLOGY

## 2023-05-27 PROCEDURE — 25000003 PHARM REV CODE 250: Performed by: PSYCHIATRY & NEUROLOGY

## 2023-05-27 PROCEDURE — 99233 SBSQ HOSP IP/OBS HIGH 50: CPT | Mod: ,,, | Performed by: PSYCHIATRY & NEUROLOGY

## 2023-05-27 RX ORDER — HEPARIN SODIUM 5000 [USP'U]/ML
5000 INJECTION, SOLUTION INTRAVENOUS; SUBCUTANEOUS EVERY 8 HOURS
Status: DISCONTINUED | OUTPATIENT
Start: 2023-05-27 | End: 2023-05-28 | Stop reason: HOSPADM

## 2023-05-27 RX ADMIN — MUPIROCIN: 20 OINTMENT TOPICAL at 09:05

## 2023-05-27 RX ADMIN — ACETAMINOPHEN 650 MG: 325 TABLET ORAL at 05:05

## 2023-05-27 RX ADMIN — ACETAMINOPHEN 650 MG: 325 TABLET ORAL at 11:05

## 2023-05-27 RX ADMIN — HEPARIN SODIUM 5000 UNITS: 5000 INJECTION INTRAVENOUS; SUBCUTANEOUS at 09:05

## 2023-05-27 RX ADMIN — ATORVASTATIN CALCIUM 40 MG: 40 TABLET, FILM COATED ORAL at 09:05

## 2023-05-27 RX ADMIN — HYDROCHLOROTHIAZIDE 25 MG: 25 TABLET ORAL at 11:05

## 2023-05-27 RX ADMIN — HEPARIN SODIUM 5000 UNITS: 5000 INJECTION INTRAVENOUS; SUBCUTANEOUS at 01:05

## 2023-05-27 NOTE — ASSESSMENT & PLAN NOTE
Continues to report LSW and paresthesia, MRI negative for acute infarct  MRI cervical spine pending  PT/OT eval and treat  Dispo recs for home with no further therapy needs

## 2023-05-27 NOTE — PROGRESS NOTES
[facility] Neuro Critical [facility]  Neurocritical Care  Progress Note    Admit Date: 5/25/2023  Service Date: 05/27/2023  Length of Stay: 2    Subjective:     Chief Complaint: Stroke aborted by administration of thrombolytic agent    History of Present Illness: Mrs Marlyn Camacho is a 43 y.o. female with history of hypertension and obesity admitted to Neurocritical Care as a transfer from [facility] for post TNK monitoring after initial presentation to [facility] ED with acute onset left sided weakness/numbness concerning for acute ischemic stroke.     05/27/2023: NAEON, MRI with no stoke noted, Echo pending; will get MRI c spine given persistent weakness/numbness. Pending Echo. Stable for stepdown to stroke    Review of Systems   Constitutional:  Negative for appetite change, chills, fatigue and fever.   HENT:  Negative for congestion and sore throat.    Eyes:  Negative for photophobia, redness and visual disturbance.   Respiratory:  Negative for cough, chest tightness and shortness of breath.    Cardiovascular:  Positive for chest pain (resolved). Negative for palpitations and leg swelling.   Gastrointestinal:  Negative for abdominal pain, constipation, diarrhea, nausea and vomiting.   Endocrine: Negative for cold intolerance and heat intolerance.   Genitourinary:  Negative for dysuria, flank pain and frequency.   Musculoskeletal:  Negative for back pain and joint swelling.   Skin:  Negative for rash and wound.   Neurological:  Positive for weakness (improved), numbness (Left sided; face, upper and lower extremity) (improved),and headaches (improved). Negative for dizziness, seizures and syncope.   Psychiatric/Behavioral:  Negative for behavioral problems and confusion.       Objective:      Vitals:  Vitals:    05/27/23 0750 05/27/23 0800 05/27/23 0900 05/27/23 1000   BP:  111/73 124/69 117/80   BP Location:       Patient Position:       Pulse: 70 68 75 77   Resp: 12 16 (!) 27 20   Temp:       TempSrc:        SpO2: 96% 96% 100% 98%   Weight:       Height:              Physical Exam  Neurologic:  -GCS [id number]  -Mental status: Alert. Oriented. Speech fluent. Follows commands.  -Cranial nerves: Visual fields full. Pupils equal, round, and reactive bilateral. Extraocular movements intact. Facial sensation intact; Facial strength symmetric. Palate elevation symmetric. Tongue protrusion midline.  -Motor: 5/5 throughout and symmetric except for: Lt hand 4/5 wrist flexion, extension, finger adduction and abduction.  -Sensation: intact throughout    Today I personally reviewed pertinent medications, lines/drains/airways, imaging, cardiology results, laboratory results, microbiology results      MRI Brain W WO Contrast 05/26/2023     Narrative  EXAMINATION:  MRI BRAIN W WO CONTRAST     CLINICAL HISTORY:  Neuro deficit, acute, stroke suspected;     TECHNIQUE:  Multiplanar multisequence MR imaging of the brain was performed before and after the administration of 10 mL Gadavist intravenous contrast.     COMPARISON:  CT head 05/26/2023.  CTA head neck 05/25/2023.     FINDINGS:  Ventricles are normal in size for age.  No hydrocephalus.     The brain demonstrates normal signal intensity.  No parenchymal mass, hemorrhage, edema or recent or remote major vascular distribution infarct.  No abnormal enhancing lesions.  Structures in the sellar region and craniocervical junction show no significant abnormalities.     No extra-axial blood or fluid collections.     The T2 skull base flow voids are preserved.  Bone marrow signal intensity unremarkable.     Impression  No acute intracranial abnormalities.     Electronically signed by resident: Pop Tsang  Date:                                                05/26/2023  Time:                                               13:39     Electronically signed by:     Damir Doe MD  Date:                                                05/26/2023  Time:                                                15:37        CT Head Without Contrast 05/26/2023     Narrative  EXAMINATION:  CT HEAD WITHOUT CONTRAST     CLINICAL HISTORY:  Neuro deficit, acute, stroke suspected;s/p tenecteplase;     TECHNIQUE:  Low dose axial images were obtained through the head.  Coronal and sagittal reformations were also performed. Contrast was not administered.     COMPARISON:  CTA head neck 05/25/2023     FINDINGS:  BRAIN: The brain parenchyma demonstrates no significant abnormality. No intracranial mass or hemorrhage. No evidence of acute infarction.     CSF SPACES: Ventricles, sulci, and cisterns are normal in size and configuration.     VESSELS: The major intracranial vessels are unremarkable.     BONES: No fracture or focal osseous lesion. Paranasal sinuses and mastoid air cells are well aerated.     SOFT TISSUES: Extracranial soft tissues are unremarkable.     Impression  No intracranial mass, hemorrhage, or evidence of acute infarction.        Electronically signed by:     Paul Larsen  Date:                                                05/26/2023  Time:                                               10:23        CTA Head and Neck (xpd) 05/25/2023     Narrative  EXAMINATION:  CTA HEAD AND NECK (XPD)     CLINICAL HISTORY:  Left arm weakness, numbess;     TECHNIQUE:  Non contrast low dose axial images were obtained through the head. CT angiogram was performed from the level of the prince to the top of the head following the IV administration of 75mL of Omnipaque 350.   Sagittal and coronal reconstructions and maximum intensity projection reconstructions were performed. Arterial stenosis percentages are based on NASCET measurement criteria.     COMPARISON:  None     FINDINGS:  Intracranial Compartment:     Ventricles and sulci are normal in size for age without evidence of hydrocephalus. No extra-axial blood or fluid collections.     The brain parenchyma appears normal. No parenchymal mass, hemorrhage, edema, or major vascular  distribution infarct.     Skull/Extracranial Contents (limited evaluation): No fracture. Mastoid air cells and paranasal sinuses are essentially clear.     Non-Vascular Structures of the Neck/Thoracic Inlet (limited evaluation): Normal.     Aorta: Normal 3 vessel arch.     Extracranial carotid circulation: No hemodynamically significant stenosis, aneurysmal dilatation, or dissection.     Extracranial vertebral circulation: No hemodynamically significant stenosis, aneurysmal dilatation, or dissection.     Intracranial Arteries: No focal high-grade stenosis, occlusion, or aneurysm.     Venous structures (limited evaluation): Normal.     Impression  No acute abnormality. No large vessel occlusion.        Electronically signed by:     Marleen Oliver  Date:                                                05/25/2023  Time:                                               20:11        Assessment/Plan:      Neuro  Acute neurological deficits  43 year old woman admitted as a transfer for post TNK care/monitoring. Vital signs stable and within normal limits on arrival to unit. Reports headache which improves with PRN tylenol. Neurologic exam notable for decreased sensation of left facial/upper extremity/lower extremity, mild left upper extremity drift with distraction, and give way left upper extremity extensor/left lower extremity flexor weakness. Serum labs grossly within normal. NCCTH, CTA H/N, and MRI Brain W WO Contrast without acute intracranial pathology/large vessel stenosis/large vessel occlusion. Unclear if stroke aborted with TNK vs stroke mimic. Vascular Neurology Consulted. Nonetheless, will monitor in NCC unit x 24 hrs post thrombolytic therapy; stable for stepdown otherwise.      Antithrombotics for secondary stroke prevention:  will discuss with vascular neurology     Statins for secondary stroke prevention and hyperlipidemia, if present:   Statins: Atorvastatin- 40 mg daily     Aggressive risk factor  modification: HTN, HLD, Diet, Exercise, Obesity     Rehab efforts: The patient has been evaluated by a stroke team provider and the therapy needs have been fully considered based off the presenting complaints and exam findings. The following therapy evaluations are needed: PT evaluate and treat, OT evaluate and treat     Diagnostics ordered/pending: TTE to assess cardiac function/status      VTE prophylaxis: [facility]     BP parameters: Infarct: Post Thrombolytic therapy, SBP <180        Cardiac/Vascular  Chest pain  - resolved     Essential hypertension  - Post Thrombolytic therapy, SBP <180      Renal/  Hypokalemia  - replace PRN     Endocrine  Severe obesity (BMI 35.0-39.9) with comorbidity  - Dietary/Nutrition Consults     Orthopedic  Weakness of left lower extremity  - see principle problem     Weakness of left upper extremity  - see principle problem     Other  Paresthesia of left upper and lower extremity  - see principle problem            The patient is being Prophylaxed for:  Venous Thromboembolism with: Chemical   Stress Ulcer with: Not Applicable   Ventilator Pneumonia with: not applicable  Activity Orders            Diet Cardiac: Cardiac starting at 05/26 0924    Progressive Mobility Protocol (mobilize patient to their highest level of functioning at least twice daily) starting at 05/26 0800          Full Code    Bony Longo MD  Neurocritical Care  [facility] - Neuro Critical Care

## 2023-05-27 NOTE — HOSPITAL COURSE
05/27/2023 NAEO, neuro exam stable and continues to have slight LSW and subjective decreased sensation. Headache earlier this morning, now resolved after PRN tylenol. MRI brain unremarkable, MRI cervical spine pending for further eval of persistent LSW/paresthesia. Echo pending to complete stroke workup. Therapy evals complete, dispo recs for home with no therapy needs.     Paresthesia intermittent and self-resolving, continued in spite of treatment of headache and after TNK administration. Consideration of complex migranes, will refer for Gen Neuro follow up +/- EMG at that visit.

## 2023-05-27 NOTE — ASSESSMENT & PLAN NOTE
42 y/o female with a PMHx of HTN, HLD, who presented to OSH ED with  c/o chest tightness and LSW and numbness. Reports symptoms started at 5:55 pm on 5/25. CTA stroke MP showed no acute intracranial pathology no LVO. She was given TNK. [place] [facility] [place]. MRI of the brain showed no acute intracranial pathology. NIHSS 1. Echo pending. AIS likely aborted by TNK.     NAEO, neuro exam stable and continues to have slight LSW and subjective decreased sensation. Headache earlier this morning, now resolved after PRN tylenol. MRI cervical spine pending for further eval of persistent LSW/paresthesia, Echo pending to complete stroke workup. If MRI cervical spine unremarkable and patient otherwise stable for discharge, echo can be completed as outpatient. Therapy evals complete, dispo recs for home with no therapy needs.      Antithrombotics for secondary stroke prevention: Antiplatelets: Aspirin: 81 mg daily    Statins for secondary stroke prevention and HLD, if present: Statins: Atorvastatin- 40 mg daily    Aggressive risk factor modification: HTN     Rehab efforts: PT/OT recs for home with no therapy needs, SLP cleared for regular diet    Diagnostics ordered/pending: TTE to assess cardiac function/status , Other: MRI cervical spine    VTE prophylaxis: Heparin 5000 units SQ every 8 hours  Mechanical prophylaxis: Place SCDs    BP parameters: Infarct: Post Thrombolytic therapy, SBP <180

## 2023-05-27 NOTE — PROGRESS NOTES
[facility] - Neuro Critical Care  Vascular Neurology  Comprehensive Stroke Center  Progress Note    Assessment/Plan:     * Stroke aborted by administration of thrombolytic agent  44 y/o female with a PMHx of HTN, HLD, who presented to OSH ED with  c/o chest tightness and LSW and numbness. Reports symptoms started at 5:55 pm on 5/25. CTA stroke MP showed no acute intracranial pathology no LVO. She was given TNK. [place] [facility] [place]. MRI of the brain showed no acute intracranial pathology. NIHSS 1. Echo pending. AIS likely aborted by TNK.     NAEO, neuro exam stable and continues to have slight LSW and subjective decreased sensation. Headache earlier this morning, now resolved after PRN tylenol. MRI cervical spine pending for further eval of persistent LSW/paresthesia, Echo pending to complete stroke workup. If MRI cervical spine unremarkable and patient otherwise stable for discharge, echo can be completed as outpatient. Therapy evals complete, dispo recs for home with no therapy needs.      Antithrombotics for secondary stroke prevention: Antiplatelets: Aspirin: 81 mg daily    Statins for secondary stroke prevention and HLD, if present: Statins: Atorvastatin- 40 mg daily    Aggressive risk factor modification: HTN     Rehab efforts: PT/OT recs for home with no therapy needs, SLP cleared for regular diet    Diagnostics ordered/pending: TTE to assess cardiac function/status , Other: MRI cervical spine    VTE prophylaxis: Heparin 5000 units SQ every 8 hours  Mechanical prophylaxis: Place SCDs    BP parameters: Infarct: Post Thrombolytic therapy, SBP <180        Weakness of left upper extremity  Continues to report LSW and paresthesia, MRI negative for acute infarct  MRI cervical spine pending  PT/OT eval and treat  Dispo recs for home with no further therapy needs    Essential hypertension  Stroke risk factor  SBP <180, MAP >65  Continue HCTZ 25mg daily  PRN labetalol/hydralazine          05/27/2023 NAEO, neuro exam stable and continues to have slight LSW and subjective decreased sensation. Headache earlier this morning, now resolved after PRN tylenol. MRI brain unremarkable, MRI cervical spine pending for further eval of persistent LSW/paresthesia. Echo pending to complete stroke workup. Therapy evals complete, dispo recs for home with no therapy needs.      STROKE DOCUMENTATION   Acute Stroke Times   Symptom Onset Date: 05/25/23  Symptom Onset Time: 1730    NIH Scale:  1a. Level of Consciousness: 0-->Alert, keenly responsive  1b. LOC Questions: 0-->Answers both questions correctly  1c. LOC Commands: 0-->Performs both tasks correctly  2. Best Gaze: 0-->Normal  3. Visual: 0-->No visual loss  4. Facial Palsy: 0-->Normal symmetrical movements  5a. Motor Arm, Left: 0-->No drift, limb holds 90 (or 45) degrees for full 10 secs  5b. Motor Arm, Right: 0-->No drift, limb holds 90 (or 45) degrees for full 10 secs  6a. Motor Leg, Left: 0-->No drift, leg holds 30 degree position for full 5 secs  6b. Motor Leg, Right: 0-->No drift, leg holds 30 degree position for full 5 secs  7. Limb Ataxia: 0-->Absent  8. Sensory: 1-->Mild-to-moderate sensory loss, patient feels pinprick is less sharp or is dull on the affected side, or there is a loss of superficial pain with pinprick, but patient is aware of being touched  9. Best Language: 0-->No aphasia, normal  10. Dysarthria: 0-->Normal  11. Extinction and Inattention (formerly Neglect): 0-->No abnormality  Total (NIH Stroke Scale): 1       Modified Bk Score: 0  Marlene Coma Scale:    ABCD2 Score:    [id number] Score:   HAS -BLED Score:   ICH Score:   Hunt & Davenport Classification:      Hemorrhagic change of an Ischemic Stroke: Does this patient have an ischemic stroke with hemorrhagic changes? No     Neurologic Chief Complaint: LSW/numbness s/p TNK    Subjective:     Interval History: Patient is seen for follow-up neurological assessment and treatment  recommendations:   NAEO, neuro exam stable and continues to have slight LSW and subjective decreased sensation. Headache earlier this morning, now resolved after PRN tylenol. MRI brain unremarkable, MRI cervical spine pending for further eval of persistent LSW/paresthesia. Echo pending to complete stroke workup. Therapy evals complete, dispo recs for home with no therapy needs.    HPI, Past Medical, Family, and Social History remains the same as documented in the initial encounter.     Review of Systems   Constitutional:  Negative for fatigue.   HENT:  Negative for drooling and trouble swallowing.    Eyes:  Negative for visual disturbance.   Respiratory:  Negative for choking.    Cardiovascular:  Negative for palpitations.   Gastrointestinal:  Negative for nausea and vomiting.   Musculoskeletal:  Negative for neck stiffness.   Skin:  Negative for color change.   Neurological:  Positive for weakness, numbness and headaches (resolved). Negative for speech difficulty.   Psychiatric/Behavioral:  Negative for confusion.    All other systems reviewed and are negative.  Scheduled Meds:   atorvastatin  40 mg Oral Daily    hydroCHLOROthiazide  25 mg Oral Daily    mupirocin   Nasal BID     Continuous Infusions:  PRN Meds:acetaminophen, hydrALAZINE, ondansetron, sodium chloride 0.9%    Objective:     Vital Signs (Most Recent):  Temp: 98.3 °F (36.8 °C) (05/27/23 0700)  Pulse: 77 (05/27/23 1000)  Resp: 20 (05/27/23 1000)  BP: 117/80 (05/27/23 1000)  SpO2: 98 % (05/27/23 1000)  BP Location: Left arm    Vital Signs Range (Last 24H):  Temp:  [97.8 °F (36.6 °C)-98.5 °F (36.9 °C)]   Pulse:  [58-85]   Resp:  [12-32]   BP: ()/(58-89)   SpO2:  [95 %-100 %]   BP Location: Left arm       Physical Exam  Vitals and nursing note reviewed.   Constitutional:       General: She is not in acute distress.  HENT:      Head: Normocephalic.      Nose: Nose normal.      Mouth/Throat:      Pharynx: No oropharyngeal exudate or posterior  oropharyngeal erythema.   Eyes:      Extraocular Movements: Extraocular movements intact.      Conjunctiva/sclera: Conjunctivae normal.   Cardiovascular:      Rate and Rhythm: Normal rate.   Pulmonary:      Effort: Pulmonary effort is normal. No respiratory distress.   Abdominal:      General: There is no distension.   Musculoskeletal:         General: No deformity or signs of injury.      Cervical back: Normal range of motion. No rigidity.   Skin:     General: Skin is warm and dry.   Neurological:      Mental Status: She is alert and oriented to person, place, and time.      Cranial Nerves: No dysarthria or facial asymmetry.      Sensory: Sensory deficit (subjective L paresthesia) present.      Motor: Weakness (Slight LUE/LLE weakness) present. No tremor or seizure activity.      Coordination: Coordination is intact.   Psychiatric:         Attention and Perception: Attention normal.         Behavior: Behavior normal. Behavior is cooperative.            Neurological Exam:   LOC: alert  Attention Span: Good   Language: No aphasia  Articulation: No dysarthria  Orientation: Person, Place, Time   EOM (CN III, IV, VI): Full/intact  Facial Movement (CN VII): Symmetric facial expression    Motor: Arm left  Paresis: 4+/5  Leg left  Paresis: 4+/5  Arm right  Normal 5/5  Leg right Normal 5/5  Sensation: Subjective L sided parethesia    Laboratory:  CMP:   Recent Labs   Lab 05/27/23  0200   CALCIUM 9.0   ALBUMIN 3.5   PROT 6.5      K 4.2   CO2 23      BUN 10   CREATININE 0.9   ALKPHOS 51*   ALT 11   AST 12   BILITOT 0.5     CBC:   Recent Labs   Lab 05/27/23  0200   WBC 7.78   RBC 4.25   HGB 13.2   HCT 39.9      MCV 94   MCH 31.1*   MCHC 33.1     Lipid Panel:   Recent Labs   Lab 05/25/23 1942   CHOL 208*   LDLCALC 130.8   HDL 55   TRIG 111     Coagulation:   Recent Labs   Lab 05/25/23 1942   INR 0.9     Hgb A1C:   Recent Labs   Lab 05/26/23  0604   HGBA1C 5.0     TSH:   Recent Labs   Lab 05/25/23 1942    TSH 0.767       Diagnostic Results     Brain imaging:  MRI Brain W WO 5/26/2023  No acute intracranial abnormalities.    CTH 5/26/2023  No intracranial mass, hemorrhage, or evidence of acute infarction.       Vessel Imaging:  CTA head and neck 5/25/2023  No acute abnormality. No large vessel occlusion.        Cardiac Evaluation:   TTE with bubble study ordered         AGA Rm  [facility]  Department of Vascular Neurology   [facility] - [facility] Critical [facility]

## 2023-05-27 NOTE — PLAN OF CARE
[facility] Care Plan    POC reviewed with Marlyn Camacho and family at 1400. Pt verbalizes understanding. Questions and concerns addressed. No acute events today. Pt progressing toward goals. See below and flowsheets for full assessment and VS info.     Stepdown orders noted; awaiting bed      Is this a stroke patient? no    Neuro:  Marlene Coma Scale  Best Eye Response: 4-->(E4) spontaneous  Best Motor Response: 6-->(M6) obeys commands  Best Verbal Response: 5-->(V5) oriented  Marlene Coma Scale Score: 15  Assessment Qualifiers: patient not sedated/intubated, no eye obstruction present  Pupil PERRLA: yes     24 hr Temp:  [97.8 °F (36.6 °C)-98.6 °F (37 °C)]     CV:   Rhythm: normal sinus rhythm  BP goals:   SBP < 180  MAP > 65    Resp:           Plan: N/A    GI/:     Diet/Nutrition Received: low saturated fat/low cholesterol, 2 gram sodium  Last Bowel Movement: 05/25/23       Intake/Output Summary (Last 24 hours) at 5/27/2023 1618  Last data filed at 5/27/2023 1345  Gross per 24 hour   Intake 705 ml   Output 600 ml   Net 105 ml     Unmeasured Output  Urine Occurrence: 1    Labs/Accuchecks:  Recent Labs   Lab 05/27/23  0200   WBC 7.78   RBC 4.25   HGB 13.2   HCT 39.9         Recent Labs   Lab 05/27/23  0200      K 4.2   CO2 23      BUN 10   CREATININE 0.9   ALKPHOS 51*   ALT 11   AST 12   BILITOT 0.5      Recent Labs   Lab 05/25/23  1942   INR 0.9      Recent Labs   Lab 05/26/23  0604   TROPONINI <0.006       Electrolytes: N/A - electrolytes WDL  Accuchecks: none    Gtts:      LDA/Wounds:  Lines/Drains/Airways       Peripheral Intravenous Line  Duration                  Peripheral IV - Single Lumen 05/25/23 1935 20 G Anterior;Right Forearm 1 day         Peripheral IV - Single Lumen 05/26/23 1627 20 G Right Antecubital <1 day                  Wounds: No  Wound care consulted: No

## 2023-05-27 NOTE — PLAN OF CARE
[facility] Care Plan    POC reviewed with Marychuykwabena Moore Doug and family at 0500. Pt verbalized understanding. Questions and concerns addressed. No acute events overnight. Pt progressing toward goals. Will continue to monitor. See below and flowsheets for full assessment and VS info.       Is this a stroke patient? yes- Stroke booklet reviewed with patient and family, risk factors identified for patient and stroke booklet remains at bedside for ongoing education.     Neuro:  Marlene Coma Scale  Best Eye Response: 4-->(E4) spontaneous  Best Motor Response: 6-->(M6) obeys commands  Best Verbal Response: 5-->(V5) oriented  Lockney Coma Scale Score: 15  Assessment Qualifiers: patient not sedated/intubated, no eye obstruction present  Pupil PERRLA: yes     24hr Temp:  [97.8 °F (36.6 °C)-98.5 °F (36.9 °C)]     CV:   Rhythm: normal sinus rhythm  BP goals:   SBP < 180  MAP > 65    Resp:           Plan: N/A    GI/:     Diet/Nutrition Received: low saturated fat/low cholesterol, 2 gram sodium  Last Bowel Movement: 05/25/23       Intake/Output Summary (Last 24 hours) at 5/27/2023 0507  Last data filed at 5/26/2023 1800  Gross per 24 hour   Intake 450 ml   Output 850 ml   Net -400 ml     Unmeasured Output  Urine Occurrence: 1    Labs/Accuchecks:  Recent Labs   Lab 05/27/23  0200   WBC 7.78   RBC 4.25   HGB 13.2   HCT 39.9         Recent Labs   Lab 05/27/23  0200      K 4.2   CO2 23      BUN 10   CREATININE 0.9   ALKPHOS 51*   ALT 11   AST 12   BILITOT 0.5      Recent Labs   Lab 05/25/23  1942   INR 0.9      Recent Labs   Lab 05/26/23  0604   TROPONINI <0.006       Electrolytes: N/A - electrolytes WDL  Accuchecks: none    Gtts:      LDA/Wounds:  Lines/Drains/Airways       Peripheral Intravenous Line  Duration                  Peripheral IV - Single Lumen 05/25/23 1935 20 G Anterior;Right Forearm 1 day         Peripheral IV - Single Lumen 05/26/23 1627 20 G Right Antecubital <1 day                  Wounds:  No  Wound care consulted: No

## 2023-05-27 NOTE — SUBJECTIVE & OBJECTIVE
Neurologic Chief Complaint: LSW/numbness s/p TNK    Subjective:     Interval History: Patient is seen for follow-up neurological assessment and treatment recommendations:   NAEO, neuro exam stable and continues to have slight LSW and subjective decreased sensation. Headache earlier this morning, now resolved after PRN tylenol. MRI brain unremarkable, MRI cervical spine pending for further eval of persistent LSW/paresthesia. Echo pending to complete stroke workup. Therapy evals complete, dispo recs for home with no therapy needs.    HPI, Past Medical, Family, and Social History remains the same as documented in the initial encounter.     Review of Systems   Constitutional:  Negative for fatigue.   HENT:  Negative for drooling and trouble swallowing.    Eyes:  Negative for visual disturbance.   Respiratory:  Negative for choking.    Cardiovascular:  Negative for palpitations.   Gastrointestinal:  Negative for nausea and vomiting.   Musculoskeletal:  Negative for neck stiffness.   Skin:  Negative for color change.   Neurological:  Positive for weakness, numbness and headaches (resolved). Negative for speech difficulty.   Psychiatric/Behavioral:  Negative for confusion.    All other systems reviewed and are negative.  Scheduled Meds:   atorvastatin  40 mg Oral Daily    hydroCHLOROthiazide  25 mg Oral Daily    mupirocin   Nasal BID     Continuous Infusions:  PRN Meds:acetaminophen, hydrALAZINE, ondansetron, sodium chloride 0.9%    Objective:     Vital Signs (Most Recent):  Temp: 98.3 °F (36.8 °C) (05/27/23 0700)  Pulse: 77 (05/27/23 1000)  Resp: 20 (05/27/23 1000)  BP: 117/80 (05/27/23 1000)  SpO2: 98 % (05/27/23 1000)  BP Location: Left arm    Vital Signs Range (Last 24H):  Temp:  [97.8 °F (36.6 °C)-98.5 °F (36.9 °C)]   Pulse:  [58-85]   Resp:  [12-32]   BP: ()/(58-89)   SpO2:  [95 %-100 %]   BP Location: Left arm       Physical Exam  Vitals and nursing note reviewed.   Constitutional:       General: She is not in  acute distress.  HENT:      Head: Normocephalic.      Nose: Nose normal.      Mouth/Throat:      Pharynx: No oropharyngeal exudate or posterior oropharyngeal erythema.   Eyes:      Extraocular Movements: Extraocular movements intact.      Conjunctiva/sclera: Conjunctivae normal.   Cardiovascular:      Rate and Rhythm: Normal rate.   Pulmonary:      Effort: Pulmonary effort is normal. No respiratory distress.   Abdominal:      General: There is no distension.   Musculoskeletal:         General: No deformity or signs of injury.      Cervical back: Normal range of motion. No rigidity.   Skin:     General: Skin is warm and dry.   Neurological:      Mental Status: She is alert and oriented to person, place, and time.      Cranial Nerves: No dysarthria or facial asymmetry.      Sensory: Sensory deficit (subjective L paresthesia) present.      Motor: Weakness (Slight LUE/LLE weakness) present. No tremor or seizure activity.      Coordination: Coordination is intact.   Psychiatric:         Attention and Perception: Attention normal.         Behavior: Behavior normal. Behavior is cooperative.            Neurological Exam:   LOC: alert  Attention Span: Good   Language: No aphasia  Articulation: No dysarthria  Orientation: Person, Place, Time   EOM (CN III, IV, VI): Full/intact  Facial Movement (CN VII): Symmetric facial expression    Motor: Arm left  Paresis: 4+/5  Leg left  Paresis: 4+/5  Arm right  Normal 5/5  Leg right Normal 5/5  Sensation: Subjective L sided parethesia    Laboratory:  CMP:   Recent Labs   Lab 05/27/23  0200   CALCIUM 9.0   ALBUMIN 3.5   PROT 6.5      K 4.2   CO2 23      BUN 10   CREATININE 0.9   ALKPHOS 51*   ALT 11   AST 12   BILITOT 0.5     CBC:   Recent Labs   Lab 05/27/23  0200   WBC 7.78   RBC 4.25   HGB 13.2   HCT 39.9      MCV 94   MCH 31.1*   MCHC 33.1     Lipid Panel:   Recent Labs   Lab 05/25/23  1942   CHOL 208*   LDLCALC 130.8   HDL 55   TRIG 111     Coagulation:   Recent  Labs   Lab 05/25/23 1942   INR 0.9     Hgb A1C:   Recent Labs   Lab 05/26/23  0604   HGBA1C 5.0     TSH:   Recent Labs   Lab 05/25/23 1942   TSH 0.767       Diagnostic Results     Brain imaging:  MRI Brain W WO 5/26/2023  No acute intracranial abnormalities.    CTH 5/26/2023  No intracranial mass, hemorrhage, or evidence of acute infarction.       Vessel Imaging:  CTA head and neck 5/25/2023  No acute abnormality. No large vessel occlusion.        Cardiac Evaluation:   TTE with bubble study ordered

## 2023-05-27 NOTE — PT/OT/SLP EVAL
Physical Therapy  Co-Evaluation with OT/Discharge    Patient Name:  Marlyn Camacho   MRN:  [id number]    Recommendations:     Discharge Recommendations: home   Discharge Equipment Recommendations: none   Barriers to discharge: None    Assessment:     Marlyn Camacho is a 43 y.o. female admitted with a medical diagnosis of Stroke aborted by administration of thrombolytic agent.  She presents with the following impairments/functional limitations: none (pt has no rehab needs.) pt tolerated treatment well and is independent/supervision with mobility and does not need skilled PT. Pt will be able to discharge home with no needs when medically stable. Pt was transferred from [facility] with chest pain and LUE numbness. Pt is safe to ambulate with family and /or RN staff.     Rehab Prognosis: Good; patient would benefit from acute skilled PT services to address these deficits and reach maximum level of function.    Recent Surgery: * No surgery found *      Plan:     During this hospitalization, patient to be seen  (pt is being discharged from PT) to address the identified rehab impairments via  (pt is safe to ambulate with family and/or RN staff.) and progress toward the following goals:    Plan of Care Expires:  05/27/23    Subjective     Chief Complaint: pt had no complaints during treatment.   Patient/Family Comments/goals:  to go home.   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 She lives with her  who works full-time and their adult children. They [address] [facility].   Prior to admission, patients level of function was independent .  Equipment used at home:  (walk in shower with bench).  DME owned (not currently used): none.  Upon discharge, patient will have assistance from adult children  .    Objective:     Communicated with nurse  prior to session.  Patient found up in chair with telemetry, pulse ox (continuous), blood pressure cuff (hep lock IV)  upon PT entry to room.    General Precautions: Standard, fall  Orthopedic Precautions:    Braces:    Respiratory Status: Room air    Exams:  Cognitive Exam:  Patient is oriented to Person, Place, Time, and Situation  RLE ROM: WFL  RLE Strength: WFL  LLE ROM: WFL  LLE Strength: WFL    Functional Mobility:  Transfers:     Sit to Stand:  independence with no AD    Gait: pt received gait training ~ 250 ft with supervision.    Balance: pt was Supervision standing balance.     Pt white board updated with current therapists name and level of mobility assistance needed.         AM-PAC 6 CLICK MOBILITY  Total Score:24       Treatment & Education:  Pt received verbal instructions in role of PT and POC. Pt verbally expressed understanding and is in agreement of discharge plan.     Patient left up in chair with all lines intact, call button in reach, and RN  notified.    GOALS:   Multidisciplinary Problems       Physical Therapy Goals       Not on file                    History:     Past Medical History:   Diagnosis Date    Abnormal Pap smear of cervix     Allergy     Hypertension     Joint pain     Keloid cicatrix        Past Surgical History:   Procedure Laterality Date    CKC, ECC, fractional D&C      COLPOSCOPY      CONIZATION-CERVIX  02/2015    DILATION AND CURETTAGE OF UTERUS      HYSTERECTOMY  03/2017    AUB, fibroids    TUBAL LIGATION         Time Tracking:     PT Received On: 05/27/23  PT Start Time: 0956     PT Stop Time: 1008  PT Total Time (min): 12 min     Billable Minutes: Evaluation 12 min       05/27/2023

## 2023-05-28 VITALS
OXYGEN SATURATION: 99 % | RESPIRATION RATE: 20 BRPM | SYSTOLIC BLOOD PRESSURE: 117 MMHG | HEART RATE: 65 BPM | TEMPERATURE: 99 F | WEIGHT: 211.19 LBS | DIASTOLIC BLOOD PRESSURE: 75 MMHG | HEIGHT: 61 IN | BODY MASS INDEX: 39.87 KG/M2

## 2023-05-28 LAB
BASOPHILS # BLD AUTO: 0.06 K/UL (ref 0–0.2)
BASOPHILS NFR BLD: 0.6 % (ref 0–1.9)
DIFFERENTIAL METHOD: ABNORMAL
EOSINOPHIL # BLD AUTO: 0.1 K/UL (ref 0–0.5)
EOSINOPHIL NFR BLD: 1.3 % (ref 0–8)
ERYTHROCYTE [DISTWIDTH] IN BLOOD BY AUTOMATED COUNT: 13.1 % (ref 11.5–14.5)
HCT VFR BLD AUTO: 38.8 % (ref 37–48.5)
HGB BLD-MCNC: 12.9 G/DL (ref 12–16)
IMM GRANULOCYTES # BLD AUTO: 0.08 K/UL (ref 0–0.04)
IMM GRANULOCYTES NFR BLD AUTO: 0.8 % (ref 0–0.5)
LYMPHOCYTES # BLD AUTO: 3.5 K/UL (ref 1–4.8)
LYMPHOCYTES NFR BLD: 36.5 % (ref 18–48)
MCH RBC QN AUTO: 31.2 PG (ref 27–31)
MCHC RBC AUTO-ENTMCNC: 33.2 G/DL (ref 32–36)
MCV RBC AUTO: 94 FL (ref 82–98)
MONOCYTES # BLD AUTO: 0.6 K/UL (ref 0.3–1)
MONOCYTES NFR BLD: 6.4 % (ref 4–15)
NEUTROPHILS # BLD AUTO: 5.3 K/UL (ref 1.8–7.7)
NEUTROPHILS NFR BLD: 54.4 % (ref 38–73)
NRBC BLD-RTO: 0 /100 WBC
PLATELET # BLD AUTO: 344 K/UL (ref 150–450)
PMV BLD AUTO: 9.4 FL (ref 9.2–12.9)
RBC # BLD AUTO: 4.13 M/UL (ref 4–5.4)
WBC # BLD AUTO: 9.69 K/UL (ref 3.9–12.7)

## 2023-05-28 PROCEDURE — 85025 COMPLETE CBC W/AUTO DIFF WBC: CPT | Performed by: HOSPITALIST

## 2023-05-28 PROCEDURE — 99233 SBSQ HOSP IP/OBS HIGH 50: CPT | Mod: ,,, | Performed by: STUDENT IN AN ORGANIZED HEALTH CARE EDUCATION/TRAINING PROGRAM

## 2023-05-28 PROCEDURE — 99233 PR SUBSEQUENT HOSPITAL CARE,LEVL III: ICD-10-PCS | Mod: ,,, | Performed by: STUDENT IN AN ORGANIZED HEALTH CARE EDUCATION/TRAINING PROGRAM

## 2023-05-28 PROCEDURE — 25000003 PHARM REV CODE 250

## 2023-05-28 PROCEDURE — 36415 COLL VENOUS BLD VENIPUNCTURE: CPT | Performed by: HOSPITALIST

## 2023-05-28 PROCEDURE — 25000003 PHARM REV CODE 250: Performed by: STUDENT IN AN ORGANIZED HEALTH CARE EDUCATION/TRAINING PROGRAM

## 2023-05-28 PROCEDURE — 63600175 PHARM REV CODE 636 W HCPCS: Performed by: PSYCHIATRY & NEUROLOGY

## 2023-05-28 RX ORDER — ASPIRIN 81 MG/1
81 TABLET ORAL DAILY
Qty: 90 TABLET | Refills: 3 | Status: SHIPPED | OUTPATIENT
Start: 2023-05-28 | End: 2024-05-27

## 2023-05-28 RX ORDER — ASPIRIN 81 MG/1
81 TABLET ORAL DAILY
Status: DISCONTINUED | OUTPATIENT
Start: 2023-05-28 | End: 2023-05-28 | Stop reason: HOSPADM

## 2023-05-28 RX ORDER — ATORVASTATIN CALCIUM 40 MG/1
40 TABLET, FILM COATED ORAL DAILY
Qty: 21 TABLET | Refills: 0 | Status: ON HOLD | OUTPATIENT
Start: 2023-05-29 | End: 2023-08-07 | Stop reason: CLARIF

## 2023-05-28 RX ADMIN — HEPARIN SODIUM 5000 UNITS: 5000 INJECTION INTRAVENOUS; SUBCUTANEOUS at 06:05

## 2023-05-28 RX ADMIN — ASPIRIN 81 MG: 81 TABLET, COATED ORAL at 12:05

## 2023-05-28 RX ADMIN — ATORVASTATIN CALCIUM 40 MG: 40 TABLET, FILM COATED ORAL at 08:05

## 2023-05-28 NOTE — SUBJECTIVE & OBJECTIVE
"  Woke up with symptoms?: no    Recent bleeding noted: no  Does the patient take any Blood Thinners? no  Medications: No relevant medications      Past Medical History: hypertension    Past Surgical History: no major surgeries within the last 2 weeks    Family History: no relevant history    Social History: unable to obtain    Allergies: No Known Allergies No relevant allergies    Review of Systems   Constitutional:  Negative for fatigue.   HENT:  Negative for drooling and trouble swallowing.    Eyes:  Negative for visual disturbance.   Respiratory:  Negative for choking.    Cardiovascular:  Negative for palpitations.   Gastrointestinal:  Negative for nausea and vomiting.   Musculoskeletal:  Negative for neck stiffness.   Skin:  Negative for color change.   Neurological:  Positive for numbness. Negative for speech difficulty, weakness and headaches.   Psychiatric/Behavioral:  Negative for confusion.    All other systems reviewed and are negative.  Objective:   Vitals: Blood pressure 102/61, pulse 79, temperature 96.7 °F (35.9 °C), temperature source Oral, resp. rate 20, height 5' 1" (1.549 m), weight 95.8 kg (211 lb 3.2 oz), last menstrual period 02/15/2017, SpO2 96 %, not currently breastfeeding. BP: 102/61    CT READ: Yes  No hemmorhage. No mass effect. No early infarct signs.     Physical Exam  Vitals and nursing note reviewed.   Constitutional:       General: She is not in acute distress.  HENT:      Head: Normocephalic and atraumatic.      Nose: Nose normal.      Mouth/Throat:      Pharynx: No oropharyngeal exudate or posterior oropharyngeal erythema.   Eyes:      Extraocular Movements: Extraocular movements intact.      Conjunctiva/sclera: Conjunctivae normal.   Cardiovascular:      Rate and Rhythm: Normal rate and regular rhythm.      Pulses: Normal pulses.   Pulmonary:      Effort: Pulmonary effort is normal. No respiratory distress.      Breath sounds: No wheezing or rales.   Abdominal:      General: There " is no distension.      Palpations: Abdomen is soft.      Tenderness: There is no abdominal tenderness. There is no guarding.   Musculoskeletal:         General: No deformity or signs of injury.      Cervical back: Normal range of motion. No rigidity.   Skin:     General: Skin is warm and dry.   Neurological:      General: No focal deficit present.      Mental Status: She is alert and oriented to person, place, and time.      Cranial Nerves: No dysarthria or facial asymmetry.      Sensory: Sensory deficit (subjective L paresthesia) present.      Motor: No weakness, tremor or seizure activity.      Coordination: Coordination is intact.   Psychiatric:         Attention and Perception: Attention normal.         Mood and Affect: Mood normal.         Behavior: Behavior normal. Behavior is cooperative.

## 2023-05-28 NOTE — ASSESSMENT & PLAN NOTE
Intermittent, self-resolving. Based on imaging impression is that no acute infarct would be contributing to these symptoms.     -Gen Neuro referral for work up of Complex migranes +/- EMG based on history or recurrence of paresthesias.

## 2023-05-28 NOTE — PROGRESS NOTES
[facility] - Neurosurgery ([facility])  Vascular Neurology  Comprehensive Stroke Center  Progress Note    Assessment/Plan:     * Stroke aborted by administration of thrombolytic agent  44 y/o female with a PMHx of HTN, HLD, who presented to [facility]SH ED with  c/o chest tightness and LSW and numbness. Reports symptoms started at 5:55 pm on 5/25. CTA stroke MP showed no acute intracranial pathology no LVO. She was given TNK. [place] [facility] [place] MRI of the brain showed no acute intracranial pathology. NIHSS 1. Echo pending. AIS likely aborted by TNK.     NAEO, neuro exam stable, MRI cervical spine unremarkable and patient otherwise stable for discharge, echo can be completed as outpatient if recurrent symptoms. Therapy evals complete, dispo recs for home with no therapy needs.      Antithrombotics for secondary stroke prevention: Antiplatelets: Aspirin: 81 mg daily    Statins for secondary stroke prevention and HLD, if present: Statins: Atorvastatin- 40 mg daily    Aggressive risk factor modification: HTN     Rehab efforts: PT/OT recs for home with no therapy needs, SLP cleared for regular diet    Diagnostics ordered/pending: N/a    VTE prophylaxis: Heparin 5000 units SQ every 8 hours  Mechanical prophylaxis: Place SCDs    BP parameters: Infarct: Post Thrombolytic therapy, SBP <180        Paresthesia of left upper and lower extremity  Intermittent, self-resolving. Based on imaging impression is that no acute infarct would be contributing to these symptoms.     -Gen Neuro referral for work up of Complex migranes +/- EMG based on history or recurrence of paresthesias.     Weakness of left upper extremity  Continues to report LSW and paresthesia, MRI negative for acute infarct  MRI cervical spine pending  PT/OT eval and treat  Dispo recs for home with no further therapy needs    Essential hypertension  Stroke risk factor  SBP <180, MAP >65  Continue HCTZ 25mg daily  PRN labetalol/hydralazine          05/27/2023 NAEO, neuro exam stable and continues to have slight LSW and subjective decreased sensation. Headache earlier this morning, now resolved after PRN tylenol. MRI brain unremarkable, MRI cervical spine pending for further eval of persistent LSW/paresthesia. Echo pending to complete stroke workup. Therapy evals complete, dispo recs for home with no therapy needs.     Paresthesia intermittent and self-resolving, continued in spite of treatment of headache and after TNK administration. Consideration of complex migranes, will refer for Gen Neuro follow up +/- EMG at that visit.       STROKE DOCUMENTATION   Acute Stroke Times   Last Known Normal Date: 05/25/23  Last Known Normal Time: 1630  Symptom Onset Date: 05/25/23  Symptom Onset Time: 1730  Stroke Team Called Date: 05/25/23  Stroke Team Called Time: 1916  Stroke Team Arrival Date: 05/25/23  Stroke Team Arrival Time: 1916  Thrombolytic Therapy Recommended: Yes    NIH Scale:  1a. Level of Consciousness: 0-->Alert, keenly responsive  1b. LOC Questions: 0-->Answers both questions correctly  1c. LOC Commands: 0-->Performs both tasks correctly  2. Best Gaze: 0-->Normal  3. Visual: 0-->No visual loss  4. Facial Palsy: 0-->Normal symmetrical movements  5a. Motor Arm, Left: 0-->No drift, limb holds 90 (or 45) degrees for full 10 secs  5b. Motor Arm, Right: 0-->No drift, limb holds 90 (or 45) degrees for full 10 secs  6a. Motor Leg, Left: 0-->No drift, leg holds 30 degree position for full 5 secs  6b. Motor Leg, Right: 0-->No drift, leg holds 30 degree position for full 5 secs  7. Limb Ataxia: 0-->Absent  8. Sensory: 0--> No Mild-to-moderate sensory loss, patient feels pinprick is less sharp or is dull on the affected side, or there is a loss of superficial pain with pinprick, but patient is aware of being touched  9. Best Language: 0-->No aphasia, normal  10. Dysarthria: 0-->Normal  11. Extinction and Inattention (formerly Neglect): 0-->No abnormality  Total (NIH  "Stroke Scale): 0          Modified [place] Score: 0  [place] Coma Scale:    ABCD2 Score:    [id number] Score:   HAS -BLED Score:   ICH Score:   Hunt & Davenport Classification:      Hemorrhagic change of an Ischemic Stroke: Does this patient have an ischemic stroke with hemorrhagic changes? No       Woke up with symptoms?: no    Recent bleeding noted: no  Does the patient take any Blood Thinners? no  Medications: No relevant medications      Past Medical History: hypertension    Past Surgical History: no major surgeries within the last 2 weeks    Family History: no relevant history    Social History: unable to obtain    Allergies: No Known Allergies No relevant allergies    Review of Systems   Constitutional:  Negative for fatigue.   HENT:  Negative for drooling and trouble swallowing.    Eyes:  Negative for visual disturbance.   Respiratory:  Negative for choking.    Cardiovascular:  Negative for palpitations.   Gastrointestinal:  Negative for nausea and vomiting.   Musculoskeletal:  Negative for neck stiffness.   Skin:  Negative for color change.   Neurological:  Positive for numbness. Negative for speech difficulty, weakness and headaches.   Psychiatric/Behavioral:  Negative for confusion.    All other systems reviewed and are negative.  Objective:   Vitals: Blood pressure 102/61, pulse 79, temperature 96.7 °F (35.9 °C), temperature source Oral, resp. rate 20, height 5' 1" (1.549 m), weight 95.8 kg (211 lb 3.2 oz), last menstrual period 02/15/2017, SpO2 96 %, not currently breastfeeding. BP: 102/61    CT READ: Yes  No hemmorhage. No mass effect. No early infarct signs.     Physical Exam  Vitals and nursing note reviewed.   Constitutional:       General: She is not in acute distress.  HENT:      Head: Normocephalic and atraumatic.      Nose: Nose normal.      Mouth/Throat:      Pharynx: No oropharyngeal exudate or posterior oropharyngeal erythema.   Eyes:      Extraocular Movements: Extraocular movements intact.      " Conjunctiva/sclera: Conjunctivae normal.   Cardiovascular:      Rate and Rhythm: Normal rate and regular rhythm.      Pulses: Normal pulses.   Pulmonary:      Effort: Pulmonary effort is normal. No respiratory distress.      Breath sounds: No wheezing or rales.   Abdominal:      General: There is no distension.      Palpations: Abdomen is soft.      Tenderness: There is no abdominal tenderness. There is no guarding.   Musculoskeletal:         General: No deformity or signs of injury.      Cervical back: Normal range of motion. No rigidity.   Skin:     General: Skin is warm and dry.   Neurological:      General: No focal deficit present.      Mental Status: She is alert and oriented to person, place, and time.      Cranial Nerves: No dysarthria or facial asymmetry.      Sensory: Sensory deficit (subjective L paresthesia) present.      Motor: No weakness, tremor or seizure activity.      Coordination: Coordination is intact.   Psychiatric:         Attention and Perception: Attention normal.         Mood and Affect: Mood normal.         Behavior: Behavior normal. Behavior is cooperative.             Gustabo Alvarado MD  [facility]  Department of Vascular Neurology   [facility]

## 2023-05-28 NOTE — ASSESSMENT & PLAN NOTE
42 y/o female with a PMHx of HTN, HLD, who presented to OSH ED with  c/o chest tightness and LSW and numbness. Reports symptoms started at 5:55 pm on 5/25. CTA stroke MP showed no acute intracranial pathology no LVO. She was given TNK. Patient was transferred to [facility] [place] for higher level of care. MRI of the brain showed no acute intracranial pathology. NIHSS 1. Echo pending. AIS likely aborted by TNK.     NAEO, neuro exam stable, MRI cervical spine unremarkable and patient otherwise stable for discharge, echo can be completed as outpatient if recurrent symptoms. Therapy evals complete, dispo recs for home with no therapy needs.      Antithrombotics for secondary stroke prevention: Antiplatelets: Aspirin: 81 mg daily    Statins for secondary stroke prevention and HLD, if present: Statins: Atorvastatin- 40 mg daily    Aggressive risk factor modification: HTN     Rehab efforts: PT/OT recs for home with no therapy needs, SLP cleared for regular diet    Diagnostics ordered/pending: N/a    VTE prophylaxis: Heparin 5000 units SQ every 8 hours  Mechanical prophylaxis: Place SCDs    BP parameters: Infarct: Post Thrombolytic therapy, SBP <180

## 2023-05-28 NOTE — DISCHARGE SUMMARY
[facility] - Neurosurgery ([facility])  Vascular Neurology  Comprehensive Stroke Center  Discharge Summary     Summary:     Admit Date: 5/25/2023  7:13 PM    Discharge Date and Time:  05/28/2023 12:09 PM    Attending Physician: Jeimy Siu MD     Discharge Provider: Gustabo Alvarado MD    History of Present Illness: 44 y/o female with a PMHx of HTN, HLD, who presented to OSH ED with  c/o chest tightness and LSW and numbness. Reports symptoms started at 5:55 pm on 5/25. CTA stroke MP showed no acute intracranial pathology no LVO. She was given TNK. Patient was transferred to [facility] for higher level of care. MRI of the brain showed no acute intracranial pathology. NIHSS 1. Echo pending. AIS likely aborted by TNK.       Hospital Course (synopsis of major diagnoses, care, treatment, and services provided during the course of the hospital stay): 05/27/2023 NAEO, neuro exam stable and continues to have slight LSW and subjective decreased sensation. Headache earlier this morning, now resolved after PRN tylenol. MRI brain unremarkable, MRI cervical spine pending for further eval of persistent LSW/paresthesia. Echo pending to complete stroke workup. Therapy evals complete, dispo recs for home with no therapy needs.     Paresthesia intermittent and self-resolving, continued in spite of treatment of headache and after TNK administration. Consideration of complex migranes, will refer for Gen Neuro follow up +/- EMG at that visit.       Goals of Care Treatment Preferences:  Code Status: Full Code      Stroke Etiology: Stroke Mimic Migraine, etiology also difficult to ascertain as CVA aborted by TNK administration    STROKE DOCUMENTATION   Acute Stroke Times   Last Known Normal Date: 05/25/23  Last Known Normal Time: 1630  Symptom Onset Date: 05/25/23  Symptom Onset Time: 1730  Stroke Team Called Date: 05/25/23  Stroke Team Called Time: 1916  Stroke Team Arrival Date: 05/25/23  Stroke Team Arrival Time: 1916  Thrombolytic Therapy  Recommended: Yes     NIH Scale:  1a. Level of Consciousness: 0-->Alert, keenly responsive  1b. LOC Questions: 0-->Answers both questions correctly  1c. LOC Commands: 0-->Performs both tasks correctly  2. Best Gaze: 0-->Normal  3. Visual: 0-->No visual loss  4. Facial Palsy: 0-->Normal symmetrical movements  5a. Motor Arm, Left: 0-->No drift, limb holds 90 (or 45) degrees for full 10 secs  5b. Motor Arm, Right: 0-->No drift, limb holds 90 (or 45) degrees for full 10 secs  6a. Motor Leg, Left: 0-->No drift, leg holds 30 degree position for full 5 secs  6b. Motor Leg, Right: 0-->No drift, leg holds 30 degree position for full 5 secs  7. Limb Ataxia: 0-->Absent  8. Sensory: 0--> No Mild-to-moderate sensory loss, patient feels pinprick is less sharp or is dull on the affected side, or there is a loss of superficial pain with pinprick, but patient is aware of being touched  9. Best Language: 0-->No aphasia, normal  10. Dysarthria: 0-->Normal  11. Extinction and Inattention (formerly Neglect): 0-->No abnormality  Total (NIH Stroke Scale): 0           Modified Bk Score: 0  [place] Coma Scale:    ABCD2 Score:    [id number] Score:   HAS -BLED Score:   ICH Score:   Hunt & Davenport Classification:       Assessment/Plan:     Diagnostic Results      Brain imaging:  MRI Brain W WO 5/26/2023  No acute intracranial abnormalities.     CTH 5/26/2023  No intracranial mass, hemorrhage, or evidence of acute infarction.        Vessel Imaging:  CTA head and neck 5/25/2023  No acute abnormality. No large vessel occlusion.        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 [I63.9] 05/26/2023 Yes    Chest pain [R07.9] 05/26/2023 Unknown    Weakness of left upper extremity [R29.898]  05/26/2023 Unknown    Hypokalemia [E87.6] 05/26/2023 Yes    Weakness of left lower extremity [R29.898] 05/26/2023 Unknown    Paresthesia of left upper and lower extremity [R20.2] 05/26/2023 Unknown    Essential hypertension [I10] 11/09/2022 Yes    Severe obesity (BMI 35.0-39.9) with comorbidity [E66.01] 07/22/2022 Yes      Problems Resolved During this Admission:    Diagnosis Date Noted Date Resolved POA    Decreased sensation [R20.8] 05/26/2023 05/26/2023 Unknown     Neuro  * Stroke aborted by administration of thrombolytic agent  42 y/o female with a PMHx of HTN, HLD, who presented to OSH ED with  c/o chest tightness and LSW and numbness. Reports symptoms started at 5:55 pm on 5/25. CTA stroke MP showed no acute intracranial pathology no LVO. She was given TNK. Patient was transferred to [facility] for higher level of care. MRI of the brain showed no acute intracranial pathology. NIHSS 1. Echo pending. AIS likely aborted by TNK.     NAEO, neuro exam stable, MRI cervical spine unremarkable and patient otherwise stable for discharge, echo can be completed as outpatient if recurrent symptoms. Therapy evals complete, dispo recs for home with no therapy needs.      Antithrombotics for secondary stroke prevention: Antiplatelets: Aspirin: 81 mg daily    Statins for secondary stroke prevention and HLD, if present: Statins: Atorvastatin- 40 mg daily    Aggressive risk factor modification: HTN     Rehab efforts: PT/OT recs for home with no therapy needs, SLP cleared for regular diet    Diagnostics ordered/pending: N/a    VTE prophylaxis: Heparin 5000 units SQ every 8 hours  Mechanical prophylaxis: Place SCDs    BP parameters: Infarct: Post Thrombolytic therapy, SBP <180        Cardiac/Vascular  Essential hypertension  Stroke risk factor  SBP <180, MAP >65  Continue HCTZ 25mg daily  PRN labetalol/hydralazine        Recommendations:     Post-discharge complication risks: None    Stroke Education given to: patient    Follow-up  in Stroke Clinic in 4-6 weeks.     Discharge Plan:  Antithrombotics: Aspirin 81mg  Statin: Atorvastatin 40 mg    Follow Up:   Follow-up Information     [facility] 7th Fl Follow up.    Specialty: Neurology  Why: For migrane work up  Contact information:  [address] [facility]  [facility] [address] [phone]  Additional information:  [facility] [place] - Main Building, 7th Floor   Please park in [facility] and take Clinic elevator           Mindi Donahue MD Follow up in 1 week(s).    Specialty: Family Medicine  Contact information:  [address]  [address]  [facility] [address]  [phone]                         Patient Instructions:      Ambulatory referral/consult to Neurology   Standing Status: Future   Referral Priority: Routine Referral Type: Consultation   Referral Reason: Specialty Services Required   Requested Specialty: Neurology   Number of Visits Requested: 1       Medications:  Reconciled Home Medications:      Medication List      START taking these medications    aspirin 81 MG EC tablet  Commonly known as: ECOTRIN  Take 1 tablet (81 mg total) by mouth once daily. For stroke prevention     atorvastatin 40 MG tablet  Commonly known as: LIPITOR  Take 1 tablet (40 mg total) by mouth once daily.  Start taking on: May 29, 2023        CONTINUE taking these medications    hydroCHLOROthiazide 25 MG tablet  Commonly known as: HYDRODIURIL  TAKE 1 TABLET(25 MG) BY MOUTH EVERY DAY        STOP taking these medications    sulindac 150 MG tablet  Commonly known as: CLINORIL            Gustabo Alvarado MD  Comprehensive Stroke Center  Department of Vascular Neurology   [facility] Neurosurgery ([facility])

## 2023-05-28 NOTE — NURSING
Nurses Note -- 4 Eyes      5/28/2023   3:59 AM      Skin assessed during: Transfer      [x] No Altered Skin Integrity Present    []Prevention Measures Documented      [] Yes- Altered Skin Integrity Present or Discovered   [] LDA Added if Not in Epic (Describe Wound)   [] New Altered Skin Integrity was Present on Admit and Documented in LDA   [] Wound Image Taken    Wound Care Consulted? No    Attending Nurse:  Palmira Yeh RN     Second RN/Staff Member:  JADEN Gaston

## 2023-05-28 NOTE — CONSULTS
[facility]  Vascular Neurology  Comprehensive Stroke Center  TeleVascular Neurology Acute Consultation Note      Consults    Consulting Provider: RUBEN DAY  Current Providers  No providers found    Patient Location:  [facility] NEUROSCIENCE PROGRESS* Emergency Department  Spoke hospital nurse at bedside with patient assisting consultant.     Patient information was obtained from patient.         Assessment/Plan:   Telestroke is at . Symptoms are LS weakness and numbness. Last known well was today at 5:55 pm.  Patient seen on Vid[facility].     NIH of 2 for decreased sensation left arm and minor drift left leg.     She is TNK candidate. Patient got TNK after risk vs benefit discussion Risk vs benefits discussion with patient regarding TNK. Patient agreed to treatment.   Recommend IV Tenecteplase 0.25mg/kg IV push (max dose 25mg); If Tenecteplase is not available use Alteplase 0.9mg/kg IV bolus followed by infusion (max dose 90mg)      Additional Recommendations:   1. Neurological assessment and vital signs (except temperature) every 15 minutes x 2 hours, then every 30 minutes x 6 hours, then every hour x 16 hours..  2. Frequency of BP assessments may need to be increased if systolic BP stays >= 180 mm Hg or diastolic BP stays >= 105 mm Hg. Administer antihypertensive meds as ordered  3. Temperature every 4 hours or as required.  4. Follow hospital protocol for further orders re: post thrombolytic therapy patient management.  5. No antithrombotics or anticoagulants (including but not limited to: heparin, warfarin, aspirin, clopidogrel, or dipyridamole) for 24 hours, then start antithrombotics as ordered by treating physician    CT brain  -ve for acute intracranial process.     CTA -ve for LVO.  Patient will be admitted for post TNK care.       Diagnoses:   Stroke like symptoms    STROKE DOCUMENTATION     Acute Stroke Times:   Acute Stroke Times   Last Known Normal Date: 05/25/23  Last  "Known Normal Time: 1630  Symptom Onset Date: 05/25/23  Symptom Onset Time: 1730  Stroke Team Called Date: 05/25/23  Stroke Team Called Time: 1916  Stroke Team Arrival Date: 05/25/23  Stroke Team Arrival Time: 1916  Thrombolytic Therapy Recommended: Yes    NIH Scale: 2        Modified [place] Score: 0  [place] Coma Scale:    ABCD2 Score:    [id number] Score:   HAS -BLED Score:   ICH Score:   Hunt & Davenport Classification:       Blood pressure 102/61, pulse 79, temperature 96.7 °F (35.9 °C), temperature source Oral, resp. rate 20, height 5' 1" (1.549 m), weight 95.8 kg (211 lb 3.2 oz), last menstrual period 02/15/2017, SpO2 96 %, not currently breastfeeding.  Eligible for thrombolytic therapy?: Yes  Thrombolytic therapy recomended: Recommend IV Tenecteplase 0.25mg/kg IV push (max dose 25mg); If Tenecteplase is not available use Alteplase 0.9mg/kg IV bolus followed by infusion (max dose 90mg)     Additional Recommendations:   Neurological assessment and vital signs (except temperature) every 15 minutes x 2 hours, then every 30 minutes x 6 hours, then every hour x 16 hours..  Frequency of BP assessments may need to be increased if systolic BP stays >= 180 mm Hg or diastolic BP stays >= 105 mm Hg. Administer antihypertensive meds as ordered  Temperature every 4 hours or as required.  Follow hospital protocol for further orders re: post thrombolytic therapy patient management.  No antithrombotics or anticoagulants (including but not limited to: heparin, warfarin, aspirin, clopidigrel, or dipyridamole) for 24 hours, then start antithrombotics as ordered by treating physician    Adapted from the American Heart Association/American Stroke Association (AHA/ASA) and American Association of Neuroscience Nurses (AANN) Guidelines.   Possible Interventional Revascularization Candidate? Awaiting CTA results from Spoke for determination     Disposition Recommendation: pending further studies    Subjective:     History of Present " "Illness:  Telestroke is at . Symptoms are LS weakness and numbness. Last known well was today at 5:55 pm.  Patient seen on Vidyo.     NIH of 2 for decreased sensation left arm and minor drift left leg.     She is TNK candidate. Patient got TNK after risk vs benefit discussion   CT brain  -ve for acute intracranial process.     CTA -ve for LVO.  Patient will be admitted for post TNK care.         Woke up with symptoms?: no    Recent bleeding noted: no  Does the patient take any Blood Thinners? no  Medications: No relevant medications      Past Medical History: hypertension    Past Surgical History: no major surgeries within the last 2 weeks    Family History: no relevant history    Social History: unable to obtain    Allergies: No Known Allergies No relevant allergies    Review of Systems  Objective:   Vitals: Blood pressure 102/61, pulse 79, temperature 96.7 °F (35.9 °C), temperature source Oral, resp. rate 20, height 5' 1" (1.549 m), weight 95.8 kg (211 lb 3.2 oz), last menstrual period 02/15/2017, SpO2 96 %, not currently breastfeeding. BP: 102/61    CT READ: Yes  No hemmorhage. No mass effect. No early infarct signs.     Physical Exam        Recommended the emergency room physician to have a brief discussion with the patient and/or family if available regarding the  risks and benefits of treatment, and to briefly document the occurrence of that discussion in his clinical encounter note.     The attending portion of this evaluation, treatment, and documentation was performed per Agnes Stoll MD via audiovisual.    Billing code:  (moderate to severe stroke, large areas of edema, some mimics)      This patient has a critical neurological condition/illness, with high morbidity and mortality.  There is a high probability for acute neurological change leading to clinical and possibly life-threatening deterioration requiring highest level of physician preparedness for urgent intervention.  Care was " coordinated with other physicians involved in the patient's care.  Radiologic studies and laboratory data were reviewed and interpreted, and plan of care was re-assessed based on the results.  Diagnosis, treatment options and prognosis may have been discussed with the patient and/or family members or caregiver.  Further advanced medical management and further evaluation is warranted for his care.    In your opinion, this was a: Tier 1 Van Negative    Consult End Time: 8:32 AM     Agnes Stoll MD  [facility] Stroke Center  Vascular Neurology   [facility]

## 2023-05-28 NOTE — ASSESSMENT & PLAN NOTE
42 y/o female with a PMHx of HTN, HLD, who presented to OSH ED with  c/o chest tightness and LSW and numbness. Reports symptoms started at 5:55 pm on 5/25. CTA stroke MP showed no acute intracranial pathology no LVO. She was given TNK. Patient was transferred to [facility] for higher level of care. MRI of the brain showed no acute intracranial pathology. NIHSS 1. Echo pending. AIS likely aborted by TNK.     NAEO, neuro exam stable, MRI cervical spine unremarkable and patient otherwise stable for discharge, echo can be completed as outpatient if recurrent symptoms. Therapy evals complete, dispo recs for home with no therapy needs.      Antithrombotics for secondary stroke prevention: Antiplatelets: Aspirin: 81 mg daily    Statins for secondary stroke prevention and HLD, if present: Statins: Atorvastatin- 40 mg daily    Aggressive risk factor modification: HTN     Rehab efforts: PT/OT recs for home with no therapy needs, SLP cleared for regular diet    Diagnostics ordered/pending: N/a    VTE prophylaxis: Heparin 5000 units SQ every 8 hours  Mechanical prophylaxis: Place SCDs    BP parameters: Infarct: Post Thrombolytic therapy, SBP <180

## 2023-05-28 NOTE — PLAN OF CARE
Problem: Adult Inpatient Plan of Care  Goal: Plan of Care Review  Outcome: Ongoing, Progressing  Goal: Patient-Specific Goal (Individualized)  Outcome: Ongoing, Progressing  Goal: Absence of Hospital-Acquired Illness or Injury  Outcome: Ongoing, Progressing  Goal: Optimal Comfort and Wellbeing  Outcome: Ongoing, Progressing  Goal: Readiness for Transition of Care  Outcome: Ongoing, Progressing     Problem: Bariatric Environmental Safety  Goal: Safety Maintained with Care  Outcome: Ongoing, Progressing     Problem: Fall Injury Risk  Goal: Absence of Fall and Fall-Related Injury  Outcome: Ongoing, Progressing     Problem: Skin Injury Risk Increased  Goal: Skin Health and Integrity  Outcome: Ongoing, Progressing     Problem: Adjustment to Illness (Stroke, Ischemic/Transient Ischemic Attack)  Goal: Optimal Coping  Outcome: Ongoing, Progressing     Problem: Bowel Elimination Impaired (Stroke, Ischemic/Transient Ischemic Attack)  Goal: Effective Bowel Elimination  Outcome: Ongoing, Progressing     Problem: Cerebral Tissue Perfusion (Stroke, Ischemic/Transient Ischemic Attack)  Goal: Optimal Cerebral Tissue Perfusion  Outcome: Ongoing, Progressing     Problem: Cognitive Impairment (Stroke, Ischemic/Transient Ischemic Attack)  Goal: Optimal Cognitive Function  Outcome: Ongoing, Progressing     Problem: Communication Impairment (Stroke, Ischemic/Transient Ischemic Attack)  Goal: Improved Communication Skills  Outcome: Ongoing, Progressing     Problem: Functional Ability Impaired (Stroke, Ischemic/Transient Ischemic Attack)  Goal: Optimal Functional Ability  Outcome: Ongoing, Progressing     Problem: Respiratory Compromise (Stroke, Ischemic/Transient Ischemic Attack)  Goal: Effective Oxygenation and Ventilation  Outcome: Ongoing, Progressing     Problem: Sensorimotor Impairment (Stroke, Ischemic/Transient Ischemic Attack)  Goal: Improved Sensorimotor Function  Outcome: Ongoing, Progressing     Problem: Swallowing Impairment  (Stroke, Ischemic/Transient Ischemic Attack)  Goal: Optimal Eating and Swallowing without Aspiration  Outcome: Ongoing, Progressing     Problem: Urinary Elimination Impaired (Stroke, Ischemic/Transient Ischemic Attack)  Goal: Effective Urinary Elimination  Outcome: Ongoing, Progressing     POC updated and reviewed with the patient at the bedside. Questions regarding POC were encouraged and addressed. VSS, see flowsheets. Patient is AOX 4 at this time. Tele maintained. Fall and safety precautions maintained, no signs of injury noted during shift. Patient was repositioned for comfort with bed locked in low position, side rails up x 3, bed alarm set, with call light within reach. Instructed patient to call staff for mobility, verbalized understanding.

## 2023-05-29 NOTE — PLAN OF CARE
[facility] - Neurosurgery (Hospital)  Discharge Final Note    Primary Care Provider: Mindi Donahue MD    Expected Discharge Date: 5/28/2023    Pt discharged home over the weekend via private family vehicle.     Final Discharge Note (most recent)       Final Note - 05/29/23 0951          Final Note    Assessment Type Final Discharge Note (P)      Anticipated Discharge Disposition Home or Self Care (P)      What phone number can be called within the next 1-3 days to see how you are doing after discharge? [phone] (P)      Hospital Resources/Appts/Education Provided Post-Acute resouces added to AVS (P)                      Important Message from Medicare             Contact Info       [facility] 7th Fl   Specialty: Neurology    [address]  [facility] [address]   Phone: [phone]       Next Steps: Follow up    Instructions: For migrane work up    Mindi Donahue MD   Specialty: Family Medicine   Relationship: PCP - General    [address]  [address]  [facility] [address]   Phone: [phone]       Next Steps: Follow up in 1 week(s)          Monica Blue LCSW  Neurocritical Care   [facility]  [address]

## 2023-05-31 ENCOUNTER — OFFICE VISIT (OUTPATIENT)
Dept: CARDIOLOGY | Facility: CLINIC | Age: 43
End: 2023-05-31
Payer: COMMERCIAL

## 2023-05-31 VITALS
WEIGHT: 209.88 LBS | BODY MASS INDEX: 39.63 KG/M2 | DIASTOLIC BLOOD PRESSURE: 67 MMHG | OXYGEN SATURATION: 98 % | HEIGHT: 61 IN | SYSTOLIC BLOOD PRESSURE: 113 MMHG | HEART RATE: 70 BPM

## 2023-05-31 DIAGNOSIS — I63.9 STROKE ABORTED BY ADMINISTRATION OF THROMBOLYTIC AGENT: Primary | ICD-10-CM

## 2023-05-31 PROCEDURE — 99999 PR PBB SHADOW E&M-EST. PATIENT-LVL III: ICD-10-PCS | Mod: PBBFAC,,, | Performed by: INTERNAL MEDICINE

## 2023-05-31 PROCEDURE — 99214 OFFICE O/P EST MOD 30 MIN: CPT | Mod: S$GLB,,, | Performed by: INTERNAL MEDICINE

## 2023-05-31 PROCEDURE — 99214 PR OFFICE/OUTPT VISIT, EST, LEVL IV, 30-39 MIN: ICD-10-PCS | Mod: S$GLB,,, | Performed by: INTERNAL MEDICINE

## 2023-05-31 PROCEDURE — 99999 PR PBB SHADOW E&M-EST. PATIENT-LVL III: CPT | Mod: PBBFAC,,, | Performed by: INTERNAL MEDICINE

## 2023-05-31 NOTE — PROGRESS NOTES
[facility]    Chief Complaint  Chief Complaint   Patient presents with    Cerebrovascular Accident       HPI:    Patient was hospitalized last weekend.  Received thrombolytics for possible stroke.  Instructed to follow-up with me to arrange an echocardiogram as one could not be done as an inpatient over the holiday weekend.  Has felt well since discharge.  No other problems since I last saw her 2 years ago.    Medications  Current Outpatient Medications   Medication Sig Dispense Refill    aspirin (ECOTRIN) 81 MG EC tablet Take 1 tablet (81 mg total) by mouth once daily. For stroke prevention 90 tablet 3    atorvastatin (LIPITOR) 40 MG tablet Take 1 tablet (40 mg total) by mouth once daily. 21 tablet 0    hydroCHLOROthiazide (HYDRODIURIL) 25 MG tablet TAKE 1 TABLET(25 MG) BY MOUTH EVERY DAY 90 tablet 3     No current facility-administered medications for this visit.        History  Past Medical History:   Diagnosis Date    Abnormal Pap smear of cervix     Allergy     Hypertension     Joint pain     Keloid cicatrix      Past Surgical History:   Procedure Laterality Date    CKC, ECC, fractional D&C      COLPOSCOPY      CONIZATION-CERVIX  02/2015    DILATION AND CURETTAGE OF UTERUS      HYSTERECTOMY  03/2017    AUB, fibroids    TUBAL LIGATION       Social History     Socioeconomic History    Marital status:    Tobacco Use    Smoking status: Never    Smokeless tobacco: Never   Substance and Sexual Activity    Alcohol use: Yes     Comment: occasional    Drug use: No    Sexual activity: Yes     Partners: Male     Birth control/protection: Other-see comments, See Surgical Hx     Comment: Tubal Ligation/Hysterectomy   Other Topics Concern    Are you pregnant or think you may be? No    Breast-feeding No     Social Determinants of Health     Financial Resource Strain: Medium Risk    Difficulty of Paying Living Expenses: Somewhat hard   Food Insecurity: Food Insecurity Present    Worried About Running Out  of Food in the Last Year: Sometimes true    Ran Out of Food in the Last Year: Sometimes true   Transportation Needs: No Transportation Needs    Lack of Transportation (Medical): No    Lack of Transportation (Non-Medical): No   Physical Activity: Inactive    Days of Exercise per Week: 1 day    Minutes of Exercise per Session: 0 min   Stress: No Stress Concern Present    Feeling of Stress : Only a little   Social Connections: Unknown    Frequency of Communication with Friends and Family: Twice a week    Frequency of Social Gatherings with Friends and Family: Twice a week    Active Member of Clubs or Organizations: Yes    Attends Club or Organization Meetings: 1 to 4 times per year    Marital Status:    Housing Stability: High Risk    Unable to Pay for Housing in the Last Year: Yes    Number of Places Lived in the Last Year: 2    Unstable Housing in the Last Year: No     Family History   Problem Relation Age of Onset    Eczema Mother     Ovarian cancer Mother     Hypertension Mother     Breast cancer Maternal Aunt         THREE AUNTS    Breast cancer Maternal Aunt     Breast cancer Maternal Aunt     Eczema Maternal Grandmother     Eczema Son     Eczema Son     Melanoma Neg Hx     Lupus Neg Hx     Psoriasis Neg Hx     Colon cancer Neg Hx         Allergies  Review of patient's allergies indicates:  No Known Allergies    Review of Systems   Review of Systems   Constitutional: Negative for malaise/fatigue, weight gain and weight loss.   Eyes:  Negative for visual disturbance.   Cardiovascular:  Negative for chest pain, claudication, cyanosis, dyspnea on exertion, irregular heartbeat, leg swelling, near-syncope, orthopnea, palpitations, paroxysmal nocturnal dyspnea and syncope.   Respiratory:  Negative for cough, hemoptysis, shortness of breath, sleep disturbances due to breathing and wheezing.    Hematologic/Lymphatic: Negative for bleeding problem. Does not bruise/bleed easily.   Skin:  Negative for poor wound  healing.   Musculoskeletal:  Negative for muscle cramps and myalgias.   Gastrointestinal:  Negative for abdominal pain, anorexia, diarrhea, heartburn, hematemesis, hematochezia, melena, nausea and vomiting.   Genitourinary:  Negative for hematuria and nocturia.   Neurological:  Negative for excessive daytime sleepiness, dizziness, focal weakness, light-headedness and weakness.     Physical Exam  Vitals:    05/31/23 1444   BP: 113/67   Pulse: 70     Wt Readings from Last 1 Encounters:   05/31/23 95.2 kg (209 lb 14.4 oz)     Physical Exam  Vitals and nursing note reviewed.   Constitutional:       General: She is not in acute distress.     Appearance: She is obese. She is not toxic-appearing or diaphoretic.   HENT:      Head: Normocephalic and atraumatic.      Mouth/Throat:      Lips: Pink.      Mouth: Mucous membranes are moist.   Eyes:      General: No scleral icterus.     Conjunctiva/sclera: Conjunctivae normal.   Neck:      Thyroid: No thyromegaly.      Vascular: No carotid bruit, hepatojugular reflux or JVD.      Trachea: Trachea normal.   Cardiovascular:      Rate and Rhythm: Normal rate and regular rhythm. No extrasystoles are present.     Chest Wall: PMI is not displaced.      Pulses:           Carotid pulses are 2+ on the right side and 2+ on the left side.       Radial pulses are 2+ on the right side and 2+ on the left side.        Dorsalis pedis pulses are 2+ on the right side and 2+ on the left side.        Posterior tibial pulses are 2+ on the right side and 2+ on the left side.      Heart sounds: S1 normal and S2 normal. No murmur heard.    No friction rub. No S3 or S4 sounds.   Pulmonary:      Effort: Pulmonary effort is normal. No accessory muscle usage or respiratory distress.      Breath sounds: Normal breath sounds and air entry. No decreased breath sounds, wheezing, rhonchi or rales.   Abdominal:      General: Bowel sounds are normal. There is no distension or abdominal bruit.      Palpations:  Abdomen is soft. There is no hepatomegaly, splenomegaly or pulsatile mass.      Tenderness: There is no abdominal tenderness.   Musculoskeletal:         General: No tenderness or deformity.      Right lower leg: No edema.      Left lower leg: No edema.   Skin:     General: Skin is warm and dry.      Capillary Refill: Capillary refill takes less than 2 seconds.      Coloration: Skin is not cyanotic or pale.      Nails: There is no clubbing.   Neurological:      General: No focal deficit present.      Mental Status: She is alert and oriented to person, place, and time.   Psychiatric:         Attention and Perception: Attention normal.         Mood and Affect: Mood normal.         Speech: Speech normal.         Behavior: Behavior normal. Behavior is cooperative.       Labs  Admission on 05/25/2023, Discharged on 05/28/2023   Component Date Value Ref Range Status    POCT Glucose 05/25/2023 86  70 - 110 mg/dL Final    WBC 05/25/2023 9.42  3.90 - 12.70 K/uL Final    RBC 05/25/2023 4.39  4.00 - 5.40 M/uL Final    Hemoglobin 05/25/2023 14.0  12.0 - 16.0 g/dL Final    Hematocrit 05/25/2023 39.2  37.0 - 48.5 % Final    MCV 05/25/2023 89  82 - 98 fL Final    MCH 05/25/2023 31.9 (H)  27.0 - 31.0 pg Final    MCHC 05/25/2023 35.7  32.0 - 36.0 g/dL Final    RDW 05/25/2023 12.8  11.5 - 14.5 % Final    Platelets 05/25/2023 365  150 - 450 K/uL Final    MPV 05/25/2023 9.5  9.2 - 12.9 fL Final    Immature Granulocytes 05/25/2023 0.4  0.0 - 0.5 % Final    Gran # (ANC) 05/25/2023 4.7  1.8 - 7.7 K/uL Final    Immature Grans (Abs) 05/25/2023 0.04  0.00 - 0.04 K/uL Final    Comment: Mild elevation in immature granulocytes is non specific and   can be seen in a variety of conditions including stress response,   acute inflammation, trauma and pregnancy. Correlation with other   laboratory and clinical findings is essential.      Lymph # 05/25/2023 3.8  1.0 - 4.8 K/uL Final    Mono # 05/25/2023 0.7  0.3 - 1.0 K/uL Final    Eos # 05/25/2023 0.1   0.0 - 0.5 K/uL Final    Baso # 05/25/2023 0.05  0.00 - 0.20 K/uL Final    nRBC 05/25/2023 0  0 /100 WBC Final    Gran % 05/25/2023 50.0  38.0 - 73.0 % Final    Lymph % 05/25/2023 40.6  18.0 - 48.0 % Final    Mono % 05/25/2023 7.2  4.0 - 15.0 % Final    Eosinophil % 05/25/2023 1.3  0.0 - 8.0 % Final    Basophil % 05/25/2023 0.5  0.0 - 1.9 % Final    Differential Method 05/25/2023 Automated   Final    Sodium 05/25/2023 139  136 - 145 mmol/L Final    Potassium 05/25/2023 3.3 (L)  3.5 - 5.1 mmol/L Final    Chloride 05/25/2023 103  95 - 110 mmol/L Final    CO2 05/25/2023 25  23 - 29 mmol/L Final    Glucose 05/25/2023 92  70 - 110 mg/dL Final    BUN 05/25/2023 13  6 - 20 mg/dL Final    Creatinine 05/25/2023 1.0  0.5 - 1.4 mg/dL Final    Calcium 05/25/2023 9.9  8.7 - 10.5 mg/dL Final    Total Protein 05/25/2023 7.9  6.0 - 8.4 g/dL Final    Albumin 05/25/2023 3.9  3.5 - 5.2 g/dL Final    Total Bilirubin 05/25/2023 0.4  0.1 - 1.0 mg/dL Final    Comment: For infants and newborns, interpretation of results should be based  on gestational age, weight and in agreement with clinical  observations.    Premature Infant recommended reference ranges:  Up to 24 hours.............<8.0 mg/dL  Up to 48 hours............<12.0 mg/dL  3-5 days..................<15.0 mg/dL  6-29 days.................<15.0 mg/dL      Alkaline Phosphatase 05/25/2023 55  55 - 135 U/L Final    AST 05/25/2023 17  10 - 40 U/L Final    ALT 05/25/2023 14  10 - 44 U/L Final    Anion Gap 05/25/2023 11  8 - 16 mmol/L Final    eGFR 05/25/2023 >60  >60 mL/min/1.73 m^2 Final    Prothrombin Time 05/25/2023 10.0  9.0 - 12.5 sec Final    INR 05/25/2023 0.9  0.8 - 1.2 Final    Comment: Coumadin Therapy:  2.0 - 3.0 for INR for all indicators except mechanical heart valves  and antiphospholipid syndromes which should use 2.5 - 3.5.      TSH 05/25/2023 0.767  0.400 - 4.000 uIU/mL Final    Cholesterol 05/25/2023 208 (H)  120 - 199 mg/dL Final    Comment: The National Cholesterol  Education Program (NCEP) has set the  following guidelines (reference ranges) for Cholesterol:  Optimal.....................<200 mg/dL  Borderline High.............200-239 mg/dL  High........................> or = 240 mg/dL      Triglycerides 05/25/2023 111  30 - 150 mg/dL Final    Comment: The National Cholesterol Education Program (NCEP) has set the  following guidelines (reference values) for triglycerides:  Normal......................<150 mg/dL  Borderline High.............150-199 mg/dL  High........................200-499 mg/dL      HDL 05/25/2023 55  40 - 75 mg/dL Final    Comment: The National Cholesterol Education Program (NCEP) has set the  following guidelines (reference values) for HDL Cholesterol:  Low...............<40 mg/dL  Optimal...........>60 mg/dL      LDL Cholesterol 05/25/2023 130.8  63.0 - 159.0 mg/dL Final    Comment: The National Cholesterol Education Program (NCEP) has set the  following guidelines (reference values) for LDL Cholesterol:  Optimal.......................<130 mg/dL  Borderline High...............130-159 mg/dL  High..........................160-189 mg/dL  Very High.....................>190 mg/dL      HDL/Cholesterol Ratio 05/25/2023 26.4  20.0 - 50.0 % Final    Total Cholesterol/HDL Ratio 05/25/2023 3.8  2.0 - 5.0 Final    Non-HDL Cholesterol 05/25/2023 153  mg/dL Final    Comment: Risk category and Non-HDL cholesterol goals:  Coronary heart disease (CHD)or equivalent (10-year risk of CHD >20%):  Non-HDL cholesterol goal     <130 mg/dL  Two or more CHD risk factors and 10-year risk of CHD <= 20%:  Non-HDL cholesterol goal     <160 mg/dL  0 to 1 CHD risk factor:  Non-HDL cholesterol goal     <190 mg/dL      Troponin I 05/25/2023 <0.006  0.000 - 0.026 ng/mL Final    Comment: The reference interval for Troponin I represents the 99th percentile   cutoff   for our facility and is consistent with 3rd generation assay   performance.      Hemoglobin A1C 05/26/2023 5.0  4.0 - 5.6 %  Final    Comment: ADA Screening Guidelines:  5.7-6.4%  Consistent with prediabetes  >or=6.5%  Consistent with diabetes    High levels of fetal hemoglobin interfere with the HbA1C  assay. Heterozygous hemoglobin variants (HbS, HgC, etc)do  not significantly interfere with this assay.   However, presence of multiple variants may affect accuracy.      Estimated Avg Glucose 05/26/2023 97  68 - 131 mg/dL Final    Troponin I 05/26/2023 <0.006  0.000 - 0.026 ng/mL Final    Comment: The reference interval for Troponin I represents the 99th percentile   cutoff   for our facility and is consistent with 3rd generation assay   performance.      Sodium 05/26/2023 139  136 - 145 mmol/L Final    Potassium 05/26/2023 3.9  3.5 - 5.1 mmol/L Final    Chloride 05/26/2023 106  95 - 110 mmol/L Final    CO2 05/26/2023 25  23 - 29 mmol/L Final    Glucose 05/26/2023 83  70 - 110 mg/dL Final    BUN 05/26/2023 9  6 - 20 mg/dL Final    Creatinine 05/26/2023 0.9  0.5 - 1.4 mg/dL Final    Calcium 05/26/2023 9.3  8.7 - 10.5 mg/dL Final    Anion Gap 05/26/2023 8  8 - 16 mmol/L Final    eGFR 05/26/2023 >60  >60 mL/min/1.73 m^2 Final    WBC 05/26/2023 7.48  3.90 - 12.70 K/uL Final    RBC 05/26/2023 4.18  4.00 - 5.40 M/uL Final    Hemoglobin 05/26/2023 13.5  12.0 - 16.0 g/dL Final    Hematocrit 05/26/2023 38.4  37.0 - 48.5 % Final    MCV 05/26/2023 92  82 - 98 fL Final    MCH 05/26/2023 32.3 (H)  27.0 - 31.0 pg Final    MCHC 05/26/2023 35.2  32.0 - 36.0 g/dL Final    RDW 05/26/2023 13.0  11.5 - 14.5 % Final    Platelets 05/26/2023 332  150 - 450 K/uL Final    MPV 05/26/2023 9.2  9.2 - 12.9 fL Final    Immature Granulocytes 05/26/2023 0.7 (H)  0.0 - 0.5 % Final    Gran # (ANC) 05/26/2023 4.3  1.8 - 7.7 K/uL Final    Immature Grans (Abs) 05/26/2023 0.05 (H)  0.00 - 0.04 K/uL Final    Comment: Mild elevation in immature granulocytes is non specific and   can be seen in a variety of conditions including stress response,   acute inflammation, trauma and  pregnancy. Correlation with other   laboratory and clinical findings is essential.      Lymph # 05/26/2023 2.4  1.0 - 4.8 K/uL Final    Mono # 05/26/2023 0.5  0.3 - 1.0 K/uL Final    Eos # 05/26/2023 0.1  0.0 - 0.5 K/uL Final    Baso # 05/26/2023 0.05  0.00 - 0.20 K/uL Final    nRBC 05/26/2023 0  0 /100 WBC Final    Gran % 05/26/2023 57.9  38.0 - 73.0 % Final    Lymph % 05/26/2023 32.4  18.0 - 48.0 % Final    Mono % 05/26/2023 6.8  4.0 - 15.0 % Final    Eosinophil % 05/26/2023 1.5  0.0 - 8.0 % Final    Basophil % 05/26/2023 0.7  0.0 - 1.9 % Final    Differential Method 05/26/2023 Automated   Final    POCT Glucose 05/26/2023 81  70 - 110 mg/dL Final    WBC 05/27/2023 7.78  3.90 - 12.70 K/uL Final    RBC 05/27/2023 4.25  4.00 - 5.40 M/uL Final    Hemoglobin 05/27/2023 13.2  12.0 - 16.0 g/dL Final    Hematocrit 05/27/2023 39.9  37.0 - 48.5 % Final    MCV 05/27/2023 94  82 - 98 fL Final    MCH 05/27/2023 31.1 (H)  27.0 - 31.0 pg Final    MCHC 05/27/2023 33.1  32.0 - 36.0 g/dL Final    RDW 05/27/2023 13.2  11.5 - 14.5 % Final    Platelets 05/27/2023 345  150 - 450 K/uL Final    MPV 05/27/2023 9.6  9.2 - 12.9 fL Final    Immature Granulocytes 05/27/2023 0.6 (H)  0.0 - 0.5 % Final    Gran # (ANC) 05/27/2023 4.2  1.8 - 7.7 K/uL Final    Immature Grans (Abs) 05/27/2023 0.05 (H)  0.00 - 0.04 K/uL Final    Comment: Mild elevation in immature granulocytes is non specific and   can be seen in a variety of conditions including stress response,   acute inflammation, trauma and pregnancy. Correlation with other   laboratory and clinical findings is essential.      Lymph # 05/27/2023 2.9  1.0 - 4.8 K/uL Final    Mono # 05/27/2023 0.5  0.3 - 1.0 K/uL Final    Eos # 05/27/2023 0.2  0.0 - 0.5 K/uL Final    Baso # 05/27/2023 0.06  0.00 - 0.20 K/uL Final    nRBC 05/27/2023 0  0 /100 WBC Final    Gran % 05/27/2023 53.5  38.0 - 73.0 % Final    Lymph % 05/27/2023 36.8  18.0 - 48.0 % Final    Mono % 05/27/2023 6.4  4.0 - 15.0 % Final     Eosinophil % 05/27/2023 1.9  0.0 - 8.0 % Final    Basophil % 05/27/2023 0.8  0.0 - 1.9 % Final    Differential Method 05/27/2023 Automated   Final    Magnesium 05/27/2023 2.3  1.6 - 2.6 mg/dL Final    Phosphorus 05/27/2023 4.6 (H)  2.7 - 4.5 mg/dL Final    Sodium 05/27/2023 138  136 - 145 mmol/L Final    Potassium 05/27/2023 4.2  3.5 - 5.1 mmol/L Final    Chloride 05/27/2023 104  95 - 110 mmol/L Final    CO2 05/27/2023 23  23 - 29 mmol/L Final    Glucose 05/27/2023 91  70 - 110 mg/dL Final    BUN 05/27/2023 10  6 - 20 mg/dL Final    Creatinine 05/27/2023 0.9  0.5 - 1.4 mg/dL Final    Calcium 05/27/2023 9.0  8.7 - 10.5 mg/dL Final    Total Protein 05/27/2023 6.5  6.0 - 8.4 g/dL Final    Albumin 05/27/2023 3.5  3.5 - 5.2 g/dL Final    Total Bilirubin 05/27/2023 0.5  0.1 - 1.0 mg/dL Final    Comment: For infants and newborns, interpretation of results should be based  on gestational age, weight and in agreement with clinical  observations.    Premature Infant recommended reference ranges:  Up to 24 hours.............<8.0 mg/dL  Up to 48 hours............<12.0 mg/dL  3-5 days..................<15.0 mg/dL  6-29 days.................<15.0 mg/dL      Alkaline Phosphatase 05/27/2023 51 (L)  55 - 135 U/L Final    AST 05/27/2023 12  10 - 40 U/L Final    ALT 05/27/2023 11  10 - 44 U/L Final    Anion Gap 05/27/2023 11  8 - 16 mmol/L Final    eGFR 05/27/2023 >60.0  >60 mL/min/1.73 m^2 Final    WBC 05/28/2023 9.69  3.90 - 12.70 K/uL Final    RBC 05/28/2023 4.13  4.00 - 5.40 M/uL Final    Hemoglobin 05/28/2023 12.9  12.0 - 16.0 g/dL Final    Hematocrit 05/28/2023 38.8  37.0 - 48.5 % Final    MCV 05/28/2023 94  82 - 98 fL Final    MCH 05/28/2023 31.2 (H)  27.0 - 31.0 pg Final    MCHC 05/28/2023 33.2  32.0 - 36.0 g/dL Final    RDW 05/28/2023 13.1  11.5 - 14.5 % Final    Platelets 05/28/2023 344  150 - 450 K/uL Final    MPV 05/28/2023 9.4  9.2 - 12.9 fL Final    Immature Granulocytes 05/28/2023 0.8 (H)  0.0 - 0.5 % Final    Gran #  (ANC) 05/28/2023 5.3  1.8 - 7.7 K/uL Final    Immature Grans (Abs) 05/28/2023 0.08 (H)  0.00 - 0.04 K/uL Final    Comment: Mild elevation in immature granulocytes is non specific and   can be seen in a variety of conditions including stress response,   acute inflammation, trauma and pregnancy. Correlation with other   laboratory and clinical findings is essential.      Lymph # 05/28/2023 3.5  1.0 - 4.8 K/uL Final    Mono # 05/28/2023 0.6  0.3 - 1.0 K/uL Final    Eos # 05/28/2023 0.1  0.0 - 0.5 K/uL Final    Baso # 05/28/2023 0.06  0.00 - 0.20 K/uL Final    nRBC 05/28/2023 0  0 /100 WBC Final    Gran % 05/28/2023 54.4  38.0 - 73.0 % Final    Lymph % 05/28/2023 36.5  18.0 - 48.0 % Final    Mono % 05/28/2023 6.4  4.0 - 15.0 % Final    Eosinophil % 05/28/2023 1.3  0.0 - 8.0 % Final    Basophil % 05/28/2023 0.6  0.0 - 1.9 % Final    Differential Method 05/28/2023 Automated   Final   Admission on 12/10/2022, Discharged on 12/10/2022   Component Date Value Ref Range Status    WBC 12/10/2022 8.55  3.90 - 12.70 K/uL Final    RBC 12/10/2022 4.41  4.00 - 5.40 M/uL Final    Hemoglobin 12/10/2022 13.8  12.0 - 16.0 g/dL Final    Hematocrit 12/10/2022 40.8  37.0 - 48.5 % Final    MCV 12/10/2022 93  82 - 98 fL Final    MCH 12/10/2022 31.3 (H)  27.0 - 31.0 pg Final    MCHC 12/10/2022 33.8  32.0 - 36.0 g/dL Final    RDW 12/10/2022 13.0  11.5 - 14.5 % Final    Platelets 12/10/2022 348  150 - 450 K/uL Final    MPV 12/10/2022 9.0 (L)  9.2 - 12.9 fL Final    Immature Granulocytes 12/10/2022 0.5  0.0 - 0.5 % Final    Gran # (ANC) 12/10/2022 4.2  1.8 - 7.7 K/uL Final    Immature Grans (Abs) 12/10/2022 0.04  0.00 - 0.04 K/uL Final    Comment: Mild elevation in immature granulocytes is non specific and   can be seen in a variety of conditions including stress response,   acute inflammation, trauma and pregnancy. Correlation with other   laboratory and clinical findings is essential.      Lymph # 12/10/2022 3.6  1.0 - 4.8 K/uL Final    Mono #  12/10/2022 0.6  0.3 - 1.0 K/uL Final    Eos # 12/10/2022 0.1  0.0 - 0.5 K/uL Final    Baso # 12/10/2022 0.06  0.00 - 0.20 K/uL Final    nRBC 12/10/2022 0  0 /100 WBC Final    Gran % 12/10/2022 48.8  38.0 - 73.0 % Final    Lymph % 12/10/2022 41.8  18.0 - 48.0 % Final    Mono % 12/10/2022 7.3  4.0 - 15.0 % Final    Eosinophil % 12/10/2022 0.9  0.0 - 8.0 % Final    Basophil % 12/10/2022 0.7  0.0 - 1.9 % Final    Differential Method 12/10/2022 Automated   Final    Sodium 12/10/2022 137  136 - 145 mmol/L Final    Potassium 12/10/2022 3.3 (L)  3.5 - 5.1 mmol/L Final    Chloride 12/10/2022 101  95 - 110 mmol/L Final    CO2 12/10/2022 26  23 - 29 mmol/L Final    Glucose 12/10/2022 82  70 - 110 mg/dL Final    BUN 12/10/2022 9  6 - 20 mg/dL Final    Creatinine 12/10/2022 0.9  0.5 - 1.4 mg/dL Final    Calcium 12/10/2022 9.8  8.7 - 10.5 mg/dL Final    Anion Gap 12/10/2022 10  8 - 16 mmol/L Final    eGFR 12/10/2022 >60.0  >60 mL/min/1.73 m^2 Final    Sed Rate 12/10/2022 29  0 - 36 mm/Hr Final    CRP 12/10/2022 0.8  0.0 - 8.2 mg/L Final    Uric Acid 12/10/2022 5.2  2.4 - 5.7 mg/dL Final       Imaging  CT Head Without Contrast    Result Date: 5/26/2023  EXAMINATION: CT HEAD WITHOUT CONTRAST CLINICAL HISTORY: Neuro deficit, acute, stroke suspected;s/p tenecteplase; TECHNIQUE: Low dose axial images were obtained through the head.  Coronal and sagittal reformations were also performed. Contrast was not administered. COMPARISON: CTA head neck 05/25/2023 FINDINGS: BRAIN: The brain parenchyma demonstrates no significant abnormality. No intracranial mass or hemorrhage. No evidence of acute infarction. CSF SPACES: Ventricles, sulci, and cisterns are normal in size and configuration. VESSELS: The major intracranial vessels are unremarkable. BONES: No fracture or focal osseous lesion. Paranasal sinuses and mastoid air cells are well aerated. SOFT TISSUES: Extracranial soft tissues are unremarkable.     No intracranial mass, hemorrhage, or  evidence of acute infarction. Electronically signed by: Paul Larsen Date:    05/26/2023 Time:    10:23    MRI Brain W WO Contrast    Result Date: 5/26/2023  EXAMINATION: MRI BRAIN W WO CONTRAST CLINICAL HISTORY: Neuro deficit, acute, stroke suspected; TECHNIQUE: Multiplanar multisequence MR imaging of the brain was performed before and after the administration of 10 mL Gadavist intravenous contrast. COMPARISON: CT head 05/26/2023.  CTA head neck 05/25/2023. FINDINGS: Ventricles are normal in size for age.  No hydrocephalus. The brain demonstrates normal signal intensity.  No parenchymal mass, hemorrhage, edema or recent or remote major vascular distribution infarct.  No abnormal enhancing lesions.  Structures in the sellar region and craniocervical junction show no significant abnormalities. No extra-axial blood or fluid collections. The T2 skull base flow voids are preserved.  Bone marrow signal intensity unremarkable.     No acute intracranial abnormalities. Electronically signed by resident: Pop Tsang Date:    05/26/2023 Time:    13:39 Electronically signed by: Damir Doe MD Date:    05/26/2023 Time:    15:37    MRI Cervical Spine Without Contrast    Result Date: 5/28/2023  EXAMINATION: MRI CERVICAL SPINE WITHOUT CONTRAST CLINICAL HISTORY: Myelopathy, acute, cervical spine;concern;. TECHNIQUE: Multiplanar, multisequence imaging of the cervical spine without the use of intravenous contrast. COMPARISON: 06/13/2018 radiograph. FINDINGS: Alignment: Normal. Vertebrae: Vertebral body heights are well maintained without evidence of acute fracture.  No diffuse signal abnormality to suggest infiltrative process. Discs: Normal. Cord: Normal. Skull base and craniocervical junction: Normal. Degenerative findings: C2-C3: No significant spinal canal stenosis or neural foraminal narrowing. C3-C4: No significant spinal canal stenosis or neural foraminal narrowing. C4-C5: No significant spinal canal stenosis or neural  foraminal narrowing. C5-C6: No significant spinal canal stenosis or neural foraminal narrowing. C6-C7: No significant spinal canal stenosis or neural foraminal narrowing. C7-T1: No significant spinal canal stenosis or neural foraminal narrowing. T1-T2: No significant spinal canal stenosis or neural foraminal narrowing. Paraspinal muscles & soft tissues: Normal.     No focal protrusion or extrusion of disc material.  No evidence for central or foraminal stenosis. Electronically signed by resident: Feng Jimenez Date:    05/27/2023 Time:    17:49 Electronically signed by: Buck Bryant MD Date:    05/28/2023 Time:    04:00    CTA Head and Neck (xpd)    Result Date: 5/25/2023  EXAMINATION: CTA HEAD AND NECK (XPD) CLINICAL HISTORY: Left arm weakness, numbess; TECHNIQUE: Non contrast low dose axial images were obtained through the head. CT angiogram was performed from the level of the prince to the top of the head following the IV administration of 75mL of Omnipaque 350.   Sagittal and coronal reconstructions and maximum intensity projection reconstructions were performed. Arterial stenosis percentages are based on NASCET measurement criteria. COMPARISON: None FINDINGS: Intracranial Compartment: Ventricles and sulci are normal in size for age without evidence of hydrocephalus. No extra-axial blood or fluid collections. The brain parenchyma appears normal. No parenchymal mass, hemorrhage, edema, or major vascular distribution infarct. Skull/Extracranial Contents (limited evaluation): No fracture. Mastoid air cells and paranasal sinuses are essentially clear. Non-Vascular Structures of the Neck/Thoracic Inlet (limited evaluation): Normal. Aorta: Normal 3 vessel arch. Extracranial carotid circulation: No hemodynamically significant stenosis, aneurysmal dilatation, or dissection. Extracranial vertebral circulation: No hemodynamically significant stenosis, aneurysmal dilatation, or dissection. Intracranial Arteries: No  focal high-grade stenosis, occlusion, or aneurysm. Venous structures (limited evaluation): Normal.     No acute abnormality. No large vessel occlusion. Electronically signed by: Marleen Oliver Date:    05/25/2023 Time:    20:11      Assessment  1. Stroke aborted by administration of thrombolytic agent  - Echo Saline Bubble? Yes; Future      Plan and Discussion    Will order the echo with bubble study. Stroke to be followed up by Neurology.    The ASCVD Risk score (C[place]ress DK, et al., 2019) failed to calculate for the following reasons:    The patient has a prior MI or stroke diagnosis     Follow Up  Follow up for test results.      Migue Hogan MD

## 2023-06-02 ENCOUNTER — PATIENT MESSAGE (OUTPATIENT)
Dept: ADMINISTRATIVE | Facility: HOSPITAL | Age: 43
End: 2023-06-02
Payer: COMMERCIAL

## 2023-06-06 ENCOUNTER — OFFICE VISIT (OUTPATIENT)
Dept: NEUROLOGY | Facility: CLINIC | Age: 43
End: 2023-06-06
Payer: COMMERCIAL

## 2023-06-06 ENCOUNTER — OFFICE VISIT (OUTPATIENT)
Dept: FAMILY MEDICINE | Facility: CLINIC | Age: 43
End: 2023-06-06
Attending: FAMILY MEDICINE
Payer: COMMERCIAL

## 2023-06-06 ENCOUNTER — LAB VISIT (OUTPATIENT)
Dept: LAB | Facility: HOSPITAL | Age: 43
End: 2023-06-06
Payer: COMMERCIAL

## 2023-06-06 VITALS
DIASTOLIC BLOOD PRESSURE: 80 MMHG | WEIGHT: 209 LBS | HEIGHT: 61 IN | BODY MASS INDEX: 39.46 KG/M2 | SYSTOLIC BLOOD PRESSURE: 120 MMHG | HEART RATE: 62 BPM

## 2023-06-06 VITALS
HEART RATE: 98 BPM | HEIGHT: 61 IN | WEIGHT: 208.63 LBS | SYSTOLIC BLOOD PRESSURE: 122 MMHG | DIASTOLIC BLOOD PRESSURE: 73 MMHG | OXYGEN SATURATION: 98 % | BODY MASS INDEX: 39.39 KG/M2

## 2023-06-06 DIAGNOSIS — Z82.3 FAMILY HISTORY OF STROKE: ICD-10-CM

## 2023-06-06 DIAGNOSIS — I10 ESSENTIAL HYPERTENSION: ICD-10-CM

## 2023-06-06 DIAGNOSIS — R20.2 PARESTHESIA OF LEFT UPPER AND LOWER EXTREMITY: ICD-10-CM

## 2023-06-06 DIAGNOSIS — Z01.00 ENCOUNTER FOR COMPLETE EYE EXAM: Primary | ICD-10-CM

## 2023-06-06 DIAGNOSIS — G93.2 IIH (IDIOPATHIC INTRACRANIAL HYPERTENSION): ICD-10-CM

## 2023-06-06 DIAGNOSIS — E78.49 OTHER HYPERLIPIDEMIA: ICD-10-CM

## 2023-06-06 DIAGNOSIS — I10 ESSENTIAL HYPERTENSION: Primary | ICD-10-CM

## 2023-06-06 DIAGNOSIS — R29.898 WEAKNESS OF LEFT UPPER EXTREMITY: ICD-10-CM

## 2023-06-06 LAB
ERYTHROCYTE [SEDIMENTATION RATE] IN BLOOD BY PHOTOMETRIC METHOD: 6 MM/HR (ref 0–36)
RPR SER QL: NORMAL

## 2023-06-06 PROCEDURE — 99213 OFFICE O/P EST LOW 20 MIN: CPT | Mod: S$GLB,,, | Performed by: FAMILY MEDICINE

## 2023-06-06 PROCEDURE — 99215 OFFICE O/P EST HI 40 MIN: CPT | Mod: S$GLB,,, | Performed by: PHYSICIAN ASSISTANT

## 2023-06-06 PROCEDURE — 80321 ALCOHOLS BIOMARKERS 1OR 2: CPT | Performed by: PHYSICIAN ASSISTANT

## 2023-06-06 PROCEDURE — 82607 VITAMIN B-12: CPT | Performed by: PHYSICIAN ASSISTANT

## 2023-06-06 PROCEDURE — 99999 PR PBB SHADOW E&M-EST. PATIENT-LVL IV: ICD-10-PCS | Mod: PBBFAC,,, | Performed by: FAMILY MEDICINE

## 2023-06-06 PROCEDURE — 86592 SYPHILIS TEST NON-TREP QUAL: CPT | Performed by: PHYSICIAN ASSISTANT

## 2023-06-06 PROCEDURE — 99215 PR OFFICE/OUTPT VISIT, EST, LEVL V, 40-54 MIN: ICD-10-PCS | Mod: S$GLB,,, | Performed by: PHYSICIAN ASSISTANT

## 2023-06-06 PROCEDURE — 84207 ASSAY OF VITAMIN B-6: CPT | Performed by: PHYSICIAN ASSISTANT

## 2023-06-06 PROCEDURE — 84252 ASSAY OF VITAMIN B-2: CPT | Performed by: PHYSICIAN ASSISTANT

## 2023-06-06 PROCEDURE — 84425 ASSAY OF VITAMIN B-1: CPT | Performed by: PHYSICIAN ASSISTANT

## 2023-06-06 PROCEDURE — 99999 PR PBB SHADOW E&M-EST. PATIENT-LVL III: CPT | Mod: PBBFAC,,, | Performed by: PHYSICIAN ASSISTANT

## 2023-06-06 PROCEDURE — 99999 PR PBB SHADOW E&M-EST. PATIENT-LVL III: ICD-10-PCS | Mod: PBBFAC,,, | Performed by: PHYSICIAN ASSISTANT

## 2023-06-06 PROCEDURE — 99999 PR PBB SHADOW E&M-EST. PATIENT-LVL IV: CPT | Mod: PBBFAC,,, | Performed by: FAMILY MEDICINE

## 2023-06-06 PROCEDURE — 99213 PR OFFICE/OUTPT VISIT, EST, LEVL III, 20-29 MIN: ICD-10-PCS | Mod: S$GLB,,, | Performed by: FAMILY MEDICINE

## 2023-06-06 PROCEDURE — 83921 ORGANIC ACID SINGLE QUANT: CPT | Performed by: PHYSICIAN ASSISTANT

## 2023-06-06 PROCEDURE — 82300 ASSAY OF CADMIUM: CPT | Performed by: PHYSICIAN ASSISTANT

## 2023-06-06 PROCEDURE — 87522 HEPATITIS C REVRS TRNSCRPJ: CPT | Performed by: PHYSICIAN ASSISTANT

## 2023-06-06 PROCEDURE — 85652 RBC SED RATE AUTOMATED: CPT | Performed by: PHYSICIAN ASSISTANT

## 2023-06-06 RX ORDER — TOPIRAMATE 25 MG/1
TABLET ORAL
Qty: 60 TABLET | Refills: 10 | Status: SHIPPED | OUTPATIENT
Start: 2023-06-06 | End: 2024-01-02

## 2023-06-06 RX ORDER — GABAPENTIN 300 MG/1
300 CAPSULE ORAL 3 TIMES DAILY
Qty: 90 CAPSULE | Refills: 11 | Status: SHIPPED | OUTPATIENT
Start: 2023-06-06 | End: 2023-06-06 | Stop reason: CLARIF

## 2023-06-06 NOTE — PROGRESS NOTES
[facility] - NEUROLOGY 7TH FL [facility], [place]    Date: 6/6/23  Patient Name: Marlyn Camacho   MRN: [id number]   PCP: Mindi Donahue  Referring Provider: Gustabo Alvarado MD    Chief Complaint: Numbness and Tingling  Subjective:       HPI:   Ms. Marlyn Camacho is a 43 y.o. female with HTN, HLD, obesity, and paresthesias presenting for evaluation of numbness and tingling. Per chart review the patient presented to the ED on 5/25/23 with complaints of chest tightness, left sided weakness, and numbess of the L arm. Initial concern was for stroke vs cardiac incident but following multiple imaging studies stroke was ruled out. She was inpatient until 5/28/23, while admitted patient did receive TNK therapy and per note from vascular neurology this would have resolved any potential transient ischemic event. Patient also developed headache which presented concern for possible complex migraine with neurological aura.     She states that she began to develop headache following TNK therapy. She notes that the headache began a few hours after. She states that it is located across the front (temple to temple) and across the posterior/occipital area. She describes the pain as throbbing and shooting. She notes that the headache comes and goes throughout the day and last for about 5-10 minutes at most. She states the headaches occur 5-6 times per day. She finds she gets one every time she wakes up. She finds the pain can be anywhere from 4-10/10. Notes that she has bilateral tearing of the eyes and intermittent spots in the vision when headaches occur. She denies any changes in her speech or strength when the headaches onset. She finds that sitting still and turning the lights off is beneficial to her symptoms. She endorses associated nausea and photophobia. She finds she has waxing and waning neck pain independent from the headache pain. Does note that she has had recent  weight gain.       She finds that she constant tingling sensation from the L knee down to her foot. She describes the sensation as pins/needles or ant bites. She denies any weakness of the L leg. She notes that she does have some occasional symptoms of tingling in the L hand but it is barely noticeable compared to the L leg. She states the L leg tingling began 4 months ago and has progressively become more bothersome. She notes that she did have L hip pain which she went to therapy for and then was told her pain was coming from the L knee rather than the hip. She finds that hip pain began prior to the onset of tingling in the leg.           PAST MEDICAL HISTORY:  Past Medical History:   Diagnosis Date    Abnormal Pap smear of cervix     Allergy     Hypertension     Joint pain     Keloid cicatrix        PAST SURGICAL HISTORY:  Past Surgical History:   Procedure Laterality Date    CKC, ECC, fractional D&C      COLPOSCOPY      CONIZATION-CERVIX  02/2015    DILATION AND CURETTAGE OF UTERUS      HYSTERECTOMY  03/2017    AUB, fibroids    TUBAL LIGATION         CURRENT MEDS:  Current Outpatient Medications   Medication Sig Dispense Refill    aspirin (ECOTRIN) 81 MG EC tablet Take 1 tablet (81 mg total) by mouth once daily. For stroke prevention 90 tablet 3    atorvastatin (LIPITOR) 40 MG tablet Take 1 tablet (40 mg total) by mouth once daily. 21 tablet 0    hydroCHLOROthiazide (HYDRODIURIL) 25 MG tablet TAKE 1 TABLET(25 MG) BY MOUTH EVERY DAY 90 tablet 3    topiramate (TOPAMAX) 25 MG tablet Take 1 tablet (25 mg total) by mouth every evening for 7 days, THEN 2 tablets (50 mg total) every evening for 7 days, THEN 4 tablets (100 mg total) every evening. 60 tablet 10     No current facility-administered medications for this visit.       ALLERGIES:  Review of patient's allergies indicates:  No Known Allergies    FAMILY HISTORY:  Family History   Problem Relation Age of Onset    Eczema Mother     Ovarian cancer Mother      "Hypertension Mother     Breast cancer Maternal Aunt         THREE AUNTS    Breast cancer Maternal Aunt     Breast cancer Maternal Aunt     Eczema Maternal Grandmother     Eczema Son     Eczema Son     Melanoma Neg Hx     Lupus Neg Hx     Psoriasis Neg Hx     Colon cancer Neg Hx        SOCIAL HISTORY:  Social History     Tobacco Use    Smoking status: Never    Smokeless tobacco: Never   Substance Use Topics    Alcohol use: Yes     Comment: occasional    Drug use: No       Review of Systems:  Review of Systems   Constitutional:  Negative for chills, fever and weight loss.   HENT:  Negative for ear discharge, ear pain, hearing loss, sinus pain, sore throat and tinnitus.    Eyes:  Negative for blurred vision, double vision and photophobia.   Respiratory:  Negative for cough, shortness of breath and wheezing.    Cardiovascular:  Negative for chest pain, palpitations and orthopnea.   Gastrointestinal:  Negative for constipation, diarrhea, nausea and vomiting.   Genitourinary:  Negative for dysuria, frequency and urgency.   Musculoskeletal:  Positive for neck pain. Negative for back pain and myalgias.   Neurological:  Positive for tingling and headaches. Negative for seizures, loss of consciousness and weakness.          Objective:     Vitals:    06/06/23 0851   BP: 120/80   Pulse: 62   Weight: 94.8 kg (208 lb 15.9 oz)   Height: 5' 1" (1.549 m)         Lab Results   Component Value Date    WBC 9.69 05/28/2023    HGB 12.9 05/28/2023    HCT 38.8 05/28/2023     05/28/2023    CHOL 208 (H) 05/25/2023    TRIG 111 05/25/2023    HDL 55 05/25/2023    ALT 11 05/27/2023    AST 12 05/27/2023     05/27/2023    K 4.2 05/27/2023     05/27/2023    CREATININE 0.9 05/27/2023    BUN 10 05/27/2023    CO2 23 05/27/2023    TSH 0.767 05/25/2023    HGBA1C 5.0 05/26/2023       NEUROLOGICAL EXAMINATION:     MENTAL STATUS   Oriented to person, place, and time.   Level of consciousness: alert    CRANIAL NERVES     CN III, IV, VI "   Pupils are equal, round, and reactive to light.  Extraocular motions are normal.     CN V   Facial sensation intact.     CN VII   Facial expression full, symmetric.     CN VIII   CN VIII normal.     CN IX, X   CN IX normal.   CN X normal.     CN XI   CN XI normal.     CN XII   CN XII normal.     MOTOR EXAM     Strength   Strength 5/5 throughout.        NO weakness of the L side noted on exam      SENSORY EXAM   Light touch normal.   Vibration normal.   Pinprick normal.   ? ?    MUSCLE STRENGTH:     Fascics Atrophy RIGHT    LEFT Atrophy Fascics     5 Neck Ext. 5       5 Neck Flex 5       5 Deltoids 5       5 Sh.Ext.Rot. 5       5 Sh.Int.Rot. 5       5 Biceps 5       5 Triceps 5       5 Forearm.Pr. 5       5 Wrist Ext. 5       5 Wrist Flex 5       5 Finger Ext. 5       5 Finger Flex 5       5 FPL 5       5 Inteross. 5                         5 Iliopsoas 5       5 Hip Abduct 5       5 Hip Adduct 5       5 Quads 5       5 Hams 5       5 Dorsiflex 5       5 Plantar Flex 5       5 Ankle Joseph 5       5 Ankle Invert 5       5 Toe Ext. 5       5 Toe Flex 5                         REFLEXES:    RIGHT Reflex   LEFT   2+ Biceps 2+   2+ Brachiorad. 2+   2+ Triceps 2+    Pectoralis     Jaw Jerk     Espino's         2+ Patellar 2+   2+ Ankle 2+    Suprapatellar              Down PLANTAR Down     Diagnostic Data Reviewed:     5/25/23 CTA HEAD AND NECK (XPD)     CLINICAL HISTORY:  Left arm weakness, numbess;     TECHNIQUE:  Non contrast low dose axial images were obtained through the head. CT angiogram was performed from the level of the prince to the top of the head following the IV administration of 75mL of Omnipaque 350.   Sagittal and coronal reconstructions and maximum intensity projection reconstructions were performed. Arterial stenosis percentages are based on NASCET measurement criteria.     COMPARISON:  None     FINDINGS:  Intracranial Compartment:     Ventricles and sulci are normal in size for age without evidence of  hydrocephalus. No extra-axial blood or fluid collections.     The brain parenchyma appears normal. No parenchymal mass, hemorrhage, edema, or major vascular distribution infarct.     Skull/Extracranial Contents (limited evaluation): No fracture. Mastoid air cells and paranasal sinuses are essentially clear.     Non-Vascular Structures of the Neck/Thoracic Inlet (limited evaluation): Normal.     Aorta: Normal 3 vessel arch.     Extracranial carotid circulation: No hemodynamically significant stenosis, aneurysmal dilatation, or dissection.     Extracranial vertebral circulation: No hemodynamically significant stenosis, aneurysmal dilatation, or dissection.     Intracranial Arteries: No focal high-grade stenosis, occlusion, or aneurysm.     Venous structures (limited evaluation): Normal.     Impression:     No acute abnormality. No large vessel occlusion.       5/26/23 MRI BRAIN W WO CONTRAST     CLINICAL HISTORY:  Neuro deficit, acute, stroke suspected;     TECHNIQUE:  Multiplanar multisequence MR imaging of the brain was performed before and after the administration of 10 mL Gadavist intravenous contrast.     COMPARISON:  CT head 05/26/2023.  CTA head neck 05/25/2023.     FINDINGS:  Ventricles are normal in size for age.  No hydrocephalus.     The brain demonstrates normal signal intensity.  No parenchymal mass, hemorrhage, edema or recent or remote major vascular distribution infarct.  No abnormal enhancing lesions.  Structures in the sellar region and craniocervical junction show no significant abnormalities.     No extra-axial blood or fluid collections.     The T2 skull base flow voids are preserved.  Bone marrow signal intensity unremarkable.     Impression:     No acute intracranial abnormalities.    5/27/23 MRI CERVICAL SPINE WITHOUT CONTRAST     CLINICAL HISTORY:  Myelopathy, acute, cervical spine;concern;.     TECHNIQUE:  Multiplanar, multisequence imaging of the cervical spine without the use of intravenous  contrast.     COMPARISON:  06/13/2018 radiograph.     FINDINGS:  Alignment: Normal.     Vertebrae: Vertebral body heights are well maintained without evidence of acute fracture.  No diffuse signal abnormality to suggest infiltrative process.     Discs: Normal.     Cord: Normal.     Skull base and craniocervical junction: Normal.     Degenerative findings:     C2-C3: No significant spinal canal stenosis or neural foraminal narrowing.     C3-C4: No significant spinal canal stenosis or neural foraminal narrowing.     C4-C5: No significant spinal canal stenosis or neural foraminal narrowing.     C5-C6: No significant spinal canal stenosis or neural foraminal narrowing.     C6-C7: No significant spinal canal stenosis or neural foraminal narrowing.     C7-T1: No significant spinal canal stenosis or neural foraminal narrowing.     T1-T2: No significant spinal canal stenosis or neural foraminal narrowing.     Paraspinal muscles & soft tissues: Normal.     Impression:     No focal protrusion or extrusion of disc material.  No evidence for central or foraminal stenosis.    Assessment:   Marlyn Camacho is a 43 y.o. female presenting for evaluation of tingling and headache.     Plan:   Headache:    -Will refer to optometry for evaluation for possible papilledema somewhat concerning for IIH will initiate topamax taper     Tingling   -EMG/NCS ordered      -Labs were ordered to evaluate if there is an underlying metabolic cause of the patients neuropathy complaint.     Problem List Items Addressed This Visit          Cardiac/Vascular    Essential hypertension    Current Assessment & Plan     Discussed with patient importance of management of hypertension due to secondary stroke risk. Patient is on appropriate therapy currently managed by PCP.            Other hyperlipidemia    Current Assessment & Plan     Patients hyperlipidemia is currently stable and well managed by the PCP. Discussed with the patient the associated  stroke risk.               Orthopedic    Weakness of left upper extremity       Other    Paresthesia of left upper and lower extremity    Relevant Orders    Heavy Metals Screen, Blood (Quantitative)    Hepatitis C RNA, Quantitative, PCR    Methylmalonic Acid, Serum    Phosphatidylethanol (PETH)    RPR    Vitamin B6    Vitamin B2    Vitamin B12 Deficiency Panel    Vitamin B1    Sedimentation rate    EMG W/ ULTRASOUND AND NERVE CONDUCTION TEST 2 Extremities     Other Visit Diagnoses       Encounter for complete eye exam    -  Primary    Relevant Orders    Ambulatory consult to Optometry    IIH (idiopathic intracranial hypertension)        Relevant Medications    topiramate (TOPAMAX) 25 MG tablet              I spent a total of 60 minutes on the day of the visit. This includes face to face time and non-face to face time preparing to see the patient (eg, review of tests), obtaining and/or reviewing separately obtained history, documenting clinical information in the electronic or other health record, independently interpreting results and communicating results to the patient/family/caregiver, or care coordinator.    Joie Wynn PA-C  Supervising physician Rick Sanderson MD was available for all questions during this exam.  [facility]

## 2023-06-06 NOTE — LETTER
June 6, 2023      [facility]  [address], [address]  [place] [facility] [address]  Phone: [phone]       Patient: Marlyn Camacho   YOB: 1980  Date of Visit: 06/06/2023    To Whom It May Concern:    Tanya Camacho  was at [facility] on 06/06/2023. The patient may return to work on 06/12/2023 without restrictions. If you have any questions or concerns, or if I can be of further assistance, please do not hesitate to contact me.    Sincerely,    Mindi Donahue MD

## 2023-06-06 NOTE — LETTER
June 6, 2023      [facility] Neurology 7th Fl  [address]  [facility] [address]  Phone: [phone]  Fax: [phone]       Patient: Marlyn Camacho   YOB: 1980  Date of Visit: 06/06/2023    To Whom It May Concern:    Tanya Camacho  was at [facility] on 06/06/2023. The patient may return to work/school on 06/06/2023 with no restrictions. If you have any questions or concerns, or if I can be of further assistance, please do not hesitate to contact me.    Sincerely,    Joie Wynn PA-C

## 2023-06-07 DIAGNOSIS — E53.8 B12 DEFICIENCY: Primary | ICD-10-CM

## 2023-06-07 LAB
ARSENIC BLD-MCNC: <1 NG/ML
CADMIUM BLD-MCNC: 0.2 NG/ML
CITY: NORMAL
COUNTY: NORMAL
GUARDIAN FIRST NAME: NORMAL
GUARDIAN LAST NAME: NORMAL
HCV RNA SERPL QL NAA+PROBE: NOT DETECTED
HCV RNA SPEC NAA+PROBE-ACNC: <12 IU/ML
HOME PHONE: NORMAL
LEAD BLD-MCNC: <1 MCG/DL
MERCURY BLD-MCNC: 1 NG/ML
RACE: NORMAL
STATE: NORMAL
STREET ADDRESS: NORMAL
VENOUS/CAPILLARY: NORMAL
VIT B12 SERPL-MCNC: 278 NG/L (ref 180–914)
ZIP: NORMAL

## 2023-06-07 RX ORDER — NEEDLES, SAFETY 22GX1 1/2"
1 NEEDLE, DISPOSABLE MISCELLANEOUS
Qty: 50 EACH | Refills: 0 | Status: SHIPPED | OUTPATIENT
Start: 2023-06-07 | End: 2023-09-06

## 2023-06-07 RX ORDER — CYANOCOBALAMIN 1000 UG/ML
INJECTION, SOLUTION INTRAMUSCULAR; SUBCUTANEOUS
Qty: 1 ML | Refills: 5 | Status: SHIPPED | OUTPATIENT
Start: 2023-06-07 | End: 2023-09-01

## 2023-06-08 LAB
CLINICAL BIOCHEMIST REVIEW: NORMAL
PLPETH BLD-MCNC: NORMAL NG/ML
POPETH BLD-MCNC: 30 NG/ML

## 2023-06-09 ENCOUNTER — TELEPHONE (OUTPATIENT)
Dept: FAMILY MEDICINE | Facility: CLINIC | Age: 43
End: 2023-06-09
Payer: COMMERCIAL

## 2023-06-09 LAB
METHYLMALONATE SERPL-SCNC: 0.15 NMOL/ML
METHYLMALONATE SERPL-SCNC: 0.15 UMOL/L
PYRIDOXAL SERPL-MCNC: 10 UG/L (ref 5–50)
VIT B1 BLD-MCNC: 56 UG/L (ref 38–122)

## 2023-06-09 NOTE — TELEPHONE ENCOUNTER
----- Message from Erlinda Saenz MA sent at 6/7/2023  1:21 PM CDT -----  Good afternoon,    We have received your message about a appointment for this patient. This pt can be seen by Dr. Shore but Unfortunately, we are not currently scheduling appointments. Dr. Shore will be transitioning to virtual appointments only and has not open her schedule. Once her schedule becomes available someone will contact the patient to schedule. As of now appointments may be as far as a year out.        Sincerely,  Erlinda Morris MA

## 2023-06-13 LAB — VIT B2 SERPL-MCNC: 3 MCG/L (ref 1–19)

## 2023-06-15 ENCOUNTER — HOSPITAL ENCOUNTER (OUTPATIENT)
Dept: CARDIOLOGY | Facility: HOSPITAL | Age: 43
Discharge: HOME | End: 2023-06-15
Attending: INTERNAL MEDICINE
Payer: COMMERCIAL

## 2023-06-15 VITALS — BODY MASS INDEX: 39.49 KG/M2 | HEIGHT: 61 IN

## 2023-06-15 DIAGNOSIS — I63.9 STROKE ABORTED BY ADMINISTRATION OF THROMBOLYTIC AGENT: ICD-10-CM

## 2023-06-15 LAB
ASCENDING AORTA: 3.11 CM
AV INDEX (PROSTH): 0.92
AV MEAN GRADIENT: 2 MMHG
AV PEAK GRADIENT: 4 MMHG
AV VALVE AREA: 3.06 CM2
AV VELOCITY RATIO: 0.96
CV ECHO LV RWT: 0.37 CM
DOP CALC AO PEAK VEL: 1 M/S
DOP CALC AO VTI: 23.6 CM
DOP CALC LVOT AREA: 3.3 CM2
DOP CALC LVOT DIAMETER: 2.06 CM
DOP CALC LVOT PEAK VEL: 0.96 M/S
DOP CALC LVOT STROKE VOLUME: 72.29 CM3
DOP CALC MV VTI: 19.6 CM
DOP CALCLVOT PEAK VEL VTI: 21.7 CM
E WAVE DECELERATION TIME: 208.42 MSEC
E/A RATIO: 0.69
E/E' RATIO: 4.89 M/S
ECHO LV POSTERIOR WALL: 0.74 CM (ref 0.6–1.1)
EJECTION FRACTION: 60 %
FRACTIONAL SHORTENING: 33 % (ref 28–44)
INTERVENTRICULAR SEPTUM: 0.8 CM (ref 0.6–1.1)
IVC DIAMETER: 1.29 CM
LA MAJOR: 4.46 CM
LA MINOR: 4.55 CM
LA WIDTH: 3.9 CM
LEFT ATRIUM SIZE: 3.05 CM
LEFT ATRIUM VOLUME: 45.54 CM3
LEFT INTERNAL DIMENSION IN SYSTOLE: 2.64 CM (ref 2.1–4)
LEFT VENTRICLE DIASTOLIC VOLUME: 68.25 ML
LEFT VENTRICLE SYSTOLIC VOLUME: 25.49 ML
LEFT VENTRICULAR INTERNAL DIMENSION IN DIASTOLE: 3.96 CM (ref 3.5–6)
LEFT VENTRICULAR MASS: 87.36 G
LV LATERAL E/E' RATIO: 3.38 M/S
LV SEPTAL E/E' RATIO: 8.8 M/S
LVOT MG: 2.36 MMHG
LVOT MV: 0.74 CM/S
MV MEAN GRADIENT: 1 MMHG
MV PEAK A VEL: 0.64 M/S
MV PEAK E VEL: 0.44 M/S
MV PEAK GRADIENT: 2 MMHG
MV STENOSIS PRESSURE HALF TIME: 60.44 MS
MV VALVE AREA BY CONTINUITY EQUATION: 3.69 CM2
MV VALVE AREA P 1/2 METHOD: 3.64 CM2
PISA TR MAX VEL: 2.01 M/S
PULM VEIN S/D RATIO: 1.88
PV PEAK D VEL: 0.26 M/S
PV PEAK S VEL: 0.49 M/S
PV PEAK VELOCITY: 0.84 CM/S
RA MAJOR: 3.88 CM
RA PRESSURE: 3 MMHG
RA WIDTH: 2.8 CM
RIGHT VENTRICULAR END-DIASTOLIC DIMENSION: 2.76 CM
RV TISSUE DOPPLER FREE WALL SYSTOLIC VELOCITY 1 (APICAL 4 CHAMBER VIEW): 10.7 CM/S
SINUS: 2.89 CM
STJ: 2.71 CM
TDI LATERAL: 0.13 M/S
TDI SEPTAL: 0.05 M/S
TDI: 0.09 M/S
TR MAX PG: 16 MMHG
TRICUSPID ANNULAR PLANE SYSTOLIC EXCURSION: 2.11 CM
TV REST PULMONARY ARTERY PRESSURE: 19 MMHG

## 2023-06-15 PROCEDURE — 93306 TTE W/DOPPLER COMPLETE: CPT | Mod: 26,,, | Performed by: INTERNAL MEDICINE

## 2023-06-15 PROCEDURE — 93306 ECHO (CUPID ONLY): ICD-10-PCS | Mod: 26,,, | Performed by: INTERNAL MEDICINE

## 2023-06-15 PROCEDURE — C8929 TTE W OR WO FOL WCON,DOPPLER: HCPCS

## 2023-06-21 ENCOUNTER — OFFICE VISIT (OUTPATIENT)
Dept: HEMATOLOGY/ONCOLOGY | Facility: CLINIC | Age: 43
End: 2023-06-21
Attending: FAMILY MEDICINE
Payer: COMMERCIAL

## 2023-06-21 VITALS
RESPIRATION RATE: 16 BRPM | OXYGEN SATURATION: 98 % | TEMPERATURE: 98 F | SYSTOLIC BLOOD PRESSURE: 113 MMHG | WEIGHT: 211.88 LBS | DIASTOLIC BLOOD PRESSURE: 76 MMHG | HEART RATE: 71 BPM | BODY MASS INDEX: 40 KG/M2 | HEIGHT: 61 IN

## 2023-06-21 DIAGNOSIS — Z82.3 FAMILY HISTORY OF STROKE: ICD-10-CM

## 2023-06-21 DIAGNOSIS — R60.0 LEG EDEMA, LEFT: Primary | ICD-10-CM

## 2023-06-21 DIAGNOSIS — I10 ESSENTIAL HYPERTENSION: ICD-10-CM

## 2023-06-21 DIAGNOSIS — M79.2 NEUROPATHIC PAIN, LEG, LEFT: ICD-10-CM

## 2023-06-21 DIAGNOSIS — E53.8 B12 DEFICIENCY: ICD-10-CM

## 2023-06-21 PROCEDURE — 99205 OFFICE O/P NEW HI 60 MIN: CPT | Mod: S$GLB,,, | Performed by: STUDENT IN AN ORGANIZED HEALTH CARE EDUCATION/TRAINING PROGRAM

## 2023-06-21 PROCEDURE — 99999 PR PBB SHADOW E&M-EST. PATIENT-LVL V: ICD-10-PCS | Mod: PBBFAC,,, | Performed by: STUDENT IN AN ORGANIZED HEALTH CARE EDUCATION/TRAINING PROGRAM

## 2023-06-21 PROCEDURE — 99205 PR OFFICE/OUTPT VISIT, NEW, LEVL V, 60-74 MIN: ICD-10-PCS | Mod: S$GLB,,, | Performed by: STUDENT IN AN ORGANIZED HEALTH CARE EDUCATION/TRAINING PROGRAM

## 2023-06-21 PROCEDURE — 99999 PR PBB SHADOW E&M-EST. PATIENT-LVL V: CPT | Mod: PBBFAC,,, | Performed by: STUDENT IN AN ORGANIZED HEALTH CARE EDUCATION/TRAINING PROGRAM

## 2023-06-21 RX ORDER — CYANOCOBALAMIN 1000 UG/ML
INJECTION, SOLUTION INTRAMUSCULAR; SUBCUTANEOUS
Qty: 15 ML | Refills: 0 | Status: SHIPPED | OUTPATIENT
Start: 2023-06-21 | End: 2023-08-27

## 2023-06-21 RX ORDER — GABAPENTIN 300 MG/1
1 TABLET ORAL 3 TIMES DAILY
COMMUNITY
End: 2024-01-02

## 2023-06-21 NOTE — PROGRESS NOTES
Hematology- Oncology Clinic Note :      6/21/2023    RFV / chief complaint- Family history of stroke        HPI  Pt is a 43 y.o. female who  has a past medical history of Abnormal Pap smear of cervix, Allergy, Hypertension, Joint pain, and Keloid cicatrix.   Pt presents to the clinic today for     C/o numbness and tingling in hands nad feet going on for some months.   LLE swelling x 1 year     LLE sensory changes were new which prompted her to go to the ER as she was worried about stroke. Pt is RN.   CTA head, MRI brain, CT spine- did not reveal any intracranial abn.   Discharged on aspirin and statin.     Pmhx- abnormal pap smear - had hysterectomy in 2017     Mother with cervical cancer, bladder cancer,   MGM- CHF , passed away from cancer   Sister- benign breast lesion , Abnormal pap smear   Maternal aunt- HTN, DM, CHF, 2 stroke     Reviewed past medical/surgical/social history    Past Medical History:   Diagnosis Date    Abnormal Pap smear of cervix     Allergy     Hypertension     Joint pain     Keloid cicatrix       Past Surgical History:   Procedure Laterality Date    CKC, ECC, fractional D&C      COLPOSCOPY      CONIZATION-CERVIX  02/2015    DILATION AND CURETTAGE OF UTERUS      HYSTERECTOMY  03/2017    AUB, fibroids    TUBAL LIGATION        Review of patient's allergies indicates:  No Known Allergies   Social History     Tobacco Use    Smoking status: Never    Smokeless tobacco: Never   Substance Use Topics    Alcohol use: Yes     Comment: occasional      Family History   Problem Relation Age of Onset    Eczema Mother     Ovarian cancer Mother     Hypertension Mother     Breast cancer Maternal Aunt         THREE AUNTS    Breast cancer Maternal Aunt     Breast cancer Maternal Aunt     Eczema Maternal Grandmother     Eczema Son     Eczema Son     Melanoma Neg Hx     Lupus Neg Hx     Psoriasis Neg Hx     Colon cancer Neg Hx           Review of Systems :  Review of Systems   Constitutional:  Positive for  "malaise/fatigue. Negative for chills, diaphoresis, fever and weight loss.   HENT: Negative.  Negative for congestion, hearing loss, nosebleeds, sore throat and tinnitus.    Eyes: Negative.  Negative for blurred vision and discharge.   Respiratory:  Negative for cough, hemoptysis, sputum production, shortness of breath and wheezing.    Cardiovascular: Negative.  Negative for chest pain, palpitations and leg swelling.   Gastrointestinal: Negative.  Negative for abdominal pain, blood in stool, constipation, diarrhea, heartburn, melena, nausea and vomiting.   Genitourinary: Negative.    Musculoskeletal: Negative.  Negative for back pain, falls, joint pain and myalgias.   Skin: Negative.  Negative for itching and rash.   Neurological:  Positive for tingling and sensory change. Negative for dizziness, speech change, focal weakness, seizures, loss of consciousness, weakness and headaches.   Endo/Heme/Allergies: Negative.  Does not bruise/bleed easily.   Psychiatric/Behavioral: Negative.  Negative for depression. The patient is not nervous/anxious and does not have insomnia.              Physical Exam :  /76 (BP Location: Left arm, Patient Position: Sitting, BP Method: Medium (Automatic))   Pulse 71   Temp 98.4 °F (36.9 °C) (Oral)   Resp 16   Ht 5' 1" (1.549 m)   Wt 96.1 kg (211 lb 13.8 oz)   LMP 02/15/2017 (Approximate)   SpO2 98%   BMI 40.03 kg/m²   Wt Readings from Last 3 Encounters:   06/21/23 96.1 kg (211 lb 13.8 oz)   06/06/23 94.8 kg (208 lb 15.9 oz)   06/06/23 94.6 kg (208 lb 9.6 oz)       Body mass index is 40.03 kg/m².      Physical Exam  Vitals and nursing note reviewed.   Constitutional:       General: She is not in acute distress.     Appearance: Normal appearance. She is not ill-appearing.   HENT:      Head: Normocephalic and atraumatic.      Right Ear: External ear normal.      Left Ear: External ear normal.      Mouth/Throat:      Pharynx: No oropharyngeal exudate.   Eyes:      General: No " "scleral icterus.        Right eye: No discharge.         Left eye: No discharge.   Cardiovascular:      Rate and Rhythm: Normal rate.   Pulmonary:      Effort: Pulmonary effort is normal. No respiratory distress.   Abdominal:      General: There is no distension.   Musculoskeletal:         General: No swelling or deformity.      Cervical back: Normal range of motion and neck supple.      Right lower leg: No edema.      Left lower leg: No edema.   Skin:     Coloration: Skin is not jaundiced.      Findings: No bruising, erythema, lesion or rash.   Neurological:      General: No focal deficit present.      Mental Status: She is alert and oriented to person, place, and time. Mental status is at baseline.      Coordination: Coordination normal.      Gait: Gait normal.   Psychiatric:         Mood and Affect: Mood normal.         Behavior: Behavior normal.           Current Outpatient Medications   Medication Sig Dispense Refill    aspirin (ECOTRIN) 81 MG EC tablet Take 1 tablet (81 mg total) by mouth once daily. For stroke prevention 90 tablet 3    cyanocobalamin 1,000 mcg/mL injection Inject 1 mL (1,000 mcg total) into the muscle every 14 (fourteen) days for 30 days, THEN 1 mL (1,000 mcg total) every 28 days. 1 mL 5    gabapentin 300 mg Tb24 Take 1 tablet by mouth 3 (three) times daily.      hydroCHLOROthiazide (HYDRODIURIL) 25 MG tablet TAKE 1 TABLET(25 MG) BY MOUTH EVERY DAY 90 tablet 3    insulin syringe,safetyneedle (BD SAFETYGLIDE INSULIN SYRINGE) 1 mL 29 gauge x 1/2" Syrg 1 Units by Misc.(Non-Drug; Combo Route) route every 14 (fourteen) days. 50 each 0    topiramate (TOPAMAX) 25 MG tablet Take 1 tablet (25 mg total) by mouth every evening for 7 days, THEN 2 tablets (50 mg total) every evening for 7 days, THEN 4 tablets (100 mg total) every evening. 60 tablet 10    atorvastatin (LIPITOR) 40 MG tablet Take 1 tablet (40 mg total) by mouth once daily. (Patient not taking: Reported on 6/21/2023) 21 tablet 0    " cyanocobalamin 1,000 mcg/mL injection Inject 1 mL (1,000 mcg total) into the muscle once daily for 7 days, THEN 1 mL (1,000 mcg total) once a week. 15 mL 0     No current facility-administered medications for this visit.       Pertinent Diagnostic studies:      Hospital Outpatient Visit on 06/15/2023   Component Date Value Ref Range Status    TDI SEPTAL 06/15/2023 0.05  m/s Final    LV LATERAL E/E' RATIO 06/15/2023 3.38  m/s Final    LV SEPTAL E/E' RATIO 06/15/2023 8.80  m/s Final    LA WIDTH 06/15/2023 3.90  cm Final    IVC diameter 06/15/2023 1.29  cm Final    Left Ventricular Outflow Tract Hillary* 06/15/2023 0.74  cm/s Final    Left Ventricular Outflow Tract Hillary* 06/15/2023 2.36  mmHg Final    TDI LATERAL 06/15/2023 0.13  m/s Final    PV PEAK VELOCITY 06/15/2023 0.84  cm/s Final    LVIDd 06/15/2023 3.96  3.5 - 6.0 cm Final    IVS 06/15/2023 0.80  0.6 - 1.1 cm Final    Posterior Wall 06/15/2023 0.74  0.6 - 1.1 cm Final    LVIDs 06/15/2023 2.64  2.1 - 4.0 cm Final    FS 06/15/2023 33  28 - 44 % Final    LA volume 06/15/2023 45.54  cm3 Final    Sinus 06/15/2023 2.89  cm Final    STJ 06/15/2023 2.71  cm Final    Ascending aorta 06/15/2023 3.11  cm Final    LV mass 06/15/2023 87.36  g Final    LA size 06/15/2023 3.05  cm Final    RVDD 06/15/2023 2.76  cm Final    TAPSE 06/15/2023 2.11  cm Final    RV S' 06/15/2023 10.70  cm/s Final    Left Ventricle Relative Wall Thick* 06/15/2023 0.37  cm Final    AV mean gradient 06/15/2023 2  mmHg Final    AV valve area 06/15/2023 3.06  cm2 Final    AV Velocity Ratio 06/15/2023 0.96   Final    AV index (prosthetic) 06/15/2023 0.92   Final    MV mean gradient 06/15/2023 1  mmHg Final    MV valve area p 1/2 method 06/15/2023 3.64  cm2 Final    MV valve area by continuity eq 06/15/2023 3.69  cm2 Final    E/A ratio 06/15/2023 0.69   Final    Mean e' 06/15/2023 0.09  m/s Final    E wave deceleration time 06/15/2023 208.42  msec Final    Pulm vein S/D ratio 06/15/2023 1.88   Final    LVOT  diameter 06/15/2023 2.06  cm Final    LVOT area 06/15/2023 3.3  cm2 Final    LVOT peak aly 06/15/2023 0.96  m/s Final    LVOT peak VTI 06/15/2023 21.70  cm Final    Ao peak aly 06/15/2023 1.00  m/s Final    Ao VTI 06/15/2023 23.6  cm Final    LVOT stroke volume 06/15/2023 72.29  cm3 Final    AV peak gradient 06/15/2023 4  mmHg Final    MV peak gradient 06/15/2023 2  mmHg Final    E/E' ratio 06/15/2023 4.89  m/s Final    MV Peak E Aly 06/15/2023 0.44  m/s Final    TR Max Aly 06/15/2023 2.01  m/s Final    MV VTI 06/15/2023 19.6  cm Final    MV stenosis pressure 1/2 time 06/15/2023 60.44  ms Final    MV Peak A Aly 06/15/2023 0.64  m/s Final    PV Peak S Aly 06/15/2023 0.49  m/s Final    PV Peak D Aly 06/15/2023 0.26  m/s Final    LV Systolic Volume 06/15/2023 25.49  mL Final    LV Diastolic Volume 06/15/2023 68.25  mL Final    RA Major Axis 06/15/2023 3.88  cm Final    Left Atrium Minor Axis 06/15/2023 4.55  cm Final    Left Atrium Major Axis 06/15/2023 4.46  cm Final    Triscuspid Valve Regurgitation Pea* 06/15/2023 16  mmHg Final    RA Width 06/15/2023 2.80  cm Final    Right Atrial Pressure (from IVC) 06/15/2023 3  mmHg Final    EF 06/15/2023 60  % Final    TV rest pulmonary artery pressure 06/15/2023 19  mmHg Final           Oncology History    No history exists.     Assessment :   ECOG 1    1. Leg edema, left    2. Family history of stroke    3. Essential hypertension    4. Neuropathic pain, leg, left    5. B12 deficiency        Plan :     Neuropathy could be due to b12 def. Aggressive treatment with IM shots.   Re eval in 3 weeks.   If symptoms do not improve and there is concern for stroke, hypercoagulable work up can be done.     LLE swelling- conservative measures d/w pt. US to rule out DVT     HTN_ controlled on meds     I spent >65 mins on reviewing epic chart notes, reviewing tests, nursing concerns,obtaining history, performing physical exam, counseling and educating patient/family/caregiver,  documentation, independently interpreting results and discussing them with patient/family/caregiver, care coordination, ordering medications/ tests/ procedures and referring and communicating with other health care professionals.       Electronically signed by Antionette Velarde    [facility]      Future Appointments   Date Time Provider Department Center   6/22/2023  3:15 PM [facility] [id number] [facility] MC [facility]   7/7/2023  9:00 AM [facility] EMG PROCEDURAL LAB [facility] NEURO [address]   8/2/2023  1:40 PM Antionette Velarde MD [facility] HEM ONC [facility]   10/6/2023  8:20 AM Austyn Lazaro OD [facility] [place]           This note was created with voice recognition software.  Grammatical, syntax and spelling errors may be inevitable.

## 2023-08-02 ENCOUNTER — OFFICE VISIT (OUTPATIENT)
Dept: HEMATOLOGY/ONCOLOGY | Facility: CLINIC | Age: 43
End: 2023-08-02
Payer: COMMERCIAL

## 2023-08-02 ENCOUNTER — LAB VISIT (OUTPATIENT)
Dept: LAB | Facility: OTHER | Age: 43
End: 2023-08-02
Attending: STUDENT IN AN ORGANIZED HEALTH CARE EDUCATION/TRAINING PROGRAM
Payer: COMMERCIAL

## 2023-08-02 VITALS
OXYGEN SATURATION: 97 % | HEART RATE: 78 BPM | HEIGHT: 61 IN | DIASTOLIC BLOOD PRESSURE: 79 MMHG | WEIGHT: 205.5 LBS | RESPIRATION RATE: 18 BRPM | BODY MASS INDEX: 38.8 KG/M2 | SYSTOLIC BLOOD PRESSURE: 119 MMHG | TEMPERATURE: 99 F

## 2023-08-02 DIAGNOSIS — Z82.3 FAMILY HISTORY OF STROKE: ICD-10-CM

## 2023-08-02 DIAGNOSIS — Z82.3 FAMILY HISTORY OF STROKE: Primary | ICD-10-CM

## 2023-08-02 DIAGNOSIS — I10 ESSENTIAL HYPERTENSION: ICD-10-CM

## 2023-08-02 DIAGNOSIS — E87.6 HYPOKALEMIA: ICD-10-CM

## 2023-08-02 DIAGNOSIS — M79.2 NEUROPATHIC PAIN, LEG, LEFT: ICD-10-CM

## 2023-08-02 DIAGNOSIS — E53.8 B12 DEFICIENCY: ICD-10-CM

## 2023-08-02 LAB
ALBUMIN SERPL BCP-MCNC: 4 G/DL (ref 3.5–5.2)
ALP SERPL-CCNC: 57 U/L (ref 55–135)
ALT SERPL W/O P-5'-P-CCNC: 14 U/L (ref 10–44)
ANION GAP SERPL CALC-SCNC: 9 MMOL/L (ref 8–16)
AST SERPL-CCNC: 15 U/L (ref 10–40)
BASOPHILS # BLD AUTO: 0.06 K/UL (ref 0–0.2)
BASOPHILS NFR BLD: 0.7 % (ref 0–1.9)
BILIRUB SERPL-MCNC: 0.7 MG/DL (ref 0.1–1)
BUN SERPL-MCNC: 7 MG/DL (ref 6–20)
CALCIUM SERPL-MCNC: 9.7 MG/DL (ref 8.7–10.5)
CHLORIDE SERPL-SCNC: 101 MMOL/L (ref 95–110)
CO2 SERPL-SCNC: 30 MMOL/L (ref 23–29)
CREAT SERPL-MCNC: 1 MG/DL (ref 0.5–1.4)
DIFFERENTIAL METHOD: ABNORMAL
EOSINOPHIL # BLD AUTO: 0.2 K/UL (ref 0–0.5)
EOSINOPHIL NFR BLD: 1.7 % (ref 0–8)
ERYTHROCYTE [DISTWIDTH] IN BLOOD BY AUTOMATED COUNT: 12.7 % (ref 11.5–14.5)
EST. GFR  (NO RACE VARIABLE): >60 ML/MIN/1.73 M^2
GLUCOSE SERPL-MCNC: 100 MG/DL (ref 70–110)
HCT VFR BLD AUTO: 41.4 % (ref 37–48.5)
HGB BLD-MCNC: 14.7 G/DL (ref 12–16)
IMM GRANULOCYTES # BLD AUTO: 0.05 K/UL (ref 0–0.04)
IMM GRANULOCYTES NFR BLD AUTO: 0.6 % (ref 0–0.5)
LYMPHOCYTES # BLD AUTO: 3.4 K/UL (ref 1–4.8)
LYMPHOCYTES NFR BLD: 37.3 % (ref 18–48)
MCH RBC QN AUTO: 32.1 PG (ref 27–31)
MCHC RBC AUTO-ENTMCNC: 35.5 G/DL (ref 32–36)
MCV RBC AUTO: 90 FL (ref 82–98)
MONOCYTES # BLD AUTO: 0.5 K/UL (ref 0.3–1)
MONOCYTES NFR BLD: 5.1 % (ref 4–15)
NEUTROPHILS # BLD AUTO: 5 K/UL (ref 1.8–7.7)
NEUTROPHILS NFR BLD: 54.6 % (ref 38–73)
NRBC BLD-RTO: 0 /100 WBC
PLATELET # BLD AUTO: 318 K/UL (ref 150–450)
PLATELET BLD QL SMEAR: ABNORMAL
PMV BLD AUTO: 9.9 FL (ref 9.2–12.9)
POTASSIUM SERPL-SCNC: 2.9 MMOL/L (ref 3.5–5.1)
PROT SERPL-MCNC: 7.6 G/DL (ref 6–8.4)
RBC # BLD AUTO: 4.58 M/UL (ref 4–5.4)
SODIUM SERPL-SCNC: 140 MMOL/L (ref 136–145)
WBC # BLD AUTO: 9.05 K/UL (ref 3.9–12.7)

## 2023-08-02 PROCEDURE — 80053 COMPREHEN METABOLIC PANEL: CPT | Performed by: STUDENT IN AN ORGANIZED HEALTH CARE EDUCATION/TRAINING PROGRAM

## 2023-08-02 PROCEDURE — 99215 PR OFFICE/OUTPT VISIT, EST, LEVL V, 40-54 MIN: ICD-10-PCS | Mod: S$GLB,,, | Performed by: STUDENT IN AN ORGANIZED HEALTH CARE EDUCATION/TRAINING PROGRAM

## 2023-08-02 PROCEDURE — 36415 COLL VENOUS BLD VENIPUNCTURE: CPT | Performed by: STUDENT IN AN ORGANIZED HEALTH CARE EDUCATION/TRAINING PROGRAM

## 2023-08-02 PROCEDURE — 85306 CLOT INHIBIT PROT S FREE: CPT | Performed by: STUDENT IN AN ORGANIZED HEALTH CARE EDUCATION/TRAINING PROGRAM

## 2023-08-02 PROCEDURE — 85613 RUSSELL VIPER VENOM DILUTED: CPT | Performed by: STUDENT IN AN ORGANIZED HEALTH CARE EDUCATION/TRAINING PROGRAM

## 2023-08-02 PROCEDURE — 85300 ANTITHROMBIN III ACTIVITY: CPT | Performed by: STUDENT IN AN ORGANIZED HEALTH CARE EDUCATION/TRAINING PROGRAM

## 2023-08-02 PROCEDURE — 99999 PR PBB SHADOW E&M-EST. PATIENT-LVL IV: ICD-10-PCS | Mod: PBBFAC,,, | Performed by: STUDENT IN AN ORGANIZED HEALTH CARE EDUCATION/TRAINING PROGRAM

## 2023-08-02 PROCEDURE — 85302 CLOT INHIBIT PROT C ANTIGEN: CPT | Performed by: STUDENT IN AN ORGANIZED HEALTH CARE EDUCATION/TRAINING PROGRAM

## 2023-08-02 PROCEDURE — 99215 OFFICE O/P EST HI 40 MIN: CPT | Mod: S$GLB,,, | Performed by: STUDENT IN AN ORGANIZED HEALTH CARE EDUCATION/TRAINING PROGRAM

## 2023-08-02 PROCEDURE — 86146 BETA-2 GLYCOPROTEIN ANTIBODY: CPT | Performed by: STUDENT IN AN ORGANIZED HEALTH CARE EDUCATION/TRAINING PROGRAM

## 2023-08-02 PROCEDURE — 85025 COMPLETE CBC W/AUTO DIFF WBC: CPT | Performed by: STUDENT IN AN ORGANIZED HEALTH CARE EDUCATION/TRAINING PROGRAM

## 2023-08-02 PROCEDURE — 85306 CLOT INHIBIT PROT S FREE: CPT | Mod: 91 | Performed by: STUDENT IN AN ORGANIZED HEALTH CARE EDUCATION/TRAINING PROGRAM

## 2023-08-02 PROCEDURE — 81240 F2 GENE: CPT | Performed by: STUDENT IN AN ORGANIZED HEALTH CARE EDUCATION/TRAINING PROGRAM

## 2023-08-02 PROCEDURE — 86356 MONONUCLEAR CELL ANTIGEN: CPT | Mod: 91 | Performed by: STUDENT IN AN ORGANIZED HEALTH CARE EDUCATION/TRAINING PROGRAM

## 2023-08-02 PROCEDURE — 86147 CARDIOLIPIN ANTIBODY EA IG: CPT | Mod: 59 | Performed by: STUDENT IN AN ORGANIZED HEALTH CARE EDUCATION/TRAINING PROGRAM

## 2023-08-02 PROCEDURE — 85303 CLOT INHIBIT PROT C ACTIVITY: CPT | Performed by: STUDENT IN AN ORGANIZED HEALTH CARE EDUCATION/TRAINING PROGRAM

## 2023-08-02 PROCEDURE — 85610 PROTHROMBIN TIME: CPT | Performed by: STUDENT IN AN ORGANIZED HEALTH CARE EDUCATION/TRAINING PROGRAM

## 2023-08-02 PROCEDURE — 81241 F5 GENE: CPT | Performed by: STUDENT IN AN ORGANIZED HEALTH CARE EDUCATION/TRAINING PROGRAM

## 2023-08-02 PROCEDURE — 99999 PR PBB SHADOW E&M-EST. PATIENT-LVL IV: CPT | Mod: PBBFAC,,, | Performed by: STUDENT IN AN ORGANIZED HEALTH CARE EDUCATION/TRAINING PROGRAM

## 2023-08-02 RX ORDER — IBUPROFEN 200 MG
TABLET ORAL
COMMUNITY
Start: 2023-06-07 | End: 2024-01-02

## 2023-08-02 NOTE — Clinical Note
Potassium levels on labs from yesterday low, supplements sent to pharmacy. Pt needs to f/u with pcp regarding low potassium levels. Thanks.

## 2023-08-02 NOTE — PROGRESS NOTES
Hematology- Oncology Clinic Note :         RFV / chief complaint- Follow-up (B12 def)        HPI  Pt is a 43 y.o. female who  has a past medical history of Abnormal Pap smear of cervix, Allergy, Hypertension, Joint pain, and Keloid cicatrix.   Pt presents to the clinic today for     C/o numbness and tingling in hands nad feet going on for some months.   LLE swelling x 1 year     May 2023 , slurred speech , LLE sensory changes were new which prompted her to go to the ER as she was worried about stroke. Pt is RN.   CTA head, MRI brain, CT spine- did not reveal any intracranial abn.   Discharged on aspirin and statin.       Today neuropathy, tingling is much improved. Took b12 daily x 7 days nad then weekly   Still on aspirin     No hx of miscarriages. No known Fhx of VTE    Pmhx- abnormal pap smear - had hysterectomy in 2017     Mother with cervical cancer, bladder cancer,   MGM- CHF , passed away from cancer   Sister- benign breast lesion , Abnormal pap smear   Maternal aunt- HTN, DM, CHF, 2 stroke     Reviewed past medical/surgical/social history    Past Medical History:   Diagnosis Date    Abnormal Pap smear of cervix     Allergy     Hypertension     Joint pain     Keloid cicatrix       Past Surgical History:   Procedure Laterality Date    CKC, ECC, fractional D&C      COLPOSCOPY      CONIZATION-CERVIX  02/2015    DILATION AND CURETTAGE OF UTERUS      HYSTERECTOMY  03/2017    AUB, fibroids    TUBAL LIGATION        Review of patient's allergies indicates:  No Known Allergies   Social History     Tobacco Use    Smoking status: Never    Smokeless tobacco: Never   Substance Use Topics    Alcohol use: Yes     Comment: occasional      Family History   Problem Relation Age of Onset    Eczema Mother     Ovarian cancer Mother     Hypertension Mother     Breast cancer Maternal Aunt         THREE AUNTS    Breast cancer Maternal Aunt     Breast cancer Maternal Aunt     Eczema Maternal Grandmother     Eczema Son     Eczema  "Son     Melanoma Neg Hx     Lupus Neg Hx     Psoriasis Neg Hx     Colon cancer Neg Hx           Review of Systems :  Review of Systems   Constitutional:  Positive for malaise/fatigue. Negative for chills, diaphoresis, fever and weight loss.   HENT: Negative.  Negative for congestion, hearing loss, nosebleeds, sore throat and tinnitus.    Eyes: Negative.  Negative for blurred vision and discharge.   Respiratory:  Negative for cough, hemoptysis, sputum production, shortness of breath and wheezing.    Cardiovascular: Negative.  Negative for chest pain, palpitations and leg swelling.   Gastrointestinal: Negative.  Negative for abdominal pain, blood in stool, constipation, diarrhea, heartburn, melena, nausea and vomiting.   Genitourinary: Negative.    Musculoskeletal: Negative.  Negative for back pain, falls, joint pain and myalgias.   Skin: Negative.  Negative for itching and rash.   Neurological:  Positive for tingling and sensory change. Negative for dizziness, speech change, focal weakness, seizures, loss of consciousness, weakness and headaches.   Endo/Heme/Allergies: Negative.  Does not bruise/bleed easily.   Psychiatric/Behavioral: Negative.  Negative for depression. The patient is not nervous/anxious and does not have insomnia.                Physical Exam :  /79 (BP Location: Left arm, Patient Position: Sitting, BP Method: Large (Automatic))   Pulse 78   Temp 98.6 °F (37 °C) (Oral)   Resp 18   Ht 5' 1" (1.549 m)   Wt 93.2 kg (205 lb 7.5 oz)   LMP 02/15/2017 (Approximate)   SpO2 97%   BMI 38.82 kg/m²   Wt Readings from Last 3 Encounters:   08/02/23 93.2 kg (205 lb 7.5 oz)   06/21/23 96.1 kg (211 lb 13.8 oz)   06/06/23 94.8 kg (208 lb 15.9 oz)       Body mass index is 38.82 kg/m².      Physical Exam  Vitals and nursing note reviewed.   Constitutional:       General: She is not in acute distress.     Appearance: Normal appearance. She is not ill-appearing.   HENT:      Head: Normocephalic and " "atraumatic.      Right Ear: External ear normal.      Left Ear: External ear normal.      Mouth/Throat:      Pharynx: No oropharyngeal exudate.   Eyes:      General: No scleral icterus.        Right eye: No discharge.         Left eye: No discharge.   Cardiovascular:      Rate and Rhythm: Normal rate.   Pulmonary:      Effort: Pulmonary effort is normal. No respiratory distress.   Abdominal:      General: There is no distension.   Musculoskeletal:         General: No swelling or deformity.      Cervical back: Normal range of motion and neck supple.      Right lower leg: No edema.      Left lower leg: Edema (ankle swelling) present.   Skin:     Coloration: Skin is not jaundiced.      Findings: No bruising, erythema, lesion or rash.   Neurological:      General: No focal deficit present.      Mental Status: She is alert and oriented to person, place, and time. Mental status is at baseline.      Coordination: Coordination normal.      Gait: Gait normal.   Psychiatric:         Mood and Affect: Mood normal.         Behavior: Behavior normal.             Current Outpatient Medications   Medication Sig Dispense Refill    aspirin (ECOTRIN) 81 MG EC tablet Take 1 tablet (81 mg total) by mouth once daily. For stroke prevention 90 tablet 3    cyanocobalamin 1,000 mcg/mL injection Inject 1 mL (1,000 mcg total) into the muscle every 14 (fourteen) days for 30 days, THEN 1 mL (1,000 mcg total) every 28 days. 1 mL 5    cyanocobalamin 1,000 mcg/mL injection Inject 1 mL (1,000 mcg total) into the muscle once daily for 7 days, THEN 1 mL (1,000 mcg total) once a week. 15 mL 0    gabapentin 300 mg Tb24 Take 1 tablet by mouth 3 (three) times daily.      hydroCHLOROthiazide (HYDRODIURIL) 25 MG tablet TAKE 1 TABLET(25 MG) BY MOUTH EVERY DAY 90 tablet 3    insulin syringe,safetyneedle (BD SAFETYGLIDE INSULIN SYRINGE) 1 mL 29 gauge x 1/2" Syrg 1 Units by Misc.(Non-Drug; Combo Route) route every 14 (fourteen) days. 50 each 0    syringe and " "needle,insulin,1mL (INSULIN SYRINGE-NEEDLE U-100) 1 mL 29 gauge x 7/16" Syrg Inject into the skin every 14 (fourteen) days.      topiramate (TOPAMAX) 25 MG tablet Take 1 tablet (25 mg total) by mouth every evening for 7 days, THEN 2 tablets (50 mg total) every evening for 7 days, THEN 4 tablets (100 mg total) every evening. 60 tablet 10    atorvastatin (LIPITOR) 40 MG tablet Take 1 tablet (40 mg total) by mouth once daily. (Patient not taking: Reported on 6/21/2023) 21 tablet 0     No current facility-administered medications for this visit.       Pertinent Diagnostic studies:      Lab Visit on 08/02/2023   Component Date Value Ref Range Status    WBC 08/02/2023 9.05  3.90 - 12.70 K/uL Final    RBC 08/02/2023 4.58  4.00 - 5.40 M/uL Final    Hemoglobin 08/02/2023 14.7  12.0 - 16.0 g/dL Final    Hematocrit 08/02/2023 41.4  37.0 - 48.5 % Final    MCV 08/02/2023 90  82 - 98 fL Final    MCH 08/02/2023 32.1 (H)  27.0 - 31.0 pg Final    MCHC 08/02/2023 35.5  32.0 - 36.0 g/dL Final    RDW 08/02/2023 12.7  11.5 - 14.5 % Final    Platelets 08/02/2023 318  150 - 450 K/uL Final    MPV 08/02/2023 9.9  9.2 - 12.9 fL Final    Immature Granulocytes 08/02/2023 0.6 (H)  0.0 - 0.5 % Final    Gran # (ANC) 08/02/2023 5.0  1.8 - 7.7 K/uL Final    Immature Grans (Abs) 08/02/2023 0.05 (H)  0.00 - 0.04 K/uL Final    Comment: Mild elevation in immature granulocytes is non specific and   can be seen in a variety of conditions including stress response,   acute inflammation, trauma and pregnancy. Correlation with other   laboratory and clinical findings is essential.      Lymph # 08/02/2023 3.4  1.0 - 4.8 K/uL Final    Mono # 08/02/2023 0.5  0.3 - 1.0 K/uL Final    Eos # 08/02/2023 0.2  0.0 - 0.5 K/uL Final    Baso # 08/02/2023 0.06  0.00 - 0.20 K/uL Final    nRBC 08/02/2023 0  0 /100 WBC Final    Gran % 08/02/2023 54.6  38.0 - 73.0 % Final    Lymph % 08/02/2023 37.3  18.0 - 48.0 % Final    Mono % 08/02/2023 5.1  4.0 - 15.0 % Final    " Eosinophil % 08/02/2023 1.7  0.0 - 8.0 % Final    Basophil % 08/02/2023 0.7  0.0 - 1.9 % Final    Platelet Estimate 08/02/2023 Appears normal   Final    Differential Method 08/02/2023 Automated   Final    Sodium 08/02/2023 140  136 - 145 mmol/L Final    Potassium 08/02/2023 2.9 (L)  3.5 - 5.1 mmol/L Final    Chloride 08/02/2023 101  95 - 110 mmol/L Final    CO2 08/02/2023 30 (H)  23 - 29 mmol/L Final    Glucose 08/02/2023 100  70 - 110 mg/dL Final    BUN 08/02/2023 7  6 - 20 mg/dL Final    Creatinine 08/02/2023 1.0  0.5 - 1.4 mg/dL Final    Calcium 08/02/2023 9.7  8.7 - 10.5 mg/dL Final    Total Protein 08/02/2023 7.6  6.0 - 8.4 g/dL Final    Albumin 08/02/2023 4.0  3.5 - 5.2 g/dL Final    Total Bilirubin 08/02/2023 0.7  0.1 - 1.0 mg/dL Final    Comment: For infants and newborns, interpretation of results should be based  on gestational age, weight and in agreement with clinical  observations.    Premature Infant recommended reference ranges:  Up to 24 hours.............<8.0 mg/dL  Up to 48 hours............<12.0 mg/dL  3-5 days..................<15.0 mg/dL  6-29 days.................<15.0 mg/dL      Alkaline Phosphatase 08/02/2023 57  55 - 135 U/L Final    AST 08/02/2023 15  10 - 40 U/L Final    ALT 08/02/2023 14  10 - 44 U/L Final    eGFR 08/02/2023 >60  >60 mL/min/1.73 m^2 Final    Anion Gap 08/02/2023 9  8 - 16 mmol/L Final           Oncology History    No history exists.     Assessment :   ECOG 1    1. Family history of stroke    2. Neuropathic pain, leg, left    3. B12 deficiency    4. Essential hypertension        Plan :         Neuropathy possibly due to b12 def- continue b12 shots.     Pt is concerned about stroke, hypercoagulable work up ordered. Advised to see neurology.     LLE swelling- conservative measures d/w pt. US to rule out DVT - no show , needs to call and bobby    HTN_ controlled on meds     I spent >40 mins on reviewing epic chart notes, reviewing tests, nursing concerns,obtaining history,  performing physical exam, counseling and educating patient/family/caregiver, documentation, independently interpreting results and discussing them with patient/family/caregiver, care coordination, ordering medications/ tests/ procedures and referring and communicating with other health care professionals.       Electronically signed by Antionette Velarde    [facility] Future Appointments   Date Time Provider Department Center   10/6/2023  8:20 AM Austyn Lazaro [facility] [place]           This note was created with voice recognition software.  Grammatical, syntax and spelling errors may be inevitable.

## 2023-08-03 LAB — AT III ACT/NOR PPP CHRO: 105 % (ref 83–118)

## 2023-08-03 RX ORDER — POTASSIUM CHLORIDE 20 MEQ/1
20 TABLET, EXTENDED RELEASE ORAL 2 TIMES DAILY
Qty: 10 TABLET | Refills: 0 | Status: SHIPPED | OUTPATIENT
Start: 2023-08-03 | End: 2023-08-08

## 2023-08-04 ENCOUNTER — TELEPHONE (OUTPATIENT)
Dept: HEMATOLOGY/ONCOLOGY | Facility: CLINIC | Age: 43
End: 2023-08-04
Payer: COMMERCIAL

## 2023-08-04 LAB
PROT C ACT/NOR PPP CHRO: 141 % (ref 70–150)
PROT S ACT/NOR PPP: 115 % (ref 50–150)

## 2023-08-04 NOTE — TELEPHONE ENCOUNTER
Called Ms Camacho, No answer. Message left on voice mail. Per Dr Velarde, she reviewed recent lab results and it states her Potassium levels on labs is  low. A supplements has been sent to her  pharmacy. Ms Camacho will need to f/u with pcp regarding low potassium levels. Ms Camacho advised to call the clinic with any questions or concerns.

## 2023-08-04 NOTE — TELEPHONE ENCOUNTER
----- Message from Antionette Velarde MD sent at 8/3/2023 10:17 AM CDT -----  Potassium levels on labs from yesterday low, supplements sent to pharmacy. Pt needs to f/u with pcp regarding low potassium levels. Thanks.

## 2023-08-05 LAB
RBC CD59 DEFICIENT NFR: <0.008 % (ref 0–0.01)
RBC PNH PHENOTYPE: NOT DETECTED

## 2023-08-06 ENCOUNTER — HOSPITAL ENCOUNTER (OUTPATIENT)
Facility: HOSPITAL | Age: 43
Discharge: HOME | End: 2023-08-07
Attending: EMERGENCY MEDICINE | Admitting: HOSPITALIST
Payer: COMMERCIAL

## 2023-08-06 DIAGNOSIS — R55 SYNCOPE: Primary | ICD-10-CM

## 2023-08-06 DIAGNOSIS — E87.6 HYPOKALEMIA: ICD-10-CM

## 2023-08-06 LAB
ALBUMIN SERPL BCP-MCNC: 4.1 G/DL (ref 3.5–5.2)
ALP SERPL-CCNC: 56 U/L (ref 55–135)
ALT SERPL W/O P-5'-P-CCNC: 14 U/L (ref 10–44)
ANION GAP SERPL CALC-SCNC: 7 MMOL/L (ref 8–16)
APTT IMM NP PPP: NORMAL SEC (ref 32–48)
APTT P HEP NEUT PPP: NORMAL SEC (ref 32–48)
AST SERPL-CCNC: 16 U/L (ref 10–40)
BASOPHILS # BLD AUTO: 0.03 K/UL (ref 0–0.2)
BASOPHILS NFR BLD: 0.4 % (ref 0–1.9)
BILIRUB SERPL-MCNC: 0.4 MG/DL (ref 0.1–1)
BILIRUB UR QL STRIP: NEGATIVE
BNP SERPL-MCNC: 77 PG/ML (ref 0–99)
BUN SERPL-MCNC: 10 MG/DL (ref 6–20)
CALCIUM SERPL-MCNC: 10 MG/DL (ref 8.7–10.5)
CHLORIDE SERPL-SCNC: 104 MMOL/L (ref 95–110)
CLARITY UR: CLEAR
CO2 SERPL-SCNC: 27 MMOL/L (ref 23–29)
COLOR UR: YELLOW
CONFIRM APTT STACLOT: NORMAL
CREAT SERPL-MCNC: 0.9 MG/DL (ref 0.5–1.4)
CTP QC/QA: YES
CTP QC/QA: YES
D DIMER PPP IA.FEU-MCNC: 0.5 MG/L FEU
DIFFERENTIAL METHOD: ABNORMAL
DRVVT SCREEN TO CONFIRM RATIO: NORMAL RATIO
EOSINOPHIL # BLD AUTO: 0.1 K/UL (ref 0–0.5)
EOSINOPHIL NFR BLD: 1.7 % (ref 0–8)
ERYTHROCYTE [DISTWIDTH] IN BLOOD BY AUTOMATED COUNT: 12.6 % (ref 11.5–14.5)
EST. GFR  (NO RACE VARIABLE): >60 ML/MIN/1.73 M^2
GLUCOSE SERPL-MCNC: 95 MG/DL (ref 70–110)
GLUCOSE UR QL STRIP: NEGATIVE
HCT VFR BLD AUTO: 37.2 % (ref 37–48.5)
HGB BLD-MCNC: 13.1 G/DL (ref 12–16)
HGB UR QL STRIP: NEGATIVE
IMM GRANULOCYTES # BLD AUTO: 0.02 K/UL (ref 0–0.04)
IMM GRANULOCYTES NFR BLD AUTO: 0.3 % (ref 0–0.5)
KETONES UR QL STRIP: NEGATIVE
LA 3 SCREEN W REFLEX-IMP: NORMAL
LA NT DPL PPP QL: NORMAL
LEUKOCYTE ESTERASE UR QL STRIP: NEGATIVE
LYMPHOCYTES # BLD AUTO: 3.5 K/UL (ref 1–4.8)
LYMPHOCYTES NFR BLD: 48.8 % (ref 18–48)
MAGNESIUM SERPL-MCNC: 2.1 MG/DL (ref 1.6–2.6)
MCH RBC QN AUTO: 31.6 PG (ref 27–31)
MCHC RBC AUTO-ENTMCNC: 35.2 G/DL (ref 32–36)
MCV RBC AUTO: 90 FL (ref 82–98)
MIXING DRVVT: NORMAL SEC (ref 33–44)
MONOCYTES # BLD AUTO: 0.6 K/UL (ref 0.3–1)
MONOCYTES NFR BLD: 8.4 % (ref 4–15)
NEUTROPHILS # BLD AUTO: 2.9 K/UL (ref 1.8–7.7)
NEUTROPHILS NFR BLD: 40.4 % (ref 38–73)
NITRITE UR QL STRIP: NEGATIVE
NRBC BLD-RTO: 0 /100 WBC
PH UR STRIP: 8 [PH] (ref 5–8)
PLATELET # BLD AUTO: 344 K/UL (ref 150–450)
PMV BLD AUTO: 9.4 FL (ref 9.2–12.9)
POC MOLECULAR INFLUENZA A AGN: NEGATIVE
POC MOLECULAR INFLUENZA B AGN: NEGATIVE
POTASSIUM SERPL-SCNC: 2.8 MMOL/L (ref 3.5–5.1)
PROT SERPL-MCNC: 7.3 G/DL (ref 6–8.4)
PROT UR QL STRIP: NEGATIVE
PROTHROMBIN TIME: 13.5 SEC (ref 12–15.5)
RBC # BLD AUTO: 4.14 M/UL (ref 4–5.4)
REPTILASE TIME: NORMAL SEC
SARS-COV-2 RDRP RESP QL NAA+PROBE: NEGATIVE
SCREEN APTT: 41 SEC (ref 32–48)
SCREEN DRVVT: 33 SEC (ref 33–44)
SODIUM SERPL-SCNC: 138 MMOL/L (ref 136–145)
SP GR UR STRIP: 1.01 (ref 1–1.03)
T4 FREE SERPL-MCNC: 0.92 NG/DL (ref 0.71–1.51)
THROMBIN TIME: NORMAL SEC (ref 14.7–19.5)
TROPONIN I SERPL DL<=0.01 NG/ML-MCNC: 0.01 NG/ML (ref 0–0.03)
TSH SERPL DL<=0.005 MIU/L-ACNC: 0.4 UIU/ML (ref 0.4–4)
URN SPEC COLLECT METH UR: NORMAL
UROBILINOGEN UR STRIP-ACNC: NEGATIVE EU/DL
WBC # BLD AUTO: 7.26 K/UL (ref 3.9–12.7)

## 2023-08-06 PROCEDURE — 99285 EMERGENCY DEPT VISIT HI MDM: CPT | Mod: 25

## 2023-08-06 PROCEDURE — 93005 ELECTROCARDIOGRAM TRACING: CPT

## 2023-08-06 PROCEDURE — 80053 COMPREHEN METABOLIC PANEL: CPT | Performed by: EMERGENCY MEDICINE

## 2023-08-06 PROCEDURE — 25000003 PHARM REV CODE 250: Performed by: EMERGENCY MEDICINE

## 2023-08-06 PROCEDURE — 87635 SARS-COV-2 COVID-19 AMP PRB: CPT | Performed by: EMERGENCY MEDICINE

## 2023-08-06 PROCEDURE — 85025 COMPLETE CBC W/AUTO DIFF WBC: CPT | Performed by: EMERGENCY MEDICINE

## 2023-08-06 PROCEDURE — 63600175 PHARM REV CODE 636 W HCPCS: Performed by: EMERGENCY MEDICINE

## 2023-08-06 PROCEDURE — 96360 HYDRATION IV INFUSION INIT: CPT | Mod: 59

## 2023-08-06 PROCEDURE — 84484 ASSAY OF TROPONIN QUANT: CPT | Performed by: EMERGENCY MEDICINE

## 2023-08-06 PROCEDURE — 84439 ASSAY OF FREE THYROXINE: CPT | Performed by: EMERGENCY MEDICINE

## 2023-08-06 PROCEDURE — 81003 URINALYSIS AUTO W/O SCOPE: CPT | Performed by: EMERGENCY MEDICINE

## 2023-08-06 PROCEDURE — 84443 ASSAY THYROID STIM HORMONE: CPT | Performed by: EMERGENCY MEDICINE

## 2023-08-06 PROCEDURE — 93010 ELECTROCARDIOGRAM REPORT: CPT | Mod: ,,, | Performed by: INTERNAL MEDICINE

## 2023-08-06 PROCEDURE — 83880 ASSAY OF NATRIURETIC PEPTIDE: CPT | Performed by: EMERGENCY MEDICINE

## 2023-08-06 PROCEDURE — 85379 FIBRIN DEGRADATION QUANT: CPT | Performed by: EMERGENCY MEDICINE

## 2023-08-06 PROCEDURE — 25500020 PHARM REV CODE 255: Performed by: EMERGENCY MEDICINE

## 2023-08-06 PROCEDURE — 87502 INFLUENZA DNA AMP PROBE: CPT

## 2023-08-06 PROCEDURE — 83735 ASSAY OF MAGNESIUM: CPT | Performed by: EMERGENCY MEDICINE

## 2023-08-06 PROCEDURE — 93010 EKG 12-LEAD: ICD-10-PCS | Mod: ,,, | Performed by: INTERNAL MEDICINE

## 2023-08-06 RX ORDER — ACETAMINOPHEN 325 MG/1
650 TABLET ORAL
Status: COMPLETED | OUTPATIENT
Start: 2023-08-06 | End: 2023-08-06

## 2023-08-06 RX ADMIN — POTASSIUM BICARBONATE 40 MEQ: 391 TABLET, EFFERVESCENT ORAL at 10:08

## 2023-08-06 RX ADMIN — ACETAMINOPHEN 650 MG: 325 TABLET ORAL at 10:08

## 2023-08-06 RX ADMIN — SODIUM CHLORIDE, POTASSIUM CHLORIDE, SODIUM LACTATE AND CALCIUM CHLORIDE 1000 ML: 600; 310; 30; 20 INJECTION, SOLUTION INTRAVENOUS at 10:08

## 2023-08-06 RX ADMIN — IOHEXOL 100 ML: 350 INJECTION, SOLUTION INTRAVENOUS at 11:08

## 2023-08-06 NOTE — Clinical Note
Diagnosis: Syncope [206001]   Future Attending Provider: ALY ANDREA [[id number]]   Admitting Provider:: ALY ANDREA [[id number]]

## 2023-08-07 VITALS
HEIGHT: 61 IN | BODY MASS INDEX: 39.33 KG/M2 | DIASTOLIC BLOOD PRESSURE: 75 MMHG | TEMPERATURE: 99 F | WEIGHT: 208.31 LBS | HEART RATE: 78 BPM | RESPIRATION RATE: 20 BRPM | SYSTOLIC BLOOD PRESSURE: 118 MMHG | OXYGEN SATURATION: 96 %

## 2023-08-07 PROBLEM — R51.9 HEADACHE: Status: ACTIVE | Noted: 2023-08-07

## 2023-08-07 PROBLEM — R79.89 ELEVATED D-DIMER: Status: ACTIVE | Noted: 2023-08-07

## 2023-08-07 PROBLEM — R55 SYNCOPE: Status: ACTIVE | Noted: 2023-08-07

## 2023-08-07 LAB
ANION GAP SERPL CALC-SCNC: 6 MMOL/L (ref 8–16)
ASCENDING AORTA: 2.72 CM
AV INDEX (PROSTH): 1.13
AV MEAN GRADIENT: 2 MMHG
AV PEAK GRADIENT: 4 MMHG
AV VALVE AREA BY VELOCITY RATIO: 3.33 CM²
AV VALVE AREA: 3.47 CM²
AV VELOCITY RATIO: 1.08
B2 GLYCOPROT1 IGA SER QL: 1.6 U/ML
B2 GLYCOPROT1 IGG SER QL: 1.4 U/ML
B2 GLYCOPROT1 IGM SER QL: <2.4 U/ML
BASOPHILS # BLD AUTO: 0.04 K/UL (ref 0–0.2)
BASOPHILS NFR BLD: 0.5 % (ref 0–1.9)
BSA FOR ECHO PROCEDURE: 2 M2
BUN SERPL-MCNC: 7 MG/DL (ref 6–20)
CALCIUM SERPL-MCNC: 9.7 MG/DL (ref 8.7–10.5)
CARDIOLIPIN IGG SER IA-ACNC: <9.4 GPL (ref 0–14.99)
CARDIOLIPIN IGM SER IA-ACNC: <9.4 MPL (ref 0–12.49)
CHLORIDE SERPL-SCNC: 106 MMOL/L (ref 95–110)
CO2 SERPL-SCNC: 26 MMOL/L (ref 23–29)
CREAT SERPL-MCNC: 0.8 MG/DL (ref 0.5–1.4)
CV ECHO LV RWT: 0.41 CM
CV STRESS BASE HR: 67 BPM
DIASTOLIC BLOOD PRESSURE: 87 MMHG
DIFFERENTIAL METHOD: ABNORMAL
DOP CALC AO PEAK VEL: 0.97 M/S
DOP CALC AO VTI: 22.6 CM
DOP CALC LVOT AREA: 3.1 CM2
DOP CALC LVOT DIAMETER: 1.98 CM
DOP CALC LVOT PEAK VEL: 1.05 M/S
DOP CALC LVOT STROKE VOLUME: 78.48 CM3
DOP CALC MV VTI: 25.1 CM
DOP CALCLVOT PEAK VEL VTI: 25.5 CM
E WAVE DECELERATION TIME: 214.28 MSEC
E/A RATIO: 1.41
E/E' RATIO: 8 M/S
ECHO LV POSTERIOR WALL: 0.87 CM (ref 0.6–1.1)
EOSINOPHIL # BLD AUTO: 0.1 K/UL (ref 0–0.5)
EOSINOPHIL NFR BLD: 1.2 % (ref 0–8)
ERYTHROCYTE [DISTWIDTH] IN BLOOD BY AUTOMATED COUNT: 12.9 % (ref 11.5–14.5)
EST. GFR  (NO RACE VARIABLE): >60 ML/MIN/1.73 M^2
F2 C.20210G>A GENO BLD/T: NEGATIVE
F5 P.R506Q BLD/T QL: NEGATIVE
FRACTIONAL SHORTENING: 28 % (ref 28–44)
GLUCOSE SERPL-MCNC: 80 MG/DL (ref 70–110)
HCT VFR BLD AUTO: 38.9 % (ref 37–48.5)
HGB BLD-MCNC: 13.2 G/DL (ref 12–16)
IMM GRANULOCYTES # BLD AUTO: 0.02 K/UL (ref 0–0.04)
IMM GRANULOCYTES NFR BLD AUTO: 0.3 % (ref 0–0.5)
INTERVENTRICULAR SEPTUM: 1.07 CM (ref 0.6–1.1)
IVC DIAMETER: 1.47 CM
IVRT: 98.95 MSEC
LA MAJOR: 5.4 CM
LA MINOR: 4.91 CM
LA WIDTH: 4.5 CM
LEFT ATRIUM SIZE: 3.67 CM
LEFT ATRIUM VOLUME INDEX: 37.6 ML/M2
LEFT ATRIUM VOLUME: 72.2 CM3
LEFT INTERNAL DIMENSION IN SYSTOLE: 3.06 CM (ref 2.1–4)
LEFT VENTRICLE DIASTOLIC VOLUME INDEX: 42.53 ML/M2
LEFT VENTRICLE DIASTOLIC VOLUME: 81.65 ML
LEFT VENTRICLE MASS INDEX: 70 G/M2
LEFT VENTRICLE SYSTOLIC VOLUME INDEX: 19.1 ML/M2
LEFT VENTRICLE SYSTOLIC VOLUME: 36.71 ML
LEFT VENTRICULAR INTERNAL DIMENSION IN DIASTOLE: 4.27 CM (ref 3.5–6)
LEFT VENTRICULAR MASS: 135.07 G
LV LATERAL E/E' RATIO: 6.55 M/S
LV SEPTAL E/E' RATIO: 10.29 M/S
LVOT MG: 2.3 MMHG
LVOT MV: 0.7 CM/S
LYMPHOCYTES # BLD AUTO: 2.9 K/UL (ref 1–4.8)
LYMPHOCYTES NFR BLD: 37.2 % (ref 18–48)
MAGNESIUM SERPL-MCNC: 2.3 MG/DL (ref 1.6–2.6)
MCH RBC QN AUTO: 31.4 PG (ref 27–31)
MCHC RBC AUTO-ENTMCNC: 33.9 G/DL (ref 32–36)
MCV RBC AUTO: 92 FL (ref 82–98)
MONOCYTES # BLD AUTO: 0.6 K/UL (ref 0.3–1)
MONOCYTES NFR BLD: 7.8 % (ref 4–15)
MV MEAN GRADIENT: 1 MMHG
MV PEAK A VEL: 0.51 M/S
MV PEAK E VEL: 0.72 M/S
MV PEAK GRADIENT: 3 MMHG
MV STENOSIS PRESSURE HALF TIME: 62.14 MS
MV VALVE AREA BY CONTINUITY EQUATION: 3.13 CM2
MV VALVE AREA P 1/2 METHOD: 3.54 CM2
NEUTROPHILS # BLD AUTO: 4.1 K/UL (ref 1.8–7.7)
NEUTROPHILS NFR BLD: 53 % (ref 38–73)
NRBC BLD-RTO: 0 /100 WBC
NUC REST EJECTION FRACTION: 60
OHS CV CPX 1 MINUTE RECOVERY HEART RATE: 111 BPM
OHS CV CPX 85 PERCENT MAX PREDICTED HEART RATE MALE: 141
OHS CV CPX ESTIMATED METS: 7
OHS CV CPX MAX PREDICTED HEART RATE: 166
OHS CV CPX PATIENT IS FEMALE: 1
OHS CV CPX PATIENT IS MALE: 0
OHS CV CPX PEAK DIASTOLIC BLOOD PRESSURE: 76 MMHG
OHS CV CPX PEAK HEAR RATE: 150 BPM
OHS CV CPX PEAK RATE PRESSURE PRODUCT: NORMAL
OHS CV CPX PEAK SYSTOLIC BLOOD PRESSURE: 144 MMHG
OHS CV CPX PERCENT MAX PREDICTED HEART RATE ACHIEVED: 90
OHS CV CPX RATE PRESSURE PRODUCT PRESENTING: 9045
PISA TR MAX VEL: 2.04 M/S
PLATELET # BLD AUTO: 344 K/UL (ref 150–450)
PMV BLD AUTO: 9.3 FL (ref 9.2–12.9)
POTASSIUM SERPL-SCNC: 3.5 MMOL/L (ref 3.5–5.1)
PV PEAK GRADIENT: 2 MMHG
PV PEAK VELOCITY: 0.66 M/S
RA MAJOR: 4.35 CM
RA PRESSURE ESTIMATED: 3 MMHG
RA WIDTH: 4.1 CM
RBC # BLD AUTO: 4.21 M/UL (ref 4–5.4)
RIGHT VENTRICULAR END-DIASTOLIC DIMENSION: 3.89 CM
RV TB RVSP: 5 MMHG
RV TISSUE DOPPLER FREE WALL SYSTOLIC VELOCITY 1 (APICAL 4 CHAMBER VIEW): 10 CM/S
SINUS: 2.87 CM
SODIUM SERPL-SCNC: 138 MMOL/L (ref 136–145)
STJ: 2.17 CM
STRESS ECHO POST EXERCISE DUR MIN: 6 MINUTES
STRESS ECHO POST EXERCISE DUR SEC: 6 SECONDS
SYSTOLIC BLOOD PRESSURE: 135 MMHG
TDI LATERAL: 0.11 M/S
TDI SEPTAL: 0.07 M/S
TDI: 0.09 M/S
TR MAX PG: 17 MMHG
TRICUSPID ANNULAR PLANE SYSTOLIC EXCURSION: 1.99 CM
TROPONIN I SERPL DL<=0.01 NG/ML-MCNC: <0.006 NG/ML (ref 0–0.03)
TROPONIN I SERPL DL<=0.01 NG/ML-MCNC: <0.006 NG/ML (ref 0–0.03)
TV REST PULMONARY ARTERY PRESSURE: 20 MMHG
WBC # BLD AUTO: 7.71 K/UL (ref 3.9–12.7)
Z-SCORE OF LEFT VENTRICULAR DIMENSION IN END DIASTOLE: -2.38
Z-SCORE OF LEFT VENTRICULAR DIMENSION IN END SYSTOLE: -0.68

## 2023-08-07 PROCEDURE — 83735 ASSAY OF MAGNESIUM: CPT | Performed by: NURSE PRACTITIONER

## 2023-08-07 PROCEDURE — G0378 HOSPITAL OBSERVATION PER HR: HCPCS

## 2023-08-07 PROCEDURE — 85025 COMPLETE CBC W/AUTO DIFF WBC: CPT | Performed by: NURSE PRACTITIONER

## 2023-08-07 PROCEDURE — 99213 OFFICE O/P EST LOW 20 MIN: CPT | Mod: 25,,, | Performed by: INTERNAL MEDICINE

## 2023-08-07 PROCEDURE — 96361 HYDRATE IV INFUSION ADD-ON: CPT

## 2023-08-07 PROCEDURE — 99213 PR OFFICE/OUTPT VISIT, EST, LEVL III, 20-29 MIN: ICD-10-PCS | Mod: GT,,, | Performed by: PSYCHIATRY & NEUROLOGY

## 2023-08-07 PROCEDURE — 25000003 PHARM REV CODE 250: Performed by: NURSE PRACTITIONER

## 2023-08-07 PROCEDURE — 99213 PR OFFICE/OUTPT VISIT, EST, LEVL III, 20-29 MIN: ICD-10-PCS | Mod: 25,,, | Performed by: INTERNAL MEDICINE

## 2023-08-07 PROCEDURE — 84484 ASSAY OF TROPONIN QUANT: CPT | Performed by: NURSE PRACTITIONER

## 2023-08-07 PROCEDURE — 36415 COLL VENOUS BLD VENIPUNCTURE: CPT | Performed by: NURSE PRACTITIONER

## 2023-08-07 PROCEDURE — 96372 THER/PROPH/DIAG INJ SC/IM: CPT | Mod: 59 | Performed by: NURSE PRACTITIONER

## 2023-08-07 PROCEDURE — 80048 BASIC METABOLIC PNL TOTAL CA: CPT | Performed by: NURSE PRACTITIONER

## 2023-08-07 PROCEDURE — 63600175 PHARM REV CODE 636 W HCPCS: Performed by: NURSE PRACTITIONER

## 2023-08-07 PROCEDURE — 25000003 PHARM REV CODE 250: Performed by: HOSPITALIST

## 2023-08-07 PROCEDURE — 99213 OFFICE O/P EST LOW 20 MIN: CPT | Mod: GT,,, | Performed by: PSYCHIATRY & NEUROLOGY

## 2023-08-07 RX ORDER — ONDANSETRON 2 MG/ML
4 INJECTION INTRAMUSCULAR; INTRAVENOUS EVERY 6 HOURS PRN
Status: DISCONTINUED | OUTPATIENT
Start: 2023-08-07 | End: 2023-08-07 | Stop reason: HOSPADM

## 2023-08-07 RX ORDER — SODIUM CHLORIDE 0.9 % (FLUSH) 0.9 %
10 SYRINGE (ML) INJECTION
Status: DISCONTINUED | OUTPATIENT
Start: 2023-08-07 | End: 2023-08-07 | Stop reason: HOSPADM

## 2023-08-07 RX ORDER — HYDRALAZINE HYDROCHLORIDE 20 MG/ML
5 INJECTION INTRAMUSCULAR; INTRAVENOUS EVERY 6 HOURS PRN
Status: DISCONTINUED | OUTPATIENT
Start: 2023-08-07 | End: 2023-08-07 | Stop reason: HOSPADM

## 2023-08-07 RX ORDER — HYDROCHLOROTHIAZIDE 25 MG/1
TABLET ORAL
Qty: 90 TABLET | Refills: 3
Start: 2023-08-07 | End: 2023-09-01

## 2023-08-07 RX ORDER — ACETAMINOPHEN 325 MG/1
650 TABLET ORAL EVERY 6 HOURS PRN
Status: DISCONTINUED | OUTPATIENT
Start: 2023-08-07 | End: 2023-08-07 | Stop reason: HOSPADM

## 2023-08-07 RX ORDER — TOPIRAMATE 25 MG/1
100 TABLET ORAL NIGHTLY
Status: DISCONTINUED | OUTPATIENT
Start: 2023-08-07 | End: 2023-08-07 | Stop reason: HOSPADM

## 2023-08-07 RX ORDER — ENOXAPARIN SODIUM 100 MG/ML
40 INJECTION SUBCUTANEOUS EVERY 24 HOURS
Status: DISCONTINUED | OUTPATIENT
Start: 2023-08-07 | End: 2023-08-07 | Stop reason: HOSPADM

## 2023-08-07 RX ORDER — ASPIRIN 81 MG/1
81 TABLET ORAL DAILY
Status: DISCONTINUED | OUTPATIENT
Start: 2023-08-07 | End: 2023-08-07 | Stop reason: HOSPADM

## 2023-08-07 RX ORDER — POTASSIUM CHLORIDE 20 MEQ/1
20 TABLET, EXTENDED RELEASE ORAL ONCE
Status: COMPLETED | OUTPATIENT
Start: 2023-08-07 | End: 2023-08-07

## 2023-08-07 RX ORDER — ATORVASTATIN CALCIUM 40 MG/1
40 TABLET, FILM COATED ORAL DAILY
Status: DISCONTINUED | OUTPATIENT
Start: 2023-08-07 | End: 2023-08-07 | Stop reason: HOSPADM

## 2023-08-07 RX ORDER — REGADENOSON 0.08 MG/ML
0.4 INJECTION, SOLUTION INTRAVENOUS ONCE
Status: DISCONTINUED | OUTPATIENT
Start: 2023-08-07 | End: 2023-08-07

## 2023-08-07 RX ORDER — SODIUM CHLORIDE 9 MG/ML
INJECTION, SOLUTION INTRAVENOUS CONTINUOUS
Status: DISCONTINUED | OUTPATIENT
Start: 2023-08-07 | End: 2023-08-07

## 2023-08-07 RX ORDER — POTASSIUM CHLORIDE 20 MEQ/1
40 TABLET, EXTENDED RELEASE ORAL ONCE
Status: COMPLETED | OUTPATIENT
Start: 2023-08-07 | End: 2023-08-07

## 2023-08-07 RX ORDER — SODIUM CHLORIDE 9 MG/ML
INJECTION, SOLUTION INTRAVENOUS ONCE
Status: COMPLETED | OUTPATIENT
Start: 2023-08-07 | End: 2023-08-07

## 2023-08-07 RX ADMIN — SODIUM CHLORIDE: 9 INJECTION, SOLUTION INTRAVENOUS at 01:08

## 2023-08-07 RX ADMIN — ATORVASTATIN CALCIUM 40 MG: 40 TABLET, FILM COATED ORAL at 09:08

## 2023-08-07 RX ADMIN — POTASSIUM CHLORIDE 40 MEQ: 1500 TABLET, EXTENDED RELEASE ORAL at 01:08

## 2023-08-07 RX ADMIN — ENOXAPARIN SODIUM 40 MG: 40 INJECTION SUBCUTANEOUS at 04:08

## 2023-08-07 RX ADMIN — POTASSIUM CHLORIDE 20 MEQ: 1500 TABLET, EXTENDED RELEASE ORAL at 09:08

## 2023-08-07 RX ADMIN — ASPIRIN 81 MG: 81 TABLET, COATED ORAL at 09:08

## 2023-08-07 NOTE — DISCHARGE SUMMARY
[facility] Medicine  Discharge Summary      Patient Name: Marlyn Camacho  MRN: [id number]  QUINTON: [id number]  Patient Class: OP- Observation  Admission Date: 8/6/2023  Hospital Length of Stay: 0 days  Discharge Date and Time: No discharge date for patient encounter.  Attending Physician: Luis Enrique Colmenares MD   Discharging Provider: Luis Enrique Colmenares MD  Primary Care Provider: Mindi Donahue MD    Primary Care Team: Networked reference to record PCT     HPI:   43 year old female with past medical history of Hypertension, HLD, obesity, headaches, and paresthesias present to ED after syncope episode. 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 She walked to the edge of a counter and called out for assistance. She reports being caught by coworker before she hit the floor and unsure what happened after that. She states she woke up to sternum rubs and brought to the ED. Once in the ED, patient complaints of a little nauseous, head tightness, and light-headedness. She states she been fine all day prior to coming to work and only ate a bag of chips with water today. Patient co-worker reports patient had no response to external stimuli or any shakiness during LOC. Patient denies any history of syncope episodes. She reports a history of stroke symptoms on 5/25/23 which CVA was ruled out. MRI of brain and MRI cervical spine unremarkable. Patient being worked up by neurologist outpatient. Per Neurologist note: Headache: Will refer to optometry for evaluation for possible papilledema somewhat concerning for IIH will initiate topamax taper. Tingling EMG/NCS ordered. Labs were ordered to evaluate if there is an underlying metabolic cause of the patients neuropathy complaint. Patient has family history of strokes. She also reports following outpatient with hematologist for low potassium and high MCH and CO2.  Per Hematologist notes: Neuropathy possibly due to b12 def- continue b12 shots. No other exacerbating or alleviating factors. Denies any fever, CP, SOB, or other associated symptoms.       6/15/23 Echo:   The left ventricle is normal in size with normal systolic function.   The estimated ejection fraction is 60%.   Normal left ventricular diastolic function.   Normal right ventricular size with normal right ventricular systolic function.   Normal central venous pressure (3 mmHg).   The estimated PA systolic pressure is 19 mmHg.   Bubble study negative for intracardiac shint    Labs and Imaging in the ED  -potassium 2.8, BNP 77, d-dimer 0.50, trop 0.008, TSH 0.399, urinalysis and CBC unremarkable  -CTA chest: No evidence of acute pulmonary embolism  -CT head:There is no evidence for acute intracranial process.  -CXR: There is no radiographic evidence for acute intrathoracic process.                 * No surgery found *      Hospital Course:   43 year old woman with hypertension and obesity who presented after suffering a syncopal episode while at work.  She was placed in observation status.  Etiology of her syncope is not definitive.  MI, CAD, PE were all ruled out.  She had negative CTH, MPI/stress and troponins.  She was monitored on telemetry but no arrhythmias were identified.  TSH was the tinyest bit low, but FT4 was normal.  Suspect dehydration vs occult arrhythmia.  Neurology and cardiology were consulted and I discussed her care and diagnostic efforts with both Dr. Rhodes and Dr. Vaz.  She will need to follow up with Dr. Vaz to arrange for outpatient event monitor.  Potassium was replaced and normalized.  She is feeling well and stable for discharge.      Goals of Care Treatment Preferences:  Code Status: Full Code      Consults:   Consults (From admission, onward)        Status Ordering Provider     Inpatient consult to Social Work  Once        Provider:  (Not yet assigned)    Completed RUBEN KRUEGER  TC.     Inpatient consult to Cardiology  Once        Provider:  Marie Vaz MD    Completed RUBEN KRUEGER     Inpatient consult to Telemedicine-General Neurology  Once        Provider:  Lorenzo Rhodes MD    Acknowledged RUBEN KRUEGER          No new Assessment & Plan notes have been filed under this hospital service since the last note was generated.  Service: Hospital Medicine    Final Active Diagnoses:    Diagnosis Date Noted POA    PRINCIPAL PROBLEM:  Syncope [R55] 08/07/2023 Unknown    Elevated d-dimer [R79.89] 08/07/2023 Unknown    Headache [R51.9] 08/07/2023 Unknown    Other hyperlipidemia [E78.49] 06/06/2023 Yes    Hypokalemia [E87.6] 05/26/2023 Yes    Paresthesia [R20.2] 05/26/2023 Unknown    Essential hypertension [I10] 11/09/2022 Yes    Severe obesity (BMI 35.0-39.9) with comorbidity [E66.01] 07/22/2022 Yes      Problems Resolved During this Admission:       Discharged Condition: stable    Disposition: Home or Self Care    Follow Up:    Patient Instructions:      Diet Cardiac     Notify your health care provider if you experience any of the following:  increased confusion or weakness     Notify your health care provider if you experience any of the following:  persistent dizziness, light-headedness, or visual disturbances     Notify your health care provider if you experience any of the following:  worsening rash     Notify your health care provider if you experience any of the following:  severe persistent headache     Notify your health care provider if you experience any of the following:  difficulty breathing or increased cough     Notify your health care provider if you experience any of the following:  severe uncontrolled pain     Notify your health care provider if you experience any of the following:  persistent nausea and vomiting or diarrhea     Notify your health care provider if you experience any of the following:  temperature >100.4     Activity as tolerated       Significant  "Diagnostic Studies: Labs:   BMP:   Recent Labs   Lab 08/06/23 2109 08/07/23  0445   GLU 95 80    138   K 2.8* 3.5    106   CO2 27 26   BUN 10 7   CREATININE 0.9 0.8   CALCIUM 10.0 9.7   MG 2.1 2.3   , CMP   Recent Labs   Lab 08/06/23 2109 08/07/23  0445    138   K 2.8* 3.5    106   CO2 27 26   GLU 95 80   BUN 10 7   CREATININE 0.9 0.8   CALCIUM 10.0 9.7   PROT 7.3  --    ALBUMIN 4.1  --    BILITOT 0.4  --    ALKPHOS 56  --    AST 16  --    ALT 14  --    ANIONGAP 7* 6*   , CBC   Recent Labs   Lab 08/06/23 2109 08/07/23  0446   WBC 7.26 7.71   HGB 13.1 13.2   HCT 37.2 38.9    344   , INR   Lab Results   Component Value Date    INR 0.9 05/25/2023    INR 1.0 10/21/2022   , Lipid Panel   Lab Results   Component Value Date    CHOL 208 (H) 05/25/2023    HDL 55 05/25/2023    LDLCALC 130.8 05/25/2023    TRIG 111 05/25/2023    CHOLHDL 26.4 05/25/2023   , Troponin   Recent Labs   Lab 08/06/23 2109 08/07/23  0107 08/07/23  0445   TROPONINI 0.008 <0.006 <0.006    and A1C:   Recent Labs   Lab 05/26/23  0604   HGBA1C 5.0       Pending Diagnostic Studies:     None         Medications:  Reconciled Home Medications:      Medication List      CHANGE how you take these medications    hydroCHLOROthiazide 25 MG tablet  Commonly known as: HYDRODIURIL  Take one tablet by mouth daily.  Check your BP at home prior to taking and if systolic blood pressure is less than 120, then skip that days dose.  What changed:   · how much to take  · how to take this  · when to take this  · additional instructions        CONTINUE taking these medications    aspirin 81 MG EC tablet  Commonly known as: ECOTRIN  Take 1 tablet (81 mg total) by mouth once daily. For stroke prevention     BD SAFETYGLIDE INSULIN SYRINGE 1 mL 29 gauge x 1/2" Syrg  Generic drug: insulin syringe,safetyneedle  1 Units by Misc.(Non-Drug; Combo Route) route every 14 (fourteen) days.     * cyanocobalamin 1,000 mcg/mL injection  Inject 1 mL (1,000 mcg " "total) into the muscle every 14 (fourteen) days for 30 days, THEN 1 mL (1,000 mcg total) every 28 days.  Start taking on: June 7, 2023     * cyanocobalamin 1,000 mcg/mL injection  Inject 1 mL (1,000 mcg total) into the muscle once daily for 7 days, THEN 1 mL (1,000 mcg total) once a week.  Start taking on: June 21, 2023     gabapentin 300 mg Tb24  Take 1 tablet by mouth 3 (three) times daily.     insulin syringe-needle U-100 1 mL 29 gauge x 7/16" Syrg  Inject into the skin every 14 (fourteen) days.     potassium chloride SA 20 MEQ tablet  Commonly known as: K-DUR,KLOR-CON  Take 1 tablet (20 mEq total) by mouth 2 (two) times daily. for 5 days     topiramate 25 MG tablet  Commonly known as: TOPAMAX  Take 1 tablet (25 mg total) by mouth every evening for 7 days, THEN 2 tablets (50 mg total) every evening for 7 days, THEN 4 tablets (100 mg total) every evening.  Start taking on: June 6, 2023         * This list has 2 medication(s) that are the same as other medications prescribed for you. Read the directions carefully, and ask your doctor or other care provider to review them with you.                Indwelling Lines/Drains at time of discharge:   Lines/Drains/Airways     None                 Time spent on the discharge of patient: 35 minutes         Luis Enrique Colmenares MD  Department of Hospital Medicine  [facility] Surg  "

## 2023-08-07 NOTE — SUBJECTIVE & OBJECTIVE
"Past Medical History:   Diagnosis Date    Abnormal Pap smear of cervix     Allergy     Hypertension     Joint pain     Keloid cicatrix     Stroke        Past Surgical History:   Procedure Laterality Date    CKC, ECC, fractional D&C      COLPOSCOPY      CONIZATION-CERVIX  02/2015    DILATION AND CURETTAGE OF UTERUS      HYSTERECTOMY  03/2017    AUB, fibroids    TUBAL LIGATION         Review of patient's allergies indicates:  No Known Allergies    No current facility-administered medications on file prior to encounter.     Current Outpatient Medications on File Prior to Encounter   Medication Sig    aspirin (ECOTRIN) 81 MG EC tablet Take 1 tablet (81 mg total) by mouth once daily. For stroke prevention    gabapentin 300 mg Tb24 Take 1 tablet by mouth 3 (three) times daily.    [DISCONTINUED] hydroCHLOROthiazide (HYDRODIURIL) 25 MG tablet TAKE 1 TABLET(25 MG) BY MOUTH EVERY DAY    cyanocobalamin 1,000 mcg/mL injection Inject 1 mL (1,000 mcg total) into the muscle every 14 (fourteen) days for 30 days, THEN 1 mL (1,000 mcg total) every 28 days.    cyanocobalamin 1,000 mcg/mL injection Inject 1 mL (1,000 mcg total) into the muscle once daily for 7 days, THEN 1 mL (1,000 mcg total) once a week.    insulin syringe,safetyneedle (BD SAFETYGLIDE INSULIN SYRINGE) 1 mL 29 gauge x 1/2" Syrg 1 Units by Misc.(Non-Drug; Combo Route) route every 14 (fourteen) days.    potassium chloride SA (K-DUR,KLOR-CON) 20 MEQ tablet Take 1 tablet (20 mEq total) by mouth 2 (two) times daily. for 5 days    syringe and needle,insulin,1mL (INSULIN SYRINGE-NEEDLE U-100) 1 mL 29 gauge x 7/16" Syrg Inject into the skin every 14 (fourteen) days.    topiramate (TOPAMAX) 25 MG tablet Take 1 tablet (25 mg total) by mouth every evening for 7 days, THEN 2 tablets (50 mg total) every evening for 7 days, THEN 4 tablets (100 mg total) every evening.    [DISCONTINUED] atorvastatin (LIPITOR) 40 MG tablet Take 1 tablet (40 mg total) by mouth once daily. (Patient not " taking: Reported on 6/21/2023)     Family History       Problem Relation (Age of Onset)    Breast cancer Maternal Aunt, Maternal Aunt, Maternal Aunt    Eczema Mother, Maternal Grandmother, Son, Son    Hypertension Mother    Ovarian cancer Mother          Tobacco Use    Smoking status: Never    Smokeless tobacco: Never   Substance and Sexual Activity    Alcohol use: Yes     Comment: occasional    Drug use: No    Sexual activity: Yes     Partners: Male     Birth control/protection: Other-see comments, See Surgical Hx     Comment: Tubal Ligation/Hysterectomy     Review of Systems   Constitutional: Negative.   HENT: Negative.     Eyes: Negative.    Cardiovascular:  Positive for syncope.   Respiratory: Negative.     Endocrine: Negative.    Hematologic/Lymphatic: Negative.    Skin: Negative.    Musculoskeletal: Negative.    Gastrointestinal: Negative.    Genitourinary: Negative.    Psychiatric/Behavioral: Negative.     Allergic/Immunologic: Negative.      Objective:     Vital Signs (Most Recent):  Temp: 98.9 °F (37.2 °C) (08/07/23 0736)  Pulse: 78 (08/07/23 0848)  Resp: 20 (08/07/23 0736)  BP: 118/75 (08/07/23 0848)  SpO2: 96 % (08/07/23 0736) Vital Signs (24h Range):  Temp:  [97.6 °F (36.4 °C)-98.9 °F (37.2 °C)] 98.9 °F (37.2 °C)  Pulse:  [] 78  Resp:  [0-22] 20  SpO2:  [94 %-100 %] 96 %  BP: (108-149)/(63-96) 118/75     Weight: 94.5 kg (208 lb 5.4 oz)  Body mass index is 39.36 kg/m².    SpO2: 96 %         Intake/Output Summary (Last 24 hours) at 8/7/2023 1601  Last data filed at 8/7/2023 1303  Gross per 24 hour   Intake 1174.43 ml   Output 700 ml   Net 474.43 ml       Lines/Drains/Airways       Peripheral Intravenous Line  Duration                  Peripheral IV - Single Lumen 08/06/23 2111 20 G Right Antecubital <1 day                     Physical Exam  Constitutional:       Appearance: Normal appearance. She is well-developed.   HENT:      Head: Normocephalic.   Eyes:      Pupils: Pupils are equal, round, and  "reactive to light.   Cardiovascular:      Rate and Rhythm: Normal rate and regular rhythm.   Pulmonary:      Effort: Pulmonary effort is normal.      Breath sounds: Normal breath sounds.   Abdominal:      General: Bowel sounds are normal.      Palpations: Abdomen is soft.      Tenderness: There is no abdominal tenderness.   Musculoskeletal:         General: Normal range of motion.      Cervical back: Normal range of motion and neck supple.   Skin:     General: Skin is warm.   Neurological:      Mental Status: She is alert and oriented to person, place, and time.          Significant Labs: BMP:   Recent Labs   Lab 08/06/23 2109 08/07/23 0445   GLU 95 80    138   K 2.8* 3.5    106   CO2 27 26   BUN 10 7   CREATININE 0.9 0.8   CALCIUM 10.0 9.7   MG 2.1 2.3   , CMP   Recent Labs   Lab 08/06/23 2109 08/07/23 0445    138   K 2.8* 3.5    106   CO2 27 26   GLU 95 80   BUN 10 7   CREATININE 0.9 0.8   CALCIUM 10.0 9.7   PROT 7.3  --    ALBUMIN 4.1  --    BILITOT 0.4  --    ALKPHOS 56  --    AST 16  --    ALT 14  --    ANIONGAP 7* 6*   , CBC   Recent Labs   Lab 08/06/23 2109 08/07/23 0446   WBC 7.26 7.71   HGB 13.1 13.2   HCT 37.2 38.9    344   , INR No results for input(s): "INR", "PROTIME" in the last 48 hours., Lipid Panel No results for input(s): "CHOL", "HDL", "LDLCALC", "TRIG", "CHOLHDL" in the last 48 hours., Troponin   Recent Labs   Lab 08/06/23 2109 08/07/23  0107 08/07/23 0445   TROPONINI 0.008 <0.006 <0.006   , and All pertinent lab results from the last 24 hours have been reviewed.    Significant Imaging: Echocardiogram: Transthoracic echo (TTE) complete (Cupid Only):   Results for orders placed or performed during the hospital encounter of 08/06/23   Echo   Result Value Ref Range    BSA 2 m2    LVOT stroke volume 78.48 cm3    LVIDd 4.27 3.5 - 6.0 cm    LV Systolic Volume 36.71 mL    LV Systolic Volume Index 19.1 mL/m2    LVIDs 3.06 2.1 - 4.0 cm    LV Diastolic Volume 81.65 mL "    LV Diastolic Volume Index 42.53 mL/m2    IVS 1.07 0.6 - 1.1 cm    LVOT diameter 1.98 cm    LVOT area 3.1 cm2    FS 28 28 - 44 %    Left Ventricle Relative Wall Thickness 0.41 cm    Posterior Wall 0.87 0.6 - 1.1 cm    LV mass 135.07 g    LV Mass Index 70 g/m2    MV Peak E Aly 0.72 m/s    TDI LATERAL 0.11 m/s    TDI SEPTAL 0.07 m/s    E/E' ratio 8.00 m/s    MV Peak A Aly 0.51 m/s    TR Max Aly 2.04 m/s    E/A ratio 1.41     IVRT 98.95 msec    E wave deceleration time 214.28 msec    LV SEPTAL E/E' RATIO 10.29 m/s    LV LATERAL E/E' RATIO 6.55 m/s    LVOT peak aly 1.05 m/s    Left Ventricular Outflow Tract Mean Velocity 0.70 cm/s    Left Ventricular Outflow Tract Mean Gradient 2.30 mmHg    LA size 3.67 cm    Left Atrium Major Axis 5.40 cm    Left Atrium Minor Axis 4.91 cm    RV S' 10.00 cm/s    TAPSE 1.99 cm    RA Major Axis 4.35 cm    AV mean gradient 2 mmHg    AV peak gradient 4 mmHg    Ao peak aly 0.97 m/s    Ao VTI 22.60 cm    LVOT peak VTI 25.50 cm    AV valve area 3.47 cm²    AV Velocity Ratio 1.08     AV index (prosthetic) 1.13     WHITLEY by Velocity Ratio 3.33 cm²    MV mean gradient 1 mmHg    MV peak gradient 3 mmHg    MV stenosis pressure 1/2 time 62.14 ms    MV valve area p 1/2 method 3.54 cm2    MV valve area by continuity eq 3.13 cm2    MV VTI 25.1 cm    Triscuspid Valve Regurgitation Peak Gradient 17 mmHg    PV PEAK VELOCITY 0.66 m/s    PV peak gradient 2 mmHg    Sinus 2.87 cm    STJ 2.17 cm    Ascending aorta 2.72 cm    IVC diameter 1.47 cm    Mean e' 0.09 m/s    ZLVIDS -0.68     ZLVIDD -2.38     RVDD 3.89 cm    LA Volume Index 37.6 mL/m2    LA volume 72.20 cm3    LA WIDTH 4.5 cm    RA Width 4.1 cm    TV resting pulmonary artery pressure 20 mmHg    RV TB RVSP 5 mmHg    Est. RA pres 3 mmHg    Narrative      Left Ventricle: The left ventricle is normal in size. Normal wall   thickness. Normal wall motion. There is normal systolic function with a   visually estimated ejection fraction of 60 - 65%. There is  normal   diastolic function.    Left Atrium: Left atrium is moderately dilated.    Right Ventricle: Normal right ventricular cavity size. Systolic   function is normal.    Pulmonary Artery: The estimated pulmonary artery systolic pressure is   20 mmHg.    IVC/SVC: Normal venous pressure at 3 mmHg.

## 2023-08-07 NOTE — ED PROVIDER NOTES
"Encounter Date: 8/6/2023    SCRIBE #1 NOTE: I, ANDREA JOHNSON, am scribing for, and in the presence of,  Arabella Gil MD. I have scribed the following portions of the note - Other sections scribed: HPI, ROS, PE, MDM.       History     Chief Complaint   Patient presents with    Loss of Consciousness     Appx 5 minutes PTA. Was at work and felt dizzy and lowered herself to the floor. Denies CP, SOB     Leonelaani sOcar Camacho is a 43 y.o. female, with a PMHx of abnormal pap smear of cervix, HTN, joint pain, and keloid cicatrix, who presents to the ED with LOC onset one hour ago. Patient reports that she was at work for one hour when she started feeling weak "like I was going to pass out". She further reports that she walked to the edge of a counter and called out for assistance. She states that she was caught by a coworker before she hit the floor and is unsure what happened. She further states that she woke up to sternal rubs and was transferred down to the ED. Patient reports that she feels a little nausea, head "tightness", and light-headedness. She further reports that she felt fine all day prior to her night shift and ate a bag of chips with water today. Additional history obtained from independent historian, patient's coworker, who states that the patient had no response to external stimuli or any shakiness during her LOC. Patient denies any Hx of similar syncope episodes. She reports a Hx of HTN and states that she was diagnosed with CVA on 05/25/2023 with symptoms of severe headaches, left-sided numbness and paraesthesia, and blurred vision. She further reports that her CVA symptoms have resolved. She notes that she was seen by her hematologist with results of low potassium of 2.9 and high MCH and CO2.  No other exacerbating or alleviating factors. Denies any fever, CP, SOB, or other associated symptoms. Endorses compliance with hydrochlorothiazide, Topamax, and potassium medication. Patient endorses " occasional EtOH, and denies any tobacco or illicit drug use. Patient reports a PSHx of hysterectomy. Patient has NKDA.     The history is provided by the patient. No  was used.     Review of patient's allergies indicates:  No Known Allergies  Past Medical History:   Diagnosis Date    Abnormal Pap smear of cervix     Allergy     Hypertension     Joint pain     Keloid cicatrix      Past Surgical History:   Procedure Laterality Date    CKC, ECC, fractional D&C      COLPOSCOPY      CONIZATION-CERVIX  02/2015    DILATION AND CURETTAGE OF UTERUS      HYSTERECTOMY  03/2017    AUB, fibroids    TUBAL LIGATION       Family History   Problem Relation Age of Onset    Eczema Mother     Ovarian cancer Mother     Hypertension Mother     Breast cancer Maternal Aunt         THREE AUNTS    Breast cancer Maternal Aunt     Breast cancer Maternal Aunt     Eczema Maternal Grandmother     Eczema Son     Eczema Son     Melanoma Neg Hx     Lupus Neg Hx     Psoriasis Neg Hx     Colon cancer Neg Hx      Social History     Tobacco Use    Smoking status: Never    Smokeless tobacco: Never   Substance Use Topics    Alcohol use: Yes     Comment: occasional    Drug use: No     Review of Systems   Constitutional:  Negative for chills, diaphoresis and fever.   Eyes:  Negative for photophobia and visual disturbance.   Respiratory:  Negative for cough and shortness of breath.    Cardiovascular:  Negative for chest pain and leg swelling.   Gastrointestinal:  Positive for nausea. Negative for abdominal pain, blood in stool, constipation, diarrhea and vomiting.   Genitourinary:  Negative for dysuria, flank pain, frequency, hematuria and urgency.   Musculoskeletal:  Negative for neck pain and neck stiffness.   Skin:  Negative for rash and wound.   Neurological:  Positive for syncope, light-headedness and headaches (tightness). Negative for weakness and numbness.   Psychiatric/Behavioral:  Negative for confusion and suicidal ideas.     All other systems reviewed and are negative.      Physical Exam     Initial Vitals [08/06/23 2037]   BP Pulse Resp Temp SpO2   (!) 143/92 102 (!) 22 98 °F (36.7 °C) 100 %      MAP       --         Physical Exam    Nursing note and vitals reviewed.  Constitutional: She appears well-developed and well-nourished. She is not diaphoretic. No distress.   HENT:   Head: Normocephalic and atraumatic.   Mouth/Throat: Oropharynx is clear and moist. No oropharyngeal exudate.   Eyes: Conjunctivae and EOM are normal. Pupils are equal, round, and reactive to light. Right eye exhibits no discharge. Left eye exhibits no discharge.   Neck: Neck supple. No JVD present.   Normal range of motion.  Cardiovascular:  Normal rate, regular rhythm, normal heart sounds and intact distal pulses.     Exam reveals no gallop and no friction rub.       No murmur heard.  Pulmonary/Chest: Breath sounds normal. No respiratory distress. She has no wheezes. She has no rhonchi. She has no rales.   Abdominal: Abdomen is soft. Bowel sounds are normal. She exhibits no distension. There is no abdominal tenderness. There is no rebound and no guarding.   Musculoskeletal:         General: No tenderness or edema.      Cervical back: Normal range of motion and neck supple.     Lymphadenopathy:     She has no cervical adenopathy.   Neurological: She is alert and oriented to person, place, and time. She has normal strength. No cranial nerve deficit or sensory deficit. GCS score is 15. GCS eye subscore is 4. GCS verbal subscore is 5. GCS motor subscore is 6.   Moves all extremities and carries on conversation. CN- II: PERRL; III/IV/VI: EOMI w/out evidence of nystagmus; V: no deficits appreciated to light touch bilateral face; VII: no facial weakness, no facial asymmetry. Eyebrow raise symmetric. Smile symmetric; IX/X: palate midline, and raises symmetrically; XI: shoulder shrug 5/5 bilaterally; XII: tongue is midline w/out asymmetry. Strength 5/5 to bilateral  upper and lower extremities, sensation intact to light touch.    Skin: Skin is warm and dry. Capillary refill takes less than 2 seconds.   Psychiatric: She has a normal mood and affect. Thought content normal.         ED Course   Procedures  Labs Reviewed   COMPREHENSIVE METABOLIC PANEL - Abnormal; Notable for the following components:       Result Value    Potassium 2.8 (*)     Anion Gap 7 (*)     All other components within normal limits   CBC W/ AUTO DIFFERENTIAL - Abnormal; Notable for the following components:    MCH 31.6 (*)     Lymph % 48.8 (*)     All other components within normal limits   TSH - Abnormal; Notable for the following components:    TSH 0.399 (*)     All other components within normal limits   D DIMER, QUANTITATIVE - Abnormal; Notable for the following components:    D-Dimer 0.50 (*)     All other components within normal limits   B-TYPE NATRIURETIC PEPTIDE   MAGNESIUM   TROPONIN I   URINALYSIS, REFLEX TO URINE CULTURE    Narrative:     Specimen Source->Urine   T4, FREE   SARS-COV-2 RDRP GENE   POCT INFLUENZA A/B MOLECULAR          Imaging Results              CTA Chest Non-Coronary (PE Studies) (Final result)  Result time 08/06/23 23:40:16      Final result by Marleen Oliver MD (08/06/23 23:40:16)                   Impression:      No evidence of acute pulmonary embolism.      Electronically signed by: Marleen Oliver  Date:    08/06/2023  Time:    23:40               Narrative:    EXAMINATION:  CT PULMONARY ANGIOGRAM WITH CONTRAST    CLINICAL HISTORY:  Loss of consciousness.    TECHNIQUE:  CT of the chest with intravenous contrast for pulmonary artery angiogram was performed. Contiguous axial 1.25 mm images followed by 10 mm reconstructions with multiplanar and MIP reformations of the pulmonary arteries. No 3D post-angiographic imaging was performed on an independent workstation and reviewed.  One hundred ml of Omnipaque 350 was injected.    COMPARISON:  None.    FINDINGS:  There is no  evidence of pulmonary artery filling defect to suggest pulmonary embolism. There is no aortic aneurysm or aortic dissection.  Mild bibasilar atelectatic changes are present.    There is no evidence of mediastinal, hilar, or axillary adenopathy.    There is no pleural or pericardial effusion.    The heart size is within normal limits for size.                                       CT Head Without Contrast (Final result)  Result time 08/06/23 22:05:20      Final result by Ab Perdomo MD (08/06/23 22:05:20)                   Impression:      There is no evidence for acute intracranial process.      Electronically signed by: Ab Perdomo  Date:    08/06/2023  Time:    22:05               Narrative:    EXAMINATION:  CT HEAD WITHOUT CONTRAST    CLINICAL HISTORY:  Syncope, recurrent;    TECHNIQUE:  Low dose axial images were obtained through the head.  Coronal and sagittal reformations were also performed. Contrast was not administered.    COMPARISON:  CT brain May 26, 2023    FINDINGS:  The ventricular system, sulcal pattern and parenchymal attenuation characteristics appear appropriate for age.  There is no evidence for intracranial mass, mass effect or midline shift.  There is no evidence for acute intracranial hemorrhage.  Appropriate CSF spaces are seen at the skull base.    The visualized orbits appear intact.  The mastoid air cells appear well aerated.  Paranasal sinuses appear predominantly well aerated with minimal mucosal thickening, there is a small opacity at the anterior left frontal sinus, there is also a small opacity of the left sphenoid sinus that may relate to a small mucous retention cyst.  The visualized osseous structures appear intact.                                       X-Ray Chest AP Portable (Final result)  Result time 08/06/23 21:48:29      Final result by Ab Perdomo MD (08/06/23 21:48:29)                   Impression:      There is no radiographic evidence for acute  intrathoracic process.      Electronically signed by: Ab Perdomo  Date:    08/06/2023  Time:    21:48               Narrative:    EXAMINATION:  XR CHEST AP PORTABLE    CLINICAL HISTORY:  syncope;    TECHNIQUE:  Single frontal view of the chest was performed.    COMPARISON:  Chest radiograph October 21, 2022    FINDINGS:  Single portable chest view is submitted.  There is mild diminished depth of inspiration, mild pattern of accentuated attenuation consistent with soft tissue attenuation associated with body habitus.    When accounting for position and technique and depth of inspiration, the appearance of the cardiomediastinal silhouette and pulmonary bronchovascular markings is appropriate.    There is no evidence for confluent infiltrate or consolidation, significant pleural effusion or pneumothorax.    The osseous structures demonstrate mild chronic change.                                       Medications   sodium chloride 0.9% flush 10 mL (has no administration in time range)   acetaminophen tablet 650 mg (has no administration in time range)   potassium chloride SA CR tablet 40 mEq (has no administration in time range)   aspirin EC tablet 81 mg (has no administration in time range)   atorvastatin tablet 40 mg (has no administration in time range)   0.9%  NaCl infusion (has no administration in time range)   ondansetron injection 4 mg (has no administration in time range)   topiramate tablet 100 mg (has no administration in time range)   hydrALAZINE injection 5 mg (has no administration in time range)   enoxaparin injection 40 mg (has no administration in time range)   potassium bicarbonate disintegrating tablet 40 mEq (40 mEq Oral Given 8/6/23 2215)   lactated ringers bolus 1,000 mL (0 mLs Intravenous Stopped 8/6/23 2353)   acetaminophen tablet 650 mg (650 mg Oral Given 8/6/23 2253)   iohexoL (OMNIPAQUE 350) injection 100 mL (100 mLs Intravenous Given 8/6/23 2307)     Medical Decision Making:   History:    Old Medical Records: I decided to obtain old medical records.  Old Records Summarized: other records.  Independently Interpreted Test(s):   I have ordered and independently interpreted EKG Reading(s) - see summary below       <> Summary of EKG Reading(s): EKG independently interpreted by Arabella Gil MD reads: See ED course.    Clinical Tests:   Lab Tests: Reviewed and Ordered  Radiological Study: Ordered and Reviewed  Medical Tests: Ordered and Reviewed  MDM  DDx:   Seizure: no witnessed seizure activity, incontinence or h/o seizures to suggest seizure today  Hypoxia and hypoglycemia less likely in this patient with normal sats and BG of 95  Cardiac issue: electrical (dysarrhythmias, brugada, WPW, long QT).  Less likely given no evidence of drop attack, no palpitations, no EKG findings to predispose to arrhythmias. No CP, no STEMI on EKG; NSTEMI unlikely to cause brief cardiogenic shock in absence of other significant findings  Mechanical: outflow obstruction like HOCM or aortic stenosis, tamponade-less likely as no murmur, non-exertional, no hypertrophy on EKG.   Vessels: PE, dissection, AAA.  Clinical picture inconsistent, no abd pain, no pulsatile mass, symmetric pulses, no risk factors of PE, CTA PE negative   Volume issue: dehydration from vomiting/diarrhea/decreased PO, sepsis, GI bleed, ruptured ectopic or bleeding AAA. No signs of recent illness to suggest infection, no e/o anemia by history or PE,  orthostatics + and patient with dry MM, IVF given in ED  Neuro: No HA, no personal  h/o cerebral aneurysm, nml neuro exam, so this is unlikely ICH or sentinel bleed  Also: vasovagal, drugs/meds, autonomic insufficiency    Patient potassium is lower than previous at 2.8, despite taking potassium at home. QTC is normal on EKG. Given syncopal episode will gien 40 meq of potassium in ED and observe overnight for repeat potassium supplementation and repeat lab. Mag WNL. Discussed with patient and in agreement  with plan. Discussed with Rachael with  who is in agreement with observation.           Scribe Attestation:   Scribe #1: I performed the above scribed service and the documentation accurately describes the services I performed. I attest to the accuracy of the note.        ED Course as of 08/07/23 0111   Sun Aug 06, 2023   2229 EKG 12-lead  Normal sinus rhythm at 87.  No ST elevation or significant depression.  T-wave inversions in V1.  Normal axis.  QTC is 457. [JT]      ED Course User Index  [JT] Arabella Gil MD               Scribe attestation: I, Arabella Gil, personally performed the services described in this documentation. All medical record entries made by the scribe were at my direction and in my presence.  I have reviewed the chart and agree that the record reflects my personal performance and is accurate and complete.   Clinical Impression:   Final diagnoses:  [R55] Syncope  [E87.6] Hypokalemia (Primary)        ED Disposition Condition    Observation Stable                Arabella Gil MD  08/07/23 0114

## 2023-08-07 NOTE — NURSING TRANSFER
Nursing Transfer Note      8/7/2023   12:30 AM    Nurse giving handoff:Teresa SOUSA RN  Nurse receiving handoff:Deja TELLEZ RN    Reason patient is being transferred: Observation    Transfer From: ED To: 335    Transfer via wheelchair    Transfer with cardiac monitoring    Transported by transport personnel    Transfer Vital Signs:  Blood Pressure:143/84  Heart Rate:72  O2:99  Temperature:97.9  Respirations:19    Telemetry: Box Number [id number], Rate 66, Rhythm SR, and Telemetry  Yina Lane  Order for Tele Monitor? Yes    Additional Lines: N/A    4eyes on Skin: yes    Medicines sent: IV Fluids    Any special needs or follow-up needed: N/A    Patient belongings transferred with patient: Yes    Chart send with patient: Yes    Notified: spouse    Patient reassessed at: 0050 on 08/07/2023    Upon arrival to floor, patient walked to bed from wheelchair with generalized weakness and dizziness noted. Cardiac monitor applied and patient oriented to room. Vital signs obtained and can be found in flow sheets with complete patient assessment. Skin dry and intact with a 20g RAC PIV noted with NS infusing at 75cc/hr. Bed alarm activated to maintain patient safety, call bell in reach, and bed in lowest position,. Patient instructed to inform the nurse if anything is needed. Patient stable and will continue to be monitored.    Staging Type: staging Who Was Counseled?: patient Are Regional Lymph Nodes Available For Evaluation: Yes Lymph Node Evaluation (Clinical And Microscopic): No clinically palpable lymph nodes and no microscopic lymph node metastases Mitotic Rate: Less than 1/mm2 Breslow Depth In Mm (Leave At 0 For In Situ): 0.4 Distant Metastases: No distant metastases Counseling For Stage 0 Melanomas: I reviewed the factors which determine melanoma stage. For Stage 0 the 5-year survival rate is 100%. I recommended at least monthly skin self-examinations and close dermatology follow-up. Detail Level: Detailed Ulceration: No Counseling For Stage Ia Melanomas: I reviewed the factors which determine melanoma stage. For Stage IA the 5-year survival rate is around 97%. The 10-year survival is around 95%. I recommended at least monthly skin self-examinations and close dermatology follow-up. Counseling For Stage Ib Melanomas: I reviewed the factors which determine melanoma stage. For Stage IB the 5-year survival rate is around 92%. The 10-year survival is around 86%. I recommended at least monthly skin self-examinations and close dermatology follow-up. Counseling For Stage Iia Melanomas: I reviewed the factors which determine melanoma stage. For Stage IIA the 5-year survival rate is around 81%. The 10-year survival is around 67%. I recommended at least monthly skin self-examinations and close dermatology follow-up. Counseling For Stage Iib Melanomas: I reviewed the factors which determine melanoma stage. For Stage IIB the 5-year survival rate is around 70%. The 10-year survival is around 57%. I recommended at least monthly skin self-examinations and close dermatology follow-up. Counseling For Stage Iic Melanomas: I reviewed the factors which determine melanoma stage. For Stage IIC the 5-year survival rate is around 53%. The 10-year survival is around 40%. I recommended at least monthly skin self-examinations and close dermatology follow-up. Counseling For Stage Iiia Melanomas: I reviewed the factors which determine melanoma stage. For Stage IIIA the 5-year survival rate is around 78%. The 10-year survival is around 68%. I recommended at least monthly skin self-examinations and close dermatology follow-up. Counseling For Stage Iiib Melanomas: I reviewed the factors which determine melanoma stage. For Stage IIIB the 5-year survival rate is around 59%. The 10-year survival is around 43%. I recommended at least monthly skin self-examinations and close dermatology follow-up. Counseling For Stage Iiic Melanomas: I reviewed the factors which determine melanoma stage. For Stage IIIC the 5-year survival rate is around 40%. The 10-year survival is around 24%. I recommended at least monthly skin self-examinations and close dermatology follow-up. Counseling For Stage Iiid Melanomas: I reviewed the factors which determine melanoma stage. For Stage IIID the 5-year survival rate is around 32%. The 10-year survival is around 24%. I recommended at least monthly skin self-examinations and close dermatology follow-up. Counseling For Stage Iv Melanomas: I reviewed the factors which determine melanoma stage. For Stage IV the 5-year survival rate is around 15% to 20%. The 10-year survival is about 10% to 15%. I recommended at least monthly skin self-examinations and close dermatology follow-up.

## 2023-08-07 NOTE — PLAN OF CARE
Problem: Adult Inpatient Plan of Care  Goal: Plan of Care Review  Outcome: Ongoing, Progressing  Goal: Patient-Specific Goal (Individualized)  Outcome: Ongoing, Progressing  Goal: Absence of Hospital-Acquired Illness or Injury  Outcome: Ongoing, Progressing  Goal: Optimal Comfort and Wellbeing  Outcome: Ongoing, Progressing  Goal: Readiness for Transition of Care  Outcome: Ongoing, Progressing     Problem: Syncope  Goal: Absence of Syncopal Symptoms  Outcome: Ongoing, Progressing     Problem: Fall Injury Risk  Goal: Absence of Fall and Fall-Related Injury  Outcome: Ongoing, Progressing   Pt A&Ox4, free from falls/injury, and able to make needs known during shift. VSS. IV fluids continued per orders. No acute distress noted. Bed locked and in lowest position. Bedside table and call light in reach. Will cont to monitor.

## 2023-08-07 NOTE — PLAN OF CARE
LESLIE notified nurse Loretta that patient is ready for discharge from case management standpoint.        08/07/23 1602   Final Note   Assessment Type Final Discharge Note   Anticipated Discharge Disposition Home   What phone number can be called within the next 1-3 days to see how you are doing after discharge? [phone]   Hospital Resources/Appts/Education Provided Appointments scheduled and added to AVS;Appointments scheduled by Navigator/Coordinator   Post-Acute Status   Post-Acute Authorization Other   Other Status No Post-Acute Service Needs   Discharge Delays None known at this time

## 2023-08-07 NOTE — ASSESSMENT & PLAN NOTE
-likely secondary to hypokalemia   consider neurology consult  -trend troponin's   -neuro checks   -orthostatic BP  -tele monitoring  -IVF hydration   -EKG as needed   -resume PTA Asa and statin

## 2023-08-07 NOTE — CONSULTS
[facility] - Med Surg  Cardiology  Consult Note    Patient Name: Marlyn Camacho  MRN: [id number]  Admission Date: 8/6/2023  Hospital Length of Stay: 0 days  Code Status: Full Code   Attending Provider: Luis Enrique Colmenares MD   Consulting Provider: Marie Vaz MD  Primary Care Physician: Mindi Donahue MD  Principal Problem:Syncope    Patient information was obtained from patient and ER records.     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  She was working yesterday when she started feeling weak, dizzy, nauseated and then passed out.  She does not know how long she was passed out for.  Denies any chest pains or tightness prior to passing out.  On tele monitoring since yesterday and no significant arrhythmia has been seen.  Denies orthopnea, PND, swelling of feet.  Denies any chest tightness.  Had a stress test done today which did not show any significant ischemia.  Echo done today did not show any acute abnormality which could cause syncope.          HPI: 43 year old female with past medical history of Hypertension, HLD, obesity, headaches, and paresthesias present to ED after syncope episode. 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 She walked to the edge of a counter and called out for assistance. She reports being caught by coworker before she hit the floor and unsure what happened after that. She states she woke up to sternum rubs and brought to the ED. Once in the ED, patient complaints of a little nauseous, head tightness, and light-headedness. She states she been fine all day prior to coming to work and only ate a bag of chips with water today. Patient  co-worker reports patient had no response to external stimuli or any shakiness during LOC. Patient denies any history of syncope episodes. She reports a history of stroke symptoms on 5/25/23 which CVA was ruled out. MRI of brain and MRI cervical spine unremarkable. Patient being worked up by neurologist outpatient. Per Neurologist note: Headache: Will refer to optometry for evaluation for possible papilledema somewhat concerning for IIH will initiate topamax taper. Tingling EMG/NCS ordered. Labs were ordered to evaluate if there is an underlying metabolic cause of the patients neuropathy complaint. Patient has family history of strokes. She also reports following outpatient with hematologist for low potassium and high MCH and CO2. Per Hematologist notes: Neuropathy possibly due to b12 def- continue b12 shots. No other exacerbating or alleviating factors. Denies any fever, CP, SOB, or other associated symptoms.         6/15/23 Echo:   The left ventricle is normal in size with normal systolic function.   The estimated ejection fraction is 60%.   Normal left ventricular diastolic function.   Normal right ventricular size with normal right ventricular systolic function.   Normal central venous pressure (3 mmHg).   The estimated PA systolic pressure is 19 mmHg.   Bubble study negative for intracardiac shint     Labs and Imaging in the ED  -potassium 2.8, BNP 77, d-dimer 0.50, trop 0.008, TSH 0.399, urinalysis and CBC unremarkable  -CTA chest: No evidence of acute pulmonary embolism  -CT head:There is no evidence for acute intracranial process.  -CXR: There is no radiographic evidence for acute intrathoracic process.         Past Medical History:   Diagnosis Date    Abnormal Pap smear of cervix     Allergy     Hypertension     Joint pain     Keloid cicatrix     Stroke        Past Surgical History:   Procedure Laterality Date    CKC, ECC, fractional D&C      COLPOSCOPY      CONIZATION-CERVIX  02/2015     "DILATION AND CURETTAGE OF UTERUS      HYSTERECTOMY  03/2017    AUB, fibroids    TUBAL LIGATION         Review of patient's allergies indicates:  No Known Allergies    No current facility-administered medications on file prior to encounter.     Current Outpatient Medications on File Prior to Encounter   Medication Sig    aspirin (ECOTRIN) 81 MG EC tablet Take 1 tablet (81 mg total) by mouth once daily. For stroke prevention    gabapentin 300 mg Tb24 Take 1 tablet by mouth 3 (three) times daily.    [DISCONTINUED] hydroCHLOROthiazide (HYDRODIURIL) 25 MG tablet TAKE 1 TABLET(25 MG) BY MOUTH EVERY DAY    cyanocobalamin 1,000 mcg/mL injection Inject 1 mL (1,000 mcg total) into the muscle every 14 (fourteen) days for 30 days, THEN 1 mL (1,000 mcg total) every 28 days.    cyanocobalamin 1,000 mcg/mL injection Inject 1 mL (1,000 mcg total) into the muscle once daily for 7 days, THEN 1 mL (1,000 mcg total) once a week.    insulin syringe,safetyneedle (BD SAFETYGLIDE INSULIN SYRINGE) 1 mL 29 gauge x 1/2" Syrg 1 Units by Misc.(Non-Drug; Combo Route) route every 14 (fourteen) days.    potassium chloride SA (K-DUR,KLOR-CON) 20 MEQ tablet Take 1 tablet (20 mEq total) by mouth 2 (two) times daily. for 5 days    syringe and needle,insulin,1mL (INSULIN SYRINGE-NEEDLE U-100) 1 mL 29 gauge x 7/16" Syrg Inject into the skin every 14 (fourteen) days.    topiramate (TOPAMAX) 25 MG tablet Take 1 tablet (25 mg total) by mouth every evening for 7 days, THEN 2 tablets (50 mg total) every evening for 7 days, THEN 4 tablets (100 mg total) every evening.    [DISCONTINUED] atorvastatin (LIPITOR) 40 MG tablet Take 1 tablet (40 mg total) by mouth once daily. (Patient not taking: Reported on 6/21/2023)     Family History       Problem Relation (Age of Onset)    Breast cancer Maternal Aunt, Maternal Aunt, Maternal Aunt    Eczema Mother, Maternal Grandmother, Son, Son    Hypertension Mother    Ovarian cancer Mother          Tobacco Use    " Smoking status: Never    Smokeless tobacco: Never   Substance and Sexual Activity    Alcohol use: Yes     Comment: occasional    Drug use: No    Sexual activity: Yes     Partners: Male     Birth control/protection: Other-see comments, See Surgical Hx     Comment: Tubal Ligation/Hysterectomy     Review of Systems   Constitutional: Negative.   HENT: Negative.     Eyes: Negative.    Cardiovascular:  Positive for syncope.   Respiratory: Negative.     Endocrine: Negative.    Hematologic/Lymphatic: Negative.    Skin: Negative.    Musculoskeletal: Negative.    Gastrointestinal: Negative.    Genitourinary: Negative.    Psychiatric/Behavioral: Negative.     Allergic/Immunologic: Negative.      Objective:     Vital Signs (Most Recent):  Temp: 98.9 °F (37.2 °C) (08/07/23 0736)  Pulse: 78 (08/07/23 0848)  Resp: 20 (08/07/23 0736)  BP: 118/75 (08/07/23 0848)  SpO2: 96 % (08/07/23 0736) Vital Signs (24h Range):  Temp:  [97.6 °F (36.4 °C)-98.9 °F (37.2 °C)] 98.9 °F (37.2 °C)  Pulse:  [] 78  Resp:  [0-22] 20  SpO2:  [94 %-100 %] 96 %  BP: (108-149)/(63-96) 118/75     Weight: 94.5 kg (208 lb 5.4 oz)  Body mass index is 39.36 kg/m².    SpO2: 96 %         Intake/Output Summary (Last 24 hours) at 8/7/2023 1601  Last data filed at 8/7/2023 1303  Gross per 24 hour   Intake 1174.43 ml   Output 700 ml   Net 474.43 ml       Lines/Drains/Airways       Peripheral Intravenous Line  Duration                  Peripheral IV - Single Lumen 08/06/23 2111 20 G Right Antecubital <1 day                     Physical Exam  Constitutional:       Appearance: Normal appearance. She is well-developed.   HENT:      Head: Normocephalic.   Eyes:      Pupils: Pupils are equal, round, and reactive to light.   Cardiovascular:      Rate and Rhythm: Normal rate and regular rhythm.   Pulmonary:      Effort: Pulmonary effort is normal.      Breath sounds: Normal breath sounds.   Abdominal:      General: Bowel sounds are normal.      Palpations: Abdomen is  "soft.      Tenderness: There is no abdominal tenderness.   Musculoskeletal:         General: Normal range of motion.      Cervical back: Normal range of motion and neck supple.   Skin:     General: Skin is warm.   Neurological:      Mental Status: She is alert and oriented to person, place, and time.          Significant Labs: BMP:   Recent Labs   Lab 08/06/23 2109 08/07/23 0445   GLU 95 80    138   K 2.8* 3.5    106   CO2 27 26   BUN 10 7   CREATININE 0.9 0.8   CALCIUM 10.0 9.7   MG 2.1 2.3   , CMP   Recent Labs   Lab 08/06/23 2109 08/07/23 0445    138   K 2.8* 3.5    106   CO2 27 26   GLU 95 80   BUN 10 7   CREATININE 0.9 0.8   CALCIUM 10.0 9.7   PROT 7.3  --    ALBUMIN 4.1  --    BILITOT 0.4  --    ALKPHOS 56  --    AST 16  --    ALT 14  --    ANIONGAP 7* 6*   , CBC   Recent Labs   Lab 08/06/23 2109 08/07/23 0446   WBC 7.26 7.71   HGB 13.1 13.2   HCT 37.2 38.9    344   , INR No results for input(s): "INR", "PROTIME" in the last 48 hours., Lipid Panel No results for input(s): "CHOL", "HDL", "LDLCALC", "TRIG", "CHOLHDL" in the last 48 hours., Troponin   Recent Labs   Lab 08/06/23 2109 08/07/23  0107 08/07/23 0445   TROPONINI 0.008 <0.006 <0.006   , and All pertinent lab results from the last 24 hours have been reviewed.    Significant Imaging: Echocardiogram: Transthoracic echo (TTE) complete (Cupid Only):   Results for orders placed or performed during the hospital encounter of 08/06/23   Echo   Result Value Ref Range    BSA 2 m2    LVOT stroke volume 78.48 cm3    LVIDd 4.27 3.5 - 6.0 cm    LV Systolic Volume 36.71 mL    LV Systolic Volume Index 19.1 mL/m2    LVIDs 3.06 2.1 - 4.0 cm    LV Diastolic Volume 81.65 mL    LV Diastolic Volume Index 42.53 mL/m2    IVS 1.07 0.6 - 1.1 cm    LVOT diameter 1.98 cm    LVOT area 3.1 cm2    FS 28 28 - 44 %    Left Ventricle Relative Wall Thickness 0.41 cm    Posterior Wall 0.87 0.6 - 1.1 cm    LV mass 135.07 g    LV Mass Index 70 g/m2    " MV Peak E Aly 0.72 m/s    TDI LATERAL 0.11 m/s    TDI SEPTAL 0.07 m/s    E/E' ratio 8.00 m/s    MV Peak A Aly 0.51 m/s    TR Max Aly 2.04 m/s    E/A ratio 1.41     IVRT 98.95 msec    E wave deceleration time 214.28 msec    LV SEPTAL E/E' RATIO 10.29 m/s    LV LATERAL E/E' RATIO 6.55 m/s    LVOT peak aly 1.05 m/s    Left Ventricular Outflow Tract Mean Velocity 0.70 cm/s    Left Ventricular Outflow Tract Mean Gradient 2.30 mmHg    LA size 3.67 cm    Left Atrium Major Axis 5.40 cm    Left Atrium Minor Axis 4.91 cm    RV S' 10.00 cm/s    TAPSE 1.99 cm    RA Major Axis 4.35 cm    AV mean gradient 2 mmHg    AV peak gradient 4 mmHg    Ao peak aly 0.97 m/s    Ao VTI 22.60 cm    LVOT peak VTI 25.50 cm    AV valve area 3.47 cm²    AV Velocity Ratio 1.08     AV index (prosthetic) 1.13     WHITLEY by Velocity Ratio 3.33 cm²    MV mean gradient 1 mmHg    MV peak gradient 3 mmHg    MV stenosis pressure 1/2 time 62.14 ms    MV valve area p 1/2 method 3.54 cm2    MV valve area by continuity eq 3.13 cm2    MV VTI 25.1 cm    Triscuspid Valve Regurgitation Peak Gradient 17 mmHg    PV PEAK VELOCITY 0.66 m/s    PV peak gradient 2 mmHg    Sinus 2.87 cm    STJ 2.17 cm    Ascending aorta 2.72 cm    IVC diameter 1.47 cm    Mean e' 0.09 m/s    ZLVIDS -0.68     ZLVIDD -2.38     RVDD 3.89 cm    LA Volume Index 37.6 mL/m2    LA volume 72.20 cm3    LA WIDTH 4.5 cm    RA Width 4.1 cm    TV resting pulmonary artery pressure 20 mmHg    RV TB RVSP 5 mmHg    Est. RA pres 3 mmHg    Narrative      Left Ventricle: The left ventricle is normal in size. Normal wall   thickness. Normal wall motion. There is normal systolic function with a   visually estimated ejection fraction of 60 - 65%. There is normal   diastolic function.    Left Atrium: Left atrium is moderately dilated.    Right Ventricle: Normal right ventricular cavity size. Systolic   function is normal.    Pulmonary Artery: The estimated pulmonary artery systolic pressure is   20 mmHg.     IVC/SVC: Normal venous pressure at 3 mmHg.       Assessment and Plan:     * Syncope  No cardiac cause detected so far.  Stress test did not show any significant ischemia.  No arrhythmias detected on tele monitoring.  Echo did not show any significant structural abnormality.  Consider event monitor as an outpatient.    Hypokalemia        Essential hypertension        Severe obesity (BMI 35.0-39.9) with comorbidity            VTE Risk Mitigation (From admission, onward)         Ordered     enoxaparin injection 40 mg  Every 24 hours         08/07/23 6645                Thank you for your consult. I will follow-up with patient. Please contact us if you have any additional questions.    Marie Vaz MD  Cardiology   [facility] [place]

## 2023-08-07 NOTE — PLAN OF CARE
Problem: Adult Inpatient Plan of Care  Goal: Plan of Care Review  Flowsheets (Taken 8/7/2023 0306)  Plan of Care Reviewed With: patient  Goal: Absence of Hospital-Acquired Illness or Injury  Intervention: Identify and Manage Fall Risk  Flowsheets (Taken 8/7/2023 0306)  Safety Promotion/Fall Prevention:   Fall Risk reviewed with patient/family   medications reviewed   lighting adjusted   side rails raised x 2   supervised activity   instructed to call staff for mobility  Intervention: Prevent Skin Injury  Flowsheets (Taken 8/7/2023 0306)  Body Position: position changed independently  Intervention: Prevent and Manage VTE (Venous Thromboembolism) Risk  Flowsheets (Taken 8/7/2023 0306)  VTE Prevention/Management: ROM (active) performed  Range of Motion: active ROM (range of motion) encouraged  Goal: Optimal Comfort and Wellbeing  Intervention: Monitor Pain and Promote Comfort  Flowsheets (Taken 8/7/2023 0306)  Pain Management Interventions: pain management plan reviewed with patient/caregiver  Intervention: Provide Person-Centered Care  Flowsheets (Taken 8/7/2023 0306)  Trust Relationship/Rapport:   care explained   questions answered   questions encouraged   thoughts/feelings acknowledged     Problem: Syncope  Goal: Absence of Syncopal Symptoms  Intervention: Manage Effect of Syncopal Symptoms  Flowsheets (Taken 8/7/2023 0306)  Supportive Measures: relaxation techniques promoted     Problem: Fall Injury Risk  Goal: Absence of Fall and Fall-Related Injury  Intervention: Identify and Manage Contributors  Flowsheets (Taken 8/7/2023 0306)  Self-Care Promotion:   independence encouraged   BADL personal objects within reach  Medication Review/Management: medications reviewed  Intervention: Promote Injury-Free Environment  Flowsheets (Taken 8/7/2023 0306)  Safety Promotion/Fall Prevention:   Fall Risk reviewed with patient/family   medications reviewed   lighting adjusted   side rails raised x 2   supervised activity    instructed to call staff for mobility

## 2023-08-07 NOTE — SUBJECTIVE & OBJECTIVE
"Past Medical History:   Diagnosis Date    Abnormal Pap smear of cervix     Allergy     Hypertension     Joint pain     Keloid cicatrix        Past Surgical History:   Procedure Laterality Date    CKC, ECC, fractional D&C      COLPOSCOPY      CONIZATION-CERVIX  02/2015    DILATION AND CURETTAGE OF UTERUS      HYSTERECTOMY  03/2017    AUB, fibroids    TUBAL LIGATION         Review of patient's allergies indicates:  No Known Allergies    No current facility-administered medications on file prior to encounter.     Current Outpatient Medications on File Prior to Encounter   Medication Sig    aspirin (ECOTRIN) 81 MG EC tablet Take 1 tablet (81 mg total) by mouth once daily. For stroke prevention    atorvastatin (LIPITOR) 40 MG tablet Take 1 tablet (40 mg total) by mouth once daily. (Patient not taking: Reported on 6/21/2023)    cyanocobalamin 1,000 mcg/mL injection Inject 1 mL (1,000 mcg total) into the muscle every 14 (fourteen) days for 30 days, THEN 1 mL (1,000 mcg total) every 28 days.    cyanocobalamin 1,000 mcg/mL injection Inject 1 mL (1,000 mcg total) into the muscle once daily for 7 days, THEN 1 mL (1,000 mcg total) once a week.    gabapentin 300 mg Tb24 Take 1 tablet by mouth 3 (three) times daily.    hydroCHLOROthiazide (HYDRODIURIL) 25 MG tablet TAKE 1 TABLET(25 MG) BY MOUTH EVERY DAY    insulin syringe,safetyneedle (BD SAFETYGLIDE INSULIN SYRINGE) 1 mL 29 gauge x 1/2" Syrg 1 Units by Misc.(Non-Drug; Combo Route) route every 14 (fourteen) days.    potassium chloride SA (K-DUR,KLOR-CON) 20 MEQ tablet Take 1 tablet (20 mEq total) by mouth 2 (two) times daily. for 5 days    syringe and needle,insulin,1mL (INSULIN SYRINGE-NEEDLE U-100) 1 mL 29 gauge x 7/16" Syrg Inject into the skin every 14 (fourteen) days.    topiramate (TOPAMAX) 25 MG tablet Take 1 tablet (25 mg total) by mouth every evening for 7 days, THEN 2 tablets (50 mg total) every evening for 7 days, THEN 4 tablets (100 mg total) every evening. "     Family History       Problem Relation (Age of Onset)    Breast cancer Maternal Aunt, Maternal Aunt, Maternal Aunt    Eczema Mother, Maternal Grandmother, Son, Son    Hypertension Mother    Ovarian cancer Mother          Tobacco Use    Smoking status: Never    Smokeless tobacco: Never   Substance and Sexual Activity    Alcohol use: Yes     Comment: occasional    Drug use: No    Sexual activity: Yes     Partners: Male     Birth control/protection: Other-see comments, See Surgical Hx     Comment: Tubal Ligation/Hysterectomy     Review of Systems   Constitutional: Negative.    HENT: Negative.     Eyes: Negative.    Respiratory:  Positive for chest tightness.    Cardiovascular: Negative.    Gastrointestinal:  Positive for nausea.   Endocrine: Negative.    Genitourinary: Negative.    Musculoskeletal: Negative.    Allergic/Immunologic: Negative.    Neurological:  Positive for syncope, light-headedness and headaches.   Hematological: Negative.    Psychiatric/Behavioral: Negative.       Objective:     Vital Signs (Most Recent):  Temp: 97.9 °F (36.6 °C) (08/07/23 0053)  Pulse: 72 (08/07/23 0053)  Resp: 19 (08/07/23 0053)  BP: 137/89 (08/07/23 0054)  SpO2: 99 % (08/07/23 0053) Vital Signs (24h Range):  Temp:  [97.6 °F (36.4 °C)-98 °F (36.7 °C)] 97.9 °F (36.6 °C)  Pulse:  [] 72  Resp:  [0-22] 19  SpO2:  [94 %-100 %] 99 %  BP: (122-149)/(80-96) 137/89     Weight: 94.5 kg (208 lb 5.4 oz)  Body mass index is 39.36 kg/m².     Physical Exam  Vitals and nursing note reviewed.   HENT:      Head: Normocephalic and atraumatic.      Nose: Nose normal.      Mouth/Throat:      Mouth: Mucous membranes are dry.   Eyes:      Extraocular Movements: Extraocular movements intact.      Pupils: Pupils are equal, round, and reactive to light.   Cardiovascular:      Rate and Rhythm: Normal rate and regular rhythm.      Pulses: Normal pulses.      Heart sounds: Normal heart sounds.   Pulmonary:      Effort: Pulmonary effort is normal.       Breath sounds: Normal breath sounds.   Abdominal:      General: Bowel sounds are normal.      Palpations: Abdomen is soft.   Musculoskeletal:         General: Normal range of motion.      Cervical back: Normal range of motion.   Skin:     General: Skin is warm and dry.   Neurological:      Mental Status: She is alert and oriented to person, place, and time.   Psychiatric:         Mood and Affect: Mood normal.              CRANIAL NERVES     CN III, IV, VI   Pupils are equal, round, and reactive to light.       Significant Labs: All pertinent labs within the past 24 hours have been reviewed.    Significant Imaging: I have reviewed all pertinent imaging results/findings within the past 24 hours.

## 2023-08-07 NOTE — ASSESSMENT & PLAN NOTE
No cardiac cause detected so far.  Stress test did not show any significant ischemia.  No arrhythmias detected on tele monitoring.  Echo did not show any significant structural abnormality.  Consider event monitor as an outpatient.

## 2023-08-07 NOTE — HPI
43 year old female with past medical history of Hypertension, HLD, obesity, headaches, and paresthesias present to ED after syncope episode. 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 She walked to the edge of a counter and called out for assistance. She reports being caught by coworker before she hit the floor and unsure what happened after that. She states she woke up to sternum rubs and brought to the ED. Once in the ED, patient complaints of a little nauseous, head tightness, and light-headedness. She states she been fine all day prior to coming to work and only ate a bag of chips with water today. Patient co-worker reports patient had no response to external stimuli or any shakiness during LOC. Patient denies any history of syncope episodes. She reports a history of stroke symptoms on 5/25/23 which CVA was ruled out. MRI of brain and MRI cervical spine unremarkable. Patient being worked up by neurologist outpatient. Per Neurologist note: Headache: Will refer to optometry for evaluation for possible papilledema somewhat concerning for IIH will initiate topamax taper. Tingling EMG/NCS ordered. Labs were ordered to evaluate if there is an underlying metabolic cause of the patients neuropathy complaint. Patient has family history of strokes. She also reports following outpatient with hematologist for low potassium and high MCH and CO2. Per Hematologist notes: Neuropathy possibly due to b12 def- continue b12 shots. No other exacerbating or alleviating factors. Denies any fever, CP, SOB, or other associated symptoms.       6/15/23 Echo:  The left ventricle is normal in size with normal systolic function.  The estimated ejection fraction is 60%.  Normal left ventricular diastolic function.  Normal right ventricular size with normal right ventricular systolic function.  Normal central venous pressure (3 mmHg).  The  estimated PA systolic pressure is 19 mmHg.  Bubble study negative for intracardiac shint    Labs and Imaging in the ED  -potassium 2.8, BNP 77, d-dimer 0.50, trop 0.008, TSH 0.399, urinalysis and CBC unremarkable  -CTA chest: No evidence of acute pulmonary embolism  -CT head:There is no evidence for acute intracranial process.  -CXR: There is no radiographic evidence for acute intrathoracic process.

## 2023-08-07 NOTE — NURSING
Pt off the unit going to [facility] by wheelchair via transport. IV access in place, saline locked. NAD or pain noted.

## 2023-08-07 NOTE — NURSING
[facility] [place]  Nurses Note -- 4 Eyes      8/7/2023       Skin assessed on: Q Shift      [x] No Pressure Injuries Present    []Prevention Measures Documented    [] Yes LDA  for Pressure Injury Previously documented     [] Yes New Pressure Injury Discovered   [] LDA for New Pressure Injury Added      Attending RN:  Loretta Ortega RN     Second RN:  Deja LUCAS RN

## 2023-08-07 NOTE — H&P
[facility] Medicine  History & Physical    Patient Name: Marlyn Camacho  MRN: [id number]  Patient Class: OP- Observation  Admission Date: 8/6/2023  Attending Physician: Maira Davison, *   Primary Care Provider: Mindi Donahue MD         Patient information was obtained from patient and ER records.     Subjective:     Principal Problem:Syncope    Chief Complaint:   Chief Complaint   Patient presents with    Loss of Consciousness     Appx 5 minutes PTA. Was at work and felt dizzy and lowered herself to the floor. Denies CP, SOB        HPI: 43 year old female with past medical history of Hypertension, HLD, obesity, headaches, and paresthesias present to ED after syncope episode. 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 She walked to the edge of a counter and called out for assistance. She reports being caught by coworker before she hit the floor and unsure what happened after that. She states she woke up to sternum rubs and brought to the ED. Once in the ED, patient complaints of a little nauseous, head tightness, and light-headedness. She states she been fine all day prior to coming to work and only ate a bag of chips with water today. Patient co-worker reports patient had no response to external stimuli or any shakiness during LOC. Patient denies any history of syncope episodes. She reports a history of stroke symptoms on 5/25/23 which CVA was ruled out. MRI of brain and MRI cervical spine unremarkable. Patient being worked up by neurologist outpatient. Per Neurologist note: Headache: Will refer to optometry for evaluation for possible papilledema somewhat concerning for IIH will initiate topamax taper. Tingling EMG/NCS ordered. Labs were ordered to evaluate if there is an underlying metabolic cause of the patients neuropathy complaint. Patient has family history of strokes.  She also reports following outpatient with hematologist for low potassium and high MCH and CO2. Per Hematologist notes: Neuropathy possibly due to b12 def- continue b12 shots. No other exacerbating or alleviating factors. Denies any fever, CP, SOB, or other associated symptoms.       6/15/23 Echo:   The left ventricle is normal in size with normal systolic function.   The estimated ejection fraction is 60%.   Normal left ventricular diastolic function.   Normal right ventricular size with normal right ventricular systolic function.   Normal central venous pressure (3 mmHg).   The estimated PA systolic pressure is 19 mmHg.   Bubble study negative for intracardiac shint    Labs and Imaging in the ED  -potassium 2.8, BNP 77, d-dimer 0.50, trop 0.008, TSH 0.399, urinalysis and CBC unremarkable  -CTA chest: No evidence of acute pulmonary embolism  -CT head:There is no evidence for acute intracranial process.  -CXR: There is no radiographic evidence for acute intrathoracic process.                 Past Medical History:   Diagnosis Date    Abnormal Pap smear of cervix     Allergy     Hypertension     Joint pain     Keloid cicatrix        Past Surgical History:   Procedure Laterality Date    CKC, ECC, fractional D&C      COLPOSCOPY      CONIZATION-CERVIX  02/2015    DILATION AND CURETTAGE OF UTERUS      HYSTERECTOMY  03/2017    AUB, fibroids    TUBAL LIGATION         Review of patient's allergies indicates:  No Known Allergies    No current facility-administered medications on file prior to encounter.     Current Outpatient Medications on File Prior to Encounter   Medication Sig    aspirin (ECOTRIN) 81 MG EC tablet Take 1 tablet (81 mg total) by mouth once daily. For stroke prevention    atorvastatin (LIPITOR) 40 MG tablet Take 1 tablet (40 mg total) by mouth once daily. (Patient not taking: Reported on 6/21/2023)    cyanocobalamin 1,000 mcg/mL injection Inject 1 mL (1,000 mcg total) into the muscle  "every 14 (fourteen) days for 30 days, THEN 1 mL (1,000 mcg total) every 28 days.    cyanocobalamin 1,000 mcg/mL injection Inject 1 mL (1,000 mcg total) into the muscle once daily for 7 days, THEN 1 mL (1,000 mcg total) once a week.    gabapentin 300 mg Tb24 Take 1 tablet by mouth 3 (three) times daily.    hydroCHLOROthiazide (HYDRODIURIL) 25 MG tablet TAKE 1 TABLET(25 MG) BY MOUTH EVERY DAY    insulin syringe,safetyneedle (BD SAFETYGLIDE INSULIN SYRINGE) 1 mL 29 gauge x 1/2" Syrg 1 Units by Misc.(Non-Drug; Combo Route) route every 14 (fourteen) days.    potassium chloride SA (K-DUR,KLOR-CON) 20 MEQ tablet Take 1 tablet (20 mEq total) by mouth 2 (two) times daily. for 5 days    syringe and needle,insulin,1mL (INSULIN SYRINGE-NEEDLE U-100) 1 mL 29 gauge x 7/16" Syrg Inject into the skin every 14 (fourteen) days.    topiramate (TOPAMAX) 25 MG tablet Take 1 tablet (25 mg total) by mouth every evening for 7 days, THEN 2 tablets (50 mg total) every evening for 7 days, THEN 4 tablets (100 mg total) every evening.     Family History       Problem Relation (Age of Onset)    Breast cancer Maternal Aunt, Maternal Aunt, Maternal Aunt    Eczema Mother, Maternal Grandmother, Son, Son    Hypertension Mother    Ovarian cancer Mother          Tobacco Use    Smoking status: Never    Smokeless tobacco: Never   Substance and Sexual Activity    Alcohol use: Yes     Comment: occasional    Drug use: No    Sexual activity: Yes     Partners: Male     Birth control/protection: Other-see comments, See Surgical Hx     Comment: Tubal Ligation/Hysterectomy     Review of Systems   Constitutional: Negative.    HENT: Negative.     Eyes: Negative.    Respiratory:  Positive for chest tightness.    Cardiovascular: Negative.    Gastrointestinal:  Positive for nausea.   Endocrine: Negative.    Genitourinary: Negative.    Musculoskeletal: Negative.    Allergic/Immunologic: Negative.    Neurological:  Positive for syncope, light-headedness and " headaches.   Hematological: Negative.    Psychiatric/Behavioral: Negative.       Objective:     Vital Signs (Most Recent):  Temp: 97.9 °F (36.6 °C) (08/07/23 0053)  Pulse: 72 (08/07/23 0053)  Resp: 19 (08/07/23 0053)  BP: 137/89 (08/07/23 0054)  SpO2: 99 % (08/07/23 0053) Vital Signs (24h Range):  Temp:  [97.6 °F (36.4 °C)-98 °F (36.7 °C)] 97.9 °F (36.6 °C)  Pulse:  [] 72  Resp:  [0-22] 19  SpO2:  [94 %-100 %] 99 %  BP: (122-149)/(80-96) 137/89     Weight: 94.5 kg (208 lb 5.4 oz)  Body mass index is 39.36 kg/m².     Physical Exam  Vitals and nursing note reviewed.   HENT:      Head: Normocephalic and atraumatic.      Nose: Nose normal.      Mouth/Throat:      Mouth: Mucous membranes are dry.   Eyes:      Extraocular Movements: Extraocular movements intact.      Pupils: Pupils are equal, round, and reactive to light.   Cardiovascular:      Rate and Rhythm: Normal rate and regular rhythm.      Pulses: Normal pulses.      Heart sounds: Normal heart sounds.   Pulmonary:      Effort: Pulmonary effort is normal.      Breath sounds: Normal breath sounds.   Abdominal:      General: Bowel sounds are normal.      Palpations: Abdomen is soft.   Musculoskeletal:         General: Normal range of motion.      Cervical back: Normal range of motion.   Skin:     General: Skin is warm and dry.   Neurological:      Mental Status: She is alert and oriented to person, place, and time.   Psychiatric:         Mood and Affect: Mood normal.              CRANIAL NERVES     CN III, IV, VI   Pupils are equal, round, and reactive to light.       Significant Labs: All pertinent labs within the past 24 hours have been reviewed.    Significant Imaging: I have reviewed all pertinent imaging results/findings within the past 24 hours.    Assessment/Plan:     * Syncope  -likely secondary to hypokalemia   consider neurology consult  -trend troponin's   -neuro checks   -orthostatic BP  -tele monitoring  -IVF hydration   -EKG as needed   -resume  PTA Asa and statin       Headache  -PTA on Topamax (resume)     Elevated d-dimer  -CTA chest r/o PE  -Noted  -no acute issue      Other hyperlipidemia  -PTA on statin (resume)       Paresthesia  -being worked up outpatient with neurology  -noted         Hypokalemia  -replete as needed  -monitor      Essential hypertension  -PTA on HCTZ (hold)  -hydralazine prn  -monitor     Severe obesity (BMI 35.0-39.9) with comorbidity  Body mass index is 39.36 kg/m². Morbid obesity complicates all aspects of disease management from diagnostic modalities to treatment. Weight loss encouraged and health benefits explained to patient.           VTE Risk Mitigation (From admission, onward)         Ordered     enoxaparin injection 40 mg  Every 24 hours         08/07/23 0109                     On 08/07/2023, patient should be placed in hospital observation services under my care in collaboration with Maira Davison.       Rachael Thomason NP  Department of Hospital Medicine  [facility] - Med Surg

## 2023-08-07 NOTE — CONSULTS
"[facility] Surg  Neurology  Consult Note    Patient Name: Marlyn Camacho  MRN: [id number]  Admission Date: 8/6/2023  Hospital Length of Stay: 0 days  Code Status: Full Code   Attending Provider: Luis Enrique Colmenares MD   Consulting Provider: Lorenzo Rhodes MD  Primary Care Physician: Mindi Donahue MD  Principal Problem:Syncope    Inpatient consult to Telemedicine-General Neurology  Consult performed by: Lorenzo Rhodes MD  Consult ordered by: Luis Enrique Colmenares MD        Subjective:     Chief Complaint:  "I passed out"    HPI: 44 y/o female with medical Hx of HTN states that she was at work when suddenly felt generalized weakness while at work. Pt had LOC but was caught by a co-worker before sustaining any trauma. No convulsions witnessed. Pt is currently asymptomatic. No  Hx of seizures or syncope. No reported hypotension or hypoglycemia.       Past Medical History:   Diagnosis Date    Abnormal Pap smear of cervix     Allergy     Hypertension     Joint pain     Keloid cicatrix     Stroke        Past Surgical History:   Procedure Laterality Date    CKC, ECC, fractional D&C      COLPOSCOPY      CONIZATION-CERVIX  02/2015    DILATION AND CURETTAGE OF UTERUS      HYSTERECTOMY  03/2017    AUB, fibroids    TUBAL LIGATION         Review of patient's allergies indicates:  No Known Allergies    Current Neurological Medications:     No current facility-administered medications on file prior to encounter.     Current Outpatient Medications on File Prior to Encounter   Medication Sig    aspirin (ECOTRIN) 81 MG EC tablet Take 1 tablet (81 mg total) by mouth once daily. For stroke prevention    gabapentin 300 mg Tb24 Take 1 tablet by mouth 3 (three) times daily.    hydroCHLOROthiazide (HYDRODIURIL) 25 MG tablet TAKE 1 TABLET(25 MG) BY MOUTH EVERY DAY    cyanocobalamin 1,000 mcg/mL injection Inject 1 mL (1,000 mcg total) into the muscle every 14 (fourteen) days for 30 days, THEN 1 mL (1,000 mcg total) every 28 days.    " "cyanocobalamin 1,000 mcg/mL injection Inject 1 mL (1,000 mcg total) into the muscle once daily for 7 days, THEN 1 mL (1,000 mcg total) once a week.    insulin syringe,safetyneedle (BD SAFETYGLIDE INSULIN SYRINGE) 1 mL 29 gauge x 1/2" Syrg 1 Units by Misc.(Non-Drug; Combo Route) route every 14 (fourteen) days.    potassium chloride SA (K-DUR,KLOR-CON) 20 MEQ tablet Take 1 tablet (20 mEq total) by mouth 2 (two) times daily. for 5 days    syringe and needle,insulin,1mL (INSULIN SYRINGE-NEEDLE U-100) 1 mL 29 gauge x 7/16" Syrg Inject into the skin every 14 (fourteen) days.    topiramate (TOPAMAX) 25 MG tablet Take 1 tablet (25 mg total) by mouth every evening for 7 days, THEN 2 tablets (50 mg total) every evening for 7 days, THEN 4 tablets (100 mg total) every evening.    [DISCONTINUED] atorvastatin (LIPITOR) 40 MG tablet Take 1 tablet (40 mg total) by mouth once daily. (Patient not taking: Reported on 6/21/2023)      Family History       Problem Relation (Age of Onset)    Breast cancer Maternal Aunt, Maternal Aunt, Maternal Aunt    Eczema Mother, Maternal Grandmother, Son, Son    Hypertension Mother    Ovarian cancer Mother          Tobacco Use    Smoking status: Never    Smokeless tobacco: Never   Substance and Sexual Activity    Alcohol use: Yes     Comment: occasional    Drug use: No    Sexual activity: Yes     Partners: Male     Birth control/protection: Other-see comments, See Surgical Hx     Comment: Tubal Ligation/Hysterectomy     Review of Systems   Constitutional:  Negative for fever.   HENT:  Negative for trouble swallowing.    Eyes:  Negative for photophobia.   Respiratory:  Negative for chest tightness.    Cardiovascular:  Negative for chest pain.   Gastrointestinal:  Negative for abdominal pain.   Genitourinary:  Negative for flank pain.   Musculoskeletal:  Negative for back pain.   Neurological:  Negative for headaches.   Psychiatric/Behavioral:  Negative for decreased concentration.      Objective: "     Vital Signs (Most Recent):  Temp: 98.9 °F (37.2 °C) (08/07/23 0736)  Pulse: 78 (08/07/23 0848)  Resp: 20 (08/07/23 0736)  BP: 118/75 (08/07/23 0848)  SpO2: 96 % (08/07/23 0736) Vital Signs (24h Range):  Temp:  [97.6 °F (36.4 °C)-98.9 °F (37.2 °C)] 98.9 °F (37.2 °C)  Pulse:  [] 78  Resp:  [0-22] 20  SpO2:  [94 %-100 %] 96 %  BP: (108-149)/(63-96) 118/75     Weight: 94.5 kg (208 lb 5.4 oz)  Body mass index is 39.36 kg/m².    Physical Exam  Constitutional:       General: She is not in acute distress.  Pulmonary:      Effort: No respiratory distress.   Neurological:      Mental Status: She is oriented to person, place, and time.   Psychiatric:         Speech: Speech normal.         NEUROLOGICAL EXAMINATION:     MENTAL STATUS   Oriented to person, place, and time.   Speech: speech is normal   Level of consciousness: alert    CRANIAL NERVES     CN VII   Right facial weakness: none  Left facial weakness: none    MOTOR EXAM        AROM of UE's and LE's against gravity       Significant Labs: CBC:   Recent Labs   Lab 08/06/23 2109 08/07/23  0446   WBC 7.26 7.71   HGB 13.1 13.2   HCT 37.2 38.9    344     CMP:   Recent Labs   Lab 08/06/23 2109 08/07/23 0445   GLU 95 80    138   K 2.8* 3.5    106   CO2 27 26   BUN 10 7   CREATININE 0.9 0.8   CALCIUM 10.0 9.7   MG 2.1 2.3   PROT 7.3  --    ALBUMIN 4.1  --    BILITOT 0.4  --    ALKPHOS 56  --    AST 16  --    ALT 14  --    ANIONGAP 7* 6*       Significant Imaging: I have reviewed all pertinent imaging results/findings within the past 24 hours.    Head CT  FINDINGS:  The ventricular system, sulcal pattern and parenchymal attenuation characteristics appear appropriate for age.  There is no evidence for intracranial mass, mass effect or midline shift.  There is no evidence for acute intracranial hemorrhage.  Appropriate CSF spaces are seen at the skull base.     The visualized orbits appear intact.  The mastoid air cells appear well aerated.   Paranasal sinuses appear predominantly well aerated with minimal mucosal thickening, there is a small opacity at the anterior left frontal sinus, there is also a small opacity of the left sphenoid sinus that may relate to a small mucous retention cyst.  The visualized osseous structures appear intact.     Impression:     There is no evidence for acute intracranial process.        Electronically signed by: Ab Perdomo  Date:                                            08/06/2023  Time:                                           22:05    Assessment and Plan:     44 y/o female consulted for syncope.    Syncope: etiology is yet to be determined. No detected arrhythmias to date. Previous echo on 5/2023 showed no concerning abnormalities. Head CT with no acute abnormalities.  Stress test today.  Event monitor on Discharge?    Active Diagnoses:    Diagnosis Date Noted POA    PRINCIPAL PROBLEM:  Syncope [R55] 08/07/2023 Unknown    Elevated d-dimer [R79.89] 08/07/2023 Unknown    Headache [R51.9] 08/07/2023 Unknown    Other hyperlipidemia [E78.49] 06/06/2023 Yes    Hypokalemia [E87.6] 05/26/2023 Yes    Paresthesia [R20.2] 05/26/2023 Unknown    Essential hypertension [I10] 11/09/2022 Yes    Severe obesity (BMI 35.0-39.9) with comorbidity [E66.01] 07/22/2022 Yes      Problems Resolved During this Admission:       VTE Risk Mitigation (From admission, onward)           Ordered     enoxaparin injection 40 mg  Every 24 hours         08/07/23 0109                    Thank you for your consult. I will follow-up with patient. Please contact us if you have any additional questions.    Lorenzo Rhodes MD  Neurology  [facility] Surg

## 2023-08-07 NOTE — PLAN OF CARE
Mindi Donahue MD      [id number] - [place] - [address] [facility] [address]  [address]  [place] [address]  Phone: [phone] Fax: [phone]    Extended Emergency Contact Information  Primary Emergency Contact: Sidney Camacho Jr  Address: [address] [place] [address] [place]  Mobile Phone: [phone]  Relation: Spouse  Secondary Emergency Contact: Catrachita Camacho  Address: [address] [place] [address] [place]  Mobile Phone: [phone]  Relation: Daughter    No barriers to discharge.        08/07/23 1512   Discharge Planning   Assessment Type Discharge Planning Brief Assessment   Resource/Environmental Concerns none   Support Systems Spouse/significant other   Equipment Currently Used at Home none   Current Living Arrangements home   Patient/Family Anticipates Transition to home   Patient/Family Anticipated Services at Transition none   DME Needed Upon Discharge  none   Discharge Plan A Home   Discharge Plan B Home

## 2023-08-07 NOTE — NURSING
Pt back to unit from nuclear med by wheelchair via transport. IV access in place, saline locked. NAD or pain noted.

## 2023-08-07 NOTE — HPI
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  She was working yesterday when she started feeling weak, dizzy, nauseated and then passed out.  She does not know how long she was passed out for.  Denies any chest pains or tightness prior to passing out.  On tele monitoring since yesterday and no significant arrhythmia has been seen.  Denies orthopnea, PND, swelling of feet.  Denies any chest tightness.  Had a stress test done today which did not show any significant ischemia.  Echo done today did not show any acute abnormality which could cause syncope.          HPI: 43 year old female with past medical history of Hypertension, HLD, obesity, headaches, and paresthesias present to ED after syncope episode. 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 She walked to the edge of a counter and called out for assistance. She reports being caught by coworker before she hit the floor and unsure what happened after that. She states she woke up to sternum rubs and brought to the ED. Once in the ED, patient complaints of a little nauseous, head tightness, and light-headedness. She states she been fine all day prior to coming to work and only ate a bag of chips with water today. Patient co-worker reports patient had no response to external stimuli or any shakiness during LOC. Patient denies any history of syncope episodes. She reports a history of stroke symptoms on 5/25/23 which CVA was ruled out. MRI of brain and MRI cervical spine unremarkable. Patient being worked up by neurologist outpatient. Per Neurologist note: Headache: Will refer to optometry for evaluation for possible papilledema somewhat concerning for IIH will initiate topamax taper. Tingling EMG/NCS ordered. Labs were ordered to evaluate if there is an underlying metabolic cause of the patients  neuropathy complaint. Patient has family history of strokes. She also reports following outpatient with hematologist for low potassium and high MCH and CO2. Per Hematologist notes: Neuropathy possibly due to b12 def- continue b12 shots. No other exacerbating or alleviating factors. Denies any fever, CP, SOB, or other associated symptoms.         6/15/23 Echo:  The left ventricle is normal in size with normal systolic function.  The estimated ejection fraction is 60%.  Normal left ventricular diastolic function.  Normal right ventricular size with normal right ventricular systolic function.  Normal central venous pressure (3 mmHg).  The estimated PA systolic pressure is 19 mmHg.  Bubble study negative for intracardiac shint     Labs and Imaging in the ED  -potassium 2.8, BNP 77, d-dimer 0.50, trop 0.008, TSH 0.399, urinalysis and CBC unremarkable  -CTA chest: No evidence of acute pulmonary embolism  -CT head:There is no evidence for acute intracranial process.  -CXR: There is no radiographic evidence for acute intrathoracic process.

## 2023-08-07 NOTE — NURSING
Patient discharged per MD order.  IV removed.  Catheter tip intact.  No distress noted.  Discharge instructions explained.  AVS given to patient.  Patient verbalized understanding.  VSS. Afebrile.  No complaints of pain, N/V, diarrhea, or SOB. Patient left with belongings to Main Entrance via wheelchair per nurse tech. Family with patient.

## 2023-08-07 NOTE — ASSESSMENT & PLAN NOTE
Body mass index is 39.36 kg/m². Morbid obesity complicates all aspects of disease management from diagnostic modalities to treatment. Weight loss encouraged and health benefits explained to patient.

## 2023-08-07 NOTE — DISCHARGE INSTRUCTIONS
The etiology of your syncopal episode is not certain.  Your echocardiogram and stress test were normal.  During your stay we have not observed any irregular heart beats. You have no evidence of infection or pulmonary embolism. Dehydration and occult cardiac arrhythmia remain possible causes.    Take all medications as prescribed.  Eat a strict low salt cardiac diet.  Follow up with your physicians as scheduled - pcp within 1 week and cardiologist within 2 weeks.  Thank you for trusting [employer] and Dr. Colmenares with your care.  We are honored that you entrusted us with your healthcare needs. Your satisfaction is very important to us and we hope you have been very pleased with your experience at [employer]. After your discharge you may receive a survey asking you to rate your hospital experience. We encourage you to take the time to complete the survey as your feedback allows us to identify areas for improvement as well as recognize our staff.   We hope that you have received the very best care possible during your hospitalization at [facility] [employer] [facility], as your satisfaction is our top priority.

## 2023-08-07 NOTE — ED TRIAGE NOTES
Pt arrived to ED with a c/o  LOC at work. Pt states that she was at work when she started to feel weak and felt like going to pass out. She states that her co-worker held her before she hit the floor. Pt also had a c/o of nausea, dizziness and lightheadedness. Denies SOB, chest pain, numbness, blurring of vision. Vitals are stable. Pt is AAO x4.

## 2023-08-07 NOTE — NURSING
[facility] [place]  Nurses Note -- 4 Eyes      8/7/2023       Skin assessed on: Admit      [x] No Pressure Injuries Present    []Prevention Measures Documented    [] Yes LDA  for Pressure Injury Previously documented     [] Yes New Pressure Injury Discovered   [] LDA for New Pressure Injury Added      Attending RN:  Deja Renee RN     Second RN:

## 2023-08-08 LAB
PROT C AG ACT/NOR PPP IA: 148 % (ref 72–160)
PROT S FREE AG ACT/NOR PPP IA: 95 % (ref 50–147)

## 2023-08-09 ENCOUNTER — TELEPHONE (OUTPATIENT)
Dept: FAMILY MEDICINE | Facility: CLINIC | Age: 43
End: 2023-08-09
Payer: COMMERCIAL

## 2023-08-14 ENCOUNTER — LAB VISIT (OUTPATIENT)
Dept: LAB | Facility: HOSPITAL | Age: 43
End: 2023-08-14
Attending: FAMILY MEDICINE
Payer: COMMERCIAL

## 2023-08-14 ENCOUNTER — OFFICE VISIT (OUTPATIENT)
Dept: FAMILY MEDICINE | Facility: CLINIC | Age: 43
End: 2023-08-14
Attending: FAMILY MEDICINE
Payer: COMMERCIAL

## 2023-08-14 VITALS
OXYGEN SATURATION: 99 % | DIASTOLIC BLOOD PRESSURE: 85 MMHG | HEART RATE: 65 BPM | WEIGHT: 209.81 LBS | BODY MASS INDEX: 39.64 KG/M2 | SYSTOLIC BLOOD PRESSURE: 136 MMHG

## 2023-08-14 DIAGNOSIS — I10 ESSENTIAL HYPERTENSION: ICD-10-CM

## 2023-08-14 DIAGNOSIS — E87.6 HYPOKALEMIA: ICD-10-CM

## 2023-08-14 DIAGNOSIS — I10 ESSENTIAL HYPERTENSION: Primary | ICD-10-CM

## 2023-08-14 LAB
ALBUMIN SERPL BCP-MCNC: 4 G/DL (ref 3.5–5.2)
ALP SERPL-CCNC: 52 U/L (ref 55–135)
ALT SERPL W/O P-5'-P-CCNC: 17 U/L (ref 10–44)
ANION GAP SERPL CALC-SCNC: 9 MMOL/L (ref 8–16)
AST SERPL-CCNC: 17 U/L (ref 10–40)
BILIRUB SERPL-MCNC: 0.5 MG/DL (ref 0.1–1)
BUN SERPL-MCNC: 7 MG/DL (ref 6–20)
CALCIUM SERPL-MCNC: 9.5 MG/DL (ref 8.7–10.5)
CHLORIDE SERPL-SCNC: 100 MMOL/L (ref 95–110)
CO2 SERPL-SCNC: 29 MMOL/L (ref 23–29)
CREAT SERPL-MCNC: 0.8 MG/DL (ref 0.5–1.4)
EST. GFR  (NO RACE VARIABLE): >60 ML/MIN/1.73 M^2
GLUCOSE SERPL-MCNC: 87 MG/DL (ref 70–110)
POTASSIUM SERPL-SCNC: 3.5 MMOL/L (ref 3.5–5.1)
PROT SERPL-MCNC: 7.6 G/DL (ref 6–8.4)
SODIUM SERPL-SCNC: 138 MMOL/L (ref 136–145)

## 2023-08-14 PROCEDURE — 99214 PR OFFICE/OUTPT VISIT, EST, LEVL IV, 30-39 MIN: ICD-10-PCS | Mod: S$GLB,,, | Performed by: FAMILY MEDICINE

## 2023-08-14 PROCEDURE — 99999 PR PBB SHADOW E&M-EST. PATIENT-LVL III: ICD-10-PCS | Mod: PBBFAC,,, | Performed by: FAMILY MEDICINE

## 2023-08-14 PROCEDURE — 99214 OFFICE O/P EST MOD 30 MIN: CPT | Mod: S$GLB,,, | Performed by: FAMILY MEDICINE

## 2023-08-14 PROCEDURE — 36415 COLL VENOUS BLD VENIPUNCTURE: CPT | Mod: PO | Performed by: FAMILY MEDICINE

## 2023-08-14 PROCEDURE — 99999 PR PBB SHADOW E&M-EST. PATIENT-LVL III: CPT | Mod: PBBFAC,,, | Performed by: FAMILY MEDICINE

## 2023-08-14 PROCEDURE — 80053 COMPREHEN METABOLIC PANEL: CPT | Performed by: FAMILY MEDICINE

## 2023-08-14 RX ORDER — AMLODIPINE BESYLATE 5 MG/1
5 TABLET ORAL DAILY
Qty: 30 TABLET | Refills: 1 | Status: SHIPPED | OUTPATIENT
Start: 2023-08-14 | End: 2023-10-11

## 2023-08-17 ENCOUNTER — OFFICE VISIT (OUTPATIENT)
Dept: CARDIOLOGY | Facility: CLINIC | Age: 43
End: 2023-08-17
Payer: COMMERCIAL

## 2023-08-17 VITALS
WEIGHT: 212.19 LBS | HEART RATE: 74 BPM | BODY MASS INDEX: 40.06 KG/M2 | SYSTOLIC BLOOD PRESSURE: 115 MMHG | DIASTOLIC BLOOD PRESSURE: 77 MMHG | OXYGEN SATURATION: 100 % | HEIGHT: 61 IN

## 2023-08-17 DIAGNOSIS — R55 SYNCOPE, UNSPECIFIED SYNCOPE TYPE: Primary | ICD-10-CM

## 2023-08-17 PROCEDURE — 99999 PR PBB SHADOW E&M-EST. PATIENT-LVL IV: CPT | Mod: PBBFAC,,, | Performed by: INTERNAL MEDICINE

## 2023-08-17 PROCEDURE — 99214 OFFICE O/P EST MOD 30 MIN: CPT | Mod: S$GLB,,, | Performed by: INTERNAL MEDICINE

## 2023-08-17 PROCEDURE — 99214 PR OFFICE/OUTPT VISIT, EST, LEVL IV, 30-39 MIN: ICD-10-PCS | Mod: S$GLB,,, | Performed by: INTERNAL MEDICINE

## 2023-08-17 PROCEDURE — 99999 PR PBB SHADOW E&M-EST. PATIENT-LVL IV: ICD-10-PCS | Mod: PBBFAC,,, | Performed by: INTERNAL MEDICINE

## 2023-08-17 NOTE — PROGRESS NOTES
"[facility]    Chief Complaint  No chief complaint on file.      HPI:    Since her last visit, underwent echocardiogram with a bubble study that showed no shunt.  About 10 days ago, she was at work when she began to feel weak, dizzy, and nauseous.  She then passed out for an uncertain duration.  Hospitalized overnight.  Had another echocardiogram which showed good left ventricular function.  A stress test was negative for ischemia.    Medications  Current Outpatient Medications   Medication Sig Dispense Refill    amLODIPine (NORVASC) 5 MG tablet Take 1 tablet (5 mg total) by mouth once daily. 30 tablet 1    aspirin (ECOTRIN) 81 MG EC tablet Take 1 tablet (81 mg total) by mouth once daily. For stroke prevention 90 tablet 3    cyanocobalamin 1,000 mcg/mL injection Inject 1 mL (1,000 mcg total) into the muscle every 14 (fourteen) days for 30 days, THEN 1 mL (1,000 mcg total) every 28 days. 1 mL 5    gabapentin 300 mg Tb24 Take 1 tablet by mouth 3 (three) times daily.      topiramate (TOPAMAX) 25 MG tablet Take 1 tablet (25 mg total) by mouth every evening for 7 days, THEN 2 tablets (50 mg total) every evening for 7 days, THEN 4 tablets (100 mg total) every evening. 60 tablet 10    cyanocobalamin 1,000 mcg/mL injection Inject 1 mL (1,000 mcg total) into the muscle once daily for 7 days, THEN 1 mL (1,000 mcg total) once a week. (Patient not taking: Reported on 8/17/2023) 15 mL 0    insulin syringe,safetyneedle (BD SAFETYGLIDE INSULIN SYRINGE) 1 mL 29 gauge x 1/2" Syrg 1 Units by Misc.(Non-Drug; Combo Route) route every 14 (fourteen) days. (Patient not taking: Reported on 8/17/2023) 50 each 0    syringe and needle,insulin,1mL (INSULIN SYRINGE-NEEDLE U-100) 1 mL 29 gauge x 7/16" Syrg Inject into the skin every 14 (fourteen) days.       No current facility-administered medications for this visit.        History  Past Medical History:   Diagnosis Date    Abnormal Pap smear of cervix     Allergy     " Hypertension     Joint pain     Keloid cicatrix     Stroke      Past Surgical History:   Procedure Laterality Date    CKC, ECC, fractional D&C      COLPOSCOPY      CONIZATION-CERVIX  02/2015    DILATION AND CURETTAGE OF UTERUS      HYSTERECTOMY  03/2017    AUB, fibroids    TUBAL LIGATION       Social History     Socioeconomic History    Marital status:    Tobacco Use    Smoking status: Never    Smokeless tobacco: Never   Substance and Sexual Activity    Alcohol use: Yes     Comment: occasional    Drug use: No    Sexual activity: Yes     Partners: Male     Birth control/protection: Other-see comments, See Surgical Hx     Comment: Tubal Ligation/Hysterectomy   Other Topics Concern    Are you pregnant or think you may be? No    Breast-feeding No     Social Determinants of Health     Financial Resource Strain: Medium Risk (8/17/2023)    Overall Financial Resource Strain (CARDIA)     Difficulty of Paying Living Expenses: Somewhat hard   Food Insecurity: Food Insecurity Present (8/17/2023)    Hunger Vital Sign     Worried About Running Out of Food in the Last Year: Sometimes true     Ran Out of Food in the Last Year: Never true   Transportation Needs: No Transportation Needs (8/17/2023)    PRAPARE - Transportation     Lack of Transportation (Medical): No     Lack of Transportation (Non-Medical): No   Physical Activity: Inactive (8/17/2023)    Exercise Vital Sign     Days of Exercise per Week: 0 days     Minutes of Exercise per Session: 0 min   Stress: Stress Concern Present (8/17/2023)    [employer] - Occupational Stress Questionnaire     Feeling of Stress : To some extent   Social Connections: Unknown (8/17/2023)    Social Connection and Isolation Panel [NHANES]     Frequency of Communication with Friends and Family: More than three times a week     Frequency of Social Gatherings with Friends and Family: Three times a week     Active Member of Clubs or Organizations: No     Attends  Club or Organization Meetings: 1 to 4 times per year     Marital Status:    Housing Stability: High Risk (8/17/2023)    Housing Stability Vital Sign     Unable to Pay for Housing in the Last Year: Yes     Number of Places Lived in the Last Year: 2     Unstable Housing in the Last Year: No     Family History   Problem Relation Age of Onset    Eczema Mother     Ovarian cancer Mother     Hypertension Mother     Breast cancer Maternal Aunt         THREE AUNTS    Breast cancer Maternal Aunt     Breast cancer Maternal Aunt     Eczema Maternal Grandmother     Eczema Son     Eczema Son     Melanoma Neg Hx     Lupus Neg Hx     Psoriasis Neg Hx     Colon cancer Neg Hx         Allergies  Review of patient's allergies indicates:  No Known Allergies    Review of Systems   Review of Systems   Constitutional: Negative for malaise/fatigue, weight gain and weight loss.   Eyes:  Negative for visual disturbance.   Cardiovascular:  Positive for palpitations and syncope. Negative for chest pain, claudication, cyanosis, dyspnea on exertion, irregular heartbeat, leg swelling, near-syncope, orthopnea and paroxysmal nocturnal dyspnea.   Respiratory:  Negative for cough, hemoptysis, shortness of breath, sleep disturbances due to breathing and wheezing.    Hematologic/Lymphatic: Negative for bleeding problem. Does not bruise/bleed easily.   Skin:  Negative for poor wound healing.   Musculoskeletal:  Negative for muscle cramps and myalgias.   Gastrointestinal:  Negative for abdominal pain, anorexia, diarrhea, heartburn, hematemesis, hematochezia, melena, nausea and vomiting.   Genitourinary:  Negative for hematuria and nocturia.   Neurological:  Negative for excessive daytime sleepiness, dizziness, focal weakness, light-headedness and weakness.       Physical Exam  Vitals:    08/17/23 1453   BP: 115/77   Pulse: 74     Wt Readings from Last 1 Encounters:   08/17/23 96.3 kg (212 lb 3.2 oz)     Physical Exam  Vitals and nursing note  reviewed.   Constitutional:       General: She is not in acute distress.     Appearance: She is obese. She is not toxic-appearing or diaphoretic.   HENT:      Head: Normocephalic and atraumatic.      Mouth/Throat:      Lips: Pink.      Mouth: Mucous membranes are moist.   Eyes:      General: No scleral icterus.     Conjunctiva/sclera: Conjunctivae normal.   Neck:      Thyroid: No thyromegaly.      Vascular: No carotid bruit, hepatojugular reflux or JVD.      Trachea: Trachea normal.   Cardiovascular:      Rate and Rhythm: Normal rate and regular rhythm. No extrasystoles are present.     Chest Wall: PMI is not displaced.      Pulses:           Carotid pulses are 2+ on the right side and 2+ on the left side.       Radial pulses are 2+ on the right side and 2+ on the left side.        Dorsalis pedis pulses are 2+ on the right side and 2+ on the left side.        Posterior tibial pulses are 2+ on the right side and 2+ on the left side.      Heart sounds: S1 normal and S2 normal. No murmur heard.     No friction rub. No S3 or S4 sounds.   Pulmonary:      Effort: Pulmonary effort is normal. No accessory muscle usage or respiratory distress.      Breath sounds: Normal breath sounds and air entry. No decreased breath sounds, wheezing, rhonchi or rales.   Abdominal:      General: Bowel sounds are normal. There is no distension or abdominal bruit.      Palpations: Abdomen is soft. There is no hepatomegaly, splenomegaly or pulsatile mass.      Tenderness: There is no abdominal tenderness.   Musculoskeletal:         General: No tenderness or deformity.      Right lower leg: No edema.      Left lower leg: No edema.   Skin:     General: Skin is warm and dry.      Capillary Refill: Capillary refill takes less than 2 seconds.      Coloration: Skin is not cyanotic or pale.      Nails: There is no clubbing.   Neurological:      General: No focal deficit present.      Mental Status: She is alert and oriented to person, place, and  time.   Psychiatric:         Attention and Perception: Attention normal.         Mood and Affect: Mood normal.         Speech: Speech normal.         Behavior: Behavior normal. Behavior is cooperative.         Labs  Lab Visit on 08/14/2023   Component Date Value Ref Range Status    Sodium 08/14/2023 138  136 - 145 mmol/L Final    Potassium 08/14/2023 3.5  3.5 - 5.1 mmol/L Final    Chloride 08/14/2023 100  95 - 110 mmol/L Final    CO2 08/14/2023 29  23 - 29 mmol/L Final    Glucose 08/14/2023 87  70 - 110 mg/dL Final    BUN 08/14/2023 7  6 - 20 mg/dL Final    Creatinine 08/14/2023 0.8  0.5 - 1.4 mg/dL Final    Calcium 08/14/2023 9.5  8.7 - 10.5 mg/dL Final    Total Protein 08/14/2023 7.6  6.0 - 8.4 g/dL Final    Albumin 08/14/2023 4.0  3.5 - 5.2 g/dL Final    Total Bilirubin 08/14/2023 0.5  0.1 - 1.0 mg/dL Final    Comment: For infants and newborns, interpretation of results should be based  on gestational age, weight and in agreement with clinical  observations.    Premature Infant recommended reference ranges:  Up to 24 hours.............<8.0 mg/dL  Up to 48 hours............<12.0 mg/dL  3-5 days..................<15.0 mg/dL  6-29 days.................<15.0 mg/dL      Alkaline Phosphatase 08/14/2023 52 (L)  55 - 135 U/L Final    AST 08/14/2023 17  10 - 40 U/L Final    ALT 08/14/2023 17  10 - 44 U/L Final    eGFR 08/14/2023 >60.0  >60 mL/min/1.73 m^2 Final    Anion Gap 08/14/2023 9  8 - 16 mmol/L Final   Admission on 08/06/2023, Discharged on 08/07/2023   Component Date Value Ref Range Status    Sodium 08/06/2023 138  136 - 145 mmol/L Final    Potassium 08/06/2023 2.8 (L)  3.5 - 5.1 mmol/L Final    Chloride 08/06/2023 104  95 - 110 mmol/L Final    CO2 08/06/2023 27  23 - 29 mmol/L Final    Glucose 08/06/2023 95  70 - 110 mg/dL Final    BUN 08/06/2023 10  6 - 20 mg/dL Final    Creatinine 08/06/2023 0.9  0.5 - 1.4 mg/dL Final    Calcium 08/06/2023 10.0  8.7 - 10.5 mg/dL Final    Total Protein 08/06/2023 7.3  6.0 - 8.4  g/dL Final    Albumin 08/06/2023 4.1  3.5 - 5.2 g/dL Final    Total Bilirubin 08/06/2023 0.4  0.1 - 1.0 mg/dL Final    Comment: For infants and newborns, interpretation of results should be based  on gestational age, weight and in agreement with clinical  observations.    Premature Infant recommended reference ranges:  Up to 24 hours.............<8.0 mg/dL  Up to 48 hours............<12.0 mg/dL  3-5 days..................<15.0 mg/dL  6-29 days.................<15.0 mg/dL      Alkaline Phosphatase 08/06/2023 56  55 - 135 U/L Final    AST 08/06/2023 16  10 - 40 U/L Final    ALT 08/06/2023 14  10 - 44 U/L Final    eGFR 08/06/2023 >60  >60 mL/min/1.73 m^2 Final    Anion Gap 08/06/2023 7 (L)  8 - 16 mmol/L Final    WBC 08/06/2023 7.26  3.90 - 12.70 K/uL Final    RBC 08/06/2023 4.14  4.00 - 5.40 M/uL Final    Hemoglobin 08/06/2023 13.1  12.0 - 16.0 g/dL Final    Hematocrit 08/06/2023 37.2  37.0 - 48.5 % Final    MCV 08/06/2023 90  82 - 98 fL Final    MCH 08/06/2023 31.6 (H)  27.0 - 31.0 pg Final    MCHC 08/06/2023 35.2  32.0 - 36.0 g/dL Final    RDW 08/06/2023 12.6  11.5 - 14.5 % Final    Platelets 08/06/2023 344  150 - 450 K/uL Final    MPV 08/06/2023 9.4  9.2 - 12.9 fL Final    Immature Granulocytes 08/06/2023 0.3  0.0 - 0.5 % Final    Gran # (ANC) 08/06/2023 2.9  1.8 - 7.7 K/uL Final    Immature Grans (Abs) 08/06/2023 0.02  0.00 - 0.04 K/uL Final    Comment: Mild elevation in immature granulocytes is non specific and   can be seen in a variety of conditions including stress response,   acute inflammation, trauma and pregnancy. Correlation with other   laboratory and clinical findings is essential.      Lymph # 08/06/2023 3.5  1.0 - 4.8 K/uL Final    Mono # 08/06/2023 0.6  0.3 - 1.0 K/uL Final    Eos # 08/06/2023 0.1  0.0 - 0.5 K/uL Final    Baso # 08/06/2023 0.03  0.00 - 0.20 K/uL Final    nRBC 08/06/2023 0  0 /100 WBC Final    Gran % 08/06/2023 40.4  38.0 - 73.0 % Final    Lymph % 08/06/2023 48.8 (H)  18.0 - 48.0 %  Final    Mono % 08/06/2023 8.4  4.0 - 15.0 % Final    Eosinophil % 08/06/2023 1.7  0.0 - 8.0 % Final    Basophil % 08/06/2023 0.4  0.0 - 1.9 % Final    Differential Method 08/06/2023 Automated   Final    BNP 08/06/2023 77  0 - 99 pg/mL Final    Values of less than 100 pg/ml are consistent with non-CHF populations.    Magnesium 08/06/2023 2.1  1.6 - 2.6 mg/dL Final    Troponin I 08/06/2023 0.008  0.000 - 0.026 ng/mL Final    Comment: The reference interval for Troponin I represents the 99th percentile   cutoff   for our facility and is consistent with 3rd generation assay   performance.      TSH 08/06/2023 0.399 (L)  0.400 - 4.000 uIU/mL Final    Specimen UA 08/06/2023 Urine, Clean Catch   Final    Color, UA 08/06/2023 Yellow  Yellow, Straw, Yue Final    Appearance, UA 08/06/2023 Clear  Clear Final    pH, UA 08/06/2023 8.0  5.0 - 8.0 Final    Specific Gravity, UA 08/06/2023 1.010  1.005 - 1.030 Final    Protein, UA 08/06/2023 Negative  Negative Final    Comment: Recommend a 24 hour urine protein or a urine   protein/creatinine ratio if globulin induced proteinuria is  clinically suspected.      Glucose, UA 08/06/2023 Negative  Negative Final    Ketones, UA 08/06/2023 Negative  Negative Final    Bilirubin (UA) 08/06/2023 Negative  Negative Final    Occult Blood UA 08/06/2023 Negative  Negative Final    Nitrite, UA 08/06/2023 Negative  Negative Final    Urobilinogen, UA 08/06/2023 Negative  <2.0 EU/dL Final    Leukocytes, UA 08/06/2023 Negative  Negative Final    D-Dimer 08/06/2023 0.50 (H)  <0.50 mg/L FEU Final    Comment: The quantitative D-dimer assay should be used as an aid in   the diagnosis of deep vein thrombosis and pulmonary embolism  in patients with the appropriate presentation and clinical  history. The upper limit of the reference interval and the clinical   cut off   point are identical. Causes of a positive (>0.50 mg/L FEU) D-Dimer   test  include, but are not limited to: DVT, PE, DIC, thrombolytic    therapy, anticoagulant therapy, recent surgery, trauma, or   pregnancy, disseminated malignancy, aortic aneurysm, cirrhosis,  and severe infection. False negative results may occur in   patients with distal DVT.      POC Rapid COVID 08/06/2023 Negative  Negative Final     Acceptable 08/06/2023 Yes   Final    POC Molecular Influenza A Ag 08/06/2023 Negative  Negative, Not Reported Final    POC Molecular Influenza B Ag 08/06/2023 Negative  Negative, Not Reported Final     Acceptable 08/06/2023 Yes   Final    Free T4 08/06/2023 0.92  0.71 - 1.51 ng/dL Final    Troponin I 08/07/2023 <0.006  0.000 - 0.026 ng/mL Final    Comment: The reference interval for Troponin I represents the 99th percentile   cutoff   for our facility and is consistent with 3rd generation assay   performance.      Troponin I 08/07/2023 <0.006  0.000 - 0.026 ng/mL Final    Comment: The reference interval for Troponin I represents the 99th percentile   cutoff   for our facility and is consistent with 3rd generation assay   performance.      Sodium 08/07/2023 138  136 - 145 mmol/L Final    Potassium 08/07/2023 3.5  3.5 - 5.1 mmol/L Final    Chloride 08/07/2023 106  95 - 110 mmol/L Final    CO2 08/07/2023 26  23 - 29 mmol/L Final    Glucose 08/07/2023 80  70 - 110 mg/dL Final    BUN 08/07/2023 7  6 - 20 mg/dL Final    Creatinine 08/07/2023 0.8  0.5 - 1.4 mg/dL Final    Calcium 08/07/2023 9.7  8.7 - 10.5 mg/dL Final    Anion Gap 08/07/2023 6 (L)  8 - 16 mmol/L Final    eGFR 08/07/2023 >60  >60 mL/min/1.73 m^2 Final    WBC 08/07/2023 7.71  3.90 - 12.70 K/uL Final    RBC 08/07/2023 4.21  4.00 - 5.40 M/uL Final    Hemoglobin 08/07/2023 13.2  12.0 - 16.0 g/dL Final    Hematocrit 08/07/2023 38.9  37.0 - 48.5 % Final    MCV 08/07/2023 92  82 - 98 fL Final    MCH 08/07/2023 31.4 (H)  27.0 - 31.0 pg Final    MCHC 08/07/2023 33.9  32.0 - 36.0 g/dL Final    RDW 08/07/2023 12.9  11.5 - 14.5 % Final    Platelets 08/07/2023 344  150 -  450 K/uL Final    MPV 08/07/2023 9.3  9.2 - 12.9 fL Final    Immature Granulocytes 08/07/2023 0.3  0.0 - 0.5 % Final    Gran # (ANC) 08/07/2023 4.1  1.8 - 7.7 K/uL Final    Immature Grans (Abs) 08/07/2023 0.02  0.00 - 0.04 K/uL Final    Comment: Mild elevation in immature granulocytes is non specific and   can be seen in a variety of conditions including stress response,   acute inflammation, trauma and pregnancy. Correlation with other   laboratory and clinical findings is essential.      Lymph # 08/07/2023 2.9  1.0 - 4.8 K/uL Final    Mono # 08/07/2023 0.6  0.3 - 1.0 K/uL Final    Eos # 08/07/2023 0.1  0.0 - 0.5 K/uL Final    Baso # 08/07/2023 0.04  0.00 - 0.20 K/uL Final    nRBC 08/07/2023 0  0 /100 WBC Final    Gran % 08/07/2023 53.0  38.0 - 73.0 % Final    Lymph % 08/07/2023 37.2  18.0 - 48.0 % Final    Mono % 08/07/2023 7.8  4.0 - 15.0 % Final    Eosinophil % 08/07/2023 1.2  0.0 - 8.0 % Final    Basophil % 08/07/2023 0.5  0.0 - 1.9 % Final    Differential Method 08/07/2023 Automated   Final    Magnesium 08/07/2023 2.3  1.6 - 2.6 mg/dL Final    BSA 08/07/2023 2  m2 Final    LVOT stroke volume 08/07/2023 78.48  cm3 Final    LVIDd 08/07/2023 4.27  3.5 - 6.0 cm Final    LV Systolic Volume 08/07/2023 36.71  mL Final    LV Systolic Volume Index 08/07/2023 19.1  mL/m2 Final    LVIDs 08/07/2023 3.06  2.1 - 4.0 cm Final    LV Diastolic Volume 08/07/2023 81.65  mL Final    LV Diastolic Volume Index 08/07/2023 42.53  mL/m2 Final    IVS 08/07/2023 1.07  0.6 - 1.1 cm Final    LVOT diameter 08/07/2023 1.98  cm Final    LVOT area 08/07/2023 3.1  cm2 Final    FS 08/07/2023 28  28 - 44 % Final    Left Ventricle Relative Wall Thick* 08/07/2023 0.41  cm Final    Posterior Wall 08/07/2023 0.87  0.6 - 1.1 cm Final    LV mass 08/07/2023 135.07  g Final    LV Mass Index 08/07/2023 70  g/m2 Final    MV Peak E Aly 08/07/2023 0.72  m/s Final    TDI LATERAL 08/07/2023 0.11  m/s Final    TDI SEPTAL 08/07/2023 0.07  m/s Final    E/E'  ratio 08/07/2023 8.00  m/s Final    MV Peak A Aly 08/07/2023 0.51  m/s Final    TR Max Aly 08/07/2023 2.04  m/s Final    E/A ratio 08/07/2023 1.41   Final    IVRT 08/07/2023 98.95  msec Final    E wave deceleration time 08/07/2023 214.28  msec Final    LV SEPTAL E/E' RATIO 08/07/2023 10.29  m/s Final    LV LATERAL E/E' RATIO 08/07/2023 6.55  m/s Final    LVOT peak aly 08/07/2023 1.05  m/s Final    Left Ventricular Outflow Tract [place]* 08/07/2023 0.70  cm/s Final    Left Ventricular Outflow Tract Hillary* 08/07/2023 2.30  mmHg Final    LA size 08/07/2023 3.67  cm Final    Left Atrium Major Axis 08/07/2023 5.40  cm Final    Left Atrium Minor Axis 08/07/2023 4.91  cm Final    RV S' 08/07/2023 10.00  cm/s Final    TAPSE 08/07/2023 1.99  cm Final    RA Major [place] 08/07/2023 4.35  cm Final    AV mean gradient 08/07/2023 2  mmHg Final    AV peak gradient 08/07/2023 4  mmHg Final    Ao peak aly 08/07/2023 0.97  m/s Final    Ao VTI 08/07/2023 22.60  cm Final    LVOT peak VTI 08/07/2023 25.50  cm Final    AV valve area 08/07/2023 3.47  cm² Final    AV Velocity Ratio 08/07/2023 1.08   Final    AV index (prosthetic) 08/07/2023 1.13   Final    WHITLEY by Velocity Ratio 08/07/2023 3.33  cm² Final    MV mean gradient 08/07/2023 1  mmHg Final    MV peak gradient 08/07/2023 3  mmHg Final    MV stenosis pressure 1/2 time 08/07/2023 62.14  ms Final    MV valve area p 1/2 method 08/07/2023 3.54  cm2 Final    MV valve area by continuity eq 08/07/2023 3.13  cm2 Final    MV VTI 08/07/2023 25.1  cm Final    Triscuspid Valve Regurgitation Pea* 08/07/2023 17  mmHg Final    PV PEAK VELOCITY 08/07/2023 0.66  m/s Final    PV peak gradient 08/07/2023 2  mmHg Final    Sinus 08/07/2023 2.87  cm Final    STJ 08/07/2023 2.17  cm Final    Ascending aorta 08/07/2023 2.72  cm Final    IVC diameter 08/07/2023 1.47  cm Final    Mean e' 08/07/2023 0.09  m/s Final    ZLVIDS 08/07/2023 -0.68   Final    ZLVIDD 08/07/2023 -2.38   Final    RVDD 08/07/2023 3.89  cm Final     LA Volume Index 08/07/2023 37.6  mL/m2 Final    LA volume 08/07/2023 72.20  cm3 Final    LA WIDTH 08/07/2023 4.5  cm Final    RA Width 08/07/2023 4.1  cm Final    TV resting pulmonary artery pressu* 08/07/2023 20  mmHg Final    RV TB RVSP 08/07/2023 5  mmHg Final    Est. RA pres 08/07/2023 3  mmHg Final    85% Max Predicted HR 08/07/2023 141   Final    Max Predicted HR 08/07/2023 166   Final    OHS CV CPX PATIENT IS MALE 08/07/2023 0.0   Final    [facility] CV CPX PATIENT IS FEMALE 08/07/2023 1.0   Final    Systolic blood pressure 08/07/2023 135  mmHg Final    Diastolic blood pressure 08/07/2023 87  mmHg Final    HR at rest 08/07/2023 67  bpm Final    Exercise duration (min) 08/07/2023 6  minutes Final    Exercise duration (sec) 08/07/2023 6  seconds Final    Peak Systolic BP 08/07/2023 144  mmHg Final    Peak Diatolic BP 08/07/2023 76  mmHg Final    Peak HR 08/07/2023 150  bpm Final    Estimated METs 08/07/2023 7   Final    % Max HR Achieved 08/07/2023 90   Final    1 Minute Recovery HR 08/07/2023 111  bpm Final    RPP 08/07/2023 9,045   Final    Peak RPP 08/07/2023 21,600   Final    Nuc Rest EF 08/07/2023 60   Final   Lab Visit on 08/02/2023   Component Date Value Ref Range Status    Prothrombin (Factor II)  08/02/2023 Negative   Final    Comment: The pathogenic c.*97G>A variant ([id number]) was NOT DETECTED.  The specimen was determined to be homozygous for the wild   type (WT) F2 gene. This test does not rule out other   mutations that may contribute to venous thrombosis.  This test was performed using the anthony(R) Factor II Test   (Roche) - an in vitro diagnostic device that uses real-time  quantitative Polymerase Chain Reaction (qPCR) for the   detection and genotyping of the human Factor II (F2) gene.   The test detects the presence of the wild type (WT) F2 gene  and the pathogenic c.*97G>A variant (also known as [id number])  in genomic DNA isolated from whole blood specimens as an   aid in diagnosing patients with  suspected thrombophilia.   The anthony(R) Factor II Test and the anthony z 480 analyzer are   used together for automated amplification and detection.   The limit of detection for this test is 0.1 ng/uL of   genomic DNA (2.5 ng/PCR reaction).    Test performed at [facility],  [address]                             [address]     [phone]  Leatha Gray MD, PhD - Medical Director      Factor V Leiden 08/02/2023 Negative   Final    Comment: The pathogenic F5 Leiden variant (c.1691G>A) was NOT   DETECTED. The specimen was determined to be homozygous   for the wild type (WT) F5 gene. This test does not rule  out other mutations that may contribute to venous   thrombosis.  This test was performed using the anthony(R) Factor V Test   (Roche) - an in vitro diagnostic device that uses   real-time quantitative Polymerase Chain Reaction (qPCR)   for the detection and genotyping of the human factor V   (F5) gene. The test detects the presence of the wild type  (WT) F5 gene and the pathogenic c.1691G>A variant (also   known as the F5 Leiden variant) in genomic DNA isolated   from whole blood specimens as an aid in diagnosing patients  with suspected thrombophilia. The anthony(R) Factor V Test and  the anthony z 480 analyzer are used together for automated   amplification and detection. The limit of detection for   this test is 0.1 ng/uL of genomic DNA (2.5 ng/PCR   reaction).    Test performed at [facility],  [address], [place]  [id number] [phone]  Leatha Gray MD, PhD - Medical Director      Antithrombin III 08/02/2023 105  83 - 118 % Final    Comment: Therapeutic doses of oral direct factor Xa inhibitors may   cause erroneously increased antithrombin activities.   Correlate clinically.      Protein S Activity 08/02/2023 115  50 - 150 % Final    Comment: Anticoagulants (for example oral direct thrombin inhibitors  like dabigatran,  anti-Xa inhibitors like rivaroxaban,  apixaban or edoxaban), heparin levels greater than 1 U/mL,  inhibitors of the APTT test system (for example lupus  anticoagulant), inhibitors of bovine factor V (for example  antibodies that may arise in patients treated with certain  bovine topical thrombin preparations) may produce a falsely  normal or elevated protein S activity result.  Suggest   clinical correlation and if indicated consider repeat assay  of protein S activity and/or antigen in the absence of  anticoagulation therapy.    Test Performed by:  [place] [address]  : Tanmay Saleem M.D. Ph.D.; CLIA# [id number]      Protein S Ag, Free 08/02/2023 95  50 - 147 % Final    Comment: Test performed at [facility],  [facility] [place]  [address]     [phone]  Leatha Gray MD, PhD - Medical Director      Protein C Activity 08/02/2023 141  70 - 150 % Final    Comment: -------------------ADDITIONAL INFORMATION-------------------  This test has been modified from the 's   instructions. Its performance characteristics were   determined by [facility] in a manner consistent with   CLIA requirements. This test has not been cleared or   approved by the U.S. Food and Drug Administration.    Test Performed by:  [place] [address]  : Tanmay Saleem M.D. Ph.D.; CLIA# [id number]      Protein C Antigen 08/02/2023 148  72 - 160 % Final    Comment: A normal protein C (PC) antigen does not exclude a   functional PC deficiency. If indicated consider PC activity   assay.    -------------------ADDITIONAL INFORMATION-------------------  This test has been modified from the 's   instructions. Its performance characteristics were   determined by [facility] in a manner consistent with CLIA   requirements. This test  has not been cleared or approved by   the U.S. Food and Drug Administration.    Test Performed by:  [facility] - [address] [address]  : Tanmay Saleem M.D. Ph.D.; CLIA# [id number]      APA Isotype IgG 08/02/2023 <9.40  0.00 - 14.99 GPL Final    INTERPRETATION: Negative    APA Isotype IgM 08/02/2023 <9.40  0.00 - 12.49 MPL Final    Comment: INTERPRETATION: Negative    Test performed at [facility],  [address] [place]  [address]     [phone]  Leatha Gray MD, PhD - Medical Director      Beta-2 Glyco 1 IgG 08/02/2023 1.4  <7.0 U/mL Final    INTERPRETATION: Negative    Beta-2 Glyco 1 IgM 08/02/2023 <2.4  <7.0 U/mL Final    Comment: INTERPRETATION: Negative    Test performed at [facility],  [address] [place]  [address]     [phone]  Leatha Gray MD, PhD - Medical Director      Beta-2 Glyco 1 IgA 08/02/2023 1.6  <7.0 U/mL Final    INTERPRETATION: Negative    PT Lupus Anticoagulant 08/02/2023 13.5  12.0 - 15.5 sec Final    PTT Lupus Anticoagulant 08/02/2023 41  32 - 48 sec Final    Thrombin Time 08/02/2023 Not Applicable  14.7 - 19.5 sec Final    Reptilase Time 08/02/2023 Not Applicable  <=21.9 sec Final    PTT Heparin Neutralized 08/02/2023 Not Applicable  32 - 48 sec Final    PTT-LA Mix 08/02/2023 Not Applicable  32 - 48 sec Final    PLATELET NEUTRALIZATION APTT 08/02/2023 Not Applicable  Negative Final    DRVVT Screen 08/02/2023 33  33 - 44 sec Final    dRVVT Incubated 1:1 Mix 08/02/2023 Not Applicable  33 - 44 sec Final    dRVVT Confirm 08/02/2023 Not Applicable  Negative ratio Final    Hex Phosph Neut Test 08/02/2023 Not Applicable  Negative Final    Lupus Anticoagulant Interpretation 08/02/2023 See Note   Final    Comment: Lupus anticoagulant not detected.  The phospholipid-dependent screening tests (PTT, DRVVT) are   not prolonged.  Lupus anticoagulant antibodies are  heterogeneous and   antibody titers fluctuate over time. Laboratory tests used   to identify lupus anticoagulants demonstrate variable   sensitivity. If there is strong clinical suspicion for   antiphospholipid antibody syndrome (APS), consider testing   for cardiolipin and beta-2 glycoprotein 1 antibodies (IgG   and IgM) if this testing has not already been performed.  Performed By: [facility]  [address] [place] [id number]  : Guru Arce MD, PhD  CLIA Number: [id number]      WBC 08/02/2023 9.05  3.90 - 12.70 K/uL Final    RBC 08/02/2023 4.58  4.00 - 5.40 M/uL Final    Hemoglobin 08/02/2023 14.7  12.0 - 16.0 g/dL Final    Hematocrit 08/02/2023 41.4  37.0 - 48.5 % Final    MCV 08/02/2023 90  82 - 98 fL Final    MCH 08/02/2023 32.1 (H)  27.0 - 31.0 pg Final    MCHC 08/02/2023 35.5  32.0 - 36.0 g/dL Final    RDW 08/02/2023 12.7  11.5 - 14.5 % Final    Platelets 08/02/2023 318  150 - 450 K/uL Final    MPV 08/02/2023 9.9  9.2 - 12.9 fL Final    Immature Granulocytes 08/02/2023 0.6 (H)  0.0 - 0.5 % Final    Gran # (ANC) 08/02/2023 5.0  1.8 - 7.7 K/uL Final    Immature Grans (Abs) 08/02/2023 0.05 (H)  0.00 - 0.04 K/uL Final    Comment: Mild elevation in immature granulocytes is non specific and   can be seen in a variety of conditions including stress response,   acute inflammation, trauma and pregnancy. Correlation with other   laboratory and clinical findings is essential.      Lymph # 08/02/2023 3.4  1.0 - 4.8 K/uL Final    Mono # 08/02/2023 0.5  0.3 - 1.0 K/uL Final    Eos # 08/02/2023 0.2  0.0 - 0.5 K/uL Final    Baso # 08/02/2023 0.06  0.00 - 0.20 K/uL Final    nRBC 08/02/2023 0  0 /100 WBC Final    Gran % 08/02/2023 54.6  38.0 - 73.0 % Final    Lymph % 08/02/2023 37.3  18.0 - 48.0 % Final    Mono % 08/02/2023 5.1  4.0 - 15.0 % Final    Eosinophil % 08/02/2023 1.7  0.0 - 8.0 % Final    Basophil % 08/02/2023 0.7  0.0 - 1.9 % Final    Platelet Estimate 08/02/2023 Appears  normal   Final    Differential Method 08/02/2023 Automated   Final    Sodium 08/02/2023 140  136 - 145 mmol/L Final    Potassium 08/02/2023 2.9 (L)  3.5 - 5.1 mmol/L Final    Chloride 08/02/2023 101  95 - 110 mmol/L Final    CO2 08/02/2023 30 (H)  23 - 29 mmol/L Final    Glucose 08/02/2023 100  70 - 110 mg/dL Final    BUN 08/02/2023 7  6 - 20 mg/dL Final    Creatinine 08/02/2023 1.0  0.5 - 1.4 mg/dL Final    Calcium 08/02/2023 9.7  8.7 - 10.5 mg/dL Final    Total Protein 08/02/2023 7.6  6.0 - 8.4 g/dL Final    Albumin 08/02/2023 4.0  3.5 - 5.2 g/dL Final    Total Bilirubin 08/02/2023 0.7  0.1 - 1.0 mg/dL Final    Comment: For infants and newborns, interpretation of results should be based  on gestational age, weight and in agreement with clinical  observations.    Premature Infant recommended reference ranges:  Up to 24 hours.............<8.0 mg/dL  Up to 48 hours............<12.0 mg/dL  3-5 days..................<15.0 mg/dL  6-29 days.................<15.0 mg/dL      Alkaline Phosphatase 08/02/2023 57  55 - 135 U/L Final    AST 08/02/2023 15  10 - 40 U/L Final    ALT 08/02/2023 14  10 - 44 U/L Final    eGFR 08/02/2023 >60  >60 mL/min/1.73 m^2 Final    Anion Gap 08/02/2023 9  8 - 16 mmol/L Final    TOTAL (II AND III) [id number] * 08/02/2023 <0.008  0.000 - 0.008 % Final    Comment: INTERPRETIVE INFORMATION: PNH, High Sensitivity, RBC  This high-sensitivity RBC assay tests for CD59 expression   on erythrocytes using flow cytometry.  It was developed   according to published guidelines (Cytometry B Clin. Cytom.   2010; 78:211) and as updated in 2018 (Cytometry B Clin.   Cytom. 2018; 94B:49).  The lower limit of quantification is   0.02 percent for PNH RBCs (based on 250,000 cells   analyzed). The lower limit of detection for PNH RBCs is   0.008 percent.  RBC analysis quantifies Type II and Type III RBC clones   when the percentage of PNH RBCs is greater than 1 percent.   Glycophorin A (KT315m) is used to gate the  RBC population,   and CD59 is the GPI-linked antigen. Recent RBC transfusions   may decrease the percentage of PNH cells measured in RBCs   (Cytometry 2000; 42:223). The presence of a subclinical PNH   population in myelodysplastic bone marrow disorders, such   as aplastic anemia or refractory anemia, may correlate with   a positive immunotherapeutic respo                           nse (Blood 2006; 107,   3891-5655).  For the most accurate measurement of the PNH clone size,   order Paroxysmal Nocturnal Hemoglobinuria, High   Sensitivity, WBC ([employer] test code 3944553) to assist with   therapeutic decisions in conventional PNH.  For initial diagnosis of PNH and analysis of both RBCs and   WBCs, order Paroxysmal Nocturnal Hemoglobinuria (PNH), High   Sensitivity, RBC and WBC ([facility] test code 1922847).  Patient Retesting Recommendations. The frequency of testing   is dictated by clinical and hematological parameters.   Repeat testing is indicated upon any significant change in   clinical or laboratory parameters and is suggested at least   annually for routine monitoring. In the setting of aplastic   anemia, international guidelines recommend screening for   PNH at diagnosis, and every 3 to 6 months initially,   reducing the frequency of testing if the proportion of   GPI-deficient cells has remained stable over an initial two   year period (Int J Lab Hematol 2019;41 S                           uppl 1:73-81).  This test was developed and its performance characteristics   determined by [facility]. It has not been cleared or   approved by the US Food and Drug Administration. This test   was performed in a CLIA certified laboratory and is   intended for clinical purposes.  Performed By: [facility]  [address] [place] [id number]  : Guru Arce MD, PhD  IA Number: [id number]      RBC PNH PHENOTYPE 08/02/2023 Not Detected  Not Detected Final   Hospital Outpatient Visit on  06/15/2023   Component Date Value Ref Range Status    TDI SEPTAL 06/15/2023 0.05  m/s Final    LV LATERAL E/E' RATIO 06/15/2023 3.38  m/s Final    LV SEPTAL E/E' RATIO 06/15/2023 8.80  m/s Final    LA WIDTH 06/15/2023 3.90  cm Final    IVC diameter 06/15/2023 1.29  cm Final    Left Ventricular Outflow Tract Hillary* 06/15/2023 0.74  cm/s Final    Left Ventricular Outflow Tract Hillary* 06/15/2023 2.36  mmHg Final    TDI LATERAL 06/15/2023 0.13  m/s Final    PV PEAK VELOCITY 06/15/2023 0.84  cm/s Final    LVIDd 06/15/2023 3.96  3.5 - 6.0 cm Final    IVS 06/15/2023 0.80  0.6 - 1.1 cm Final    Posterior Wall 06/15/2023 0.74  0.6 - 1.1 cm Final    LVIDs 06/15/2023 2.64  2.1 - 4.0 cm Final    FS 06/15/2023 33  28 - 44 % Final    LA volume 06/15/2023 45.54  cm3 Final    Sinus 06/15/2023 2.89  cm Final    STJ 06/15/2023 2.71  cm Final    Ascending aorta 06/15/2023 3.11  cm Final    LV mass 06/15/2023 87.36  g Final    LA size 06/15/2023 3.05  cm Final    RVDD 06/15/2023 2.76  cm Final    TAPSE 06/15/2023 2.11  cm Final    RV S' 06/15/2023 10.70  cm/s Final    Left Ventricle Relative Wall Thick* 06/15/2023 0.37  cm Final    AV mean gradient 06/15/2023 2  mmHg Final    AV valve area 06/15/2023 3.06  cm2 Final    AV Velocity Ratio 06/15/2023 0.96   Final    AV index (prosthetic) 06/15/2023 0.92   Final    MV mean gradient 06/15/2023 1  mmHg Final    MV valve area p 1/2 method 06/15/2023 3.64  cm2 Final    MV valve area by continuity eq 06/15/2023 3.69  cm2 Final    E/A ratio 06/15/2023 0.69   Final    Mean e' 06/15/2023 0.09  m/s Final    E wave deceleration time 06/15/2023 208.42  msec Final    Pulm vein S/D ratio 06/15/2023 1.88   Final    LVOT diameter 06/15/2023 2.06  cm Final    LVOT area 06/15/2023 3.3  cm2 Final    LVOT peak ester 06/15/2023 0.96  m/s Final    LVOT peak VTI 06/15/2023 21.70  cm Final    Ao peak ester 06/15/2023 1.00  m/s Final    Ao VTI 06/15/2023 23.6  cm Final    LVOT stroke volume 06/15/2023 72.29  cm3 Final     AV peak gradient 06/15/2023 4  mmHg Final    MV peak gradient 06/15/2023 2  mmHg Final    E/E' ratio 06/15/2023 4.89  m/s Final    MV Peak E Aly 06/15/2023 0.44  m/s Final    TR Max Aly 06/15/2023 2.01  m/s Final    MV VTI 06/15/2023 19.6  cm Final    MV stenosis pressure 1/2 time 06/15/2023 60.44  ms Final    MV Peak A Aly 06/15/2023 0.64  m/s Final    PV Peak S Aly 06/15/2023 0.49  m/s Final    PV Peak D Aly 06/15/2023 0.26  m/s Final    LV Systolic Volume 06/15/2023 25.49  mL Final    LV Diastolic Volume 06/15/2023 68.25  mL Final    RA Major Axis 06/15/2023 3.88  cm Final    Left Atrium Minor Axis 06/15/2023 4.55  cm Final    Left Atrium Major Axis 06/15/2023 4.46  cm Final    Triscuspid Valve Regurgitation Pea* 06/15/2023 16  mmHg Final    RA Width 06/15/2023 2.80  cm Final    Right Atrial Pressure (from IVC) 06/15/2023 3  mmHg Final    EF 06/15/2023 60  % Final    TV resting pulmonary artery pressu* 06/15/2023 19  mmHg Final   Lab Visit on 06/06/2023   Component Date Value Ref Range Status    Arsenic 06/06/2023 <1  <13 ng/mL Final    Comment: -------------------ADDITIONAL INFORMATION-------------------  This test was developed and its performance characteristics   determined by [facility] in a manner consistent with CLIA   requirements. This test has not been cleared or approved by   the U.S. Food and Drug Administration.      Lead 06/06/2023 <1.0  <5.0 mcg/dL Final    Comment: -------------------ADDITIONAL INFORMATION-------------------  Testing performed by Inductively Coupled Plasma-Mass   Spectrometry (ICP-MS).  This test was developed and its performance characteristics   determined by [facility] in a manner consistent with CLIA   requirements. This test has not been cleared or approved by   the U.S. Food and Drug Administration.      Cadmium 06/06/2023 0.2  <5.0 ng/mL Final    Comment: -------------------ADDITIONAL INFORMATION-------------------  This test was developed and its performance  characteristics   determined by [facility] in a manner consistent with CLIA   requirements. This test has not been cleared or approved by   the U.S. Food and Drug Administration.      Mercury 06/06/2023 1  <10 ng/mL Final    Comment: -------------------ADDITIONAL INFORMATION-------------------  This test was developed and its performance characteristics   determined by [facility] in a manner consistent with CLIA   requirements. This test has not been cleared or approved by   the U.S. Food and Drug Administration.      Venous/Capillary 06/06/2023 Venous   Final    Comment: Test Performed by:  [facility] - [address]  [address] [place] [address]  : Tanmay Saleem M.D. Ph.D.; IA# [id number]      Street Address 06/06/2023 Test Not Performed   Final    [facility] 06/06/2023 Test Not Performed   Final    [facility] 06/06/2023 Test Not Performed   Final    [facility] 06/06/2023 Test Not Performed   Final    [facility] 06/06/2023 Test Not Performed   Final    Guardian First Name 06/06/2023 Test Not Performed   Final    Guardian Last Name 06/06/2023 Test Not Performed   Final    Home Phone 06/06/2023 Test Not Performed   Final    Race 06/06/2023 Test Not Performed   Final    HCV Quantitative Result 06/06/2023 <12  <12 IU/mL Final    HCV, Qualitative 06/06/2023 Not Detected  Not Detected Final    Comment: This procedure utilizes a real-time polymerase chain reaction test  from [employer]. The amplification target is a conserved region   of the HCV genome. The lower limit of quantitation is <12 IU/mL   (<1.08 Log IU/mL)and the upper limit of quantitation is 30 million   IU/mL (7.48 Log IU/mL).     Specimens reported as Detected but <12 IU/mL contain detectable  levels of Hepatitis C RNA but the viral load is below the limit of   quantitation. A Not Detected result does not rule out HCV infection,   clinical correlation is recommended.      Methlymalonic Acid 06/06/2023 0.15  <0.40  umol/L Final    Comment: If applicable, any drug confirmation testing reported  here was developed and the performance characteristics  determined by [facility]. This   confirmation testing has not been cleared or approved  by the FDA. The laboratory is regulated under CLIA as  qualified to perform high-complexity testing. This test  is used for patient testing purposes. It should not be  regarded as investigational or for research.    Test performed at [facility],  [address] , [place]  [address]     [phone]  Leatha Gray MD, PhD - Medical Director      PEth 16:0/18.1 (POPEth) 06/06/2023 30  Cutoff: 10 ng/mL Final    Comment: Phosphatidylethanol (PEth) homologues result interpretation    PEth 16:0/18:1 (POPEth)  Less than 10 ng/mL: Not detected  10 - 19 ng/mL: Abstinence or light alcohol consumption  (<2 drinks per day for several days a week)  20 - 200 ng/mL: Moderate alcohol consumption  (up to 4 drinks per day for several days a week)  Greater than 200 ng/mL: Heavy alcohol consumption or   chronic alcohol use (at least 4 drinks per day several days   a week)    (Reference: ZAYRA Ryan and TAN Pete 2018 J. Forensic Sci)      PEth 16:0/18.2 (PLPEth) 06/06/2023 SEE BELOW  Cutoff: 10 ng/mL Final    Comment: Upon analysis, result not available due to analyte specific   failure. PEth 16:0/18:2 (PLPEth) Reference ranges are not   well established      PETH INTERPRETATION 06/06/2023 Positive.   Final    Comment: -------------------ADDITIONAL INFORMATION-------------------  This report is intended for use in clinical monitoring and   management of patients.  It is not intended for use in   employment-related testing.  This test was developed and its performance characteristics   determined by [facility] in a manner consistent with CLIA   requirements. This test has not been cleared or approved by   the U.S. Food and Drug Administration.    Test Performed by:  [facility]  [facility] [place] [address]  : Tanmay Saleem M.D. Ph.D.; CLIA# [id number]      RPR 06/06/2023 Non-reactive  Non-reactive Final    Vitamin B6 06/06/2023 10  5 - 50 ug/L Final    Comment: This test was developed and the performance   characteristics determined by [facility].   It has not been cleared or approved by the FDA.   The laboratory is regulated under CLIA as qualified to   perform high-complexity testing. This test is used for   patient testing purposes. It should not be regarded   as investigational or for research.    Test performed at [facility],  [address] [place]  [address]     [phone]  Leatha Gray MD, PhD - Medical Director      Vitamin B2 06/06/2023 3  1 - 19 mcg/L Final    Comment: -------------------ADDITIONAL INFORMATION-------------------  This test was developed and its performance characteristics   determined by [facility] in a manner consistent with CLIA   requirements. This test has not been cleared or approved by   the U.S. Food and Drug Administration.    Test Performed by:  [facility] [place] [address]  : Tanmay Saleem M.D. Ph.D.; CLIA# [id number]      Vitamin B-12 06/06/2023 278  180 - 914 ng/L Final    Comment: B-12 <400; MMA test was performed.    Test Performed by:  [place] [address]  : Tanmay Saleem M.D. Ph.D.; CLIA# [id number]      Thiamine 06/06/2023 56  38 - 122 ug/L Final    Comment: This test was developed and the performance   characteristics determined by [facility].   It has not been cleared or approved by the FDA.   The laboratory is regulated under CLIA as qualified to   perform high-complexity testing. This test is used for   patient testing purposes. It should not  be regarded   as investigational or for research.    Test performed at [facility],  [address] , [place]  [address]     [phone]  Leatha Gray MD, PhD - Medical Director      Sed Rate 06/06/2023 6  0 - 36 mm/Hr Final    B12 Def. Methylmalonic Acid 06/06/2023 0.15  <=0.40 nmol/mL Final    Comment: No cellular B-12 deficiency.    -------------------ADDITIONAL INFORMATION-------------------  This test was developed and its performance characteristics   determined by [facility] in a manner consistent with CLIA   requirements. This test has not been cleared or approved by   the U.S. Food and Drug Administration.    Test Performed by:  [address] [address]  : Tanmay Saleem M.D. Ph.D.; CLIA# [id number]     Admission on 05/25/2023, Discharged on 05/28/2023   Component Date Value Ref Range Status    POCT Glucose 05/25/2023 86  70 - 110 mg/dL Final    WBC 05/25/2023 9.42  3.90 - 12.70 K/uL Final    RBC 05/25/2023 4.39  4.00 - 5.40 M/uL Final    Hemoglobin 05/25/2023 14.0  12.0 - 16.0 g/dL Final    Hematocrit 05/25/2023 39.2  37.0 - 48.5 % Final    MCV 05/25/2023 89  82 - 98 fL Final    MCH 05/25/2023 31.9 (H)  27.0 - 31.0 pg Final    MCHC 05/25/2023 35.7  32.0 - 36.0 g/dL Final    RDW 05/25/2023 12.8  11.5 - 14.5 % Final    Platelets 05/25/2023 365  150 - 450 K/uL Final    MPV 05/25/2023 9.5  9.2 - 12.9 fL Final    Immature Granulocytes 05/25/2023 0.4  0.0 - 0.5 % Final    Gran # (ANC) 05/25/2023 4.7  1.8 - 7.7 K/uL Final    Immature Grans (Abs) 05/25/2023 0.04  0.00 - 0.04 K/uL Final    Comment: Mild elevation in immature granulocytes is non specific and   can be seen in a variety of conditions including stress response,   acute inflammation, trauma and pregnancy. Correlation with other   laboratory and clinical findings is essential.      Lymph # 05/25/2023 3.8  1.0 - 4.8 K/uL Final    Mono # 05/25/2023 0.7   0.3 - 1.0 K/uL Final    Eos # 05/25/2023 0.1  0.0 - 0.5 K/uL Final    Baso # 05/25/2023 0.05  0.00 - 0.20 K/uL Final    nRBC 05/25/2023 0  0 /100 WBC Final    Gran % 05/25/2023 50.0  38.0 - 73.0 % Final    Lymph % 05/25/2023 40.6  18.0 - 48.0 % Final    Mono % 05/25/2023 7.2  4.0 - 15.0 % Final    Eosinophil % 05/25/2023 1.3  0.0 - 8.0 % Final    Basophil % 05/25/2023 0.5  0.0 - 1.9 % Final    Differential Method 05/25/2023 Automated   Final    Sodium 05/25/2023 139  136 - 145 mmol/L Final    Potassium 05/25/2023 3.3 (L)  3.5 - 5.1 mmol/L Final    Chloride 05/25/2023 103  95 - 110 mmol/L Final    CO2 05/25/2023 25  23 - 29 mmol/L Final    Glucose 05/25/2023 92  70 - 110 mg/dL Final    BUN 05/25/2023 13  6 - 20 mg/dL Final    Creatinine 05/25/2023 1.0  0.5 - 1.4 mg/dL Final    Calcium 05/25/2023 9.9  8.7 - 10.5 mg/dL Final    Total Protein 05/25/2023 7.9  6.0 - 8.4 g/dL Final    Albumin 05/25/2023 3.9  3.5 - 5.2 g/dL Final    Total Bilirubin 05/25/2023 0.4  0.1 - 1.0 mg/dL Final    Comment: For infants and newborns, interpretation of results should be based  on gestational age, weight and in agreement with clinical  observations.    Premature Infant recommended reference ranges:  Up to 24 hours.............<8.0 mg/dL  Up to 48 hours............<12.0 mg/dL  3-5 days..................<15.0 mg/dL  6-29 days.................<15.0 mg/dL      Alkaline Phosphatase 05/25/2023 55  55 - 135 U/L Final    AST 05/25/2023 17  10 - 40 U/L Final    ALT 05/25/2023 14  10 - 44 U/L Final    Anion Gap 05/25/2023 11  8 - 16 mmol/L Final    eGFR 05/25/2023 >60  >60 mL/min/1.73 m^2 Final    Prothrombin Time 05/25/2023 10.0  9.0 - 12.5 sec Final    INR 05/25/2023 0.9  0.8 - 1.2 Final    Comment: Coumadin Therapy:  2.0 - 3.0 for INR for all indicators except mechanical heart valves  and antiphospholipid syndromes which should use 2.5 - 3.5.      TSH 05/25/2023 0.767  0.400 - 4.000 uIU/mL Final    Cholesterol 05/25/2023 208 (H)  120 - 199  mg/dL Final    Comment: The National Cholesterol Education Program (NCEP) has set the  following guidelines (reference ranges) for Cholesterol:  Optimal.....................<200 mg/dL  Borderline High.............200-239 mg/dL  High........................> or = 240 mg/dL      Triglycerides 05/25/2023 111  30 - 150 mg/dL Final    Comment: The National Cholesterol Education Program (NCEP) has set the  following guidelines (reference values) for triglycerides:  Normal......................<150 mg/dL  Borderline High.............150-199 mg/dL  High........................200-499 mg/dL      HDL 05/25/2023 55  40 - 75 mg/dL Final    Comment: The National Cholesterol Education Program (NCEP) has set the  following guidelines (reference values) for HDL Cholesterol:  Low...............<40 mg/dL  Optimal...........>60 mg/dL      LDL Cholesterol 05/25/2023 130.8  63.0 - 159.0 mg/dL Final    Comment: The National Cholesterol Education Program (NCEP) has set the  following guidelines (reference values) for LDL Cholesterol:  Optimal.......................<130 mg/dL  Borderline High...............130-159 mg/dL  High..........................160-189 mg/dL  Very High.....................>190 mg/dL      HDL/Cholesterol Ratio 05/25/2023 26.4  20.0 - 50.0 % Final    Total Cholesterol/HDL Ratio 05/25/2023 3.8  2.0 - 5.0 Final    Non-HDL Cholesterol 05/25/2023 153  mg/dL Final    Comment: Risk category and Non-HDL cholesterol goals:  Coronary heart disease (CHD)or equivalent (10-year risk of CHD >20%):  Non-HDL cholesterol goal     <130 mg/dL  Two or more CHD risk factors and 10-year risk of CHD <= 20%:  Non-HDL cholesterol goal     <160 mg/dL  0 to 1 CHD risk factor:  Non-HDL cholesterol goal     <190 mg/dL      Troponin I 05/25/2023 <0.006  0.000 - 0.026 ng/mL Final    Comment: The reference interval for Troponin I represents the 99th percentile   cutoff   for our facility and is consistent with 3rd generation assay   performance.       Hemoglobin A1C 05/26/2023 5.0  4.0 - 5.6 % Final    Comment: ADA Screening Guidelines:  5.7-6.4%  Consistent with prediabetes  >or=6.5%  Consistent with diabetes    High levels of fetal hemoglobin interfere with the HbA1C  assay. Heterozygous hemoglobin variants (HbS, HgC, etc)do  not significantly interfere with this assay.   However, presence of multiple variants may affect accuracy.      Estimated Avg Glucose 05/26/2023 97  68 - 131 mg/dL Final    Troponin I 05/26/2023 <0.006  0.000 - 0.026 ng/mL Final    Comment: The reference interval for Troponin I represents the 99th percentile   cutoff   for our facility and is consistent with 3rd generation assay   performance.      Sodium 05/26/2023 139  136 - 145 mmol/L Final    Potassium 05/26/2023 3.9  3.5 - 5.1 mmol/L Final    Chloride 05/26/2023 106  95 - 110 mmol/L Final    CO2 05/26/2023 25  23 - 29 mmol/L Final    Glucose 05/26/2023 83  70 - 110 mg/dL Final    BUN 05/26/2023 9  6 - 20 mg/dL Final    Creatinine 05/26/2023 0.9  0.5 - 1.4 mg/dL Final    Calcium 05/26/2023 9.3  8.7 - 10.5 mg/dL Final    Anion Gap 05/26/2023 8  8 - 16 mmol/L Final    eGFR 05/26/2023 >60  >60 mL/min/1.73 m^2 Final    WBC 05/26/2023 7.48  3.90 - 12.70 K/uL Final    RBC 05/26/2023 4.18  4.00 - 5.40 M/uL Final    Hemoglobin 05/26/2023 13.5  12.0 - 16.0 g/dL Final    Hematocrit 05/26/2023 38.4  37.0 - 48.5 % Final    MCV 05/26/2023 92  82 - 98 fL Final    MCH 05/26/2023 32.3 (H)  27.0 - 31.0 pg Final    MCHC 05/26/2023 35.2  32.0 - 36.0 g/dL Final    RDW 05/26/2023 13.0  11.5 - 14.5 % Final    Platelets 05/26/2023 332  150 - 450 K/uL Final    MPV 05/26/2023 9.2  9.2 - 12.9 fL Final    Immature Granulocytes 05/26/2023 0.7 (H)  0.0 - 0.5 % Final    Gran # (ANC) 05/26/2023 4.3  1.8 - 7.7 K/uL Final    Immature Grans (Abs) 05/26/2023 0.05 (H)  0.00 - 0.04 K/uL Final    Comment: Mild elevation in immature granulocytes is non specific and   can be seen in a variety of conditions including  stress response,   acute inflammation, trauma and pregnancy. Correlation with other   laboratory and clinical findings is essential.      Lymph # 05/26/2023 2.4  1.0 - 4.8 K/uL Final    Mono # 05/26/2023 0.5  0.3 - 1.0 K/uL Final    Eos # 05/26/2023 0.1  0.0 - 0.5 K/uL Final    Baso # 05/26/2023 0.05  0.00 - 0.20 K/uL Final    nRBC 05/26/2023 0  0 /100 WBC Final    Gran % 05/26/2023 57.9  38.0 - 73.0 % Final    Lymph % 05/26/2023 32.4  18.0 - 48.0 % Final    Mono % 05/26/2023 6.8  4.0 - 15.0 % Final    Eosinophil % 05/26/2023 1.5  0.0 - 8.0 % Final    Basophil % 05/26/2023 0.7  0.0 - 1.9 % Final    Differential Method 05/26/2023 Automated   Final    POCT Glucose 05/26/2023 81  70 - 110 mg/dL Final    WBC 05/27/2023 7.78  3.90 - 12.70 K/uL Final    RBC 05/27/2023 4.25  4.00 - 5.40 M/uL Final    Hemoglobin 05/27/2023 13.2  12.0 - 16.0 g/dL Final    Hematocrit 05/27/2023 39.9  37.0 - 48.5 % Final    MCV 05/27/2023 94  82 - 98 fL Final    MCH 05/27/2023 31.1 (H)  27.0 - 31.0 pg Final    MCHC 05/27/2023 33.1  32.0 - 36.0 g/dL Final    RDW 05/27/2023 13.2  11.5 - 14.5 % Final    Platelets 05/27/2023 345  150 - 450 K/uL Final    MPV 05/27/2023 9.6  9.2 - 12.9 fL Final    Immature Granulocytes 05/27/2023 0.6 (H)  0.0 - 0.5 % Final    Gran # (ANC) 05/27/2023 4.2  1.8 - 7.7 K/uL Final    Immature Grans (Abs) 05/27/2023 0.05 (H)  0.00 - 0.04 K/uL Final    Comment: Mild elevation in immature granulocytes is non specific and   can be seen in a variety of conditions including stress response,   acute inflammation, trauma and pregnancy. Correlation with other   laboratory and clinical findings is essential.      Lymph # 05/27/2023 2.9  1.0 - 4.8 K/uL Final    Mono # 05/27/2023 0.5  0.3 - 1.0 K/uL Final    Eos # 05/27/2023 0.2  0.0 - 0.5 K/uL Final    Baso # 05/27/2023 0.06  0.00 - 0.20 K/uL Final    nRBC 05/27/2023 0  0 /100 WBC Final    Gran % 05/27/2023 53.5  38.0 - 73.0 % Final    Lymph % 05/27/2023 36.8  18.0 - 48.0 % Final     Mono % 05/27/2023 6.4  4.0 - 15.0 % Final    Eosinophil % 05/27/2023 1.9  0.0 - 8.0 % Final    Basophil % 05/27/2023 0.8  0.0 - 1.9 % Final    Differential Method 05/27/2023 Automated   Final    Magnesium 05/27/2023 2.3  1.6 - 2.6 mg/dL Final    Phosphorus 05/27/2023 4.6 (H)  2.7 - 4.5 mg/dL Final    Sodium 05/27/2023 138  136 - 145 mmol/L Final    Potassium 05/27/2023 4.2  3.5 - 5.1 mmol/L Final    Chloride 05/27/2023 104  95 - 110 mmol/L Final    CO2 05/27/2023 23  23 - 29 mmol/L Final    Glucose 05/27/2023 91  70 - 110 mg/dL Final    BUN 05/27/2023 10  6 - 20 mg/dL Final    Creatinine 05/27/2023 0.9  0.5 - 1.4 mg/dL Final    Calcium 05/27/2023 9.0  8.7 - 10.5 mg/dL Final    Total Protein 05/27/2023 6.5  6.0 - 8.4 g/dL Final    Albumin 05/27/2023 3.5  3.5 - 5.2 g/dL Final    Total Bilirubin 05/27/2023 0.5  0.1 - 1.0 mg/dL Final    Comment: For infants and newborns, interpretation of results should be based  on gestational age, weight and in agreement with clinical  observations.    Premature Infant recommended reference ranges:  Up to 24 hours.............<8.0 mg/dL  Up to 48 hours............<12.0 mg/dL  3-5 days..................<15.0 mg/dL  6-29 days.................<15.0 mg/dL      Alkaline Phosphatase 05/27/2023 51 (L)  55 - 135 U/L Final    AST 05/27/2023 12  10 - 40 U/L Final    ALT 05/27/2023 11  10 - 44 U/L Final    Anion Gap 05/27/2023 11  8 - 16 mmol/L Final    eGFR 05/27/2023 >60.0  >60 mL/min/1.73 m^2 Final    WBC 05/28/2023 9.69  3.90 - 12.70 K/uL Final    RBC 05/28/2023 4.13  4.00 - 5.40 M/uL Final    Hemoglobin 05/28/2023 12.9  12.0 - 16.0 g/dL Final    Hematocrit 05/28/2023 38.8  37.0 - 48.5 % Final    MCV 05/28/2023 94  82 - 98 fL Final    MCH 05/28/2023 31.2 (H)  27.0 - 31.0 pg Final    MCHC 05/28/2023 33.2  32.0 - 36.0 g/dL Final    RDW 05/28/2023 13.1  11.5 - 14.5 % Final    Platelets 05/28/2023 344  150 - 450 K/uL Final    MPV 05/28/2023 9.4  9.2 - 12.9 fL Final    Immature Granulocytes  05/28/2023 0.8 (H)  0.0 - 0.5 % Final    Gran # (ANC) 05/28/2023 5.3  1.8 - 7.7 K/uL Final    Immature Grans (Abs) 05/28/2023 0.08 (H)  0.00 - 0.04 K/uL Final    Comment: Mild elevation in immature granulocytes is non specific and   can be seen in a variety of conditions including stress response,   acute inflammation, trauma and pregnancy. Correlation with other   laboratory and clinical findings is essential.      Lymph # 05/28/2023 3.5  1.0 - 4.8 K/uL Final    Mono # 05/28/2023 0.6  0.3 - 1.0 K/uL Final    Eos # 05/28/2023 0.1  0.0 - 0.5 K/uL Final    Baso # 05/28/2023 0.06  0.00 - 0.20 K/uL Final    nRBC 05/28/2023 0  0 /100 WBC Final    Gran % 05/28/2023 54.4  38.0 - 73.0 % Final    Lymph % 05/28/2023 36.5  18.0 - 48.0 % Final    Mono % 05/28/2023 6.4  4.0 - 15.0 % Final    Eosinophil % 05/28/2023 1.3  0.0 - 8.0 % Final    Basophil % 05/28/2023 0.6  0.0 - 1.9 % Final    Differential Method 05/28/2023 Automated   Final       Imaging  Nuclear Stress - Cardiology Interpreted    Result Date: 8/7/2023    The patient exercised for 6 minutes 6 seconds on a Marcio protocol, corresponding to a functional capacity of 7 METS, achieving a peak heart rate of 150 bpm, which is 90 % of the age predicted maximum heart rate.   Normal myocardial perfusion scan. There is no evidence of myocardial ischemia or infarction.   The gated perfusion images showed an ejection fraction of 60% at rest.   There is normal wall motion at rest and post stress.   The ECG portion of the study is negative for ischemia.   The patient reported no chest pain during the stress test.   There were no arrhythmias during stress.     Echo    Result Date: 8/7/2023    Left Ventricle: The left ventricle is normal in size. Normal wall thickness. Normal wall motion. There is normal systolic function with a visually estimated ejection fraction of 60 - 65%. There is normal diastolic function.   Left Atrium: Left atrium is moderately dilated.   Right Ventricle:  Normal right ventricular cavity size. Systolic function is normal.   Pulmonary Artery: The estimated pulmonary artery systolic pressure is 20 mmHg.   IVC/SVC: Normal venous pressure at 3 mmHg.     CTA Chest Non-Coronary (PE Studies)    Result Date: 8/6/2023  EXAMINATION: CT PULMONARY ANGIOGRAM WITH CONTRAST CLINICAL HISTORY: Loss of consciousness. TECHNIQUE: CT of the chest with intravenous contrast for pulmonary artery angiogram was performed. Contiguous axial 1.25 mm images followed by 10 mm reconstructions with multiplanar and MIP reformations of the pulmonary arteries. No 3D post-angiographic imaging was performed on an independent workstation and reviewed.  One hundred ml of Omnipaque 350 was injected. COMPARISON: None. FINDINGS: There is no evidence of pulmonary artery filling defect to suggest pulmonary embolism. There is no aortic aneurysm or aortic dissection.  Mild bibasilar atelectatic changes are present. There is no evidence of mediastinal, hilar, or axillary adenopathy. There is no pleural or pericardial effusion. The heart size is within normal limits for size.     No evidence of acute pulmonary embolism. Electronically signed by: Marleen Oliver Date:    08/06/2023 Time:    23:40    CT Head Without Contrast    Result Date: 8/6/2023  EXAMINATION: CT HEAD WITHOUT CONTRAST CLINICAL HISTORY: Syncope, recurrent; TECHNIQUE: Low dose axial images were obtained through the head.  Coronal and sagittal reformations were also performed. Contrast was not administered. COMPARISON: CT brain May 26, 2023 FINDINGS: The ventricular system, sulcal pattern and parenchymal attenuation characteristics appear appropriate for age.  There is no evidence for intracranial mass, mass effect or midline shift.  There is no evidence for acute intracranial hemorrhage.  Appropriate CSF spaces are seen at the skull base. The visualized orbits appear intact.  The mastoid air cells appear well aerated.  Paranasal sinuses appear  predominantly well aerated with minimal mucosal thickening, there is a small opacity at the anterior left frontal sinus, there is also a small opacity of the left sphenoid sinus that may relate to a small mucous retention cyst.  The visualized osseous structures appear intact.     There is no evidence for acute intracranial process. Electronically signed by: Ab Perdomo Date:    08/06/2023 Time:    22:05    X-Ray Chest AP Portable    Result Date: 8/6/2023  EXAMINATION: XR CHEST AP PORTABLE CLINICAL HISTORY: syncope; TECHNIQUE: Single frontal view of the chest was performed. COMPARISON: Chest radiograph October 21, 2022 FINDINGS: Single portable chest view is submitted.  There is mild diminished depth of inspiration, mild pattern of accentuated attenuation consistent with soft tissue attenuation associated with body habitus. When accounting for position and technique and depth of inspiration, the appearance of the cardiomediastinal silhouette and pulmonary bronchovascular markings is appropriate. There is no evidence for confluent infiltrate or consolidation, significant pleural effusion or pneumothorax. The osseous structures demonstrate mild chronic change.     There is no radiographic evidence for acute intrathoracic process. Electronically signed by: Ab Perdomo Date:    08/06/2023 Time:    21:48      Assessment  1. Syncope, unspecified syncope type  - Cardiac event monitor; Future      Plan and Discussion    Likely orthostatic hypotension.  Plan for an event monitor.    The ASCVD Risk score (Strang DK, et al., 2019) failed to calculate for the following reasons:    The patient has a prior MI or stroke diagnosis     Follow Up  Follow up for test results.      Migue Hogan MD

## 2023-08-18 ENCOUNTER — PATIENT MESSAGE (OUTPATIENT)
Dept: FAMILY MEDICINE | Facility: CLINIC | Age: 43
End: 2023-08-18
Payer: COMMERCIAL

## 2023-08-18 NOTE — TELEPHONE ENCOUNTER
Please enter note stating that the patient is cleared to return to work.  Or does the patient need to schedule a hosp f/u appt with you?  Please advise. Thanks

## 2023-08-29 NOTE — PROGRESS NOTES
"Subjective:       Patient ID: Marlyn Camacho is a 43 y.o. female.    Chief Complaint: Hypertension    Hypertension  Pertinent negatives include no chest pain, palpitations or shortness of breath.     Pt is here for follow up of htn on hctz no sob/cp bp fine however   Pt has low potassium no muscle cramps she denies ankle swelling she denies pnd orthopnea chf   Review of Systems   Constitutional:  Negative for chills, fatigue and fever.   Respiratory:  Negative for cough, chest tightness and shortness of breath.    Cardiovascular:  Negative for chest pain and palpitations.   Gastrointestinal:  Negative for abdominal distention, abdominal pain and blood in stool.       Objective:    /85   Pulse 65   Wt 95.2 kg (209 lb 12.8 oz)   LMP 02/15/2017 (Approximate)   SpO2 99%   BMI 39.64 kg/m²     Physical Exam  Constitutional:       Appearance: Normal appearance. She is not ill-appearing.   Cardiovascular:      Rate and Rhythm: Normal rate and regular rhythm.   Pulmonary:      Effort: Pulmonary effort is normal. No respiratory distress.   Neurological:      General: No focal deficit present.      Mental Status: She is alert and oriented to person, place, and time.      Cranial Nerves: No cranial nerve deficit.      Coordination: Coordination normal.   Psychiatric:         Mood and Affect: Mood normal.         Behavior: Behavior normal.         Thought Content: Thought content normal.         Judgment: Judgment normal.       K 2.8 8/6, 3.5 8/7b with supplement  Assessment:       1. Essential hypertension    2. Hypokalemia        Plan:     Orders cmp  Norvasc for htn  D/c hctz  Increase water intake  Low salt diet  Rtc 2 weeks bp check virtual okay with log       "This note will not be shared with the patient."     "

## 2023-08-31 ENCOUNTER — CLINICAL SUPPORT (OUTPATIENT)
Dept: CARDIOLOGY | Facility: HOSPITAL | Age: 43
End: 2023-08-31
Attending: INTERNAL MEDICINE
Payer: COMMERCIAL

## 2023-08-31 DIAGNOSIS — R55 SYNCOPE, UNSPECIFIED SYNCOPE TYPE: ICD-10-CM

## 2023-08-31 PROCEDURE — 93272 ECG/REVIEW INTERPRET ONLY: CPT | Mod: ,,, | Performed by: STUDENT IN AN ORGANIZED HEALTH CARE EDUCATION/TRAINING PROGRAM

## 2023-08-31 PROCEDURE — 93272 CARDIAC EVENT MONITOR (CUPID ONLY): ICD-10-PCS | Mod: ,,, | Performed by: STUDENT IN AN ORGANIZED HEALTH CARE EDUCATION/TRAINING PROGRAM

## 2023-08-31 PROCEDURE — 93270 REMOTE 30 DAY ECG REV/REPORT: CPT

## 2023-08-31 PROCEDURE — 93271 ECG/MONITORING AND ANALYSIS: CPT

## 2023-09-01 ENCOUNTER — TELEPHONE (OUTPATIENT)
Dept: FAMILY MEDICINE | Facility: CLINIC | Age: 43
End: 2023-09-01
Payer: COMMERCIAL

## 2023-09-01 ENCOUNTER — OFFICE VISIT (OUTPATIENT)
Dept: FAMILY MEDICINE | Facility: CLINIC | Age: 43
End: 2023-09-01
Attending: FAMILY MEDICINE
Payer: COMMERCIAL

## 2023-09-01 ENCOUNTER — OFFICE VISIT (OUTPATIENT)
Dept: HEMATOLOGY/ONCOLOGY | Facility: CLINIC | Age: 43
End: 2023-09-01
Payer: COMMERCIAL

## 2023-09-01 VITALS
BODY MASS INDEX: 40.06 KG/M2 | HEART RATE: 81 BPM | WEIGHT: 212 LBS | DIASTOLIC BLOOD PRESSURE: 79 MMHG | SYSTOLIC BLOOD PRESSURE: 113 MMHG

## 2023-09-01 DIAGNOSIS — R60.0 LEG EDEMA, LEFT: ICD-10-CM

## 2023-09-01 DIAGNOSIS — I10 ESSENTIAL HYPERTENSION: Primary | ICD-10-CM

## 2023-09-01 DIAGNOSIS — M79.2 NEUROPATHIC PAIN, LEG, LEFT: ICD-10-CM

## 2023-09-01 DIAGNOSIS — E53.8 B12 DEFICIENCY: Primary | ICD-10-CM

## 2023-09-01 PROCEDURE — 99215 PR OFFICE/OUTPT VISIT, EST, LEVL V, 40-54 MIN: ICD-10-PCS | Mod: 95,,, | Performed by: STUDENT IN AN ORGANIZED HEALTH CARE EDUCATION/TRAINING PROGRAM

## 2023-09-01 PROCEDURE — 99215 OFFICE O/P EST HI 40 MIN: CPT | Mod: 95,,, | Performed by: STUDENT IN AN ORGANIZED HEALTH CARE EDUCATION/TRAINING PROGRAM

## 2023-09-01 PROCEDURE — 99213 OFFICE O/P EST LOW 20 MIN: CPT | Mod: 95,,, | Performed by: FAMILY MEDICINE

## 2023-09-01 PROCEDURE — 99213 PR OFFICE/OUTPT VISIT, EST, LEVL III, 20-29 MIN: ICD-10-PCS | Mod: 95,,, | Performed by: FAMILY MEDICINE

## 2023-09-01 RX ORDER — CYANOCOBALAMIN 1000 UG/ML
1000 INJECTION, SOLUTION INTRAMUSCULAR; SUBCUTANEOUS
Qty: 4 ML | Refills: 0 | Status: SHIPPED | OUTPATIENT
Start: 2023-09-01 | End: 2023-12-30

## 2023-09-01 NOTE — PROGRESS NOTES
Hematology- Oncology Clinic Note :         RFV / chief complaint- Follow-up        HPI  Pt is a 43 y.o. female who  has a past medical history of Abnormal Pap smear of cervix, Allergy, Hypertension, Joint pain, Keloid cicatrix, and Stroke.   Pt presents to the clinic today for b12 def    C/o numbness and tingling in hands nad feet going on for some months.   LLE swelling x 1 year     May 2023 , slurred speech , LLE sensory changes were new which prompted her to go to the ER as she was worried about stroke. Pt is RN.   CTA head, MRI brain, CT spine- did not reveal any intracranial abn.   Discharged on aspirin and statin.       Today neuropathy, tingling is much improved. Took b12 daily x 7 days and now weekly   Still on aspirin . Had syncope, has heart monitor on currently. Dr Hogan   Continues to have intermittent LLE    No hx of miscarriages. No known Fhx of VTE    Pmhx- abnormal pap smear - had hysterectomy in 2017     Mother with cervical cancer, bladder cancer,   MGM- CHF , passed away from cancer   Sister- benign breast lesion , Abnormal pap smear   Maternal aunt- HTN, DM, CHF, 2 stroke     Reviewed past medical/surgical/social history    Past Medical History:   Diagnosis Date    Abnormal Pap smear of cervix     Allergy     Hypertension     Joint pain     Keloid cicatrix     Stroke       Past Surgical History:   Procedure Laterality Date    CKC, ECC, fractional D&C      COLPOSCOPY      CONIZATION-CERVIX  02/2015    DILATION AND CURETTAGE OF UTERUS      HYSTERECTOMY  03/2017    AUB, fibroids    TUBAL LIGATION        Review of patient's allergies indicates:  No Known Allergies   Social History     Tobacco Use    Smoking status: Never    Smokeless tobacco: Never   Substance Use Topics    Alcohol use: Yes     Comment: occasional      Family History   Problem Relation Age of Onset    Eczema Mother     Ovarian cancer Mother     Hypertension Mother     Breast cancer Maternal Aunt         THREE AUNTS    Breast cancer  Maternal Aunt     Breast cancer Maternal Aunt     Eczema Maternal Grandmother     Eczema Son     Eczema Son     Melanoma Neg Hx     Lupus Neg Hx     Psoriasis Neg Hx     Colon cancer Neg Hx           Review of Systems :  Review of Systems   Constitutional:  Positive for malaise/fatigue. Negative for chills, diaphoresis, fever and weight loss.   HENT: Negative.  Negative for congestion, hearing loss, nosebleeds, sore throat and tinnitus.    Eyes: Negative.  Negative for blurred vision and discharge.   Respiratory:  Negative for cough, hemoptysis, sputum production, shortness of breath and wheezing.    Cardiovascular:  Positive for leg swelling. Negative for chest pain and palpitations.   Gastrointestinal: Negative.  Negative for abdominal pain, blood in stool, constipation, diarrhea, heartburn, melena, nausea and vomiting.   Genitourinary: Negative.    Musculoskeletal: Negative.  Negative for back pain, falls, joint pain and myalgias.   Skin: Negative.  Negative for itching and rash.   Neurological:  Positive for tingling and sensory change. Negative for dizziness, speech change, focal weakness, seizures, loss of consciousness, weakness and headaches.   Endo/Heme/Allergies: Negative.  Does not bruise/bleed easily.   Psychiatric/Behavioral: Negative.  Negative for depression. The patient is not nervous/anxious and does not have insomnia.                Physical Exam :  LMP 02/15/2017 (Approximate)   Wt Readings from Last 3 Encounters:   09/01/23 96.2 kg (212 lb)   08/17/23 96.3 kg (212 lb 3.2 oz)   08/14/23 95.2 kg (209 lb 12.8 oz)       There is no height or weight on file to calculate BMI.      Physical Exam  Constitutional:       General: She is not in acute distress.     Appearance: Normal appearance. She is not ill-appearing.   HENT:      Head: Normocephalic and atraumatic.      Right Ear: External ear normal.      Left Ear: External ear normal.   Eyes:      General: No scleral icterus.        Right eye: No  "discharge.         Left eye: No discharge.   Pulmonary:      Effort: Pulmonary effort is normal. No respiratory distress.   Skin:     Coloration: Skin is not jaundiced.      Findings: No erythema or rash.   Neurological:      Mental Status: She is alert and oriented to person, place, and time. Mental status is at baseline.   Psychiatric:         Mood and Affect: Mood normal.         Speech: Speech normal.         Behavior: Behavior normal.             Current Outpatient Medications   Medication Sig Dispense Refill    amLODIPine (NORVASC) 5 MG tablet Take 1 tablet (5 mg total) by mouth once daily. 30 tablet 1    aspirin (ECOTRIN) 81 MG EC tablet Take 1 tablet (81 mg total) by mouth once daily. For stroke prevention 90 tablet 3    cyanocobalamin 1,000 mcg/mL injection Inject 1 mL (1,000 mcg total) into the muscle every 30 days. 4 mL 0    gabapentin 300 mg Tb24 Take 1 tablet by mouth 3 (three) times daily.      insulin syringe,safetyneedle (BD SAFETYGLIDE INSULIN SYRINGE) 1 mL 29 gauge x 1/2" Syrg 1 Units by Misc.(Non-Drug; Combo Route) route every 14 (fourteen) days. (Patient not taking: Reported on 8/17/2023) 50 each 0    syringe and needle,insulin,1mL (INSULIN SYRINGE-NEEDLE U-100) 1 mL 29 gauge x 7/16" Syrg Inject into the skin every 14 (fourteen) days.      topiramate (TOPAMAX) 25 MG tablet Take 1 tablet (25 mg total) by mouth every evening for 7 days, THEN 2 tablets (50 mg total) every evening for 7 days, THEN 4 tablets (100 mg total) every evening. 60 tablet 10     No current facility-administered medications for this visit.       Pertinent Diagnostic studies:      No visits with results within 1 Week(s) from this visit.   Latest known visit with results is:   Lab Visit on 08/14/2023   Component Date Value Ref Range Status    Sodium 08/14/2023 138  136 - 145 mmol/L Final    Potassium 08/14/2023 3.5  3.5 - 5.1 mmol/L Final    Chloride 08/14/2023 100  95 - 110 mmol/L Final    CO2 08/14/2023 29  23 - 29 mmol/L " Final    Glucose 08/14/2023 87  70 - 110 mg/dL Final    BUN 08/14/2023 7  6 - 20 mg/dL Final    Creatinine 08/14/2023 0.8  0.5 - 1.4 mg/dL Final    Calcium 08/14/2023 9.5  8.7 - 10.5 mg/dL Final    Total Protein 08/14/2023 7.6  6.0 - 8.4 g/dL Final    Albumin 08/14/2023 4.0  3.5 - 5.2 g/dL Final    Total Bilirubin 08/14/2023 0.5  0.1 - 1.0 mg/dL Final    Comment: For infants and newborns, interpretation of results should be based  on gestational age, weight and in agreement with clinical  observations.    Premature Infant recommended reference ranges:  Up to 24 hours.............<8.0 mg/dL  Up to 48 hours............<12.0 mg/dL  3-5 days..................<15.0 mg/dL  6-29 days.................<15.0 mg/dL      Alkaline Phosphatase 08/14/2023 52 (L)  55 - 135 U/L Final    AST 08/14/2023 17  10 - 40 U/L Final    ALT 08/14/2023 17  10 - 44 U/L Final    eGFR 08/14/2023 >60.0  >60 mL/min/1.73 m^2 Final    Anion Gap 08/14/2023 9  8 - 16 mmol/L Final           Oncology History    No history exists.     Assessment :   ECOG 1    1. B12 deficiency    2. Neuropathic pain, leg, left    3. Leg edema, left        Plan :         Neuropathy possibly due to b12 def- continue b12 shots. Once a month    Pt is concerned about stroke, hypercoagulable work up negative/ wnl. . Advised to see neurology.     LLE swelling- conservative measures d/w pt. US to rule out DVT - no show , needs to call and bobby    HTN- chronic, stable, controlled on meds ,   130s/89c/fu with pcp, hctz discontinued due to low K     The patient location is: [place]  The chief complaint leading to consultation is: see chief complaint below    Visit type: audiovisual    Face to Face time with patient: >10  40 minutes of total time spent on the encounter, which includes face to face time and non-face to face time preparing to see the patient (eg, review of tests), Obtaining and/or reviewing separately obtained history, Documenting clinical information in the  electronic or other health record, Independently interpreting results (not separately reported) and communicating results to the patient/family/caregiver, or Care coordination (not separately reported).         Each patient to whom he or she provides medical services by telemedicine is:  (1) informed of the relationship between the physician and patient and the respective role of any other health care provider with respect to management of the patient; and (2) notified that he or she may decline to receive medical services by telemedicine and may withdraw from such care at any time.        Electronically signed by Antionette Velarde    [facility] Future Appointments   Date Time Provider Department [place]   10/6/2023  8:20 AM Austyn Lazaro [facility] [place]           This note was created with voice recognition software.  Grammatical, syntax and spelling errors may be inevitable.

## 2023-09-06 NOTE — PROGRESS NOTES
The patient location is: home  The chief complaint leading to consultation is: htn    Visit type: audiovisual    Face to Face time with patient: 15  20 minutes of total time spent on the encounter, which includes face to face time and non-face to face time preparing to see the patient (eg, review of tests), Obtaining and/or reviewing separately obtained history, Documenting clinical information in the electronic or other health record, Independently interpreting results (not separately reported) and communicating results to the patient/family/caregiver, or Care coordination (not separately reported).         Each patient to whom he or she provides medical services by telemedicine is:  (1) informed of the relationship between the physician and patient and the respective role of any other health care provider with respect to management of the patient; and (2) notified that he or she may decline to receive medical services by telemedicine and may withdraw from such care at any time.    Notes:   Subjective:       Patient ID: Marlyn Camacho is a 43 y.o. female.    Chief Complaint: Hypertension    Hypertension  Pertinent negatives include no chest pain, headaches, neck pain, palpitations or shortness of breath.     Pt is here for follow up of htn stable on norvasc no sob/cp bp fine at home   Review of Systems   Constitutional:  Negative for activity change, chills, fatigue, fever and unexpected weight change.   HENT:  Negative for hearing loss, rhinorrhea and trouble swallowing.    Eyes:  Negative for discharge and visual disturbance.   Respiratory:  Negative for cough, chest tightness, shortness of breath and wheezing.    Cardiovascular:  Negative for chest pain and palpitations.   Gastrointestinal:  Negative for blood in stool, constipation, diarrhea and vomiting.   Endocrine: Negative for polydipsia and polyuria.   Genitourinary:  Negative for difficulty urinating, dysuria, hematuria and menstrual problem.  "  Musculoskeletal:  Positive for arthralgias and joint swelling. Negative for neck pain.   Neurological:  Negative for weakness and headaches.   Psychiatric/Behavioral:  Negative for confusion and dysphoric mood.        Objective:    /79   Pulse 81   Wt 96.2 kg (212 lb)   LMP 02/15/2017 (Approximate)   BMI 40.06 kg/m²     Physical Exam  Constitutional:       Appearance: Normal appearance. She is not ill-appearing.   HENT:      Head: Normocephalic and atraumatic.   Eyes:      Extraocular Movements: Extraocular movements intact.   Pulmonary:      Effort: Pulmonary effort is normal. No respiratory distress.   Neurological:      General: No focal deficit present.      Mental Status: She is alert and oriented to person, place, and time.      Cranial Nerves: No cranial nerve deficit.      Coordination: Coordination normal.   Psychiatric:         Mood and Affect: Mood normal.         Behavior: Behavior normal.         Thought Content: Thought content normal.         Judgment: Judgment normal.       Bun/creat 7/0.8 in 8/2023  Assessment:       1. Essential hypertension        Plan:        Orders declined  Cont meds  Low salt diet  Rtc for fasting annual     "This note will not be shared with the patient."     "

## 2023-09-26 ENCOUNTER — TELEPHONE (OUTPATIENT)
Dept: ELECTROPHYSIOLOGY | Facility: CLINIC | Age: 43
End: 2023-09-26
Payer: COMMERCIAL

## 2023-09-26 NOTE — TELEPHONE ENCOUNTER
Patient wearing 30 day event monitor for diagnosis Syncope and collapse     Received patient-triggered alert notification on 9/23/23 at 1030 pm, ecg c/w ST, 175 bpm     Pt symptoms: pt does not recall hitting the symptoms button at that time.    Will cont to monitor until 10/5/2023

## 2023-09-27 ENCOUNTER — PATIENT MESSAGE (OUTPATIENT)
Dept: FAMILY MEDICINE | Facility: CLINIC | Age: 43
End: 2023-09-27
Payer: COMMERCIAL

## 2023-09-29 ENCOUNTER — HOSPITAL ENCOUNTER (EMERGENCY)
Facility: HOSPITAL | Age: 43
Discharge: HOME | End: 2023-09-29
Attending: EMERGENCY MEDICINE
Payer: COMMERCIAL

## 2023-09-29 VITALS
DIASTOLIC BLOOD PRESSURE: 78 MMHG | HEART RATE: 72 BPM | BODY MASS INDEX: 40.02 KG/M2 | WEIGHT: 212 LBS | RESPIRATION RATE: 20 BRPM | TEMPERATURE: 98 F | OXYGEN SATURATION: 97 % | HEIGHT: 61 IN | SYSTOLIC BLOOD PRESSURE: 128 MMHG

## 2023-09-29 DIAGNOSIS — M79.89 LEG SWELLING: ICD-10-CM

## 2023-09-29 LAB
ALBUMIN SERPL BCP-MCNC: 3.9 G/DL (ref 3.5–5.2)
ALP SERPL-CCNC: 56 U/L (ref 55–135)
ALT SERPL W/O P-5'-P-CCNC: 15 U/L (ref 10–44)
ANION GAP SERPL CALC-SCNC: 7 MMOL/L (ref 8–16)
AST SERPL-CCNC: 15 U/L (ref 10–40)
BASOPHILS # BLD AUTO: 0.06 K/UL (ref 0–0.2)
BASOPHILS NFR BLD: 0.9 % (ref 0–1.9)
BILIRUB SERPL-MCNC: 0.5 MG/DL (ref 0.1–1)
BILIRUB UR QL STRIP: NEGATIVE
BNP SERPL-MCNC: 16 PG/ML (ref 0–99)
BUN SERPL-MCNC: 8 MG/DL (ref 6–20)
CALCIUM SERPL-MCNC: 9.4 MG/DL (ref 8.7–10.5)
CHLORIDE SERPL-SCNC: 102 MMOL/L (ref 95–110)
CLARITY UR REFRACT.AUTO: CLEAR
CO2 SERPL-SCNC: 28 MMOL/L (ref 23–29)
COLOR UR AUTO: COLORLESS
CREAT SERPL-MCNC: 0.9 MG/DL (ref 0.5–1.4)
DIFFERENTIAL METHOD: ABNORMAL
EOSINOPHIL # BLD AUTO: 0.1 K/UL (ref 0–0.5)
EOSINOPHIL NFR BLD: 1.7 % (ref 0–8)
ERYTHROCYTE [DISTWIDTH] IN BLOOD BY AUTOMATED COUNT: 12.6 % (ref 11.5–14.5)
EST. GFR  (NO RACE VARIABLE): >60 ML/MIN/1.73 M^2
GLUCOSE SERPL-MCNC: 81 MG/DL (ref 70–110)
GLUCOSE UR QL STRIP: NEGATIVE
HCT VFR BLD AUTO: 39 % (ref 37–48.5)
HGB BLD-MCNC: 13.3 G/DL (ref 12–16)
HGB UR QL STRIP: NEGATIVE
IMM GRANULOCYTES # BLD AUTO: 0.02 K/UL (ref 0–0.04)
IMM GRANULOCYTES NFR BLD AUTO: 0.3 % (ref 0–0.5)
KETONES UR QL STRIP: NEGATIVE
LEUKOCYTE ESTERASE UR QL STRIP: NEGATIVE
LYMPHOCYTES # BLD AUTO: 2.8 K/UL (ref 1–4.8)
LYMPHOCYTES NFR BLD: 39.3 % (ref 18–48)
MCH RBC QN AUTO: 32 PG (ref 27–31)
MCHC RBC AUTO-ENTMCNC: 34.1 G/DL (ref 32–36)
MCV RBC AUTO: 94 FL (ref 82–98)
MONOCYTES # BLD AUTO: 0.4 K/UL (ref 0.3–1)
MONOCYTES NFR BLD: 6.2 % (ref 4–15)
NEUTROPHILS # BLD AUTO: 3.6 K/UL (ref 1.8–7.7)
NEUTROPHILS NFR BLD: 51.6 % (ref 38–73)
NITRITE UR QL STRIP: NEGATIVE
NRBC BLD-RTO: 0 /100 WBC
PH UR STRIP: 7 [PH] (ref 5–8)
PLATELET # BLD AUTO: 375 K/UL (ref 150–450)
PMV BLD AUTO: 9.2 FL (ref 9.2–12.9)
POTASSIUM SERPL-SCNC: 3.1 MMOL/L (ref 3.5–5.1)
PROT SERPL-MCNC: 7.3 G/DL (ref 6–8.4)
PROT UR QL STRIP: NEGATIVE
RBC # BLD AUTO: 4.15 M/UL (ref 4–5.4)
SODIUM SERPL-SCNC: 137 MMOL/L (ref 136–145)
SP GR UR STRIP: 1.01 (ref 1–1.03)
TROPONIN I SERPL DL<=0.01 NG/ML-MCNC: <0.006 NG/ML (ref 0–0.03)
URN SPEC COLLECT METH UR: ABNORMAL
WBC # BLD AUTO: 6.99 K/UL (ref 3.9–12.7)

## 2023-09-29 PROCEDURE — 81003 URINALYSIS AUTO W/O SCOPE: CPT | Performed by: PHYSICIAN ASSISTANT

## 2023-09-29 PROCEDURE — 84484 ASSAY OF TROPONIN QUANT: CPT | Performed by: PHYSICIAN ASSISTANT

## 2023-09-29 PROCEDURE — 85025 COMPLETE CBC W/AUTO DIFF WBC: CPT | Performed by: PHYSICIAN ASSISTANT

## 2023-09-29 PROCEDURE — 25000003 PHARM REV CODE 250: Performed by: PHYSICIAN ASSISTANT

## 2023-09-29 PROCEDURE — 99285 EMERGENCY DEPT VISIT HI MDM: CPT | Mod: 25

## 2023-09-29 PROCEDURE — 83880 ASSAY OF NATRIURETIC PEPTIDE: CPT | Performed by: PHYSICIAN ASSISTANT

## 2023-09-29 PROCEDURE — 80053 COMPREHEN METABOLIC PANEL: CPT | Performed by: PHYSICIAN ASSISTANT

## 2023-09-29 RX ORDER — FUROSEMIDE 20 MG/1
20 TABLET ORAL DAILY
Qty: 5 TABLET | Refills: 0 | Status: SHIPPED | OUTPATIENT
Start: 2023-09-29 | End: 2024-01-02

## 2023-09-29 RX ORDER — POTASSIUM CHLORIDE 20 MEQ/1
40 TABLET, EXTENDED RELEASE ORAL ONCE
Status: COMPLETED | OUTPATIENT
Start: 2023-09-29 | End: 2023-09-29

## 2023-09-29 RX ORDER — POTASSIUM CHLORIDE 750 MG/1
20 TABLET, EXTENDED RELEASE ORAL DAILY
Qty: 10 TABLET | Refills: 0 | Status: SHIPPED | OUTPATIENT
Start: 2023-09-29 | End: 2024-01-02

## 2023-09-29 RX ADMIN — POTASSIUM CHLORIDE 40 MEQ: 1500 TABLET, EXTENDED RELEASE ORAL at 04:09

## 2023-09-29 NOTE — ED PROVIDER NOTES
Encounter Date: 9/29/2023       History     Chief Complaint   Patient presents with    Leg Swelling     Pt reports swelling to bilateral legs x4 days.     43-year-old female with history of hypertension to the ED complaining of bilateral lower extremity swelling.  She was admitted in August after a syncopal episode, during that admission was noted to be hypokalemic.  Subsequently, HCTZ was discontinued and replaced with amlodipine.  Over the past 4 days, has been having progressively worsening bilateral lower extremity edema.  She has tried elevation, compression stockings with no improvement.  She is now starting to have discomfort in the legs due to the swelling.  She does report a elevated D-dimer in the past, was supposed to have bilateral lower extremity ultrasound to rule out DVT but missed this appointment.  Denies fever, chills, chest pain, shortness of breath, URI symptoms, numbness, headache.    The history is provided by the patient.     Review of patient's allergies indicates:  No Known Allergies  Past Medical History:   Diagnosis Date    Abnormal Pap smear of cervix     Allergy     Hypertension     Joint pain     Keloid cicatrix     Stroke      Past Surgical History:   Procedure Laterality Date    CKC, ECC, fractional D&C      COLPOSCOPY      CONIZATION-CERVIX  02/2015    DILATION AND CURETTAGE OF UTERUS      HYSTERECTOMY  03/2017    AUB, fibroids    TUBAL LIGATION       Family History   Problem Relation Age of Onset    Eczema Mother     Ovarian cancer Mother     Hypertension Mother     Breast cancer Maternal Aunt         THREE AUNTS    Breast cancer Maternal Aunt     Breast cancer Maternal Aunt     Eczema Maternal Grandmother     Eczema Son     Eczema Son     Melanoma Neg Hx     Lupus Neg Hx     Psoriasis Neg Hx     Colon cancer Neg Hx      Social History     Tobacco Use    Smoking status: Never    Smokeless tobacco: Never   Substance Use Topics    Alcohol use: Yes     Comment: occasional    Drug use:  No     Review of Systems   Constitutional:  Negative for chills and fever.   Respiratory:  Negative for shortness of breath.    Cardiovascular:  Positive for leg swelling. Negative for chest pain.       Physical Exam     Initial Vitals [09/29/23 1347]   BP Pulse Resp Temp SpO2   135/86 82 18 98.1 °F (36.7 °C) 100 %      MAP       --         Physical Exam    Nursing note and vitals reviewed.  Constitutional: She appears well-developed and well-nourished. She is not diaphoretic. No distress.   Cardiovascular:  Normal rate, regular rhythm and normal heart sounds.     Exam reveals no gallop and no friction rub.       No murmur heard.  2+ LE edema. Distal pulses intact.    Pulmonary/Chest: Breath sounds normal. She has no wheezes. She has no rhonchi. She has no rales.   Abdominal: Abdomen is soft. Bowel sounds are normal. There is no abdominal tenderness. There is no rebound and no guarding.   Musculoskeletal:         General: Normal range of motion.     Neurological: She is alert and oriented to person, place, and time.   Skin: Skin is warm and dry. No rash noted. No erythema.   Psychiatric: She has a normal mood and affect.         ED Course   Procedures  Labs Reviewed   CBC W/ AUTO DIFFERENTIAL - Abnormal; Notable for the following components:       Result Value    MCH 32.0 (*)     All other components within normal limits   COMPREHENSIVE METABOLIC PANEL - Abnormal; Notable for the following components:    Potassium 3.1 (*)     Anion Gap 7 (*)     All other components within normal limits   TROPONIN I   B-TYPE NATRIURETIC PEPTIDE   URINALYSIS, REFLEX TO URINE CULTURE          Imaging Results    None          Medications   potassium chloride SA CR tablet 40 mEq (has no administration in time range)     Medical Decision Making  Chart review:  Admitted beginning of August after syncopal episode. Echo normal. Stress test normal. Hypokalemic.  8/14 PCP follow up, discontinue HCTZ and start norvasc  8/17 cardiology follow  up    Amount and/or Complexity of Data Reviewed  Labs: ordered.  Radiology: ordered.    Risk  Prescription drug management.         APC / Resident Notes:   43-year-old female with history of hypertension to the ED complaining of bilateral lower extremity swelling.  Vital signs stable.  Regular rate and rhythm.  Lungs are clear.  Abdomen is soft, nontender.  2+ bilateral lower extremity edema is appreciated.  Bilateral pedal pulses 2+.  Normal sensation.  Differential diagnosis includes but isn't limited to electrolyte abnormality, renal failure, fluid overload.  She recently had an echo with a normal EF.  Swelling may also be due to discontinuation of HCTZ.  Will get labs, bilateral lower extremity ultrasound.    No leukocytosis or anemia. Hypokalemia with K 3.1, orally replaced in the ED. Troponin and BNP normal.    4:13 PM  Signed out to AGA Valenzuela pending LE ultrasound and CXR. If normal, discharged with lasix and potassium.                         Clinical Impression:   Final diagnoses:  [M79.89] Leg swelling               Rebecca Saez PA-C  09/29/23 0075

## 2023-09-29 NOTE — ED PROVIDER NOTES
ED Physician Hand-off Note:    ED Course: I assumed care of patient from off-going ED physician team. Briefly, Patient is a 43-year-old female who presents ED for bilateral leg swelling.    At the time of signout plan was pending lower extremity ultrasound, chest x-ray.    Medications given in the ED:    Medications   potassium chloride SA CR tablet 40 mEq (40 mEq Oral Given 9/29/23 8727)       Disposition:  Patient's previous lab.  Her BNP and troponin are within normal limits.  Low suspicion for acute heart failure.  Her bilateral lower extremity DVT study negative for DVT.  No JAGRUTI on labs.  She has good distal pulses.  UA negative for proteinuria.  Likely dependent edema she was noted to have slight hypokalemia which was repleted orally in the ED. will discharge home with a short course of Lasix as well as p.o. potassium and recommend close follow-up with her PCP for further evaluation.    Patient comfortable with discharge. Patient counseled regarding exam, results, diagnosis, treatment, and plan.    Impression: Leg swelling, hypokalemia       Ariel Sanchez PA-C  09/29/23 1351

## 2023-09-29 NOTE — ED TRIAGE NOTES
Marlyn Oscar Camacho, a 43 y.o. female presents to the ED w/ complaint of extremity swelling.     Pt reports bilateral leg swelling that started 4 days ago. Pt has hx of hypertension.     Triage note:  Chief Complaint   Patient presents with    Leg Swelling     Pt reports swelling to bilateral legs x4 days.     Review of patient's allergies indicates:  No Known Allergies  Past Medical History:   Diagnosis Date    Abnormal Pap smear of cervix     Allergy     Hypertension     Joint pain     Keloid cicatrix     Stroke

## 2023-09-29 NOTE — DISCHARGE INSTRUCTIONS
You were seen in the emergency department for leg swelling.  Your function was normal today.  Your ultrasound of your leg did not show any sign of blood clots.  Your heart enzymes and lab work for heart failure was normal today.  This is likely dependent edema and recommend close follow-up with your PCP for further evaluation.  We have prescribed you a short course of Lasix to help with her swelling as well as potassium.  Return to ED sooner if you develop any new or worsening symptoms.

## 2023-10-06 ENCOUNTER — OFFICE VISIT (OUTPATIENT)
Dept: OPTOMETRY | Facility: CLINIC | Age: 43
End: 2023-10-06
Payer: COMMERCIAL

## 2023-10-06 DIAGNOSIS — R51.9 INTRACTABLE HEADACHE, UNSPECIFIED CHRONICITY PATTERN, UNSPECIFIED HEADACHE TYPE: Primary | ICD-10-CM

## 2023-10-06 DIAGNOSIS — Z86.73 HISTORY OF STROKE: ICD-10-CM

## 2023-10-06 DIAGNOSIS — Z01.00 ENCOUNTER FOR COMPLETE EYE EXAM: ICD-10-CM

## 2023-10-06 PROCEDURE — 92004 COMPRE OPH EXAM NEW PT 1/>: CPT | Mod: S$GLB,,, | Performed by: OPTOMETRIST

## 2023-10-06 PROCEDURE — 92004 PR EYE EXAM, NEW PATIENT,COMPREHESV: ICD-10-PCS | Mod: S$GLB,,, | Performed by: OPTOMETRIST

## 2023-10-06 PROCEDURE — 99999 PR PBB SHADOW E&M-EST. PATIENT-LVL III: ICD-10-PCS | Mod: PBBFAC,,, | Performed by: OPTOMETRIST

## 2023-10-06 PROCEDURE — 99999 PR PBB SHADOW E&M-EST. PATIENT-LVL III: CPT | Mod: PBBFAC,,, | Performed by: OPTOMETRIST

## 2023-10-06 RX ORDER — LORATADINE 10 MG/1
TABLET ORAL
COMMUNITY
End: 2024-01-02

## 2023-10-06 RX ORDER — NEOMYCIN SULFATE, POLYMYXIN B SULFATE, HYDROCORTISONE 3.5; 10000; 1 MG/ML; [USP'U]/ML; MG/ML
SOLUTION/ DROPS AURICULAR (OTIC)
COMMUNITY
End: 2024-01-02

## 2023-10-06 RX ORDER — FLUTICASONE PROPIONATE 50 MCG
1 SPRAY, SUSPENSION (ML) NASAL DAILY
COMMUNITY
End: 2024-01-02

## 2023-10-06 RX ORDER — OMEPRAZOLE 40 MG/1
CAPSULE, DELAYED RELEASE ORAL
COMMUNITY
End: 2024-01-02

## 2023-10-06 NOTE — PROGRESS NOTES
LILLIE MARQUEZ: about 5 yrs. Ago   Chief complaint (CC): Patient is here for annual eye exam today.  Patient   was referred because she had a stroke in May and had been having   headaches.  Patient states the headaches have now subsided but she takes   Topamax when she does have them.  Patient doesn't have any glasses and has   noticed a little trouble with near.  Distance vision seems fine without   glasses.  Glasses? -  Contacts? -  H/o eye surgery, injections or laser: -  H/o eye injury: -  Known eye conditions? -  Family h/o eye conditions? Mother and MGM with glaucoma  Eye gtts? -      (-) Flashes (-)  Floaters (-) Mucous   (-)  Tearing (-) Itching (-) Burning   (-) Headaches (-) Eye Pain/discomfort (-) Irritation   (-)  Redness (-) Double vision (-) Blurry vision    Diabetic? -  A1c? -      Last edited by Migdalia Davenport on 10/6/2023  8:27 AM.            Assessment /Plan     For exam results, see Encounter Report.      Intractable headache, unspecified chronicity pattern, unspecified headache type  No e/o ocular pathology. No papilledema OU.   RTC 1 year.     Encounter for complete eye exam  -     Ambulatory consult to Optometry    History of stroke  No e/o effects on vision. S/p stroke May 2023.   Monitor.

## 2023-10-11 RX ORDER — AMLODIPINE BESYLATE 5 MG/1
5 TABLET ORAL
Qty: 30 TABLET | Refills: 1 | Status: SHIPPED | OUTPATIENT
Start: 2023-10-11 | End: 2023-10-12

## 2023-10-11 NOTE — TELEPHONE ENCOUNTER
No care due was identified.  Health [facility] Embedded Care Due Messages. Reference number: [id number].   10/11/2023 10:19:51 AM CDT

## 2023-10-11 NOTE — TELEPHONE ENCOUNTER
No care due was identified.  Health [facility] Embedded Care Due Messages. Reference number: [id number].   10/11/2023 6:24:01 PM CDT

## 2023-10-12 RX ORDER — AMLODIPINE BESYLATE 5 MG/1
5 TABLET ORAL
Qty: 90 TABLET | Refills: 3 | Status: SHIPPED | OUTPATIENT
Start: 2023-10-12

## 2023-11-06 NOTE — PROGRESS NOTES
"Subjective:       Patient ID: Marlyn Camacho is a 43 y.o. female.    Chief Complaint: Hypertension    Hypertension  Pertinent negatives include no chest pain, palpitations or shortness of breath.     Pt is in clinic for follow up of htn pt is stable on norvasc lasix no muscle cramps on potassium supplement  No sob/cp bp fine    Review of Systems   Constitutional:  Negative for chills, fatigue and fever.   Respiratory:  Negative for cough, chest tightness and shortness of breath.    Cardiovascular:  Negative for chest pain and palpitations.   Gastrointestinal:  Negative for abdominal distention, abdominal pain and blood in stool.       Objective:    /73 (BP Location: Right arm, Patient Position: Sitting, BP Method: Large (Automatic))   Pulse 98   Ht 5' 1" (1.549 m)   Wt 94.6 kg (208 lb 9.6 oz)   LMP 02/15/2017 (Approximate)   SpO2 98%   BMI 39.41 kg/m²     Physical Exam  Constitutional:       Appearance: Normal appearance. She is not ill-appearing.   Cardiovascular:      Rate and Rhythm: Normal rate and regular rhythm.   Pulmonary:      Effort: Pulmonary effort is normal. No respiratory distress.   Neurological:      General: No focal deficit present.      Mental Status: She is alert and oriented to person, place, and time.      Cranial Nerves: No cranial nerve deficit.      Coordination: Coordination normal.   Psychiatric:         Mood and Affect: Mood normal.         Behavior: Behavior normal.         Thought Content: Thought content normal.         Judgment: Judgment normal.       Bun creat 10/0.9 in 5/2023  Assessment:       1. Essential hypertension    2. Family history of stroke        Plan:     Orders cmp  Cont  meds  Low salt diet  Graded exercise  Rtc 6 months       "This note will not be shared with the patient."     "

## 2023-11-13 ENCOUNTER — PATIENT MESSAGE (OUTPATIENT)
Dept: ADMINISTRATIVE | Facility: HOSPITAL | Age: 43
End: 2023-11-13
Payer: COMMERCIAL

## 2023-12-26 ENCOUNTER — PATIENT MESSAGE (OUTPATIENT)
Dept: FAMILY MEDICINE | Facility: CLINIC | Age: 43
End: 2023-12-26
Payer: COMMERCIAL

## 2024-01-02 ENCOUNTER — OFFICE VISIT (OUTPATIENT)
Dept: OBSTETRICS AND GYNECOLOGY | Facility: CLINIC | Age: 44
End: 2024-01-02
Attending: OBSTETRICS & GYNECOLOGY
Payer: COMMERCIAL

## 2024-01-02 VITALS
BODY MASS INDEX: 41.04 KG/M2 | DIASTOLIC BLOOD PRESSURE: 70 MMHG | WEIGHT: 217.38 LBS | HEIGHT: 61 IN | SYSTOLIC BLOOD PRESSURE: 100 MMHG

## 2024-01-02 DIAGNOSIS — Z01.419 WELL FEMALE EXAM WITH ROUTINE GYNECOLOGICAL EXAM: Primary | ICD-10-CM

## 2024-01-02 DIAGNOSIS — Z12.11 COLON CANCER SCREENING: ICD-10-CM

## 2024-01-02 PROBLEM — R07.9 CHEST PAIN: Status: RESOLVED | Noted: 2023-05-26 | Resolved: 2024-01-02

## 2024-01-02 PROBLEM — E87.6 HYPOKALEMIA: Status: RESOLVED | Noted: 2023-05-26 | Resolved: 2024-01-02

## 2024-01-02 PROBLEM — R79.89 ELEVATED D-DIMER: Status: RESOLVED | Noted: 2023-08-07 | Resolved: 2024-01-02

## 2024-01-02 PROCEDURE — 99999 PR PBB SHADOW E&M-EST. PATIENT-LVL III: CPT | Mod: PBBFAC,,, | Performed by: OBSTETRICS & GYNECOLOGY

## 2024-01-02 PROCEDURE — 99396 PREV VISIT EST AGE 40-64: CPT | Mod: ,,, | Performed by: OBSTETRICS & GYNECOLOGY

## 2024-01-02 RX ORDER — TERCONAZOLE 8 MG/G
1 CREAM VAGINAL NIGHTLY
Qty: 20 G | Refills: 0 | Status: SHIPPED | OUTPATIENT
Start: 2024-01-02 | End: 2024-01-05

## 2024-01-02 NOTE — PROGRESS NOTES
SUBJECTIVE:   43 y.o. female   for routine gyn exam. Patient's last menstrual period was 02/15/2017 (approximate)..  She has no unusual complaints          Past Medical History:   Diagnosis Date    Abnormal Pap smear of cervix     Allergy     Hypertension     Joint pain     Keloid cicatrix     Stroke 2023    Possible     Past Surgical History:   Procedure Laterality Date    CKC, ECC, fractional D&C      CONIZATION-CERVIX  2015    DILATION AND CURETTAGE OF UTERUS      HYSTERECTOMY  2017    AUB, fibroids    TUBAL LIGATION       Social History     Socioeconomic History    Marital status:    Tobacco Use    Smoking status: Never    Smokeless tobacco: Never   Substance and Sexual Activity    Alcohol use: Yes     Comment: occasional    Drug use: No    Sexual activity: Yes     Partners: Male     Birth control/protection: Other-see comments, See Surgical Hx     Comment: Tubal Ligation/Hysterectomy   Other Topics Concern    Are you pregnant or think you may be? No    Breast-feeding No     Social Determinants of Health     Financial Resource Strain: Medium Risk (10/6/2023)    Overall Financial Resource Strain (CARDIA)     Difficulty of Paying Living Expenses: Somewhat hard   Food Insecurity: Patient Declined (10/6/2023)    Hunger Vital Sign     Worried About Running Out of Food in the Last Year: Patient declined     Ran Out of Food in the Last Year: Patient declined   Recent Concern: Food Insecurity - Food Insecurity Present (2023)    Hunger Vital Sign     Worried About Running Out of Food in the Last Year: Sometimes true     Ran Out of Food in the Last Year: Never true   Transportation Needs: Unknown (10/6/2023)    PRAPARE - Transportation     Lack of Transportation (Medical): No     Lack of Transportation (Non-Medical): Patient declined   Physical Activity: Inactive (10/6/2023)    Exercise Vital Sign     Days of Exercise per Week: 0 days     Minutes of Exercise per Session: 0 min   Stress: No  Stress Concern Present (10/6/2023)    [employer] [place] [employer] - Occupational Stress Questionnaire     Feeling of Stress : Only a little   Recent Concern: Stress - Stress Concern Present (2023)    [employer] [place] [employer] - Occupational Stress Questionnaire     Feeling of Stress : To some extent   Social Connections: Unknown (10/6/2023)    Social Connection and Isolation Panel [NHANES]     Frequency of Communication with Friends and Family: Once a week     Frequency of Social Gatherings with Friends and Family: Once a week     Active Member of Clubs or Organizations: Yes     Attends Club or Organization Meetings: More than 4 times per year     Marital Status:    Housing Stability: Unknown (10/6/2023)    Housing Stability Vital Sign     Unable to Pay for Housing in the Last Year: Patient refused     Number of Places Lived in the Last Year: 2     Unstable Housing in the Last Year: Patient refused   Recent Concern: Housing Stability - High Risk (2023)    Housing Stability Vital Sign     Unable to Pay for Housing in the Last Year: Yes     Number of Places Lived in the Last Year: 2     Unstable Housing in the Last Year: No     Family History   Problem Relation Age of Onset    Eczema Mother     Ovarian cancer Mother     Hypertension Mother     Breast cancer Maternal Aunt         THREE AUNTS    Breast cancer Maternal Aunt     Breast cancer Maternal Aunt     Eczema Maternal Grandmother     Eczema Son     Eczema Son     Melanoma Neg Hx     Lupus Neg Hx     Psoriasis Neg Hx     Colon cancer Neg Hx      OB History    Para Term  AB Living   3 3 0     3   SAB IAB Ectopic Multiple Live Births           3      # Outcome Date GA Lbr Bennie/2nd Weight Sex Delivery Anes PTL Lv   3 Para      Vag-Spont   KATIE   2 Para      Vag-Spont   KATIE   1 Para      Vag-Spont   KATIE         Current Outpatient Medications   Medication Sig Dispense Refill    amLODIPine (NORVASC) 5 MG tablet  "TAKE 1 TABLET(5 MG) BY MOUTH EVERY DAY 90 tablet 3    aspirin (ECOTRIN) 81 MG EC tablet Take 1 tablet (81 mg total) by mouth once daily. For stroke prevention 90 tablet 3    terconazole (TERAZOL 3) 0.8 % vaginal cream Place 1 applicator vaginally every evening. for 3 days 20 g 0     No current facility-administered medications for this visit.     Allergies: Patient has no known allergies.     ROS:  Constitutional: no weight loss, weight gain, fever, fatigue  Eyes:  No vision changes, glasses/contacts  ENT/Mouth: No ulcers, sinus problems, ears ringing, headache  Cardiovascular: No inability to lie flat, chest pain, exercise intolerance, swelling, heart palpitations  Respiratory: No wheezing, coughing blood, shortness of breath, or cough  Gastrointestinal: No diarrhea, bloody stool, nausea/vomiting, constipation, gas, hemorrhoids  Genitourinary: No blood in urine, painful urination, urgency of urination, frequency of urination, incomplete emptying, incontinence, abnormal bleeding, painful periods, heavy periods, vaginal discharge, vaginal odor, painful intercourse, sexual problems, bleeding after intercourse.  Musculoskeletal: No muscle weakness  Skin/Breast: No painful breasts, nipple discharge, masses, rash, ulcers  Neurological: No passing out, seizures, numbness, headache  Endocrine: No diabetes, hypothyroid, hyperthyroid, hot flashes, hair loss, abnormal hair growth, acne  Psychiatric: No depression, crying  Hematologic: No bruises, bleeding, swollen lymph nodes, anemia.      OBJECTIVE:   The patient appears well, alert, oriented x 3, in no distress.  /70 (BP Location: Right arm, Patient Position: Sitting, BP Method: Large (Manual))   Ht 5' 1" (1.549 m)   Wt 98.6 kg (217 lb 6 oz)   LMP 02/15/2017 (Approximate)   BMI 41.07 kg/m²   NECK: no thyromegaly, trachea midline  SKIN: no acne, striae, hirsutism  CHEST: CTAB  CV: RRR  BREAST EXAM: breasts appear normal, no suspicious masses, no skin or nipple " changes or axillary nodes  ABDOMEN: no hernias, masses, or hepatosplenomegaly  GENITALIA: normal external genitalia, no erythema, white discharge  URETHRA: normal urethra, normal urethral meatus  VAGINA: Normal  CERVIX: absent  UTERUS: absent  ADNEXA: no mass, fullness, tenderness      ASSESSMENT:   1. Well female exam with routine gynecological exam        2. Colon cancer screening  Ambulatory referral/consult to Endo Procedure           PLAN:   Orders Placed This Encounter    Ambulatory referral/consult to Endo Procedure     terconazole (TERAZOL 3) 0.8 % vaginal cream     Discussed vaginitis- presumed candida. Terazol cream  Discussed healthy lifestyle including regular exercise, healthy eating, etc.  Return to clinic in 1 year

## 2024-01-03 ENCOUNTER — LAB VISIT (OUTPATIENT)
Dept: LAB | Facility: HOSPITAL | Age: 44
End: 2024-01-03
Attending: STUDENT IN AN ORGANIZED HEALTH CARE EDUCATION/TRAINING PROGRAM
Payer: COMMERCIAL

## 2024-01-03 DIAGNOSIS — E53.8 B12 DEFICIENCY: ICD-10-CM

## 2024-01-03 DIAGNOSIS — M79.2 NEUROPATHIC PAIN, LEG, LEFT: ICD-10-CM

## 2024-01-03 LAB — VIT B12 SERPL-MCNC: 885 PG/ML (ref 210–950)

## 2024-01-03 PROCEDURE — 82607 VITAMIN B-12: CPT | Performed by: STUDENT IN AN ORGANIZED HEALTH CARE EDUCATION/TRAINING PROGRAM

## 2024-01-03 PROCEDURE — 83921 ORGANIC ACID SINGLE QUANT: CPT | Performed by: STUDENT IN AN ORGANIZED HEALTH CARE EDUCATION/TRAINING PROGRAM

## 2024-01-03 PROCEDURE — 36415 COLL VENOUS BLD VENIPUNCTURE: CPT | Performed by: STUDENT IN AN ORGANIZED HEALTH CARE EDUCATION/TRAINING PROGRAM

## 2024-01-04 ENCOUNTER — TELEPHONE (OUTPATIENT)
Dept: GENETICS | Facility: CLINIC | Age: 44
End: 2024-01-04
Payer: COMMERCIAL

## 2024-01-08 ENCOUNTER — TELEPHONE (OUTPATIENT)
Dept: ENDOSCOPY | Facility: HOSPITAL | Age: 44
End: 2024-01-08

## 2024-01-08 LAB — METHYLMALONATE SERPL-SCNC: 0.11 UMOL/L

## 2024-01-10 ENCOUNTER — OFFICE VISIT (OUTPATIENT)
Dept: HEMATOLOGY/ONCOLOGY | Facility: CLINIC | Age: 44
End: 2024-01-10
Payer: COMMERCIAL

## 2024-01-10 VITALS
SYSTOLIC BLOOD PRESSURE: 134 MMHG | TEMPERATURE: 98 F | HEART RATE: 69 BPM | OXYGEN SATURATION: 100 % | WEIGHT: 216.94 LBS | HEIGHT: 61 IN | RESPIRATION RATE: 18 BRPM | BODY MASS INDEX: 40.96 KG/M2 | DIASTOLIC BLOOD PRESSURE: 96 MMHG

## 2024-01-10 DIAGNOSIS — E53.8 B12 DEFICIENCY: Primary | ICD-10-CM

## 2024-01-10 PROCEDURE — 3008F BODY MASS INDEX DOCD: CPT | Mod: CPTII,S$GLB,, | Performed by: INTERNAL MEDICINE

## 2024-01-10 PROCEDURE — 99214 OFFICE O/P EST MOD 30 MIN: CPT | Mod: S$GLB,,, | Performed by: INTERNAL MEDICINE

## 2024-01-10 PROCEDURE — 1159F MED LIST DOCD IN RCRD: CPT | Mod: CPTII,S$GLB,, | Performed by: INTERNAL MEDICINE

## 2024-01-10 PROCEDURE — 3080F DIAST BP >= 90 MM HG: CPT | Mod: CPTII,S$GLB,, | Performed by: INTERNAL MEDICINE

## 2024-01-10 PROCEDURE — 3075F SYST BP GE 130 - 139MM HG: CPT | Mod: CPTII,S$GLB,, | Performed by: INTERNAL MEDICINE

## 2024-01-10 PROCEDURE — 99999 PR PBB SHADOW E&M-EST. PATIENT-LVL III: CPT | Mod: PBBFAC,,, | Performed by: INTERNAL MEDICINE

## 2024-01-10 NOTE — PROGRESS NOTES
[facility]         Chief Complaint:   Encounter Diagnosis   Name Primary?    B12 deficiency Yes       HPI:  Marlyn Camacho is a 43 y.o. female who presents today for follow up of B12. She stopped the injections in October when the prescription ran out. She has been doing well. Denies any neuropathy  Syncope in August 2023, but normal since that time  Last visit with Dr Velarde Sept 2023    Hematology History:   Pt presents to the clinic for B12 deficiency in Early 2023   C/o numbness and tingling in hands nad feet going on for some months.   LLE swelling x 1 year; ultrasound 9/2023 wnl    History of possible stroke May 2023. Presented with slurred speech, LLE sensory changes were new which prompted her to go to the ER as she was worried about stroke.  CTA head, MRI brain, CT spine- did not reveal any intracranial abn.   Discharged on aspirin and statin.     No hx of miscarriages. No known Fhx of VTE      Social History     Tobacco Use    Smoking status: Never    Smokeless tobacco: Never   Substance Use Topics    Alcohol use: Yes     Comment: occasional    Drug use: No     Family History   Problem Relation Age of Onset    Eczema Mother     Ovarian cancer Mother     Hypertension Mother     Breast cancer Maternal Aunt         THREE AUNTS    Breast cancer Maternal Aunt     Breast cancer Maternal Aunt     Eczema Maternal Grandmother     Eczema Son     Eczema Son     Melanoma Neg Hx     Lupus Neg Hx     Psoriasis Neg Hx     Colon cancer Neg Hx      Past Medical History:   Diagnosis Date    Abnormal Pap smear of cervix     Allergy     Hypertension     Joint pain     Keloid cicatrix     Stroke 05/2023    Possible     Past Surgical History:   Procedure Laterality Date    CKC, ECC, fractional D&C      CONIZATION-CERVIX  02/2015    DILATION AND CURETTAGE OF UTERUS      HYSTERECTOMY  03/2017    AUB, fibroids    TUBAL LIGATION         Patient Active Problem List   Diagnosis     "Constipation, chronic    Eczema    Severe obesity (BMI 35.0-39.9) with comorbidity    Essential hypertension    Acute pain of both knees    Left hip pain    Patellar instability of both knees    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    Respiratory: Negative.     Cardiovascular: Negative.    Gastrointestinal: Negative.    Musculoskeletal: Negative.    Neurological: Negative.    Endo/Heme/Allergies: Negative.    All other systems reviewed and are negative.      PHYSICAL EXAM:  BP (!) 134/96 (BP Location: Right arm, Patient Position: Sitting, BP Method: Medium (Automatic))   Pulse 69   Temp 98.4 °F (36.9 °C) (Oral)   Resp 18   Ht 5' 1" (1.549 m)   Wt 98.4 kg (216 lb 14.9 oz)   LMP 02/15/2017 (Approximate)   SpO2 100%   BMI 40.99 kg/m²     General Appearance:    Alert, cooperative, no distress, appears stated age   Head:    Normocephalic, without obvious abnormality, atraumatic   Eyes:    PERRL, conjunctiva/corneas clear   Nose:   Nares normal, septum midline   Throat:   Lips, mucosa, and tongue normal; teeth and gums normal   Lungs:     Respirations unlabored   Extremities:   Extremities normal, atraumatic, no cyanosis or edema   Pulses:   2+ and symmetric all extremities   Skin:   Skin color, texture, turgor normal, no rashes or lesions   Neurologic:   CNII-XII intact, normal gait           Pertinent Labs & Imaging:  No results for input(s): "WBC", "HGB", "HCT", "PLT" in the last 2160 hours.  No results for input(s): "CREATININE", "AST", "ALT" in the last 2160 hours.    Invalid input(s): "GFR", "BILIRUBIN TOTAL", "ALKALINE " "PHOSPHATASE"      Assessment & Plan:    1. B12 deficiency    Full chart review of past labs and imaging, referring providers' notes, and consultants' reports.   Patient previously seen by Dr Velarde  Reviewed B12 and history. Prior labs show low/normal b12 levels. MMA wnl  Ok to stay off of B12. Repeat labs in April 2024. If continued normal, can follow up with PCP as long as no new symptoms arise       Med Onc Chart Routing      Follow up with physician . Will call with results   Follow up with KANNAN    Infusion scheduling note    Injection scheduling note    Labs CBC and vitamin B12   Scheduling:  Preferred lab:  Lab interval:  April 2024   Imaging    Pharmacy appointment    Other referrals                  MDM includes  :    - Acute or chronic illness or injury that poses a threat to life or bodily function  - Review of prior external notes from unique source  - Independent review and explanation of 3+ results from unique tests  - Discussion of management and ordering 2 unique tests  - Extensive discussion of treatment and management      Najma Bronson MD  01/10/2024    "

## 2024-01-26 ENCOUNTER — HOSPITAL ENCOUNTER (EMERGENCY)
Facility: HOSPITAL | Age: 44
Discharge: HOME | End: 2024-01-26
Attending: EMERGENCY MEDICINE
Payer: COMMERCIAL

## 2024-01-26 VITALS
SYSTOLIC BLOOD PRESSURE: 154 MMHG | DIASTOLIC BLOOD PRESSURE: 104 MMHG | TEMPERATURE: 99 F | WEIGHT: 218 LBS | HEIGHT: 61 IN | BODY MASS INDEX: 41.16 KG/M2 | RESPIRATION RATE: 16 BRPM | HEART RATE: 75 BPM | OXYGEN SATURATION: 100 %

## 2024-01-26 DIAGNOSIS — R07.9 CHEST PAIN: ICD-10-CM

## 2024-01-26 DIAGNOSIS — R91.1 PULMONARY NODULE: Primary | ICD-10-CM

## 2024-01-26 LAB
ALBUMIN SERPL BCP-MCNC: 3.8 G/DL (ref 3.5–5.2)
ALP SERPL-CCNC: 68 U/L (ref 55–135)
ALT SERPL W/O P-5'-P-CCNC: 21 U/L (ref 10–44)
ANION GAP SERPL CALC-SCNC: 7 MMOL/L (ref 8–16)
AST SERPL-CCNC: 22 U/L (ref 10–40)
BASOPHILS # BLD AUTO: 0.06 K/UL (ref 0–0.2)
BASOPHILS NFR BLD: 0.7 % (ref 0–1.9)
BILIRUB SERPL-MCNC: 0.4 MG/DL (ref 0.1–1)
BNP SERPL-MCNC: 41 PG/ML (ref 0–99)
BUN SERPL-MCNC: 6 MG/DL (ref 6–20)
CALCIUM SERPL-MCNC: 10.2 MG/DL (ref 8.7–10.5)
CHLORIDE SERPL-SCNC: 105 MMOL/L (ref 95–110)
CO2 SERPL-SCNC: 25 MMOL/L (ref 23–29)
CREAT SERPL-MCNC: 0.9 MG/DL (ref 0.5–1.4)
DIFFERENTIAL METHOD BLD: ABNORMAL
EOSINOPHIL # BLD AUTO: 0.1 K/UL (ref 0–0.5)
EOSINOPHIL NFR BLD: 1.2 % (ref 0–8)
ERYTHROCYTE [DISTWIDTH] IN BLOOD BY AUTOMATED COUNT: 13.4 % (ref 11.5–14.5)
EST. GFR  (NO RACE VARIABLE): >60 ML/MIN/1.73 M^2
GLUCOSE SERPL-MCNC: 81 MG/DL (ref 70–110)
HCT VFR BLD AUTO: 42.2 % (ref 37–48.5)
HCV AB SERPL QL IA: NORMAL
HGB BLD-MCNC: 14.4 G/DL (ref 12–16)
HIV 1+2 AB+HIV1 P24 AG SERPL QL IA: NORMAL
IMM GRANULOCYTES # BLD AUTO: 0.06 K/UL (ref 0–0.04)
IMM GRANULOCYTES NFR BLD AUTO: 0.7 % (ref 0–0.5)
LYMPHOCYTES # BLD AUTO: 3 K/UL (ref 1–4.8)
LYMPHOCYTES NFR BLD: 36.4 % (ref 18–48)
MCH RBC QN AUTO: 31.4 PG (ref 27–31)
MCHC RBC AUTO-ENTMCNC: 34.1 G/DL (ref 32–36)
MCV RBC AUTO: 92 FL (ref 82–98)
MONOCYTES # BLD AUTO: 0.5 K/UL (ref 0.3–1)
MONOCYTES NFR BLD: 6.7 % (ref 4–15)
NEUTROPHILS # BLD AUTO: 4.4 K/UL (ref 1.8–7.7)
NEUTROPHILS NFR BLD: 54.3 % (ref 38–73)
NRBC BLD-RTO: 0 /100 WBC
PLATELET # BLD AUTO: 345 K/UL (ref 150–450)
PMV BLD AUTO: 9.5 FL (ref 9.2–12.9)
POTASSIUM SERPL-SCNC: 3.6 MMOL/L (ref 3.5–5.1)
PROT SERPL-MCNC: 7.7 G/DL (ref 6–8.4)
RBC # BLD AUTO: 4.59 M/UL (ref 4–5.4)
SODIUM SERPL-SCNC: 137 MMOL/L (ref 136–145)
TROPONIN I SERPL DL<=0.01 NG/ML-MCNC: 0.01 NG/ML (ref 0–0.03)
TROPONIN I SERPL DL<=0.01 NG/ML-MCNC: 0.01 NG/ML (ref 0–0.03)
WBC # BLD AUTO: 8.1 K/UL (ref 3.9–12.7)

## 2024-01-26 PROCEDURE — 25500020 PHARM REV CODE 255: Performed by: EMERGENCY MEDICINE

## 2024-01-26 PROCEDURE — 80053 COMPREHEN METABOLIC PANEL: CPT | Performed by: PHYSICIAN ASSISTANT

## 2024-01-26 PROCEDURE — 85025 COMPLETE CBC W/AUTO DIFF WBC: CPT | Performed by: PHYSICIAN ASSISTANT

## 2024-01-26 PROCEDURE — 87389 HIV-1 AG W/HIV-1&-2 AB AG IA: CPT | Performed by: PHYSICIAN ASSISTANT

## 2024-01-26 PROCEDURE — 83880 ASSAY OF NATRIURETIC PEPTIDE: CPT | Performed by: PHYSICIAN ASSISTANT

## 2024-01-26 PROCEDURE — 63600175 PHARM REV CODE 636 W HCPCS: Performed by: PHYSICIAN ASSISTANT

## 2024-01-26 PROCEDURE — 99285 EMERGENCY DEPT VISIT HI MDM: CPT | Mod: 25

## 2024-01-26 PROCEDURE — 25000003 PHARM REV CODE 250: Performed by: PHYSICIAN ASSISTANT

## 2024-01-26 PROCEDURE — 86803 HEPATITIS C AB TEST: CPT | Performed by: PHYSICIAN ASSISTANT

## 2024-01-26 PROCEDURE — 93010 ELECTROCARDIOGRAM REPORT: CPT | Mod: ,,, | Performed by: INTERNAL MEDICINE

## 2024-01-26 PROCEDURE — 96374 THER/PROPH/DIAG INJ IV PUSH: CPT

## 2024-01-26 PROCEDURE — 96375 TX/PRO/DX INJ NEW DRUG ADDON: CPT | Mod: 59

## 2024-01-26 PROCEDURE — 84484 ASSAY OF TROPONIN QUANT: CPT | Mod: 91 | Performed by: PHYSICIAN ASSISTANT

## 2024-01-26 PROCEDURE — 93005 ELECTROCARDIOGRAM TRACING: CPT

## 2024-01-26 RX ORDER — ONDANSETRON HYDROCHLORIDE 2 MG/ML
4 INJECTION, SOLUTION INTRAVENOUS
Status: COMPLETED | OUTPATIENT
Start: 2024-01-26 | End: 2024-01-26

## 2024-01-26 RX ORDER — ACETAMINOPHEN 325 MG/1
650 TABLET ORAL
Status: COMPLETED | OUTPATIENT
Start: 2024-01-26 | End: 2024-01-26

## 2024-01-26 RX ORDER — KETOROLAC TROMETHAMINE 30 MG/ML
10 INJECTION, SOLUTION INTRAMUSCULAR; INTRAVENOUS
Status: COMPLETED | OUTPATIENT
Start: 2024-01-26 | End: 2024-01-26

## 2024-01-26 RX ORDER — ALUMINUM HYDROXIDE, MAGNESIUM HYDROXIDE, AND SIMETHICONE 1200; 120; 1200 MG/30ML; MG/30ML; MG/30ML
30 SUSPENSION ORAL ONCE
Status: COMPLETED | OUTPATIENT
Start: 2024-01-26 | End: 2024-01-26

## 2024-01-26 RX ORDER — LIDOCAINE HYDROCHLORIDE 20 MG/ML
15 SOLUTION OROPHARYNGEAL ONCE
Status: COMPLETED | OUTPATIENT
Start: 2024-01-26 | End: 2024-01-26

## 2024-01-26 RX ORDER — FAMOTIDINE 20 MG/1
20 TABLET, FILM COATED ORAL EVERY 12 HOURS
Qty: 60 TABLET | Refills: 0 | Status: SHIPPED | OUTPATIENT
Start: 2024-01-26 | End: 2024-03-19

## 2024-01-26 RX ADMIN — KETOROLAC TROMETHAMINE 10 MG: 30 INJECTION, SOLUTION INTRAMUSCULAR; INTRAVENOUS at 03:01

## 2024-01-26 RX ADMIN — IOHEXOL 100 ML: 350 INJECTION, SOLUTION INTRAVENOUS at 03:01

## 2024-01-26 RX ADMIN — ACETAMINOPHEN 650 MG: 325 TABLET ORAL at 03:01

## 2024-01-26 RX ADMIN — ONDANSETRON 4 MG: 2 INJECTION INTRAMUSCULAR; INTRAVENOUS at 11:01

## 2024-01-26 RX ADMIN — LIDOCAINE HYDROCHLORIDE 15 ML: 20 SOLUTION OROPHARYNGEAL at 11:01

## 2024-01-26 RX ADMIN — ALUMINUM HYDROXIDE, MAGNESIUM HYDROXIDE, AND SIMETHICONE 30 ML: 200; 200; 20 SUSPENSION ORAL at 11:01

## 2024-01-26 NOTE — DISCHARGE INSTRUCTIONS
Start pepcid every 12 hours for the next 4 weeks for treatment of acid reflux.   You can try over-the-counter fast acting antacids such as Tums, Maalox, or Mylanta as needed.  Avoid foods that can exacerbate your acid reflux such as spicy foods, tomato sauce, peppermint, caffeine, garlic, onions, alcohol, and tobacco use.  Sit up for 2 hours after your last meal before lying down.  Read following resources for additional information.  Follow-up with PCP.  Return to the emergency department for new worsening symptoms.  Future Appointments   Date Time Provider Department Center   3/7/2024 11:45 AM [facility]   3/19/2024 10:30 AM Abebe Saenz MD [facility]O [place]   4/8/2024 11:10 AM LAB, [facility] LAB [facility]     Imaging Results              CTA Chest Non-Coronary (PE Studies) (Final result)  Result time 01/26/24 15:53:16      Final result by John Amin MD (01/26/24 15:53:16)                   Impression:      1. Study considered diagnostic to the segmental pulmonary arterial level without convincing evidence of acute pulmonary embolism.  2. Two solid pulmonary nodules measuring up to 6 mm appear grossly unchanged compared to prior CT chest performed 08/06/2023.  For multiple solid nodules with any 6 mm or greater, Fleischner Society guidelines recommend follow up with non-contrast chest CT at 3-6 months and 18-24 months after discovery.  3. Additional details as per the body of the report.      Electronically signed by: John Amin  Date:    01/26/2024  Time:    15:53               Narrative:    EXAMINATION:  CTA CHEST NON CORONARY (PE STUDIES)    CLINICAL HISTORY:  Pulmonary embolism (PE) suspected, unknown D-dimer;    TECHNIQUE:  Low dose axial images, sagittal and coronal reformations were obtained from the thoracic inlet to the lung bases following the IV administration of 100 mL of Omnipaque 350.  Contrast timing was optimized to evaluate the pulmonary  arteries.  MIP images were performed.    COMPARISON:  CTA chest 08/06/2023.    FINDINGS:  Exam quality: Study considered diagnostic to the segmental pulmonary arterial level.    Pulmonary embolism: No acute or chronic pulmonary embolism.    Central pulmonary arteries: Normal caliber.    Aorta: Normal caliber.    Heart: No right heart strain. Normal size.    Coronary arteries: No calcifications.    Pericardium: Normal. No effusion, thickening, or calcification.    Support tubes and lines: None.    Base of neck/thyroid: Normal.    Lymph nodes: No supraclavicular, axillary, internal mammary, mediastinal, or hilar adenopathy.    Esophagus: Normal.    Pleura: No effusion, thickening, or calcification.    Upper abdomen: Unremarkable    Body wall: Unremarkable    Airways: Central airways grossly patent.    Lungs: Assessment limited by nondedicated technique and respiratory motion.  No definite acute appearing focal or diffuse process.  Atelectasis.  Question minor areas of mosaic-type attenuation most pronounced in the lower lobes, as may be seen in setting of small vessel and/or small airways disease.  A 6 mm fissural based triangular-shaped nodule abutting the fissure in the left lower lobe would be in keeping with lymph node. 4-5 mm solid nodule right upper lobe (series 301, image 160).  These appear unchanged since 08/06/2023 CT of the chest.    Bones: No acute findings.                                       X-Ray Chest PA And Lateral (Final result)  Result time 01/26/24 11:38:53      Final result by Chet Lazaro MD (01/26/24 11:38:53)                   Impression:      No acute cardiopulmonary disease      Electronically signed by: Chet Lazaro MD  Date:    01/26/2024  Time:    11:38               Narrative:    EXAMINATION:  XR CHEST PA AND LATERAL    CLINICAL HISTORY:  Chest Pain;    TECHNIQUE:  PA and lateral views of the chest were performed.    COMPARISON:  09/29/2023    FINDINGS:  Heart size and  mediastinal contour are normal.  Lungs are clear.  No lung consolidation, pleural effusion, or pneumothorax is seen.  Skeletal structures are intact.

## 2024-01-26 NOTE — ED NOTES
0745 onset of CP, mid sternal radiainting to L side now, 8/10 pain at this time, no other symptoms.    Patient identifiers for Marlyn Camacho 43 y.o. female checked and correct.  Chief Complaint   Patient presents with    Chest Pain     C/o chest onset 745 am, denies cardiac hx, also c/o SOB     Past Medical History:   Diagnosis Date    Abnormal Pap smear of cervix     Allergy     Hypertension     Joint pain     Keloid cicatrix     Stroke 05/2023    Possible     Allergies reported: Review of patient's allergies indicates:  No Known Allergies    Appearance: Pt awake, alert & oriented to person, place & time. Pt in no acute distress at present time. Pt is clean and well groomed with clothes appropriately fastened.   Skin: Skin warm, dry & intact. Color consistent with ethnicity. Mucous membranes moist. No breakdown or brusing noted.   Musculoskeletal: Patient moving all extremities well, no obvious swelling or deformities noted.   Respiratory: Respirations spontaneous, even, and non-labored. Visible chest rise noted. Airway is open and patent. No accessory muscle use noted.   Neurologic: Sensation is intact. Speech is clear and appropriate. Eyes open spontaneously, behavior appropriate to situation, follows commands, facial expression symmetrical, bilateral hand grasp equal and even, purposeful motor response noted.  Cardiac: All peripheral pulses present. No Bilateral lower extremity edema. Cap refill is <3 seconds.  Abdomen: Abdomen soft, non distended, non tender to palpation.   : Pt voids independently, denies dysuria, hematuria, frequency.

## 2024-01-26 NOTE — Clinical Note
"Marlyn Tejeda"Doug was seen and treated in our emergency department on 1/26/2024.  She may return to work on 01/29/2024.       If you have any questions or concerns, please don't hesitate to call.      Marilin Neville PA-C"

## 2024-01-26 NOTE — ED PROVIDER NOTES
Encounter Date: 1/26/2024       History     Chief Complaint   Patient presents with    Chest Pain     C/o chest onset 745 am, denies cardiac hx, also c/o SOB     11:01 AM  Patient is a 43-year-old female with a history of HTN, allergy, arthralgias, keloid, who presents to [facility] ED via friend/family's POV for emergent evaluation of chest pain.    Patient reports acute onset at 0745.  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  Describes it as tightness in the center of her chest.  Has had radiating pain to her left shoulder.  She ate oatmeal and had coffee (after onset of pain) which did not change her pain.  She had a Coke to see if it would help but it also did not change her pain.  She has had nausea without vomiting.  She has not noted chest pain with exertion.  Denies any fever, cough, abdominal pain, shortness of breath, tobacco use, hormone use, recent surgery or travel, history of VTE.    On chart review, she had nuclear stress echo on August 2023  ·  The patient exercised for 6 minutes 6 seconds on a Marcio protocol, corresponding to a functional capacity of 7 METS, achieving a peak heart rate of 150 bpm, which is 90 % of the age predicted maximum heart rate.  ·  Normal myocardial perfusion scan. There is no evidence of myocardial ischemia or infarction.  ·  The gated perfusion images showed an ejection fraction of 60% at rest.  ·  There is normal wall motion at rest and post stress.  ·  The ECG portion of the study is negative for ischemia.  ·  The patient reported no chest pain during the stress test.  ·  There were no arrhythmias during stress.          Review of patient's allergies indicates:  No Known Allergies  Past Medical History:   Diagnosis Date    Abnormal Pap smear of cervix     Allergy     Hypertension     Joint pain     Keloid cicatrix     Stroke 05/2023    Possible     Past Surgical History:   Procedure Laterality Date    CKC, ECC, fractional D&C       CONIZATION-CERVIX  02/2015    DILATION AND CURETTAGE OF UTERUS      HYSTERECTOMY  03/2017    AUB, fibroids    TUBAL LIGATION       Family History   Problem Relation Age of Onset    Eczema Mother     Ovarian cancer Mother     Hypertension Mother     Breast cancer Maternal Aunt         THREE AUNTS    Breast cancer Maternal Aunt     Breast cancer Maternal Aunt     Eczema Maternal Grandmother     Eczema Son     Eczema Son     Melanoma Neg Hx     Lupus Neg Hx     Psoriasis Neg Hx     Colon cancer Neg Hx      Social History     Tobacco Use    Smoking status: Never    Smokeless tobacco: Never   Substance Use Topics    Alcohol use: Yes     Comment: occasional    Drug use: No     Review of Systems   Constitutional:  Negative for fever.   HENT:  Negative for sore throat.    Respiratory:  Negative for shortness of breath.    Cardiovascular:  Positive for chest pain.   Gastrointestinal:  Positive for nausea. Negative for abdominal pain, diarrhea and vomiting.   Genitourinary:  Negative for dysuria, frequency and hematuria.   Musculoskeletal:  Negative for back pain.   Skin:  Negative for rash.   Neurological:  Negative for weakness.   Hematological:  Does not bruise/bleed easily.       Physical Exam     Initial Vitals [01/26/24 0954]   BP Pulse Resp Temp SpO2   (!) 162/63 96 18 98.5 °F (36.9 °C) 100 %      MAP       --         Physical Exam    Vitals reviewed.  Constitutional: She appears well-developed and well-nourished. She is not diaphoretic. She is cooperative.  Non-toxic appearance. She does not have a sickly appearance. She does not appear ill. No distress.   HENT:   Head: Normocephalic and atraumatic.   Nose: Nose normal.   Mouth/Throat: No trismus in the jaw.   Eyes: Conjunctivae and EOM are normal.   Neck:   Normal range of motion.  Cardiovascular:  Normal rate and regular rhythm.           Pulmonary/Chest: Breath sounds normal. No accessory muscle usage. No tachypnea. No respiratory distress. She has no wheezes. She  has no rales. She exhibits tenderness.   Abdominal: Abdomen is soft. She exhibits no distension. There is no abdominal tenderness. There is no rebound, no guarding, no tenderness at McBurney's point and negative Del Rio's sign.   Musculoskeletal:         General: Normal range of motion.      Cervical back: Normal range of motion.     Neurological: She is alert. She has normal strength.   Skin: Skin is warm and dry. No erythema. No pallor.         ED Course   Procedures  Labs Reviewed   CBC W/ AUTO DIFFERENTIAL - Abnormal; Notable for the following components:       Result Value    MCH 31.4 (*)     Immature Granulocytes 0.7 (*)     Immature Grans (Abs) 0.06 (*)     All other components within normal limits   COMPREHENSIVE METABOLIC PANEL - Abnormal; Notable for the following components:    Anion Gap 7 (*)     All other components within normal limits   HIV 1 / 2 ANTIBODY    Narrative:     Release to patient->Immediate   HEPATITIS C ANTIBODY    Narrative:     Release to patient->Immediate   TROPONIN I   TROPONIN I   B-TYPE NATRIURETIC PEPTIDE        ECG Results              EKG 12-lead (Final result)  Result time 01/26/24 11:35:51      Final result by Interface, Lab [facility] (01/26/24 11:35:51)                   Narrative:    Test Reason : R07.9,    Vent. Rate : 094 BPM     Atrial Rate : 094 BPM     P-R Int : 150 ms          QRS Dur : 084 ms      QT Int : 342 ms       P-R-T Axes : 057 054 036 degrees     QTc Int : 427 ms    Normal sinus rhythm  Normal ECG  When compared with ECG of 07-AUG-2023 12:34,  Minor changes  Confirmed by Keyur TEMPLE MD ([id number]) on 1/26/2024 11:35:37 AM    Referred By: [email or web address]   SELF           Confirmed By:Keyur TEMPLE MD                                  Imaging Results              CTA Chest Non-Coronary (PE Studies) (Final result)  Result time 01/26/24 15:53:16      Final result by John Amin MD (01/26/24 15:53:16)                   Impression:      1. Study considered diagnostic to  the segmental pulmonary arterial level without convincing evidence of acute pulmonary embolism.  2. Two solid pulmonary nodules measuring up to 6 mm appear grossly unchanged compared to prior CT chest performed 08/06/2023.  For multiple solid nodules with any 6 mm or greater, Fleischner Society guidelines recommend follow up with non-contrast chest CT at 3-6 months and 18-24 months after discovery.  3. Additional details as per the body of the report.      Electronically signed by: John Amin  Date:    01/26/2024  Time:    15:53               Narrative:    EXAMINATION:  CTA CHEST NON CORONARY (PE STUDIES)    CLINICAL HISTORY:  Pulmonary embolism (PE) suspected, unknown D-dimer;    TECHNIQUE:  Low dose axial images, sagittal and coronal reformations were obtained from the thoracic inlet to the lung bases following the IV administration of 100 mL of Omnipaque 350.  Contrast timing was optimized to evaluate the pulmonary arteries.  MIP images were performed.    COMPARISON:  CTA chest 08/06/2023.    FINDINGS:  Exam quality: Study considered diagnostic to the segmental pulmonary arterial level.    Pulmonary embolism: No acute or chronic pulmonary embolism.    Central pulmonary arteries: Normal caliber.    Aorta: Normal caliber.    Heart: No right heart strain. Normal size.    Coronary arteries: No calcifications.    Pericardium: Normal. No effusion, thickening, or calcification.    Support tubes and lines: None.    Base of neck/thyroid: Normal.    Lymph nodes: No supraclavicular, axillary, internal mammary, mediastinal, or hilar adenopathy.    Esophagus: Normal.    Pleura: No effusion, thickening, or calcification.    Upper abdomen: Unremarkable    Body wall: Unremarkable    Airways: Central airways grossly patent.    Lungs: Assessment limited by nondedicated technique and respiratory motion.  No definite acute appearing focal or diffuse process.  Atelectasis.  Question minor areas of mosaic-type attenuation most  pronounced in the lower lobes, as may be seen in setting of small vessel and/or small airways disease.  A 6 mm fissural based triangular-shaped nodule abutting the fissure in the left lower lobe would be in keeping with lymph node. 4-5 mm solid nodule right upper lobe (series 301, image 160).  These appear unchanged since 08/06/2023 CT of the chest.    Bones: No acute findings.                                       X-Ray Chest PA And Lateral (Final result)  Result time 01/26/24 11:38:53      Final result by Chet Lazaro MD (01/26/24 11:38:53)                   Impression:      No acute cardiopulmonary disease      Electronically signed by: Chet Lazaro MD  Date:    01/26/2024  Time:    11:38               Narrative:    EXAMINATION:  XR CHEST PA AND LATERAL    CLINICAL HISTORY:  Chest Pain;    TECHNIQUE:  PA and lateral views of the chest were performed.    COMPARISON:  09/29/2023    FINDINGS:  Heart size and mediastinal contour are normal.  Lungs are clear.  No lung consolidation, pleural effusion, or pneumothorax is seen.  Skeletal structures are intact.                                       Medications   ondansetron injection 4 mg (4 mg Intravenous Given 1/26/24 1112)   aluminum-magnesium hydroxide-simethicone 200-200-20 mg/5 mL suspension 30 mL (30 mLs Oral Given 1/26/24 1120)     And   LIDOcaine viscous HCl 2% oral solution 15 mL (15 mLs Oral Given 1/26/24 1120)   iohexoL (OMNIPAQUE 350) injection 100 mL (100 mLs Intravenous Given 1/26/24 1514)   acetaminophen tablet 650 mg (650 mg Oral Given 1/26/24 1532)   ketorolac injection 9.999 mg (9.999 mg Intravenous Given 1/26/24 1532)     Medical Decision Making  Patient is a 43-year-old female with a history of HTN, allergy, arthralgias, keloid, who presents to [facility] [place] via friend/family's POV for emergent evaluation of chest pain.    Includes but is not limited to GERD, gastritis, peptic ulcer disease, gastroenteritis, food poisoning, ACS.  Low suspicion  for PE given no shortness a breath in normal vitals. She is PERC neg as well. Will consider other etiologies if symptoms do not improve.    Will initiate workup, obtain 2 troponins given onset of symptoms this morning, give IV Zofran and GI cocktail, and reassess.    Amount and/or Complexity of Data Reviewed  External Data Reviewed: labs, radiology and ECG.  Labs: ordered.  Radiology: ordered.  ECG/medicine tests: ordered and independent interpretation performed.    Risk  OTC drugs.  Prescription drug management.      Additional MDM:   PERC Rule:   Age is greater than or equal to 50 = 0.0  Heart Rate is greater than or equal to 100 = 0.0  SaO2 on room air < 95% = 0.0  Unilateral leg swelling = 0.0  Hemoptysis = 0.0  Recent surgery or trauma = 0.0  Prior PE or DVT =  0.0  Hormone use = 0.00  PERC Score = 0              ED Course as of 01/27/24 1017   Fri Jan 26, 2024   1042 BP(!): 162/63 [CL]   1042 Temp: 98.5 °F (36.9 °C) [CL]   1042 Pulse: 96 [CL]   1042 Resp: 18 [CL]   1042 SpO2: 100 % [CL]   1059 On my independent interpretation, EKG with NSR at 97 beats per minute.  Normal intervals.  No ST changes.  Nonspecific T-wave flattening in lead 3.  No STEMI.   [CL]   1432 Patient reassessed.  She does not report improvement in her symptoms after GI cocktail.  Still endorsing chest pain with left shoulder and left neck pain.  2nd troponin in process.  We will obtain CTA chest. [CL]      ED Course User Index  [CL] Marilin Neville PA-C          2nd trop negative.   CTA PE study without PE, aneurysm, dissection, effusion. She has 2 pulmonary nodules that we discussed with patient's family at bedside. Recommend close follow up with repeat imaging as detailed in report. Impressions provided.     Give negative work up today, I doubt any lifethreatening conditions. We will consider treatment for GERD as well as MSK pain since she had mild TTP. Pepcid bid. OTC meds. We discussed dietary and lifestyle changes.   Follow up with  PCP. Return to ER precautions given.            I have reviewed patient's chart and discussed this case with my supervising MD.     Marilin Neville PA-C  Emergent Department  [facility]  Spectralink #[id number] or #[id number]        Clinical Impression:  Final diagnoses:  [R07.9] Chest pain - 2 trops neg, CTA neg for PE  [R91.1] Pulmonary nodule (Primary)          ED Disposition Condition    Discharge Stable          ED Prescriptions       Medication Sig Dispense Start Date End Date Auth. Provider    famotidine (PEPCID) 20 MG tablet Take 1 tablet (20 mg total) by mouth every 12 (twelve) hours. 60 tablet 1/26/2024 2/25/2024 Marilin Neville PA-C          Follow-up Information       Follow up With Specialties Details Why Contact Info    Mindi Donahue MD Family Medicine Schedule an appointment as soon as possible for a visit   [address]  [address]  [facility] [address]  [phone]      [facility] - Emergency Dept Emergency Medicine  If symptoms worsen [address] [id number] [phone]             Marilin Neville PA-C  01/27/24 1017       Marilin Neville PA-C  01/27/24 1017

## 2024-01-29 ENCOUNTER — PATIENT MESSAGE (OUTPATIENT)
Dept: FAMILY MEDICINE | Facility: CLINIC | Age: 44
End: 2024-01-29
Payer: COMMERCIAL

## 2024-01-29 ENCOUNTER — OFFICE VISIT (OUTPATIENT)
Dept: FAMILY MEDICINE | Facility: CLINIC | Age: 44
End: 2024-01-29
Attending: FAMILY MEDICINE
Payer: COMMERCIAL

## 2024-01-29 VITALS
HEIGHT: 61 IN | SYSTOLIC BLOOD PRESSURE: 136 MMHG | HEART RATE: 75 BPM | WEIGHT: 220 LBS | DIASTOLIC BLOOD PRESSURE: 68 MMHG | BODY MASS INDEX: 41.54 KG/M2

## 2024-01-29 DIAGNOSIS — Z12.12 SCREENING FOR COLORECTAL CANCER: ICD-10-CM

## 2024-01-29 DIAGNOSIS — R91.1 PULMONARY NODULE: ICD-10-CM

## 2024-01-29 DIAGNOSIS — E66.01 OBESITY, MORBID, BMI 40.0-49.9: ICD-10-CM

## 2024-01-29 DIAGNOSIS — I10 ESSENTIAL HYPERTENSION: Primary | ICD-10-CM

## 2024-01-29 DIAGNOSIS — Z12.11 SCREENING FOR COLORECTAL CANCER: ICD-10-CM

## 2024-01-29 PROCEDURE — 3075F SYST BP GE 130 - 139MM HG: CPT | Mod: CPTII,95,, | Performed by: FAMILY MEDICINE

## 2024-01-29 PROCEDURE — 1160F RVW MEDS BY RX/DR IN RCRD: CPT | Mod: CPTII,95,, | Performed by: FAMILY MEDICINE

## 2024-01-29 PROCEDURE — 3008F BODY MASS INDEX DOCD: CPT | Mod: CPTII,95,, | Performed by: FAMILY MEDICINE

## 2024-01-29 PROCEDURE — 3078F DIAST BP <80 MM HG: CPT | Mod: CPTII,95,, | Performed by: FAMILY MEDICINE

## 2024-01-29 PROCEDURE — 1159F MED LIST DOCD IN RCRD: CPT | Mod: CPTII,95,, | Performed by: FAMILY MEDICINE

## 2024-01-29 PROCEDURE — 99214 OFFICE O/P EST MOD 30 MIN: CPT | Mod: 95,,, | Performed by: FAMILY MEDICINE

## 2024-01-29 NOTE — PROGRESS NOTES
The patient location is: home  The chief complaint leading to consultation is: htn    Visit type: audiovisual    Face to Face time with patient: 20  30 minutes of total time spent on the encounter, which includes face to face time and non-face to face time preparing to see the patient (eg, review of tests), Obtaining and/or reviewing separately obtained history, Documenting clinical information in the electronic or other health record, Independently interpreting results (not separately reported) and communicating results to the patient/family/caregiver, or Care coordination (not separately reported).         Each patient to whom he or she provides medical services by telemedicine is:  (1) informed of the relationship between the physician and patient and the respective role of any other health care provider with respect to management of the patient; and (2) notified that he or she may decline to receive medical services by telemedicine and may withdraw from such care at any time.    Notes:   Subjective:       Patient ID: Marlyn Camacho is a 43 y.o. female.    Chief Complaint: Hypertension    Hypertension  Associated symptoms include headaches. Pertinent negatives include no chest pain, neck pain or palpitations.     Pt is in virtual visit for follow up of htn elevated in ED for chest pain neg work up pt is   on norvasc it has been fine at home no sob/cp since her ED visit  Pt had ct scan in ED for r/o PE with confirmed incidental pulmonary nodules with no change.  Pt was to have a ct scan in 3-6 months coinciding with suggestion on ct scan results from August while in house.  Pt has not seen pulmonary for this she feels well no sob/cp  Review of Systems   Constitutional:  Positive for unexpected weight change. Negative for activity change, chills, fatigue and fever.   HENT:  Negative for hearing loss, rhinorrhea and trouble swallowing.    Eyes:  Negative for discharge and visual disturbance.  "  Respiratory:  Negative for cough, chest tightness and wheezing.    Cardiovascular:  Negative for chest pain and palpitations.   Gastrointestinal:  Positive for blood in stool and constipation. Negative for diarrhea and vomiting.   Endocrine: Negative for polydipsia and polyuria.   Genitourinary:  Negative for difficulty urinating, dysuria, hematuria and menstrual problem.   Musculoskeletal:  Negative for neck pain.   Neurological:  Positive for headaches. Negative for weakness.   Psychiatric/Behavioral:  Positive for dysphoric mood. Negative for confusion.        Objective:    /68   Pulse 75   Ht 5' 1" (1.549 m)   Wt 99.8 kg (220 lb)   LMP 02/15/2017 (Approximate)   BMI 41.57 kg/m²     Physical Exam  Constitutional:       Appearance: She is obese.   HENT:      Head: Normocephalic and atraumatic.   Pulmonary:      Effort: Pulmonary effort is normal. No respiratory distress.   Neurological:      General: No focal deficit present.      Mental Status: She is alert and oriented to person, place, and time.      Cranial Nerves: No cranial nerve deficit.      Coordination: Coordination normal.   Psychiatric:         Mood and Affect: Mood normal.         Behavior: Behavior normal.         Thought Content: Thought content normal.         Judgment: Judgment normal.         Assessment:       1. Essential hypertension    2. Pulmonary nodule      3. Obese   Plan:     Orders fit kit colonoscopy   Cont meds  Start low salt weight loss diet  Graded exercise  Rtc 3 - 6 months  F/u pulmonary       "This note will not be shared with the patient."   "

## 2024-02-05 ENCOUNTER — OFFICE VISIT (OUTPATIENT)
Dept: PULMONOLOGY | Facility: CLINIC | Age: 44
End: 2024-02-05
Payer: COMMERCIAL

## 2024-02-05 VITALS
SYSTOLIC BLOOD PRESSURE: 123 MMHG | HEIGHT: 61 IN | BODY MASS INDEX: 41.04 KG/M2 | DIASTOLIC BLOOD PRESSURE: 84 MMHG | OXYGEN SATURATION: 99 % | HEART RATE: 84 BPM | WEIGHT: 217.38 LBS

## 2024-02-05 DIAGNOSIS — R91.1 PULMONARY NODULE: ICD-10-CM

## 2024-02-05 DIAGNOSIS — E66.01 SEVERE OBESITY (BMI 35.0-39.9) WITH COMORBIDITY: Primary | ICD-10-CM

## 2024-02-05 PROCEDURE — 99999 PR PBB SHADOW E&M-EST. PATIENT-LVL III: CPT | Mod: PBBFAC,,, | Performed by: INTERNAL MEDICINE

## 2024-02-05 PROCEDURE — 3074F SYST BP LT 130 MM HG: CPT | Mod: CPTII,S$GLB,, | Performed by: INTERNAL MEDICINE

## 2024-02-05 PROCEDURE — 99203 OFFICE O/P NEW LOW 30 MIN: CPT | Mod: S$GLB,,, | Performed by: INTERNAL MEDICINE

## 2024-02-05 PROCEDURE — 3079F DIAST BP 80-89 MM HG: CPT | Mod: CPTII,S$GLB,, | Performed by: INTERNAL MEDICINE

## 2024-02-05 PROCEDURE — 1159F MED LIST DOCD IN RCRD: CPT | Mod: CPTII,S$GLB,, | Performed by: INTERNAL MEDICINE

## 2024-02-05 PROCEDURE — 3008F BODY MASS INDEX DOCD: CPT | Mod: CPTII,S$GLB,, | Performed by: INTERNAL MEDICINE

## 2024-02-05 NOTE — ASSESSMENT & PLAN NOTE
6mm largest nodule in LLL; low suspicion visually. No risk factors for malignancy. Stable over 5 months. Will repeat CT in 1 year as long as no other CT done in interval. Important to minimize radiation exposure

## 2024-02-05 NOTE — PROGRESS NOTES
Subjective:       Patient ID: Marlyn Camacho is a 43 y.o. female.    Chief Complaint: No chief complaint on file.    Patient had CT Chest done in August 2023 as part of a workup for syncope. Incedentally found nodules. Again had CTA in Jan for chest pain which was later found to be related to indigestion. She reports these symptoms have resolved. She reports some mild dyspnea she relates to her weight. She has not yet started an exercise program. She is a never smoker. No occupational exposure. No exposure to asbestos. No FH lung cancer.     She is still being evaluated for intermittent lightheadedness.   Review of Systems   Respiratory:  Positive for dyspnea on extertion.    Neurological:  Positive for dizziness and syncope.   All other systems reviewed and are negative.      Past Medical History:   Diagnosis Date    Abnormal Pap smear of cervix     Allergy     Hypertension     Joint pain     Keloid cicatrix     Stroke 05/2023    Possible        Family History   Problem Relation Age of Onset    Eczema Mother     Ovarian cancer Mother     Hypertension Mother     Breast cancer Maternal Aunt         THREE AUNTS    Breast cancer Maternal Aunt     Breast cancer Maternal Aunt     Eczema Maternal Grandmother     Eczema Son     Eczema Son     Melanoma Neg Hx     Lupus Neg Hx     Psoriasis Neg Hx     Colon cancer Neg Hx       If not mentioned in HPI, Family history is reviewed and not contributory    Social History     Tobacco Use    Smoking status: Never    Smokeless tobacco: Never   Substance Use Topics    Alcohol use: Yes     Comment: occasional    Drug use: No        Objective:        Vitals:    02/05/24 0805   BP: 123/84   Pulse: 84     Wt Readings from Last 3 Encounters:   02/05/24 98.6 kg (217 lb 6 oz)   01/29/24 99.8 kg (220 lb)   01/26/24 98.9 kg (218 lb)     Temp Readings from Last 3 Encounters:   01/26/24 98.5 °F (36.9 °C) (Oral)   01/10/24 98.4 °F (36.9 °C) (Oral)   09/29/23 97.6  °F (36.4 °C) (Oral)     BP Readings from Last 3 Encounters:   02/05/24 123/84   01/29/24 136/68   01/26/24 (!) 154/104     Pulse Readings from Last 3 Encounters:   02/05/24 84   01/29/24 75   01/26/24 75       Physical Exam   Constitutional: She is oriented to person, place, and time. She appears well-developed and well-nourished. She is obese.   HENT:   Head: Normocephalic.   Cardiovascular: Normal rate and regular rhythm.   Pulmonary/Chest: Normal expansion, symmetric chest wall expansion and effort normal.   Abdominal: Soft. She exhibits no distension.   Musculoskeletal:         General: No edema. Normal range of motion.      Cervical back: Neck supple.   Neurological: She is alert and oriented to person, place, and time. Gait normal.   Skin: Skin is warm and dry. No rash noted.   Psychiatric: She has a normal mood and affect. Her behavior is normal. Judgment and thought content normal.   Vitals reviewed.    CBC  Lab Results   Component Value Date    WBC 8.10 01/26/2024    HGB 14.4 01/26/2024    HCT 42.2 01/26/2024    MCV 92 01/26/2024     01/26/2024         CMP  Sodium   Date Value Ref Range Status   01/26/2024 137 136 - 145 mmol/L Final     Potassium   Date Value Ref Range Status   01/26/2024 3.6 3.5 - 5.1 mmol/L Final     Chloride   Date Value Ref Range Status   01/26/2024 105 95 - 110 mmol/L Final     CO2   Date Value Ref Range Status   01/26/2024 25 23 - 29 mmol/L Final     Glucose   Date Value Ref Range Status   01/26/2024 81 70 - 110 mg/dL Final     BUN   Date Value Ref Range Status   01/26/2024 6 6 - 20 mg/dL Final     Creatinine   Date Value Ref Range Status   01/26/2024 0.9 0.5 - 1.4 mg/dL Final     Calcium   Date Value Ref Range Status   01/26/2024 10.2 8.7 - 10.5 mg/dL Final     Total Protein   Date Value Ref Range Status   01/26/2024 7.7 6.0 - 8.4 g/dL Final     Albumin   Date Value Ref Range Status   01/26/2024 3.8 3.5 - 5.2 g/dL Final     Total Bilirubin   Date Value Ref Range Status  "  01/26/2024 0.4 0.1 - 1.0 mg/dL Final     Comment:     For infants and newborns, interpretation of results should be based  on gestational age, weight and in agreement with clinical  observations.    Premature Infant recommended reference ranges:  Up to 24 hours.............<8.0 mg/dL  Up to 48 hours............<12.0 mg/dL  3-5 days..................<15.0 mg/dL  6-29 days.................<15.0 mg/dL       Alkaline Phosphatase   Date Value Ref Range Status   01/26/2024 68 55 - 135 U/L Final     AST   Date Value Ref Range Status   01/26/2024 22 10 - 40 U/L Final     ALT   Date Value Ref Range Status   01/26/2024 21 10 - 44 U/L Final     Anion Gap   Date Value Ref Range Status   01/26/2024 7 (L) 8 - 16 mmol/L Final     eGFR   Date Value Ref Range Status   01/26/2024 >60.0 >60 mL/min/1.73 m^2 Final       ABG  No results found for: "PH", "PO2", "PCO2"        Personal Diagnostic Review  I have personally reviewed the following data and added my own interpretation as below:  CT of chest performed on Jan 2024 PE protocol revealed 6 mm nodule in LLL as well as smaller sub 6mm nodule no PE.    Echo without PH, nl EF        2/5/2024     8:05 AM 1/29/2024    10:10 AM 1/26/2024     4:28 PM 1/26/2024     2:21 PM 1/26/2024    12:18 PM 1/26/2024     9:54 AM 1/10/2024    11:11 AM   Pulmonary Function Tests   SpO2 99 %  100 % 99 % 96 % 100 % 100 %   Height 5' 1" (1.549 m) 5' 1" (1.549 m)    5' 1" (1.549 m) 5' 1" (1.549 m)   Weight 98.6 kg (217 lb 6 oz) 99.8 kg (220 lb)    98.9 kg (218 lb) 98.4 kg (216 lb 14.9 oz)   BMI (Calculated) 41.1 41.6    41.2 41         Assessment:       1. Severe obesity (BMI 35.0-39.9) with comorbidity    2. Pulmonary nodule        Outpatient Encounter Medications as of 2/5/2024   Medication Sig Dispense Refill    amLODIPine (NORVASC) 5 MG tablet TAKE 1 TABLET(5 MG) BY MOUTH EVERY DAY 90 tablet 3    aspirin (ECOTRIN) 81 MG EC tablet Take 1 tablet (81 mg total) by mouth once daily. For stroke prevention 90 " tablet 3    famotidine (PEPCID) 20 MG tablet Take 1 tablet (20 mg total) by mouth every 12 (twelve) hours. 60 tablet 0     No facility-administered encounter medications on file as of 2/5/2024.     1. Pulmonary nodule  - Ambulatory referral/consult to Pulmonology  - CT Chest Without Contrast; Future    2. Severe obesity (BMI 35.0-39.9) with comorbidity    Plan:     Problem List Items Addressed This Visit          Pulmonary    Pulmonary nodule    Current Assessment & Plan     6mm largest nodule in LLL; low suspicion visually. No risk factors for malignancy. Stable over 5 months. Will repeat CT in 1 year as long as no other CT done in interval. Important to minimize radiation exposure         Relevant Orders    CT Chest Without Contrast       Endocrine    Severe obesity (BMI 35.0-39.9) with comorbidity - Primary    Current Assessment & Plan     Discussed diet and exercise plan to help with weight loss and reduce mild dyspnea              No follow-ups on file.    Future Appointments   Date Time Provider Department Center   3/4/2024 11:15 AM PRE-ADMIT NURSE 1, ENDO -[facility] ENDO4 [facility]   3/7/2024 11:45 AM [facility] MAMMO1 [facility] MAMMO [facility]   3/19/2024 10:30 AM Abebe Saenz MD [facility] GASTRO [place]   4/8/2024 11:10 AM LAB, [facility] LAB [facility] [place]   4/30/2024  6:40 AM Mindi Donahue MD [facility]           Les  MD Beatrice

## 2024-02-23 ENCOUNTER — HOSPITAL ENCOUNTER (EMERGENCY)
Facility: HOSPITAL | Age: 44
Discharge: HOME | End: 2024-02-24
Attending: INTERNAL MEDICINE
Payer: COMMERCIAL

## 2024-02-23 DIAGNOSIS — J06.9 VIRAL URI WITH COUGH: Primary | ICD-10-CM

## 2024-02-23 DIAGNOSIS — R07.9 CHEST PAIN: ICD-10-CM

## 2024-02-23 LAB
ALBUMIN SERPL BCP-MCNC: 3.8 G/DL (ref 3.5–5.2)
ALP SERPL-CCNC: 64 U/L (ref 55–135)
ALT SERPL W/O P-5'-P-CCNC: 16 U/L (ref 10–44)
ANION GAP SERPL CALC-SCNC: 7 MMOL/L (ref 8–16)
AST SERPL-CCNC: 14 U/L (ref 10–40)
B-HCG UR QL: NEGATIVE
BASOPHILS # BLD AUTO: 0.04 K/UL (ref 0–0.2)
BASOPHILS NFR BLD: 0.5 % (ref 0–1.9)
BILIRUB SERPL-MCNC: 0.4 MG/DL (ref 0.1–1)
BUN SERPL-MCNC: 10 MG/DL (ref 6–20)
CALCIUM SERPL-MCNC: 9.1 MG/DL (ref 8.7–10.5)
CHLORIDE SERPL-SCNC: 107 MMOL/L (ref 95–110)
CO2 SERPL-SCNC: 23 MMOL/L (ref 23–29)
CREAT SERPL-MCNC: 0.9 MG/DL (ref 0.5–1.4)
CTP QC/QA: YES
DIFFERENTIAL METHOD BLD: ABNORMAL
EOSINOPHIL # BLD AUTO: 0.2 K/UL (ref 0–0.5)
EOSINOPHIL NFR BLD: 2.3 % (ref 0–8)
ERYTHROCYTE [DISTWIDTH] IN BLOOD BY AUTOMATED COUNT: 13.2 % (ref 11.5–14.5)
EST. GFR  (NO RACE VARIABLE): >60 ML/MIN/1.73 M^2
GLUCOSE SERPL-MCNC: 88 MG/DL (ref 70–110)
HCT VFR BLD AUTO: 39.7 % (ref 37–48.5)
HGB BLD-MCNC: 13.5 G/DL (ref 12–16)
IMM GRANULOCYTES # BLD AUTO: 0.04 K/UL (ref 0–0.04)
IMM GRANULOCYTES NFR BLD AUTO: 0.5 % (ref 0–0.5)
LYMPHOCYTES # BLD AUTO: 3.1 K/UL (ref 1–4.8)
LYMPHOCYTES NFR BLD: 37 % (ref 18–48)
MCH RBC QN AUTO: 31.3 PG (ref 27–31)
MCHC RBC AUTO-ENTMCNC: 34 G/DL (ref 32–36)
MCV RBC AUTO: 92 FL (ref 82–98)
MONOCYTES # BLD AUTO: 0.4 K/UL (ref 0.3–1)
MONOCYTES NFR BLD: 5.2 % (ref 4–15)
NEUTROPHILS # BLD AUTO: 4.6 K/UL (ref 1.8–7.7)
NEUTROPHILS NFR BLD: 54.5 % (ref 38–73)
NRBC BLD-RTO: 0 /100 WBC
PLATELET # BLD AUTO: 292 K/UL (ref 150–450)
PMV BLD AUTO: 9.1 FL (ref 9.2–12.9)
POC MOLECULAR INFLUENZA A AGN: NEGATIVE
POC MOLECULAR INFLUENZA B AGN: NEGATIVE
POTASSIUM SERPL-SCNC: 3.4 MMOL/L (ref 3.5–5.1)
PROT SERPL-MCNC: 7.2 G/DL (ref 6–8.4)
RBC # BLD AUTO: 4.32 M/UL (ref 4–5.4)
SARS-COV-2 RDRP RESP QL NAA+PROBE: NEGATIVE
SODIUM SERPL-SCNC: 137 MMOL/L (ref 136–145)
WBC # BLD AUTO: 8.44 K/UL (ref 3.9–12.7)

## 2024-02-23 PROCEDURE — 87502 INFLUENZA DNA AMP PROBE: CPT

## 2024-02-23 PROCEDURE — 84484 ASSAY OF TROPONIN QUANT: CPT | Performed by: NURSE PRACTITIONER

## 2024-02-23 PROCEDURE — 80053 COMPREHEN METABOLIC PANEL: CPT | Performed by: NURSE PRACTITIONER

## 2024-02-23 PROCEDURE — 99285 EMERGENCY DEPT VISIT HI MDM: CPT | Mod: 25

## 2024-02-23 PROCEDURE — 85025 COMPLETE CBC W/AUTO DIFF WBC: CPT | Performed by: NURSE PRACTITIONER

## 2024-02-23 PROCEDURE — 87635 SARS-COV-2 COVID-19 AMP PRB: CPT | Performed by: NURSE PRACTITIONER

## 2024-02-23 PROCEDURE — 93010 ELECTROCARDIOGRAM REPORT: CPT | Mod: ,,, | Performed by: INTERNAL MEDICINE

## 2024-02-23 PROCEDURE — 93005 ELECTROCARDIOGRAM TRACING: CPT

## 2024-02-23 PROCEDURE — 83880 ASSAY OF NATRIURETIC PEPTIDE: CPT | Performed by: NURSE PRACTITIONER

## 2024-02-23 PROCEDURE — 81025 URINE PREGNANCY TEST: CPT | Performed by: PHYSICIAN ASSISTANT

## 2024-02-23 NOTE — Clinical Note
"Marlyn Tejeda" Doug was seen and treated in our emergency department on 2/23/2024.  She may return to work on 02/24/2024.       If you have any questions or concerns, please don't hesitate to call.       RN    "

## 2024-02-24 VITALS
SYSTOLIC BLOOD PRESSURE: 135 MMHG | BODY MASS INDEX: 41.35 KG/M2 | DIASTOLIC BLOOD PRESSURE: 88 MMHG | WEIGHT: 219 LBS | HEIGHT: 61 IN | RESPIRATION RATE: 20 BRPM | HEART RATE: 81 BPM | TEMPERATURE: 99 F | OXYGEN SATURATION: 99 %

## 2024-02-24 PROBLEM — J06.9 VIRAL URI WITH COUGH: Status: ACTIVE | Noted: 2024-02-24

## 2024-02-24 LAB
BNP SERPL-MCNC: 17 PG/ML (ref 0–99)
TROPONIN I SERPL DL<=0.01 NG/ML-MCNC: <0.006 NG/ML (ref 0–0.03)
TROPONIN I SERPL DL<=0.01 NG/ML-MCNC: <0.006 NG/ML (ref 0–0.03)

## 2024-02-24 PROCEDURE — 84484 ASSAY OF TROPONIN QUANT: CPT | Performed by: NURSE PRACTITIONER

## 2024-02-24 PROCEDURE — 25000003 PHARM REV CODE 250: Performed by: INTERNAL MEDICINE

## 2024-02-24 RX ORDER — IBUPROFEN 800 MG/1
800 TABLET ORAL EVERY 8 HOURS PRN
Qty: 30 TABLET | Refills: 0 | Status: SHIPPED | OUTPATIENT
Start: 2024-02-24

## 2024-02-24 RX ORDER — FLUTICASONE PROPIONATE 50 MCG
2 SPRAY, SUSPENSION (ML) NASAL DAILY
Qty: 15 G | Refills: 0 | Status: SHIPPED | OUTPATIENT
Start: 2024-02-24

## 2024-02-24 RX ORDER — IBUPROFEN 400 MG/1
800 TABLET ORAL
Status: COMPLETED | OUTPATIENT
Start: 2024-02-24 | End: 2024-02-24

## 2024-02-24 RX ORDER — AZELASTINE 1 MG/ML
2 SPRAY, METERED NASAL 2 TIMES DAILY
Qty: 30 ML | Refills: 0 | Status: SHIPPED | OUTPATIENT
Start: 2024-02-24 | End: 2024-04-30

## 2024-02-24 RX ORDER — KETOROLAC TROMETHAMINE 30 MG/ML
30 INJECTION, SOLUTION INTRAMUSCULAR; INTRAVENOUS
Status: DISCONTINUED | OUTPATIENT
Start: 2024-02-24 | End: 2024-02-24

## 2024-02-24 RX ADMIN — IBUPROFEN 800 MG: 400 TABLET ORAL at 01:02

## 2024-02-24 RX ADMIN — POTASSIUM BICARBONATE 40 MEQ: 391 TABLET, EFFERVESCENT ORAL at 12:02

## 2024-02-24 NOTE — ED TRIAGE NOTES
Pt arrived to ED with a c/o chest tightness, congestion and cough. Pt reports being in work tonight when her chest pain was started. Pt also reports of productive cough. Denies nausea, vomiting, abdominal pain. Pt is alert and oriented. V/s are within normal range

## 2024-02-24 NOTE — ED PROVIDER NOTES
Encounter Date: 2/23/2024       History     Chief Complaint   Patient presents with    Chest Pain     Cough, congestion, and chest tightness that started tonight     44-year-old female with a past medical history significant for hypertension presents to the emergency department complaining of nasal congestion, cough and chest discomfort /back pain that began tonight.  She states chest discomfort occurs when she coughs and denies fever/chills/nausea/vomiting/shortness of breath.    The history is provided by the patient. No  was used.     Review of patient's allergies indicates:  No Known Allergies  Past Medical History:   Diagnosis Date    Abnormal Pap smear of cervix     Allergy     Hypertension     Joint pain     Keloid cicatrix     Stroke 05/2023    Possible     Past Surgical History:   Procedure Laterality Date    CKC, ECC, fractional D&C      CONIZATION-CERVIX  02/2015    DILATION AND CURETTAGE OF UTERUS      HYSTERECTOMY  03/2017    AUB, fibroids    TUBAL LIGATION       Family History   Problem Relation Age of Onset    Eczema Mother     Ovarian cancer Mother     Hypertension Mother     Breast cancer Maternal Aunt         THREE AUNTS    Breast cancer Maternal Aunt     Breast cancer Maternal Aunt     Eczema Maternal Grandmother     Eczema Son     Eczema Son     Melanoma Neg Hx     Lupus Neg Hx     Psoriasis Neg Hx     Colon cancer Neg Hx      Social History     Tobacco Use    Smoking status: Never    Smokeless tobacco: Never   Substance Use Topics    Alcohol use: Yes     Comment: occasional    Drug use: No     Review of Systems   Constitutional:  Negative for chills and fever.   HENT:  Positive for congestion and postnasal drip.    Respiratory:  Positive for cough. Negative for shortness of breath.    Cardiovascular:  Positive for chest pain. Negative for palpitations and leg swelling.   Gastrointestinal:  Negative for nausea and vomiting.   All other systems reviewed and are  negative.      Physical Exam     Initial Vitals [02/23/24 2210]   BP Pulse Resp Temp SpO2   (!) 151/87 85 16 99.2 °F (37.3 °C) 98 %      MAP       --         Physical Exam    Nursing note and vitals reviewed.  Constitutional: She is not diaphoretic. No distress.   HENT:   Head: Normocephalic and atraumatic.   Right Ear: External ear normal.   Left Ear: External ear normal.   Clear postnasal drip, posterior oropharyngeal erythema without exudate or edema, enlarged nasal turbinates, clear nasal discharge   Eyes: Conjunctivae are normal.   Neck:   Normal range of motion.  Cardiovascular:  Normal rate and regular rhythm.           Pulmonary/Chest: Breath sounds normal. No respiratory distress. She has no wheezes. She has no rhonchi. She has no rales. She exhibits no tenderness.   Abdominal: Abdomen is soft. Bowel sounds are normal.   Musculoskeletal:         General: Normal range of motion.      Cervical back: Normal range of motion.     Neurological: She is alert. She has normal strength.   Skin: Skin is warm and dry. Capillary refill takes less than 2 seconds.   Psychiatric: She has a normal mood and affect.         ED Course   Procedures  Labs Reviewed   CBC W/ AUTO DIFFERENTIAL - Abnormal; Notable for the following components:       Result Value    MCH 31.3 (*)     MPV 9.1 (*)     All other components within normal limits   COMPREHENSIVE METABOLIC PANEL - Abnormal; Notable for the following components:    Potassium 3.4 (*)     Anion Gap 7 (*)     All other components within normal limits   TROPONIN I   B-TYPE NATRIURETIC PEPTIDE   TROPONIN I   SARS-COV-2 RDRP GENE   POCT INFLUENZA A/B MOLECULAR   POCT URINE PREGNANCY          Imaging Results              X-Ray Chest PA And Lateral (Final result)  Result time 02/23/24 22:49:30      Final result by Karel Hogue MD (02/23/24 22:49:30)                   Impression:      No acute cardiopulmonary process.      Electronically signed by: Karel Hogue  MD  Date:    02/23/2024  Time:    22:49               Narrative:    EXAMINATION:  XR CHEST PA AND LATERAL    CLINICAL HISTORY:  Chest Pain;    TECHNIQUE:  PA and lateral views of the chest were performed.    COMPARISON:  01/26/2024.    FINDINGS:  There is no consolidation, effusion, or pneumothorax.    Cardiomediastinal silhouette is unremarkable.    Regional osseous structures are unremarkable.                                       Medications   potassium bicarbonate disintegrating tablet 40 mEq (40 mEq Oral Given 2/24/24 0032)   ibuprofen tablet 800 mg (800 mg Oral Given 2/24/24 0144)     Medical Decision Making  44-year-old female with a past medical history significant for hypertension presents to the emergency department complaining of nasal congestion, cough and chest discomfort /back pain that began tonight.  She states chest discomfort occurs when she coughs and denies fever/chills/nausea/vomiting/shortness of breath.  Course of ED stay:   1. Cardiovascular-patient has been hemodynamically stable throughout ED stay.  EKG shows no acute changes.  Troponin was normal x2.  Second troponin is pending.  Chest discomfort appears to be reproducible with coughing.  2. Pulmonary-patient has had normal O2 sats on room air with no signs or symptoms of respiratory distress throughout ED stay.  Chest x-ray shows no acute abnormalities.    3. Hematology/infectious disease-patient has been afebrile with a normal CBC.  Rapid flu and COVID-19 screens were negative.  4. GI/nutrition/renal/endocrine-CMP is reassuring except for mild hypokalemia.  Potassium was given in the ED.  Patient was given instructions for viral URI and received prescriptions for Astelin/fluticasone/ibuprofen.  She was advised to follow with her primary care physician within the next week for re-evaluation/return to the emergency department if condition worsens.    Risk  Prescription drug management.                                      Clinical  Impression:  Final diagnoses:  [R07.9] Chest pain  [J06.9] Viral URI with cough (Primary)                 Danis Wallis MD  02/24/24 3683

## 2024-02-24 NOTE — FIRST PROVIDER EVALUATION
"Medical screening examination initiated.  I have conducted a focused provider triage encounter, findings are as follows:    Brief history of present illness:  cough, congestion, and chest tightness that started tonight    Vitals:    02/23/24 2210   BP: (!) 151/87   BP Location: Left arm   Patient Position: Sitting   Pulse: 85   Resp: 16   Temp: 99.2 °F (37.3 °C)   TempSrc: Oral   SpO2: 98%   Weight: 99.3 kg (219 lb)   Height: 5' 1" (1.549 m)       Pertinent physical exam:  NAD    Brief workup plan:  EKG, labs, Flu, COVID, UA    Preliminary workup initiated; this workup will be continued and followed by the physician or advanced practice provider that is assigned to the patient when roomed.  "

## 2024-02-25 LAB
OHS QRS DURATION: 84 MS
OHS QTC CALCULATION: 443 MS

## 2024-03-04 ENCOUNTER — CLINICAL SUPPORT (OUTPATIENT)
Dept: ENDOSCOPY | Facility: HOSPITAL | Age: 44
End: 2024-03-04
Attending: FAMILY MEDICINE
Payer: COMMERCIAL

## 2024-03-04 ENCOUNTER — TELEPHONE (OUTPATIENT)
Dept: ENDOSCOPY | Facility: HOSPITAL | Age: 44
End: 2024-03-04

## 2024-03-04 DIAGNOSIS — Z12.12 SCREENING FOR COLORECTAL CANCER: ICD-10-CM

## 2024-03-04 DIAGNOSIS — Z12.11 SCREENING FOR COLORECTAL CANCER: ICD-10-CM

## 2024-03-04 DIAGNOSIS — Z12.11 SPECIAL SCREENING FOR MALIGNANT NEOPLASMS, COLON: Primary | ICD-10-CM

## 2024-03-04 NOTE — PLAN OF CARE
Called pt to schedule Screening Colonoscopy. Schedule is booked up . New refendo made fro pt. To get Call back. My O message sent for pt. To call Dept. Soon to check for June schedule opening and need to add EGD.

## 2024-03-07 ENCOUNTER — HOSPITAL ENCOUNTER (OUTPATIENT)
Dept: RADIOLOGY | Facility: OTHER | Age: 44
Discharge: HOME | End: 2024-03-07
Attending: FAMILY MEDICINE
Payer: COMMERCIAL

## 2024-03-07 DIAGNOSIS — Z12.31 ENCOUNTER FOR SCREENING MAMMOGRAM FOR BREAST CANCER: ICD-10-CM

## 2024-03-07 PROCEDURE — 77063 BREAST TOMOSYNTHESIS BI: CPT | Mod: TC

## 2024-03-07 PROCEDURE — 77063 BREAST TOMOSYNTHESIS BI: CPT | Mod: 26,,, | Performed by: RADIOLOGY

## 2024-03-07 PROCEDURE — 77067 SCR MAMMO BI INCL CAD: CPT | Mod: 26,,, | Performed by: RADIOLOGY

## 2024-03-07 PROCEDURE — 77067 SCR MAMMO BI INCL CAD: CPT | Mod: TC

## 2024-03-19 ENCOUNTER — TELEPHONE (OUTPATIENT)
Dept: ENDOSCOPY | Facility: HOSPITAL | Age: 44
End: 2024-03-19
Payer: COMMERCIAL

## 2024-03-19 ENCOUNTER — OFFICE VISIT (OUTPATIENT)
Dept: GASTROENTEROLOGY | Facility: CLINIC | Age: 44
End: 2024-03-19
Payer: COMMERCIAL

## 2024-03-19 VITALS
HEIGHT: 61 IN | SYSTOLIC BLOOD PRESSURE: 136 MMHG | WEIGHT: 224.19 LBS | BODY MASS INDEX: 42.33 KG/M2 | DIASTOLIC BLOOD PRESSURE: 89 MMHG | HEART RATE: 76 BPM

## 2024-03-19 DIAGNOSIS — K21.9 GASTROESOPHAGEAL REFLUX DISEASE, UNSPECIFIED WHETHER ESOPHAGITIS PRESENT: ICD-10-CM

## 2024-03-19 DIAGNOSIS — K59.04 CHRONIC IDIOPATHIC CONSTIPATION: Primary | ICD-10-CM

## 2024-03-19 PROCEDURE — 99214 OFFICE O/P EST MOD 30 MIN: CPT | Mod: S$GLB,,, | Performed by: INTERNAL MEDICINE

## 2024-03-19 PROCEDURE — 3079F DIAST BP 80-89 MM HG: CPT | Mod: CPTII,S$GLB,, | Performed by: INTERNAL MEDICINE

## 2024-03-19 PROCEDURE — 1159F MED LIST DOCD IN RCRD: CPT | Mod: CPTII,S$GLB,, | Performed by: INTERNAL MEDICINE

## 2024-03-19 PROCEDURE — 3008F BODY MASS INDEX DOCD: CPT | Mod: CPTII,S$GLB,, | Performed by: INTERNAL MEDICINE

## 2024-03-19 PROCEDURE — 99999 PR PBB SHADOW E&M-EST. PATIENT-LVL III: CPT | Mod: PBBFAC,,, | Performed by: INTERNAL MEDICINE

## 2024-03-19 PROCEDURE — 3075F SYST BP GE 130 - 139MM HG: CPT | Mod: CPTII,S$GLB,, | Performed by: INTERNAL MEDICINE

## 2024-03-19 RX ORDER — OMEPRAZOLE 40 MG/1
40 CAPSULE, DELAYED RELEASE ORAL DAILY
Qty: 30 CAPSULE | Refills: 2 | Status: SHIPPED | OUTPATIENT
Start: 2024-03-19 | End: 2024-06-17

## 2024-03-19 NOTE — PROGRESS NOTES
Reason for visit:GERD and Constipation    HPI: Ms. Camacho is a 44-year-old female who presents with at least a few years intermittent burning sensation in the epigastrium after meals and constipation. Was last seen in 2021. She describes a burning sensation mostly after spicy or acidic meals. The sensation can be referred to her chest and rarely she regurgitates food into her mouth. Her symptoms are more pronounced when she lays down to sleep at night. Denies any dysphagia or odynophagia. Denies any hoarseness of voice or sore throat. She has taken Omeprazole few years ago which seemed to help. She has also been constipated over the past few years where she has to strain to have a bowel movement. She has seen mucus and blood in her stool recently. Denies any family history of GI malignancy. No unintentional weight loss or change in appetite. No nausea or vomiting. Denies any recent surgeries. She underwent hysterectomy previously. She takes ibuprofen as needed for back pain and knee pain. Denies any melena or maroon stools. Denies any dysphagia, odynophagia, nausea, vomiting, heartburn or acid regurgitation. No abdominal pains, changes in bowel pattern, blood/ mucus in stool or unintentional weight loss. No melena or maroon stools. No recent changes in diet or medications. No family history of IBD, Celiac disease or GI malignancy. Mother with bladder cancer and cervical cancer. No regular NSAIDs (rare), alcohol (occasional) or tobacco use (none). No recent antibiotic use, travels or sick contacts. No prior history of C.diff. Her chronic epigastric burning sensation was suspected to be gastroesophageal reflux disease and due to chronic constipation with recent bout of bright red blood per rectum she was recommended to undergo EGD and Colonoscopy. Started on Omeprazole 40 mg 30 minutes breakfast and Linzess 145 mcg daily with breakfast but did not continue to use it.    Past medical, surgical, social and family  history reviewed in epic    Medication allergies reviewed in epic    Review of systems:    Constitutional:  No fever, no chills, weight gain over the past 2 years, appetite is normal  Eyes:  No visual changes or red eyes  ENT:  No odynophagia or hoarseness of voice  Cardiovascular:  No angina or palpitation  Respiratory:  No shortness breath or wheezing  Genitourinary:  No dysuria or frequency  Musculoskeletal:  No myalgias or arthralgias  Skin:  No pruritus or eczema  Neurologic:  No headache or seizures  Psychiatric:  No anxiety depression  Gastrointestinal:  See HPI    Physical exam:  Vitals see epic, awake, alert, oriented x3 in no acute distress    No physical exam done.    30 minutes spent with more than 50% in counseling    Recent labs reviewed.      Impression: Chronic GERD and Chronic idiopathic constipation    Recommendations:     Schedule EGD and Colonoscopy  Linzess 145 mcg daily  Omeprazole 40 mg before supper

## 2024-03-19 NOTE — TELEPHONE ENCOUNTER
"    Endo Case Request  Received: Today  Abebe Saenz MD Piglia, Ashley, JADEN  Procedure: EGD/Colonoscopy    Diagnosis: Screening colonoscopy and GERD    Procedure Timin-4 weeks    *If within 4 weeks selected, please neelima as high priority*    *If greater than 12 weeks, please select "4-12 weeks" and delay sending until 2 months prior to requested date*    Provider: Myself    [facility]    Additional Scheduling Information: No scheduling concerns    Prep Specifications:Standard prep    Have you attached a patient to this message: Yes  "

## 2024-04-04 ENCOUNTER — TELEPHONE (OUTPATIENT)
Dept: ENDOSCOPY | Facility: HOSPITAL | Age: 44
End: 2024-04-04
Payer: COMMERCIAL

## 2024-04-04 DIAGNOSIS — K21.9 GASTROESOPHAGEAL REFLUX DISEASE, UNSPECIFIED WHETHER ESOPHAGITIS PRESENT: ICD-10-CM

## 2024-04-04 DIAGNOSIS — Z12.11 COLON CANCER SCREENING: Primary | ICD-10-CM

## 2024-04-04 NOTE — TELEPHONE ENCOUNTER
"    Endo Case Request  Received: 2 weeks ago  Abebe Saenz MD Piglia, Ashley, JADEN  Procedure: EGD/Colonoscopy    Diagnosis: Screening colonoscopy and GERD    Procedure Timin-4 weeks    *If within 4 weeks selected, please neelima as high priority*    *If greater than 12 weeks, please select "4-12 weeks" and delay sending until 2 months prior to requested date*    Provider: Myself    Location: [facility]    Additional Scheduling Information: No scheduling concerns    Prep Specifications:Standard prep    Have you attached a patient to this message: Yes  "

## 2024-04-04 NOTE — TELEPHONE ENCOUNTER
Spoke to patient to schedule procedure(s) Colonoscopy/EGD       Physician to perform procedure(s) Dr. PHUC Saenz  Date of Procedure (s) 6/5/2024  Arrival Time 7:00 AM  Time of Procedure(s) 8:00 AM   Location of Procedure(s) [facility] [address] Floor  Type of Rx Prep sent to patient: PEG  Instructions provided to patient via [facility]    Patient was informed on the following information and verbalized understanding. Screening questionnaire reviewed with patient and complete. If procedure requires anesthesia, a responsible adult needs to be present to accompany the patient home, patient cannot drive after receiving anesthesia. Appointment details are tentative, especially check-in time. Patient will receive a prep-op call 7 days prior to confirm check-in time for procedure. If applicable the patient should contact their pharmacy to verify Rx for procedure prep is ready for pick-up. Patient was advised to call the scheduling department at [phone] if pharmacy states no Rx is available. Patient was advised to call the endoscopy scheduling department if any questions or concerns arise.      [facility] Scheduling Department

## 2024-04-08 ENCOUNTER — LAB VISIT (OUTPATIENT)
Dept: LAB | Facility: HOSPITAL | Age: 44
End: 2024-04-08
Attending: STUDENT IN AN ORGANIZED HEALTH CARE EDUCATION/TRAINING PROGRAM
Payer: COMMERCIAL

## 2024-04-08 DIAGNOSIS — E53.8 B12 DEFICIENCY: ICD-10-CM

## 2024-04-08 LAB
BASOPHILS # BLD AUTO: 0.05 K/UL (ref 0–0.2)
BASOPHILS NFR BLD: 0.7 % (ref 0–1.9)
DIFFERENTIAL METHOD BLD: ABNORMAL
EOSINOPHIL # BLD AUTO: 0.2 K/UL (ref 0–0.5)
EOSINOPHIL NFR BLD: 3.3 % (ref 0–8)
ERYTHROCYTE [DISTWIDTH] IN BLOOD BY AUTOMATED COUNT: 12.8 % (ref 11.5–14.5)
HCT VFR BLD AUTO: 39.4 % (ref 37–48.5)
HGB BLD-MCNC: 13.1 G/DL (ref 12–16)
IMM GRANULOCYTES # BLD AUTO: 0.03 K/UL (ref 0–0.04)
IMM GRANULOCYTES NFR BLD AUTO: 0.4 % (ref 0–0.5)
LYMPHOCYTES # BLD AUTO: 2.5 K/UL (ref 1–4.8)
LYMPHOCYTES NFR BLD: 37.5 % (ref 18–48)
MCH RBC QN AUTO: 31.6 PG (ref 27–31)
MCHC RBC AUTO-ENTMCNC: 33.2 G/DL (ref 32–36)
MCV RBC AUTO: 95 FL (ref 82–98)
MONOCYTES # BLD AUTO: 0.5 K/UL (ref 0.3–1)
MONOCYTES NFR BLD: 7.5 % (ref 4–15)
NEUTROPHILS # BLD AUTO: 3.4 K/UL (ref 1.8–7.7)
NEUTROPHILS NFR BLD: 50.6 % (ref 38–73)
NRBC BLD-RTO: 0 /100 WBC
PLATELET # BLD AUTO: 324 K/UL (ref 150–450)
PMV BLD AUTO: 9.3 FL (ref 9.2–12.9)
RBC # BLD AUTO: 4.15 M/UL (ref 4–5.4)
VIT B12 SERPL-MCNC: 649 PG/ML (ref 210–950)
WBC # BLD AUTO: 6.67 K/UL (ref 3.9–12.7)

## 2024-04-08 PROCEDURE — 85025 COMPLETE CBC W/AUTO DIFF WBC: CPT | Performed by: INTERNAL MEDICINE

## 2024-04-08 PROCEDURE — 82607 VITAMIN B-12: CPT | Performed by: INTERNAL MEDICINE

## 2024-04-08 PROCEDURE — 36415 COLL VENOUS BLD VENIPUNCTURE: CPT | Performed by: INTERNAL MEDICINE

## 2024-04-11 ENCOUNTER — PATIENT OUTREACH (OUTPATIENT)
Dept: ADMINISTRATIVE | Facility: HOSPITAL | Age: 44
End: 2024-04-11
Payer: COMMERCIAL

## 2024-04-30 ENCOUNTER — OFFICE VISIT (OUTPATIENT)
Dept: FAMILY MEDICINE | Facility: CLINIC | Age: 44
End: 2024-04-30
Attending: FAMILY MEDICINE
Payer: COMMERCIAL

## 2024-04-30 ENCOUNTER — LAB VISIT (OUTPATIENT)
Dept: LAB | Facility: HOSPITAL | Age: 44
End: 2024-04-30
Attending: FAMILY MEDICINE
Payer: COMMERCIAL

## 2024-04-30 VITALS
HEART RATE: 81 BPM | SYSTOLIC BLOOD PRESSURE: 107 MMHG | OXYGEN SATURATION: 97 % | BODY MASS INDEX: 41 KG/M2 | DIASTOLIC BLOOD PRESSURE: 74 MMHG | WEIGHT: 217 LBS

## 2024-04-30 DIAGNOSIS — I10 ESSENTIAL HYPERTENSION: ICD-10-CM

## 2024-04-30 DIAGNOSIS — Z00.00 ANNUAL PHYSICAL EXAM: Primary | ICD-10-CM

## 2024-04-30 DIAGNOSIS — Z00.00 ANNUAL PHYSICAL EXAM: ICD-10-CM

## 2024-04-30 LAB
ALBUMIN SERPL BCP-MCNC: 3.7 G/DL (ref 3.5–5.2)
ALP SERPL-CCNC: 69 U/L (ref 55–135)
ALT SERPL W/O P-5'-P-CCNC: 17 U/L (ref 10–44)
ANION GAP SERPL CALC-SCNC: 10 MMOL/L (ref 8–16)
AST SERPL-CCNC: 15 U/L (ref 10–40)
BASOPHILS # BLD AUTO: 0.05 K/UL (ref 0–0.2)
BASOPHILS NFR BLD: 0.7 % (ref 0–1.9)
BILIRUB SERPL-MCNC: 0.5 MG/DL (ref 0.1–1)
BILIRUB UR QL STRIP: NEGATIVE
BUN SERPL-MCNC: 8 MG/DL (ref 6–20)
CALCIUM SERPL-MCNC: 9.5 MG/DL (ref 8.7–10.5)
CHLORIDE SERPL-SCNC: 105 MMOL/L (ref 95–110)
CHOLEST SERPL-MCNC: 225 MG/DL (ref 120–199)
CHOLEST/HDLC SERPL: 5.4 {RATIO} (ref 2–5)
CLARITY UR REFRACT.AUTO: CLEAR
CO2 SERPL-SCNC: 24 MMOL/L (ref 23–29)
COLOR UR AUTO: YELLOW
CREAT SERPL-MCNC: 0.9 MG/DL (ref 0.5–1.4)
DIFFERENTIAL METHOD BLD: ABNORMAL
EOSINOPHIL # BLD AUTO: 0.2 K/UL (ref 0–0.5)
EOSINOPHIL NFR BLD: 3 % (ref 0–8)
ERYTHROCYTE [DISTWIDTH] IN BLOOD BY AUTOMATED COUNT: 13.2 % (ref 11.5–14.5)
EST. GFR  (NO RACE VARIABLE): >60 ML/MIN/1.73 M^2
ESTIMATED AVG GLUCOSE: 103 MG/DL (ref 68–131)
GLUCOSE SERPL-MCNC: 81 MG/DL (ref 70–110)
GLUCOSE UR QL STRIP: NEGATIVE
HBA1C MFR BLD: 5.2 % (ref 4–5.6)
HCT VFR BLD AUTO: 39.8 % (ref 37–48.5)
HDLC SERPL-MCNC: 42 MG/DL (ref 40–75)
HDLC SERPL: 18.7 % (ref 20–50)
HGB BLD-MCNC: 13.4 G/DL (ref 12–16)
HGB UR QL STRIP: ABNORMAL
IMM GRANULOCYTES # BLD AUTO: 0.02 K/UL (ref 0–0.04)
IMM GRANULOCYTES NFR BLD AUTO: 0.3 % (ref 0–0.5)
KETONES UR QL STRIP: NEGATIVE
LDLC SERPL CALC-MCNC: 163.6 MG/DL (ref 63–159)
LEUKOCYTE ESTERASE UR QL STRIP: NEGATIVE
LYMPHOCYTES # BLD AUTO: 2.5 K/UL (ref 1–4.8)
LYMPHOCYTES NFR BLD: 37 % (ref 18–48)
MCH RBC QN AUTO: 31.6 PG (ref 27–31)
MCHC RBC AUTO-ENTMCNC: 33.7 G/DL (ref 32–36)
MCV RBC AUTO: 94 FL (ref 82–98)
MONOCYTES # BLD AUTO: 0.4 K/UL (ref 0.3–1)
MONOCYTES NFR BLD: 6.3 % (ref 4–15)
NEUTROPHILS # BLD AUTO: 3.5 K/UL (ref 1.8–7.7)
NEUTROPHILS NFR BLD: 52.7 % (ref 38–73)
NITRITE UR QL STRIP: NEGATIVE
NONHDLC SERPL-MCNC: 183 MG/DL
NRBC BLD-RTO: 0 /100 WBC
PH UR STRIP: 6 [PH] (ref 5–8)
PLATELET # BLD AUTO: 390 K/UL (ref 150–450)
PMV BLD AUTO: 9.6 FL (ref 9.2–12.9)
POTASSIUM SERPL-SCNC: 3.4 MMOL/L (ref 3.5–5.1)
PROT SERPL-MCNC: 7.3 G/DL (ref 6–8.4)
PROT UR QL STRIP: NEGATIVE
RBC # BLD AUTO: 4.24 M/UL (ref 4–5.4)
SODIUM SERPL-SCNC: 139 MMOL/L (ref 136–145)
SP GR UR STRIP: 1.02 (ref 1–1.03)
TRIGL SERPL-MCNC: 97 MG/DL (ref 30–150)
TSH SERPL DL<=0.005 MIU/L-ACNC: 0.49 UIU/ML (ref 0.4–4)
URN SPEC COLLECT METH UR: ABNORMAL
WBC # BLD AUTO: 6.71 K/UL (ref 3.9–12.7)

## 2024-04-30 PROCEDURE — 3008F BODY MASS INDEX DOCD: CPT | Mod: CPTII,S$GLB,, | Performed by: FAMILY MEDICINE

## 2024-04-30 PROCEDURE — 80053 COMPREHEN METABOLIC PANEL: CPT | Performed by: FAMILY MEDICINE

## 2024-04-30 PROCEDURE — 85025 COMPLETE CBC W/AUTO DIFF WBC: CPT | Performed by: FAMILY MEDICINE

## 2024-04-30 PROCEDURE — 99999 PR PBB SHADOW E&M-EST. PATIENT-LVL III: CPT | Mod: PBBFAC,,, | Performed by: FAMILY MEDICINE

## 2024-04-30 PROCEDURE — 99396 PREV VISIT EST AGE 40-64: CPT | Mod: S$GLB,,, | Performed by: FAMILY MEDICINE

## 2024-04-30 PROCEDURE — 3074F SYST BP LT 130 MM HG: CPT | Mod: CPTII,S$GLB,, | Performed by: FAMILY MEDICINE

## 2024-04-30 PROCEDURE — 1159F MED LIST DOCD IN RCRD: CPT | Mod: CPTII,S$GLB,, | Performed by: FAMILY MEDICINE

## 2024-04-30 PROCEDURE — 81003 URINALYSIS AUTO W/O SCOPE: CPT | Performed by: FAMILY MEDICINE

## 2024-04-30 PROCEDURE — 3078F DIAST BP <80 MM HG: CPT | Mod: CPTII,S$GLB,, | Performed by: FAMILY MEDICINE

## 2024-04-30 PROCEDURE — 83036 HEMOGLOBIN GLYCOSYLATED A1C: CPT | Performed by: FAMILY MEDICINE

## 2024-04-30 PROCEDURE — 84443 ASSAY THYROID STIM HORMONE: CPT | Performed by: FAMILY MEDICINE

## 2024-04-30 PROCEDURE — 80061 LIPID PANEL: CPT | Performed by: FAMILY MEDICINE

## 2024-04-30 PROCEDURE — 36415 COLL VENOUS BLD VENIPUNCTURE: CPT | Mod: PO | Performed by: FAMILY MEDICINE

## 2024-04-30 RX ORDER — TRIAMTERENE/HYDROCHLOROTHIAZID 37.5-25 MG
1 TABLET ORAL DAILY
Qty: 60 TABLET | Refills: 3 | Status: SHIPPED | OUTPATIENT
Start: 2024-04-30 | End: 2024-06-17

## 2024-04-30 RX ORDER — POLYETHYLENE GLYCOL-3350 AND ELECTROLYTES WITH FLAVOR PACK 240; 5.84; 2.98; 6.72; 22.72 G/278.26G; G/278.26G; G/278.26G; G/278.26G; G/278.26G
4000 POWDER, FOR SOLUTION ORAL ONCE
Status: ON HOLD | COMMUNITY
Start: 2024-04-16 | End: 2024-06-05 | Stop reason: HOSPADM

## 2024-04-30 NOTE — PROGRESS NOTES
Subjective:       Patient ID: Marlyn Camacho is a 44 y.o. female.    Chief Complaint: Annual Exam    HPI  Pt is here for annual exam pt is well no sob/cp no cough chest congestion no sore throat uri symptoms  Pt denies n/v/f/c/d/c no change in bowel habits no brbpr  Pt denies dysuria hematuria no abnl vaginal bleeding  Pt has htn bp fine on norvasc however pt ankles have been swelling   Review of Systems   Constitutional:  Negative for activity change, chills, diaphoresis, fatigue and fever.   HENT:  Negative for congestion, ear discharge, ear pain, hearing loss, postnasal drip, rhinorrhea, sinus pressure, sneezing, sore throat, trouble swallowing and voice change.    Eyes:  Negative for photophobia, discharge, redness, itching and visual disturbance.   Respiratory:  Negative for cough, chest tightness, shortness of breath and wheezing.    Cardiovascular:  Positive for leg swelling. Negative for chest pain and palpitations.   Gastrointestinal:  Negative for abdominal pain, anal bleeding, blood in stool, constipation, diarrhea, nausea, rectal pain and vomiting.   Genitourinary:  Negative for dyspareunia, dysuria, flank pain, frequency, hematuria, menstrual problem, pelvic pain, urgency, vaginal bleeding and vaginal discharge.   Musculoskeletal:  Negative for arthralgias, back pain, joint swelling and neck pain.   Skin:  Negative for color change and rash.   Neurological:  Negative for dizziness, speech difficulty, weakness, light-headedness, numbness and headaches.   Hematological:  Does not bruise/bleed easily.   Psychiatric/Behavioral:  Negative for agitation, confusion, decreased concentration, sleep disturbance and suicidal ideas. The patient is not nervous/anxious.      Objective:    /74   Pulse 81   Wt 98.4 kg (217 lb)   LMP 02/15/2017 (Approximate)   SpO2 97%   BMI 41.00 kg/m²     Physical Exam  Constitutional:       Appearance: Normal appearance. She is well-developed. She is not  "ill-appearing.   HENT:      Head: Normocephalic and atraumatic.      Right Ear: External ear normal.      Left Ear: External ear normal.      Nose: Nose normal.   Eyes:      General:         Right eye: No discharge.         Left eye: No discharge.      Conjunctiva/sclera: Conjunctivae normal.      Pupils: Pupils are equal, round, and reactive to light.   Neck:      Thyroid: No thyromegaly.   Cardiovascular:      Rate and Rhythm: Normal rate and regular rhythm.      Heart sounds: Normal heart sounds. No murmur heard.     No friction rub. No gallop.   Pulmonary:      Effort: Pulmonary effort is normal.      Breath sounds: Normal breath sounds. No wheezing or rales.   Abdominal:      General: Bowel sounds are normal. There is no distension.      Palpations: Abdomen is soft.      Tenderness: There is no abdominal tenderness. There is no guarding or rebound.   Genitourinary:     Vagina: Normal.      Comments: declined  Musculoskeletal:         General: No tenderness. Normal range of motion.      Cervical back: Normal range of motion and neck supple.      Right lower leg: Edema present.      Left lower leg: Edema present.   Lymphadenopathy:      Cervical: No cervical adenopathy.   Skin:     General: Skin is warm and dry.      Findings: No erythema or rash.   Neurological:      General: No focal deficit present.      Mental Status: She is alert and oriented to person, place, and time.      Cranial Nerves: No cranial nerve deficit.      Motor: No abnormal muscle tone.      Coordination: Coordination normal.   Psychiatric:         Behavior: Behavior normal.         Thought Content: Thought content normal.         Judgment: Judgment normal.       Assessment:       1. Annual physical exam    2. Essential hypertension        Plan:     Orders cbc cmp lipid hgb A1c tsh urine   Cont meds  Low salt diet  Graded exercise  Rtc 6 months    Health maintenance  Discussed with pt        "This note will not be shared with the patient." "

## 2024-05-07 ENCOUNTER — PATIENT MESSAGE (OUTPATIENT)
Dept: FAMILY MEDICINE | Facility: CLINIC | Age: 44
End: 2024-05-07
Payer: COMMERCIAL

## 2024-05-28 ENCOUNTER — TELEPHONE (OUTPATIENT)
Dept: ENDOSCOPY | Facility: HOSPITAL | Age: 44
End: 2024-05-28
Payer: COMMERCIAL

## 2024-05-30 ENCOUNTER — ANESTHESIA EVENT (OUTPATIENT)
Dept: ENDOSCOPY | Facility: HOSPITAL | Age: 44
End: 2024-05-30
Payer: COMMERCIAL

## 2024-05-30 NOTE — ANESTHESIA PREPROCEDURE EVALUATION
05/30/2024  Marlyn Camacho is a 44 y.o., female.  [facility]  Anesthesia Pre-Operative Evaluation       Patient Name: Marlyn Camacho  YOB: 1980  MRN: [id number]  [facility]: [id number]      Code Status: Prior   Date of Procedure: 6/5/2024  Anesthesia: Choice Procedure: Procedure(s) (LRB):  EGD (ESOPHAGOGASTRODUODENOSCOPY) (N/A)  COLONOSCOPY (N/A)  Pre-Operative Diagnosis: Colon cancer screening [Z12.11]  Gastroesophageal reflux disease, unspecified whether esophagitis present [K21.9]  Proceduralist: Surgeons and Role:     * Abebe Saenz MD - Primary Nurse: (Unknown)      SUBJECTIVE:   Marlyn Camacho is a 44 y.o. female who  has a past medical history of Abnormal Pap smear of cervix, Allergy, Hypertension, Joint pain, Keloid cicatrix, and Stroke (05/2023)..     she has a current medication list which includes the following long-term medication(s): aspirin, azelastine, omeprazole, and triamterene-hydrochlorothiazide 37.5-25 mg.     ALLERGIES:   Review of patient's allergies indicates:  No Known Allergies  LDA:          Lines/Drains/Airways       None                  Anesthesia Evaluation      Airway   Mallampati: I  Dental    (+) Intact    Pulmonary    Cardiovascular   (+) hypertension    Neuro/Psych    (+) CVA residual symptoms    GI/Hepatic/Renal      Endo/Other    Abdominal                     MEDICATIONS:     Antibiotics (From admission, onward)      None          VTE Risk Mitigation (From admission, onward)      None              No current facility-administered medications for this encounter.     Current Outpatient Medications   Medication Sig Dispense Refill    aspirin (ECOTRIN) 81 MG EC tablet Take 1 tablet (81 mg total) by mouth once daily. For stroke prevention 90 tablet 3    azelastine (ASTELIN) 137 mcg (0.1 %) nasal spray 2 sprays (274 mcg  total) by Nasal route 2 (two) times daily. 30 mL 0    fluticasone propionate (FLONASE) 50 mcg/actuation nasal spray 2 sprays (100 mcg total) by Each Nostril route once daily. 15 g 0    GAVILYTE-C 240-22.72-6.72 -5.84 gram SolR Take 4,000 mLs by mouth once. (Patient not taking: Reported on 4/30/2024)      ibuprofen (ADVIL,MOTRIN) 800 MG tablet Take 1 tablet (800 mg total) by mouth every 8 (eight) hours as needed for Pain. 30 tablet 0    linaCLOtide (LINZESS) 145 mcg Cap capsule Take 1 capsule (145 mcg total) by mouth before breakfast. 30 capsule 2    omeprazole (PRILOSEC) 40 MG capsule Take 1 capsule (40 mg total) by mouth once daily. 30 capsule 2    triamterene-hydrochlorothiazide 37.5-25 mg (MAXZIDE-25) 37.5-25 mg per tablet Take 1 tablet by mouth once daily. 60 tablet 3          History:   There are no hospital problems to display for this patient.    Surgical History:    has a past surgical history that includes Tubal ligation; CKC, ECC, fractional D&C; CONIZATION-CERVIX (02/2015); Dilation and curettage of uterus; and Hysterectomy (03/2017).   Social History:    reports being sexually active and has had partner(s) who are male. She reports using the following methods of birth control/protection: Other-see comments and See Surgical Hx.  reports that she has never smoked. She has never been exposed to tobacco smoke. She has never used smokeless tobacco. She reports current alcohol use. She reports that she does not use drugs.     OBJECTIVE:     Vital Signs (Most Recent):    Vital Signs Range (Last 24H):          There is no height or weight on file to calculate BMI.   Wt Readings from Last 4 Encounters:   04/30/24 98.4 kg (217 lb)   03/19/24 101.7 kg (224 lb 3.3 oz)   02/23/24 99.3 kg (219 lb)   02/05/24 98.6 kg (217 lb 6 oz)       Significant Labs:  Lab Results   Component Value Date    WBC 6.71 04/30/2024    HGB 13.4 04/30/2024    HCT 39.8 04/30/2024     04/30/2024     04/30/2024    K 3.4 (L)  04/30/2024     04/30/2024    CREATININE 0.9 04/30/2024    BUN 8 04/30/2024    CO2 24 04/30/2024    GLU 81 04/30/2024    CALCIUM 9.5 04/30/2024    MG 2.3 08/07/2023    PHOS 4.6 (H) 05/27/2023    ALKPHOS 69 04/30/2024    ALT 17 04/30/2024    AST 15 04/30/2024    ALBUMIN 3.7 04/30/2024    INR 0.9 05/25/2023    HGBA1C 5.2 04/30/2024    TROPONINI <0.006 02/24/2024    BNP 17 02/23/2024     Patient's last menstrual period was 02/15/2017 (approximate).  No results found for this or any previous visit (from the past 72 hour(s)).    EKG:   Results for orders placed or performed during the hospital encounter of 02/23/24   EKG 12-lead    Collection Time: 02/23/24  9:54 PM   Result Value Ref Range    QRS Duration 84 ms    [facility] QTC Calculation 443 ms    Narrative    Test Reason : R07.9,    Vent. Rate : 102 BPM     Atrial Rate : 102 BPM     P-R Int : 146 ms          QRS Dur : 084 ms      QT Int : 340 ms       P-R-T Axes : 048 062 038 degrees     QTc Int : 443 ms    Sinus tachycardia  Otherwise normal ECG  When compared with ECG of 26-JAN-2024 09:58,  No significant change was found  Confirmed by Bony Alonso MD ([id number]) on 2/25/2024 12:56:54 PM    Referred By: [email or web address]   SELF           Confirmed By:Bony Alonso MD       TTE:  Results for orders placed or performed during the hospital encounter of 08/06/23   Echo   Result Value Ref Range    BSA 2 m2    LVOT stroke volume 78.48 cm3    LVIDd 4.27 3.5 - 6.0 cm    LV Systolic Volume 36.71 mL    LV Systolic Volume Index 19.1 mL/m2    LVIDs 3.06 2.1 - 4.0 cm    LV Diastolic Volume 81.65 mL    LV Diastolic Volume Index 42.53 mL/m2    IVS 1.07 0.6 - 1.1 cm    LVOT diameter 1.98 cm    LVOT area 3.1 cm2    FS 28 28 - 44 %    Left Ventricle Relative Wall Thickness 0.41 cm    Posterior Wall 0.87 0.6 - 1.1 cm    LV mass 135.07 g    LV Mass Index 70 g/m2    MV Peak E Aly 0.72 m/s    TDI LATERAL 0.11 m/s    TDI SEPTAL 0.07 m/s    E/E' ratio 8.00 m/s    MV Peak A Aly 0.51 m/s    TR Max  Aly 2.04 m/s    E/A ratio 1.41     IVRT 98.95 msec    E wave deceleration time 214.28 msec    LV SEPTAL E/E' RATIO 10.29 m/s    LV LATERAL E/E' RATIO 6.55 m/s    LVOT peak aly 1.05 m/s    Left Ventricular Outflow Tract Mean Velocity 0.70 cm/s    Left Ventricular Outflow Tract Mean Gradient 2.30 mmHg    LA size 3.67 cm    Left Atrium Major Axis 5.40 cm    Left Atrium Minor Axis 4.91 cm    RV S' 10.00 cm/s    TAPSE 1.99 cm    RA Major Axis 4.35 cm    AV mean gradient 2 mmHg    AV peak gradient 4 mmHg    Ao peak aly 0.97 m/s    Ao VTI 22.60 cm    LVOT peak VTI 25.50 cm    AV valve area 3.47 cm²    AV Velocity Ratio 1.08     AV index (prosthetic) 1.13     WHITLEY by Velocity Ratio 3.33 cm²    MV mean gradient 1 mmHg    MV peak gradient 3 mmHg    MV stenosis pressure 1/2 time 62.14 ms    MV valve area p 1/2 method 3.54 cm2    MV valve area by continuity eq 3.13 cm2    MV VTI 25.1 cm    Triscuspid Valve Regurgitation Peak Gradient 17 mmHg    PV PEAK VELOCITY 0.66 m/s    PV peak gradient 2 mmHg    Sinus 2.87 cm    STJ 2.17 cm    Ascending aorta 2.72 cm    IVC diameter 1.47 cm    Mean e' 0.09 m/s    ZLVIDS -0.68     ZLVIDD -2.38     RVDD 3.89 cm    LA Volume Index 37.6 mL/m2    LA volume 72.20 cm3    LA WIDTH 4.5 cm    RA Width 4.1 cm    TV resting pulmonary artery pressure 20 mmHg    RV TB RVSP 5 mmHg    Est. RA pres 3 mmHg    Narrative      Left Ventricle: The left ventricle is normal in size. Normal wall   thickness. Normal wall motion. There is normal systolic function with a   visually estimated ejection fraction of 60 - 65%. There is normal   diastolic function.    Left Atrium: Left atrium is moderately dilated.    Right Ventricle: Normal right ventricular cavity size. Systolic   function is normal.    Pulmonary Artery: The estimated pulmonary artery systolic pressure is   20 mmHg.    IVC/SVC: Normal venous pressure at 3 mmHg.       EF   Date Value Ref Range Status   06/15/2023 60 % Final     Nuc Rest EF   Date Value Ref  "Range Status   08/07/2023 60  Final      No results found for this or any previous visit.  JOSE:  No results found for this or any previous visit.  Stress Test:  Results for orders placed during the hospital encounter of 08/06/23    Nuclear Stress - Cardiology Interpreted    Interpretation Summary    The patient exercised for 6 minutes 6 seconds on a Marcio protocol, corresponding to a functional capacity of 7 METS, achieving a peak heart rate of 150 bpm, which is 90 % of the age predicted maximum heart rate.    Normal myocardial perfusion scan. There is no evidence of myocardial ischemia or infarction.    The gated perfusion images showed an ejection fraction of 60% at rest.    There is normal wall motion at rest and post stress.    The ECG portion of the study is negative for ischemia.    The patient reported no chest pain during the stress test.    There were no arrhythmias during stress.     LHC:  No results found for this or any previous visit.     PFT:  No results found for: "FEV1", "FVC", "ZNU8YKO", "TLC", "DLCO"     ASSESSMENT/PLAN:        Pre-op Assessment    I have reviewed the Patient Summary Reports.     I have reviewed the Nursing Notes. I have reviewed the NPO Status.   I have reviewed the Medications.     Review of Systems  Anesthesia Hx:  No problems with previous Anesthesia             Denies Family Hx of Anesthesia complications.    Denies Personal Hx of Anesthesia complications.                    Hematology/Oncology:  Hematology Normal   Oncology Normal                                   EENT/Dental:  EENT/Dental Normal           Cardiovascular:     Hypertension           hyperlipidemia    Syncope  Chest pain                         Pulmonary:  Pulmonary Normal        Pulmonary nodule               Renal/:  Renal/ Normal                 Hepatic/GI:  Hepatic/GI Normal                 Musculoskeletal:  Musculoskeletal Normal                Neurological:   CVA, residual symptoms                     "                Endocrine:  Endocrine Normal            Dermatological:  Skin Normal Eczema   Psych:  Psychiatric Normal                    Physical Exam  General: Well nourished, Alert and Oriented    Airway:  Mallampati: I     Dental:  Intact        Anesthesia Plan  Type of Anesthesia, risks & benefits discussed:    Anesthesia Type: Gen Natural Airway  Intra-op Monitoring Plan: Standard ASA Monitors  Post Op Pain Control Plan: multimodal analgesia  Induction:  IV  Informed Consent: Informed consent signed with the Patient and all parties understand the risks and agree with anesthesia plan.  All questions answered. Patient consented to blood products? No  ASA Score: 2  Day of Surgery Review of History & Physical: H&P Update referred to the surgeon/provider.    Ready For Surgery From Anesthesia Perspective.     .

## 2024-06-03 ENCOUNTER — TELEPHONE (OUTPATIENT)
Dept: FAMILY MEDICINE | Facility: CLINIC | Age: 44
End: 2024-06-03

## 2024-06-03 ENCOUNTER — OFFICE VISIT (OUTPATIENT)
Dept: FAMILY MEDICINE | Facility: CLINIC | Age: 44
End: 2024-06-03
Attending: FAMILY MEDICINE
Payer: COMMERCIAL

## 2024-06-03 VITALS
DIASTOLIC BLOOD PRESSURE: 82 MMHG | HEART RATE: 86 BPM | WEIGHT: 215 LBS | BODY MASS INDEX: 40.59 KG/M2 | SYSTOLIC BLOOD PRESSURE: 135 MMHG | HEIGHT: 61 IN

## 2024-06-03 DIAGNOSIS — E78.2 MIXED HYPERLIPIDEMIA: ICD-10-CM

## 2024-06-03 DIAGNOSIS — I10 ESSENTIAL HYPERTENSION: Primary | ICD-10-CM

## 2024-06-03 DIAGNOSIS — E87.6 HYPOKALEMIA: ICD-10-CM

## 2024-06-03 DIAGNOSIS — E66.01 OBESITY, MORBID, BMI 40.0-49.9: ICD-10-CM

## 2024-06-03 PROCEDURE — 99214 OFFICE O/P EST MOD 30 MIN: CPT | Mod: 95,,, | Performed by: FAMILY MEDICINE

## 2024-06-03 PROCEDURE — 3044F HG A1C LEVEL LT 7.0%: CPT | Mod: CPTII,95,, | Performed by: FAMILY MEDICINE

## 2024-06-03 PROCEDURE — 3008F BODY MASS INDEX DOCD: CPT | Mod: CPTII,95,, | Performed by: FAMILY MEDICINE

## 2024-06-03 PROCEDURE — 3079F DIAST BP 80-89 MM HG: CPT | Mod: CPTII,95,, | Performed by: FAMILY MEDICINE

## 2024-06-03 PROCEDURE — 3075F SYST BP GE 130 - 139MM HG: CPT | Mod: CPTII,95,, | Performed by: FAMILY MEDICINE

## 2024-06-03 RX ORDER — ATORVASTATIN CALCIUM 20 MG/1
20 TABLET, FILM COATED ORAL DAILY
Qty: 30 TABLET | Refills: 3 | Status: SHIPPED | OUTPATIENT
Start: 2024-06-03 | End: 2025-06-03

## 2024-06-03 NOTE — PROGRESS NOTES
Subjective:    The patient location is: home  The chief complaint leading to consultation is: htn    Visit type: audiovisual    Face to Face time with patient: 20  30 minutes of total time spent on the encounter, which includes face to face time and non-face to face time preparing to see the patient (eg, review of tests), Obtaining and/or reviewing separately obtained history, Documenting clinical information in the electronic or other health record, Independently interpreting results (not separately reported) and communicating results to the patient/family/caregiver, or Care coordination (not separately reported).         Each patient to whom he or she provides medical services by telemedicine is:  (1) informed of the relationship between the physician and patient and the respective role of any other health care provider with respect to management of the patient; and (2) notified that he or she may decline to receive medical services by telemedicine and may withdraw from such care at any time.    Notes:        Patient ID: Marlyn Camacho is a 44 y.o. female.    Chief Complaint: Hypertension    Hypertension  This is a recurrent problem. The current episode started more than 1 year ago. The problem has been waxing and waning since onset. The problem is controlled. Associated symptoms include anxiety, malaise/fatigue, peripheral edema and sweats. Pertinent negatives include no blurred vision, chest pain, neck pain, orthopnea, PND or shortness of breath. There are no associated agents to hypertension. Risk factors for coronary artery disease include dyslipidemia, family history and obesity. Past treatments include diuretics. The current treatment provides mild improvement. Compliance problems include diet and exercise.      Pt is in virtual visit for follow up of htn bp fine at home on diuretic combo potassium low on hctz no sob/cp no muscle cramps  Pt has hypercholesterolemia not on a statin but willing to  "start she is not on a low fat low salt diet consistently   Pt is obese not on a weight loss diet consistently no regular exercise  Review of Systems   Constitutional:  Positive for malaise/fatigue. Negative for chills, fatigue and fever.   Eyes:  Negative for blurred vision.   Respiratory:  Negative for cough and shortness of breath.    Cardiovascular:  Negative for chest pain, orthopnea and PND.   Endocrine: Negative for polydipsia and polyuria.   Musculoskeletal:  Negative for neck pain.   Psychiatric/Behavioral:  Negative for dysphoric mood, sleep disturbance and suicidal ideas. The patient is not nervous/anxious.        Objective:    /82   Pulse 86   Ht 5' 1" (1.549 m)   Wt 97.5 kg (215 lb)   LMP 02/15/2017 (Approximate)   BMI 40.62 kg/m²     Physical Exam  Constitutional:       Appearance: She is obese. She is not ill-appearing.   HENT:      Nose: Nose normal.   Pulmonary:      Effort: Pulmonary effort is normal. No respiratory distress.   Neurological:      General: No focal deficit present.      Mental Status: She is alert and oriented to person, place, and time.      Cranial Nerves: No cranial nerve deficit.      Coordination: Coordination normal.   Psychiatric:         Mood and Affect: Mood normal.         Behavior: Behavior normal.         Thought Content: Thought content normal.         Judgment: Judgment normal.       Ldl 164 in 4/2024  Assessment:       1. Essential hypertension    2. Mixed hyperlipidemia        Plan:     Orders bmp now    Lipid panel cmp in 2-3 months  Cont meds  Start statin  Low fat low salt weight loss diet  Graded exercise  Rtc 2-3 months lab pta virtual       "This note will not be shared with the patient."     "

## 2024-06-03 NOTE — TELEPHONE ENCOUNTER
----- Message from Mindi Donahue MD sent at 6/3/2024  7:15 AM CDT -----  Regarding: scheduling  Please help pt set up lab appt for bmp for this week and cmp lipid pta a virtual with me in August.

## 2024-06-05 ENCOUNTER — HOSPITAL ENCOUNTER (OUTPATIENT)
Facility: HOSPITAL | Age: 44
Discharge: HOME | End: 2024-06-05
Attending: INTERNAL MEDICINE | Admitting: INTERNAL MEDICINE
Payer: COMMERCIAL

## 2024-06-05 ENCOUNTER — ANESTHESIA (OUTPATIENT)
Dept: ENDOSCOPY | Facility: HOSPITAL | Age: 44
End: 2024-06-05
Payer: COMMERCIAL

## 2024-06-05 VITALS
SYSTOLIC BLOOD PRESSURE: 126 MMHG | DIASTOLIC BLOOD PRESSURE: 89 MMHG | BODY MASS INDEX: 40.59 KG/M2 | WEIGHT: 215 LBS | HEIGHT: 61 IN | TEMPERATURE: 98 F | RESPIRATION RATE: 16 BRPM | HEART RATE: 84 BPM | OXYGEN SATURATION: 96 %

## 2024-06-05 DIAGNOSIS — Z12.11 COLON CANCER SCREENING: ICD-10-CM

## 2024-06-05 DIAGNOSIS — K21.9 GASTROESOPHAGEAL REFLUX DISEASE, UNSPECIFIED WHETHER ESOPHAGITIS PRESENT: Primary | ICD-10-CM

## 2024-06-05 DIAGNOSIS — K21.9 GERD (GASTROESOPHAGEAL REFLUX DISEASE): ICD-10-CM

## 2024-06-05 PROCEDURE — 45385 COLONOSCOPY W/LESION REMOVAL: CPT | Mod: PT | Performed by: INTERNAL MEDICINE

## 2024-06-05 PROCEDURE — 37000008 HC ANESTHESIA 1ST 15 MINUTES: Performed by: INTERNAL MEDICINE

## 2024-06-05 PROCEDURE — 88365 INSITU HYBRIDIZATION (FISH): CPT | Mod: 26,,, | Performed by: PATHOLOGY

## 2024-06-05 PROCEDURE — 88342 IMHCHEM/IMCYTCHM 1ST ANTB: CPT | Mod: 26,,, | Performed by: PATHOLOGY

## 2024-06-05 PROCEDURE — 88365 INSITU HYBRIDIZATION (FISH): CPT | Performed by: PATHOLOGY

## 2024-06-05 PROCEDURE — 88364 INSITU HYBRIDIZATION (FISH): CPT | Mod: 26,,, | Performed by: PATHOLOGY

## 2024-06-05 PROCEDURE — 27201089 HC SNARE, DISP (ANY): Performed by: INTERNAL MEDICINE

## 2024-06-05 PROCEDURE — 25000003 PHARM REV CODE 250: Performed by: NURSE ANESTHETIST, CERTIFIED REGISTERED

## 2024-06-05 PROCEDURE — 37000009 HC ANESTHESIA EA ADD 15 MINS: Performed by: INTERNAL MEDICINE

## 2024-06-05 PROCEDURE — 88341 IMHCHEM/IMCYTCHM EA ADD ANTB: CPT | Performed by: PATHOLOGY

## 2024-06-05 PROCEDURE — 88342 IMHCHEM/IMCYTCHM 1ST ANTB: CPT | Performed by: PATHOLOGY

## 2024-06-05 PROCEDURE — 99900035 HC TECH TIME PER 15 MIN (STAT)

## 2024-06-05 PROCEDURE — 45385 COLONOSCOPY W/LESION REMOVAL: CPT | Mod: 33,,, | Performed by: INTERNAL MEDICINE

## 2024-06-05 PROCEDURE — 88364 INSITU HYBRIDIZATION (FISH): CPT | Performed by: PATHOLOGY

## 2024-06-05 PROCEDURE — 88305 TISSUE EXAM BY PATHOLOGIST: CPT | Performed by: PATHOLOGY

## 2024-06-05 PROCEDURE — 88305 TISSUE EXAM BY PATHOLOGIST: CPT | Mod: 26,,, | Performed by: PATHOLOGY

## 2024-06-05 PROCEDURE — 43239 EGD BIOPSY SINGLE/MULTIPLE: CPT | Mod: 51,,, | Performed by: INTERNAL MEDICINE

## 2024-06-05 PROCEDURE — 43239 EGD BIOPSY SINGLE/MULTIPLE: CPT | Performed by: INTERNAL MEDICINE

## 2024-06-05 PROCEDURE — 27201012 HC FORCEPS, HOT/COLD, DISP: Performed by: INTERNAL MEDICINE

## 2024-06-05 PROCEDURE — 63600175 PHARM REV CODE 636 W HCPCS: Performed by: NURSE ANESTHETIST, CERTIFIED REGISTERED

## 2024-06-05 PROCEDURE — D9220A PRA ANESTHESIA: Mod: 33,,, | Performed by: NURSE ANESTHETIST, CERTIFIED REGISTERED

## 2024-06-05 PROCEDURE — 88341 IMHCHEM/IMCYTCHM EA ADD ANTB: CPT | Mod: 26,,, | Performed by: PATHOLOGY

## 2024-06-05 PROCEDURE — 94761 N-INVAS EAR/PLS OXIMETRY MLT: CPT

## 2024-06-05 RX ORDER — SODIUM CHLORIDE 9 MG/ML
INJECTION, SOLUTION INTRAVENOUS CONTINUOUS
Status: DISCONTINUED | OUTPATIENT
Start: 2024-06-05 | End: 2024-06-05 | Stop reason: HOSPADM

## 2024-06-05 RX ORDER — LIDOCAINE HYDROCHLORIDE 20 MG/ML
INJECTION INTRAVENOUS
Status: DISCONTINUED | OUTPATIENT
Start: 2024-06-05 | End: 2024-06-05

## 2024-06-05 RX ORDER — PROPOFOL 10 MG/ML
VIAL (ML) INTRAVENOUS
Status: DISCONTINUED | OUTPATIENT
Start: 2024-06-05 | End: 2024-06-05

## 2024-06-05 RX ADMIN — PROPOFOL 100 MG: 10 INJECTION, EMULSION INTRAVENOUS at 08:06

## 2024-06-05 RX ADMIN — GLYCOPYRROLATE 0.2 MG: 0.2 INJECTION, SOLUTION INTRAMUSCULAR; INTRAVENOUS at 08:06

## 2024-06-05 RX ADMIN — LIDOCAINE HYDROCHLORIDE 100 MG: 20 INJECTION INTRAVENOUS at 08:06

## 2024-06-05 RX ADMIN — SODIUM CHLORIDE: 0.9 INJECTION, SOLUTION INTRAVENOUS at 08:06

## 2024-06-05 NOTE — [employer] PATIENT INSTRUCTIONS
Discharge Summary/Instructions after an Endoscopic Procedure  Patient Name: Marlyn Camacho  Patient MRN: [id number]  Patient YOB: 1980 Wednesday, June 5, 2024  Abebe Saenz MD  Dear patient,  As a result of recent federal legislation (The Federal Cures Act), you may   receive lab or pathology results from your procedure in your [facility]   account before your physician is able to contact you. Your physician or   their representative will relay the results to you with their   recommendations at their soonest availability.  Thank you,  RESTRICTIONS:  During your procedure today, you received medications for sedation.  These   medications may affect your judgment, balance and coordination.  Therefore,   for 24 hours, you have the following restrictions:   - DO NOT drive a car, operate machinery, make legal/financial decisions,   sign important papers or drink alcohol.    ACTIVITY:  Today: no heavy lifting, straining or running due to procedural   sedation/anesthesia.  The following day: return to full activity including work.  DIET:  Eat and drink normally unless instructed otherwise.     TREATMENT FOR COMMON SIDE EFFECTS:  - Mild abdominal pain, nausea, belching, bloating or excessive gas:  rest,   eat lightly and use a heating pad.  - Sore Throat: treat with throat lozenges and/or gargle with warm salt   water.  - Because air was used during the procedure, expelling large amounts of air   from your rectum or belching is normal.  - If a bowel prep was taken, you may not have a bowel movement for 1-3 days.    This is normal.  SYMPTOMS TO WATCH FOR AND REPORT TO YOUR PHYSICIAN:  1. Abdominal pain or bloating, other than gas cramps.  2. Chest pain.  3. Back pain.  4. Signs of infection such as: chills or fever occurring within 24 hours   after the procedure.  5. Rectal bleeding, which would show as bright red, maroon, or black stools.   (A tablespoon of blood from the rectum is not serious, especially  if   hemorrhoids are present.)  6. Vomiting.  7. Weakness or dizziness.  GO DIRECTLY TO THE NEAREST EMERGENCY ROOM IF YOU HAVE ANY OF THE FOLLOWING:      Difficulty breathing              Chills and/or fever over 101 F   Persistent vomiting and/or vomiting blood   Severe abdominal pain   Severe chest pain   Black, tarry stools   Bleeding- more than one tablespoon   Any other symptom or condition that you feel may need urgent attention  Your doctor recommends these additional instructions:  If any biopsies were taken, your doctors clinic will contact you in 1 to 2   weeks with any results.  - Patient has a contact number available for emergencies.  The signs and   symptoms of potential delayed complications were discussed with the   patient.  Return to normal activities tomorrow.  Written discharge   instructions were provided to the patient.   - Discharge patient to home.   - Resume previous diet.   - Continue present medications.   - Await pathology results.   For questions, problems or results please call your physician - Abebe Saenz MD at Work:  [phone].  [facility] [place] [facility], EMERGENCY ROOM PHONE NUMBER: [phone]  IF A COMPLICATION OR EMERGENCY SITUATION ARISES AND YOU ARE UNABLE TO REACH   YOUR PHYSICIAN - GO DIRECTLY TO THE EMERGENCY ROOM.  Abebe Saenz MD  6/5/2024 8:23:17 AM  This report has been verified and signed electronically.  Dear patient,  As a result of recent federal legislation (The Federal Cures Act), you may   receive lab or pathology results from your procedure in your [facility]   account before your physician is able to contact you. Your physician or   their representative will relay the results to you with their   recommendations at their soonest availability.  Thank you,  [employer]ATION

## 2024-06-05 NOTE — PROVATION PATIENT INSTRUCTIONS
Discharge Summary/Instructions after an Endoscopic Procedure  Patient Name: Marlyn Camacho  Patient MRN: [id number]  Patient YOB: 1980 Wednesday, June 5, 2024  Abebe Saenz MD  Dear patient,  As a result of recent federal legislation (The Federal Cures Act), you may   receive lab or pathology results from your procedure in your [facility]   account before your physician is able to contact you. Your physician or   their representative will relay the results to you with their   recommendations at their soonest availability.  Thank you,  RESTRICTIONS:  During your procedure today, you received medications for sedation.  These   medications may affect your judgment, balance and coordination.  Therefore,   for 24 hours, you have the following restrictions:   - DO NOT drive a car, operate machinery, make legal/financial decisions,   sign important papers or drink alcohol.    ACTIVITY:  Today: no heavy lifting, straining or running due to procedural   sedation/anesthesia.  The following day: return to full activity including work.  DIET:  Eat and drink normally unless instructed otherwise.     TREATMENT FOR COMMON SIDE EFFECTS:  - Mild abdominal pain, nausea, belching, bloating or excessive gas:  rest,   eat lightly and use a heating pad.  - Sore Throat: treat with throat lozenges and/or gargle with warm salt   water.  - Because air was used during the procedure, expelling large amounts of air   from your rectum or belching is normal.  - If a bowel prep was taken, you may not have a bowel movement for 1-3 days.    This is normal.  SYMPTOMS TO WATCH FOR AND REPORT TO YOUR PHYSICIAN:  1. Abdominal pain or bloating, other than gas cramps.  2. Chest pain.  3. Back pain.  4. Signs of infection such as: chills or fever occurring within 24 hours   after the procedure.  5. Rectal bleeding, which would show as bright red, maroon, or black stools.   (A tablespoon of blood from the rectum is not serious, especially  if   hemorrhoids are present.)  6. Vomiting.  7. Weakness or dizziness.  GO DIRECTLY TO THE NEAREST EMERGENCY ROOM IF YOU HAVE ANY OF THE FOLLOWING:      Difficulty breathing              Chills and/or fever over 101 F   Persistent vomiting and/or vomiting blood   Severe abdominal pain   Severe chest pain   Black, tarry stools   Bleeding- more than one tablespoon   Any other symptom or condition that you feel may need urgent attention  Your doctor recommends these additional instructions:  If any biopsies were taken, your doctors clinic will contact you in 1 to 2   weeks with any results.  - Patient has a contact number available for emergencies.  The signs and   symptoms of potential delayed complications were discussed with the   patient.  Return to normal activities tomorrow.  Written discharge   instructions were provided to the patient.   - Discharge patient to home.   - Resume previous diet.   - Continue present medications.   - Await pathology results.   - Repeat colonoscopy in 7 years for surveillance.   For questions, problems or results please call your physician - Abebe Saenz MD at Work:  [phone].  [facility], EMERGENCY ROOM PHONE NUMBER: [phone]  IF A COMPLICATION OR EMERGENCY SITUATION ARISES AND YOU ARE UNABLE TO REACH   YOUR PHYSICIAN - GO DIRECTLY TO THE EMERGENCY ROOM.  Abebe Saenz MD  6/5/2024 8:42:14 AM  This report has been verified and signed electronically.  Dear patient,  As a result of recent federal legislation (The Federal Cures Act), you may   receive lab or pathology results from your procedure in your [facility]   account before your physician is able to contact you. Your physician or   their representative will relay the results to you with their   recommendations at their soonest availability.  Thank you,  PROVATION

## 2024-06-05 NOTE — TRANSFER OF CARE
"Anesthesia Transfer of Care Note    Patient: Marlyn Camacho    Procedure(s) Performed: Procedure(s) (LRB):  EGD (ESOPHAGOGASTRODUODENOSCOPY) (N/A)  COLONOSCOPY (N/A)    Patient location: PACU    Anesthesia Type: general    Transport from OR: Transported from OR on 2-3 L/min O2 by NC with adequate spontaneous ventilation    Post pain: adequate analgesia    Post assessment: no apparent anesthetic complications and tolerated procedure well    Post vital signs: stable    Level of consciousness: sedated    Nausea/Vomiting: no nausea/vomiting    Complications: none    Transfer of care protocol was followed      Last vitals: Visit Vitals  /81 (BP Location: Left arm, Patient Position: Lying)   Pulse 66   Temp 36.6 °C (97.9 °F) (Temporal)   Resp 16   Ht 5' 1" (1.549 m)   Wt 97.5 kg (215 lb)   LMP 02/15/2017 (Approximate)   SpO2 96%   Breastfeeding No   BMI 40.62 kg/m²     "
Yes

## 2024-06-05 NOTE — H&P
Short Stay Endoscopy History and Physical    PCP - Mindi Donahue MD    Procedure - EGD/Colonoscopy  Sedation: GA  ASA - per anesthesia  Mallampati - per anesthesia  History of Anesthesia problems - no  Family history Anesthesia problems -  no     HPI:  This is a 44 y.o. female here for evaluation of : GERD and Screening for CRC    Reflux - yes  Dysphagia - no  Abdominal pain - no  Diarrhea - no    ROS:  Constitutional: No fevers, chills, No weight loss  ENT: No allergies  CV: No chest pain  Pulm: No cough, No shortness of breath  Ophtho: No vision changes  GI: see HPI  Medical History:  has a past medical history of Abnormal Pap smear of cervix, Allergy, Hypertension, Joint pain, Keloid cicatrix, and Stroke (05/2023).    Surgical History:  has a past surgical history that includes Tubal ligation; CKC, ECC, fractional D&C; CONIZATION-CERVIX (02/2015); Dilation and curettage of uterus; and Hysterectomy (03/2017).    Family History: family history includes Breast cancer in her maternal aunt, maternal aunt, and maternal aunt; Eczema in her maternal grandmother, mother, son, and son; Hypertension in her mother; Ovarian cancer in her mother.. Otherwise no colon cancer, inflammatory bowel disease, or GI malignancies.    Social History:  reports that she has never smoked. She has never been exposed to tobacco smoke. She has never used smokeless tobacco. She reports current alcohol use. She reports that she does not use drugs.    Review of patient's allergies indicates:  No Known Allergies    Medications:   Medications Prior to Admission   Medication Sig Dispense Refill Last Dose    aspirin (ECOTRIN) 81 MG EC tablet Take 1 tablet (81 mg total) by mouth once daily. For stroke prevention 90 tablet 3 Past Week    fluticasone propionate (FLONASE) 50 mcg/actuation nasal spray 2 sprays (100 mcg total) by Each Nostril route once daily. 15 g 0 Past Month    GAVILYTE-C 240-22.72-6.72 -5.84 gram SolR Take 4,000 mLs by mouth  once.   6/5/2024    linaCLOtide (LINZESS) 145 mcg Cap capsule Take 1 capsule (145 mcg total) by mouth before breakfast. 30 capsule 2 6/4/2024    omeprazole (PRILOSEC) 40 MG capsule Take 1 capsule (40 mg total) by mouth once daily. 30 capsule 2 6/4/2024    triamterene-hydrochlorothiazide 37.5-25 mg (MAXZIDE-25) 37.5-25 mg per tablet Take 1 tablet by mouth once daily. 60 tablet 3 6/4/2024    atorvastatin (LIPITOR) 20 MG tablet Take 1 tablet (20 mg total) by mouth once daily. 30 tablet 3 Unknown    azelastine (ASTELIN) 137 mcg (0.1 %) nasal spray 2 sprays (274 mcg total) by Nasal route 2 (two) times daily. 30 mL 0     ibuprofen (ADVIL,MOTRIN) 800 MG tablet Take 1 tablet (800 mg total) by mouth every 8 (eight) hours as needed for Pain. 30 tablet 0 Unknown       Objective Findings:    Vital Signs: Per nursing notes.    Physical Exam:  General Appearance: Well appearing in no acute distress  Head:   Normocephalic, without obvious abnormality  Eyes:    No scleral icterus  Airway: Open  Neck: No restriction in mobility  Lungs: CTA bilaterally in anterior and posterior fields, no wheezes, no crackles.  Heart:  Regular rate and rhythm, S1, S2 normal, no murmurs heard  Abdomen: Soft, non tender, non distended      Labs:  Lab Results   Component Value Date    WBC 6.71 04/30/2024    HGB 13.4 04/30/2024    HCT 39.8 04/30/2024     04/30/2024    CHOL 225 (H) 04/30/2024    TRIG 97 04/30/2024    HDL 42 04/30/2024    ALT 17 04/30/2024    AST 15 04/30/2024     04/30/2024    K 3.4 (L) 04/30/2024     04/30/2024    CREATININE 0.9 04/30/2024    BUN 8 04/30/2024    CO2 24 04/30/2024    TSH 0.495 04/30/2024    INR 0.9 05/25/2023    HGBA1C 5.2 04/30/2024         I have explained the risks and benefits of endoscopy procedures to the patient including but not limited to bleeding, perforation, infection, and death.    Thank you so much for allowing me to participate in the care of Marlyn Saenz,  MD

## 2024-06-05 NOTE — ANESTHESIA POSTPROCEDURE EVALUATION
Anesthesia Post Evaluation    Patient: Marlyn Camacho    Procedure(s) Performed: Procedure(s) (LRB):  EGD (ESOPHAGOGASTRODUODENOSCOPY) (N/A)  COLONOSCOPY (N/A)    Final Anesthesia Type: general      Patient location during evaluation: PACU  Patient participation: Yes- Able to Participate  Level of consciousness: awake and alert  Post-procedure vital signs: reviewed and stable  Pain management: adequate  Airway patency: patent    PONV status at discharge: No PONV  Anesthetic complications: no      Cardiovascular status: blood pressure returned to baseline  Respiratory status: unassisted  Hydration status: euvolemic  Follow-up not needed.            Vitals Value Taken Time   /89 06/05/24 0911   Temp  06/05/24 0945   Pulse 84 06/05/24 0911   Resp 16 06/05/24 0911   SpO2 96 % 06/05/24 0911         Event Time   Out of Recovery 09:01:00         Pain/Miguel Score: Miguel Score: 10 (6/5/2024  9:01 AM)

## 2024-06-06 ENCOUNTER — LAB VISIT (OUTPATIENT)
Dept: LAB | Facility: HOSPITAL | Age: 44
End: 2024-06-06
Attending: FAMILY MEDICINE
Payer: COMMERCIAL

## 2024-06-06 DIAGNOSIS — I10 ESSENTIAL HYPERTENSION: ICD-10-CM

## 2024-06-06 LAB
ANION GAP SERPL CALC-SCNC: 6 MMOL/L (ref 8–16)
BUN SERPL-MCNC: 5 MG/DL (ref 6–20)
CALCIUM SERPL-MCNC: 9.7 MG/DL (ref 8.7–10.5)
CHLORIDE SERPL-SCNC: 102 MMOL/L (ref 95–110)
CO2 SERPL-SCNC: 29 MMOL/L (ref 23–29)
CREAT SERPL-MCNC: 1 MG/DL (ref 0.5–1.4)
EST. GFR  (NO RACE VARIABLE): >60 ML/MIN/1.73 M^2
GLUCOSE SERPL-MCNC: 83 MG/DL (ref 70–110)
POTASSIUM SERPL-SCNC: 3.3 MMOL/L (ref 3.5–5.1)
SODIUM SERPL-SCNC: 137 MMOL/L (ref 136–145)

## 2024-06-06 PROCEDURE — 80048 BASIC METABOLIC PNL TOTAL CA: CPT | Performed by: FAMILY MEDICINE

## 2024-06-06 PROCEDURE — 36415 COLL VENOUS BLD VENIPUNCTURE: CPT | Mod: PO | Performed by: FAMILY MEDICINE

## 2024-06-14 ENCOUNTER — TELEPHONE (OUTPATIENT)
Dept: FAMILY MEDICINE | Facility: CLINIC | Age: 44
End: 2024-06-14
Payer: COMMERCIAL

## 2024-06-14 ENCOUNTER — PATIENT MESSAGE (OUTPATIENT)
Dept: FAMILY MEDICINE | Facility: CLINIC | Age: 44
End: 2024-06-14
Payer: COMMERCIAL

## 2024-06-14 LAB
COMMENT: NORMAL
FINAL PATHOLOGIC DIAGNOSIS: NORMAL
GROSS: NORMAL
Lab: NORMAL
SUPPLEMENTAL DIAGNOSIS: NORMAL

## 2024-06-14 NOTE — TELEPHONE ENCOUNTER
----- Message from Mindi Donahue MD sent at 6/14/2024  9:50 AM CDT -----  Regarding: hypokelamia f/u  Please help pt schedule virtual visit to go over options  ----- Message -----  From: [facility]  Sent: 6/6/2024  12:03 PM CDT  To: Mindi Donahue MD

## 2024-06-17 ENCOUNTER — TELEPHONE (OUTPATIENT)
Dept: GASTROENTEROLOGY | Facility: CLINIC | Age: 44
End: 2024-06-17
Payer: COMMERCIAL

## 2024-06-17 ENCOUNTER — OFFICE VISIT (OUTPATIENT)
Dept: FAMILY MEDICINE | Facility: CLINIC | Age: 44
End: 2024-06-17
Attending: FAMILY MEDICINE
Payer: COMMERCIAL

## 2024-06-17 VITALS
BODY MASS INDEX: 40.59 KG/M2 | SYSTOLIC BLOOD PRESSURE: 117 MMHG | DIASTOLIC BLOOD PRESSURE: 88 MMHG | WEIGHT: 215 LBS | HEIGHT: 61 IN | HEART RATE: 85 BPM

## 2024-06-17 DIAGNOSIS — E87.6 HYPOKALEMIA: ICD-10-CM

## 2024-06-17 DIAGNOSIS — E66.01 OBESITY, MORBID, BMI 40.0-49.9: ICD-10-CM

## 2024-06-17 DIAGNOSIS — B35.4 TINEA CORPORIS: ICD-10-CM

## 2024-06-17 DIAGNOSIS — I10 ESSENTIAL HYPERTENSION: Primary | ICD-10-CM

## 2024-06-17 PROCEDURE — 3074F SYST BP LT 130 MM HG: CPT | Mod: CPTII,95,, | Performed by: FAMILY MEDICINE

## 2024-06-17 PROCEDURE — 99214 OFFICE O/P EST MOD 30 MIN: CPT | Mod: 95,,, | Performed by: FAMILY MEDICINE

## 2024-06-17 PROCEDURE — 3044F HG A1C LEVEL LT 7.0%: CPT | Mod: CPTII,95,, | Performed by: FAMILY MEDICINE

## 2024-06-17 PROCEDURE — 3079F DIAST BP 80-89 MM HG: CPT | Mod: CPTII,95,, | Performed by: FAMILY MEDICINE

## 2024-06-17 PROCEDURE — 3008F BODY MASS INDEX DOCD: CPT | Mod: CPTII,95,, | Performed by: FAMILY MEDICINE

## 2024-06-17 RX ORDER — BISMUTH SUBCITRATE POTASSIUM, METRONIDAZOLE AND TETRACYCLINE HYDROCHLORIDE 140; 125; 125 MG/1; MG/1; MG/1
3 CAPSULE ORAL
Qty: 120 EACH | Refills: 0 | Status: SHIPPED | OUTPATIENT
Start: 2024-06-17 | End: 2024-06-27

## 2024-06-17 RX ORDER — CLOTRIMAZOLE AND BETAMETHASONE DIPROPIONATE 10; .64 MG/G; MG/G
CREAM TOPICAL 2 TIMES DAILY
Qty: 45 G | Refills: 0 | Status: SHIPPED | OUTPATIENT
Start: 2024-06-17

## 2024-06-17 RX ORDER — FLUCONAZOLE 150 MG/1
TABLET ORAL
Qty: 2 TABLET | Refills: 0 | Status: SHIPPED | OUTPATIENT
Start: 2024-06-17

## 2024-06-17 RX ORDER — SPIRONOLACTONE 50 MG/1
50 TABLET, FILM COATED ORAL DAILY
Qty: 90 TABLET | Refills: 0 | Status: SHIPPED | OUTPATIENT
Start: 2024-06-17 | End: 2024-09-15

## 2024-06-17 RX ORDER — OMEPRAZOLE 40 MG/1
40 CAPSULE, DELAYED RELEASE ORAL
Qty: 20 CAPSULE | Refills: 0 | Status: SHIPPED | OUTPATIENT
Start: 2024-06-17 | End: 2024-06-27

## 2024-06-17 NOTE — TELEPHONE ENCOUNTER
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 Treatment has been sent to [facility] #[id number] - [place] - [address] [facility] [address]. The stool will turn black due to Pepto bismol in the medication combination and avoid drinking alcohol while on treatment.    The colon polyps were benign.

## 2024-06-17 NOTE — PROGRESS NOTES
Please notify patient, the biopsies reveal an infection with H.pylori bacteria. Treatment has been sent to [facility] #[id number] - [place] - [address] [facility] AT [facility] [address]. The stool will turn black due to Pepto bismol in the medication combination and avoid drinking alcohol while on treatment.    The colon polyps were benign.

## 2024-06-17 NOTE — PROGRESS NOTES
The patient location is: work  The chief complaint leading to consultation is: htn    Visit type: audiovisual    Face to Face time with patient: 20  30 minutes of total time spent on the encounter, which includes face to face time and non-face to face time preparing to see the patient (eg, review of tests), Obtaining and/or reviewing separately obtained history, Documenting clinical information in the electronic or other health record, Independently interpreting results (not separately reported) and communicating results to the patient/family/caregiver, or Care coordination (not separately reported).         Each patient to whom he or she provides medical services by telemedicine is:  (1) informed of the relationship between the physician and patient and the respective role of any other health care provider with respect to management of the patient; and (2) notified that he or she may decline to receive medical services by telemedicine and may withdraw from such care at any time.    Notes:   Subjective:       Patient ID: Marlyn Camacho is a 44 y.o. female.    Chief Complaint: Hypertension    Hypertension  Pertinent negatives include no chest pain, headaches, neck pain or palpitations.     Pt is in virtual visit for follow up of htn bp fine on diuretic combo but potassium remains low no sob/cp no muscle cramps  Pt is c/o groin and submammary itching red rash now burning she is not treating this - several weeks getting worse  Pt is obese not on weight loss diet consistently   Review of Systems   Constitutional:  Negative for activity change, chills, fatigue, fever and unexpected weight change.   HENT:  Negative for hearing loss, rhinorrhea and trouble swallowing.    Eyes:  Negative for discharge and visual disturbance.   Respiratory:  Negative for cough, chest tightness and wheezing.    Cardiovascular:  Negative for chest pain and palpitations.   Gastrointestinal:  Negative for blood in stool, constipation,  "diarrhea and vomiting.   Endocrine: Negative for polydipsia and polyuria.   Genitourinary:  Negative for difficulty urinating, dysuria, hematuria and menstrual problem.   Musculoskeletal:  Negative for arthralgias, joint swelling and neck pain.   Skin:  Positive for color change and rash.   Neurological:  Negative for weakness and headaches.   Psychiatric/Behavioral:  Negative for confusion and dysphoric mood.        Objective:    /88   Pulse 85   Ht 5' 1" (1.549 m)   Wt 97.5 kg (215 lb)   LMP 02/15/2017 (Approximate)   BMI 40.62 kg/m²     Physical Exam  Constitutional:       Appearance: She is obese. She is not ill-appearing.   Eyes:      Extraocular Movements: Extraocular movements intact.   Pulmonary:      Effort: Pulmonary effort is normal. No respiratory distress.   Skin:     Comments: Virtual visit not able to appreciate   Neurological:      General: No focal deficit present.      Mental Status: She is alert and oriented to person, place, and time.      Cranial Nerves: No cranial nerve deficit.      Coordination: Coordination normal.   Psychiatric:         Mood and Affect: Mood normal.         Behavior: Behavior normal.         Thought Content: Thought content normal.         Judgment: Judgment normal.       K - 3.3 in 6/2024  Assessment:       1. Essential hypertension    2. Tinea corporis    3. Hypokalemia        Plan:     Orders bmp pta 1 month  D/c triam hctz  Start spironolactone  Graded exercise  Start lotrisone cream  Start diflucan  F/u derm if needed  Rtc 1 month lab pta       "This note will not be shared with the patient."   "

## 2024-07-08 RX ORDER — LINACLOTIDE 145 UG/1
145 CAPSULE, GELATIN COATED ORAL
Qty: 30 CAPSULE | Refills: 2 | Status: SHIPPED | OUTPATIENT
Start: 2024-07-08

## 2024-07-19 ENCOUNTER — HOSPITAL ENCOUNTER (EMERGENCY)
Facility: HOSPITAL | Age: 44
Discharge: HOME | End: 2024-07-19
Attending: EMERGENCY MEDICINE
Payer: COMMERCIAL

## 2024-07-19 VITALS
OXYGEN SATURATION: 97 % | TEMPERATURE: 98 F | SYSTOLIC BLOOD PRESSURE: 126 MMHG | HEART RATE: 74 BPM | BODY MASS INDEX: 40.59 KG/M2 | HEIGHT: 61 IN | WEIGHT: 215 LBS | DIASTOLIC BLOOD PRESSURE: 87 MMHG | RESPIRATION RATE: 19 BRPM

## 2024-07-19 DIAGNOSIS — U07.1 COVID-19: Primary | ICD-10-CM

## 2024-07-19 DIAGNOSIS — U07.1 COVID-19 VIRUS DETECTED: ICD-10-CM

## 2024-07-19 LAB
CTP QC/QA: YES
CTP QC/QA: YES
MOLECULAR STREP A: NEGATIVE
POC RAPID STREP A: NEGATIVE
SARS-COV-2 RDRP RESP QL NAA+PROBE: POSITIVE

## 2024-07-19 PROCEDURE — 99284 EMERGENCY DEPT VISIT MOD MDM: CPT | Mod: 25,ER

## 2024-07-19 PROCEDURE — 87651 STREP A DNA AMP PROBE: CPT | Mod: ER

## 2024-07-19 PROCEDURE — 25000003 PHARM REV CODE 250: Mod: ER | Performed by: PHYSICIAN ASSISTANT

## 2024-07-19 PROCEDURE — 87635 SARS-COV-2 COVID-19 AMP PRB: CPT | Mod: ER | Performed by: PHYSICIAN ASSISTANT

## 2024-07-19 PROCEDURE — 87880 STREP A ASSAY W/OPTIC: CPT | Mod: ER

## 2024-07-19 PROCEDURE — 96372 THER/PROPH/DIAG INJ SC/IM: CPT | Performed by: PHYSICIAN ASSISTANT

## 2024-07-19 PROCEDURE — 63600175 PHARM REV CODE 636 W HCPCS: Mod: ER | Performed by: PHYSICIAN ASSISTANT

## 2024-07-19 RX ORDER — ACETAMINOPHEN 500 MG
500 TABLET ORAL
Status: COMPLETED | OUTPATIENT
Start: 2024-07-19 | End: 2024-07-19

## 2024-07-19 RX ORDER — DEXAMETHASONE SODIUM PHOSPHATE 4 MG/ML
6 INJECTION, SOLUTION INTRA-ARTICULAR; INTRALESIONAL; INTRAMUSCULAR; INTRAVENOUS; SOFT TISSUE
Status: COMPLETED | OUTPATIENT
Start: 2024-07-19 | End: 2024-07-19

## 2024-07-19 RX ORDER — BENZONATATE 100 MG/1
100 CAPSULE ORAL 3 TIMES DAILY PRN
Qty: 20 CAPSULE | Refills: 0 | Status: SHIPPED | OUTPATIENT
Start: 2024-07-19 | End: 2024-07-26

## 2024-07-19 RX ORDER — ACETAMINOPHEN 500 MG
500 TABLET ORAL EVERY 4 HOURS PRN
Qty: 20 TABLET | Refills: 0 | Status: SHIPPED | OUTPATIENT
Start: 2024-07-19 | End: 2024-07-24

## 2024-07-19 RX ORDER — IBUPROFEN 600 MG/1
600 TABLET ORAL EVERY 6 HOURS PRN
Qty: 20 TABLET | Refills: 0 | Status: SHIPPED | OUTPATIENT
Start: 2024-07-19 | End: 2024-07-24

## 2024-07-19 RX ORDER — PROMETHAZINE HYDROCHLORIDE AND DEXTROMETHORPHAN HYDROBROMIDE 6.25; 15 MG/5ML; MG/5ML
5 SYRUP ORAL EVERY 4 HOURS PRN
Qty: 100 ML | Refills: 0 | Status: SHIPPED | OUTPATIENT
Start: 2024-07-19 | End: 2024-07-22

## 2024-07-19 RX ORDER — ALBUTEROL SULFATE 90 UG/1
1-2 AEROSOL, METERED RESPIRATORY (INHALATION) EVERY 6 HOURS PRN
Qty: 18 G | Refills: 0 | Status: SHIPPED | OUTPATIENT
Start: 2024-07-19 | End: 2024-08-18

## 2024-07-19 RX ADMIN — DEXAMETHASONE SODIUM PHOSPHATE 6 MG: 4 INJECTION INTRA-ARTICULAR; INTRALESIONAL; INTRAMUSCULAR; INTRAVENOUS; SOFT TISSUE at 03:07

## 2024-07-19 RX ADMIN — ACETAMINOPHEN 500 MG: 500 TABLET, FILM COATED ORAL at 02:07

## 2024-07-19 NOTE — DISCHARGE INSTRUCTIONS

## 2024-07-19 NOTE — ED PROVIDER NOTES
Encounter Date: 7/19/2024       History     Chief Complaint   Patient presents with    URI     Patient presents w/ a c/o of URI sx (sore throat, body aches, sob, and congestion) since yesterday. Reports being exposed to COVID.     Chief complaint: URI    HPI:     44-year-old female with history of hypertension, pulmonary nodules presenting for evaluation of nasal congestion, rhinorrhea, sore throat, generalized myalgias headache and cough with associated intermittent shortness of breath.  She reports this began after exposure to COVID 19 at work.  Reports history of previous possible stroke requiring tPA.  She denies history of clotting disorders.  No use of blood thinners.  No attempted treatment    The history is provided by the patient.     Review of patient's allergies indicates:  No Known Allergies  Past Medical History:   Diagnosis Date    Abnormal Pap smear of cervix     Allergy     Hypertension     Joint pain     Keloid cicatrix     Stroke 05/2023    Possible     Past Surgical History:   Procedure Laterality Date    CKC, ECC, fractional D&C      COLONOSCOPY N/A 6/5/2024    Procedure: COLONOSCOPY;  Surgeon: Abebe Saenz MD;  Location: [facility] ENDOSCOPY;  Service: Endoscopy;  Laterality: N/A;    CONIZATION-CERVIX  02/2015    DILATION AND CURETTAGE OF UTERUS      ESOPHAGOGASTRODUODENOSCOPY N/A 6/5/2024    Procedure: EGD (ESOPHAGOGASTRODUODENOSCOPY);  Surgeon: Abebe Saenz MD;  Location: [facility] ENDOSCOPY;  Service: Endoscopy;  Laterality: N/A;  clinic pt of dr. saenz; peg sent to [facility]; instr via portal; AP  5/28/24- pc complete. DBM    HYSTERECTOMY  03/2017    AUB, fibroids    TUBAL LIGATION       Family History   Problem Relation Name Age of Onset    Eczema Mother      Ovarian cancer Mother      Hypertension Mother      Breast cancer Maternal Aunt          THREE AUNTS    Breast cancer Maternal Aunt      Breast cancer Maternal Aunt      Eczema Maternal Grandmother      Eczema Son      Eczema Son       Melanoma Neg Hx      Lupus Neg Hx      Psoriasis Neg Hx      Colon cancer Neg Hx       Social History     Tobacco Use    Smoking status: Never     Passive exposure: Never    Smokeless tobacco: Never   Substance Use Topics    Alcohol use: Yes     Comment: occasional    Drug use: No     Review of Systems   Constitutional:  Negative for chills and fever.   HENT:  Positive for congestion and rhinorrhea. Negative for ear pain, nosebleeds, sore throat and trouble swallowing.    Eyes:  Negative for redness.   Respiratory:  Positive for cough and shortness of breath. Negative for stridor.    Cardiovascular:  Negative for chest pain.   Gastrointestinal:  Negative for abdominal pain, constipation, diarrhea, nausea and vomiting.   Genitourinary:  Negative for decreased urine volume, dysuria, frequency, hematuria and urgency.   Musculoskeletal:  Positive for myalgias. Negative for back pain and neck pain.   Skin:  Negative for rash and wound.   Neurological:  Positive for headaches. Negative for dizziness, speech difficulty, weakness, light-headedness and numbness.   Hematological:  Does not bruise/bleed easily.   Psychiatric/Behavioral:  Negative for confusion.        Physical Exam     Initial Vitals [07/19/24 1420]   BP Pulse Resp Temp SpO2   (!) 132/90 85 20 98.2 °F (36.8 °C) 99 %      MAP       --         Physical Exam    Nursing note and vitals reviewed.  Constitutional: She appears well-developed and well-nourished. No distress.   HENT:   Head: Normocephalic.   Right Ear: External ear normal.   Left Ear: External ear normal.   Nose: Mucosal edema present.   Mouth/Throat: Uvula is midline and mucous membranes are normal. Posterior oropharyngeal erythema present. No oropharyngeal exudate or posterior oropharyngeal edema.   Eyes: Conjunctivae are normal. Right eye exhibits no discharge. Left eye exhibits no discharge. No scleral icterus.   Neck: No tracheal deviation present.   Cardiovascular:  Normal rate and regular  rhythm.     Exam reveals no gallop and no friction rub.       No murmur heard.  Pulmonary/Chest: Breath sounds normal. No stridor. No respiratory distress. She has no wheezes. She has no rhonchi. She has no rales.   SpO2 99% on room air   Musculoskeletal:         General: Normal range of motion.     Neurological: She is alert.   Skin: Skin is warm and dry. No rash noted. No erythema.   Psychiatric: She has a normal mood and affect. Her behavior is normal. Judgment and thought content normal.         ED Course   Procedures  Labs Reviewed   SARS-COV-2 RDRP GENE - Abnormal; Notable for the following components:       Result Value    POC Rapid COVID Positive (*)     All other components within normal limits    Narrative:     This test utilizes isothermal nucleic acid amplification technology to detect the SARS-CoV-2 RdRp nucleic acid segment. The analytical sensitivity (limit of detection) is 500 copies/swab.     A POSITIVE result is indicative of the presence of SARS-CoV-2 RNA; clinical correlation with patient history and other diagnostic information is necessary to determine patient infection status.    A NEGATIVE result means that SARS-CoV-2 nucleic acids are not present above the limit of detection. A NEGATIVE result should be treated as presumptive. It does not rule out the possibility of COVID-19 and should not be the sole basis for treatment decisions. If COVID-19 is strongly suspected based on clinical and exposure history, re-testing using an alternate molecular assay should be considered.     Commercial kits are provided by [employer].   _________________________________________________________________   The authorized Fact Sheet for Healthcare Providers and the authorized Fact Sheet for Patients of the ID NOW COVID-19 are available on the FDA website:    https://www.fda.gov/media/452529/download      https://www.fda.gov/media/204043/download      POCT STREP A MOLECULAR   POCT STREP A, RAPID           Imaging Results    None          Medications   dexAMETHasone injection 6 mg (6 mg Intramuscular Given 7/19/24 1507)   acetaminophen tablet 500 mg (500 mg Oral Given 7/19/24 9968)     Medical Decision Making  44 yr old patient presenting with constellation of symptoms most c/w COVID 19 with a positive COVID 19 test.     Also considered but less likely:  PTA/RPA: no hot potato voice, no uvular deviation,  Esophageal rupture: No history of dysphagia  Unlikely deep space infection/Daniel's  Low suspicion for CNS infection or bacterial sinusitis given exam and history.  Strep:  neg  Flu: neg  Lungs ctab doubt pna.     Patient given decadron IM and tylenol PO in the emergency department. To be discharged home on meds for symtpomatic tx. No respiratory distress, otherwise relatively well appearing and nontoxic. O2 sats of 99% and patient in no acute distress. Return precautions given, patient understands and agrees with plan. All questions answered.  Instructed to follow up with PCP. There is currently no accepted treatment except conservative measures and respiratory support if appropriate.  This patient will be discharged home with strict return precautions if worsening respiratory status.  Patient was made aware other resource limitations and the fact that they could have worsening symptoms.  Patient was informed to quarantine themselves for per guidelines. Offered paxlovid and pt declined.         Amount and/or Complexity of Data Reviewed  Labs: ordered.    Risk  OTC drugs.  Prescription drug management.                                      Clinical Impression:  Final diagnoses:  [U07.1] COVID-19 (Primary)          ED Disposition Condition    Discharge Stable          ED Prescriptions       Medication Sig Dispense Start Date End Date Auth. Provider    ibuprofen (ADVIL,MOTRIN) 600 MG tablet Take 1 tablet (600 mg total) by mouth every 6 (six) hours as needed. 20 tablet 7/19/2024 7/24/2024 Lucy Gallegos PA-C     acetaminophen (TYLENOL) 500 MG tablet Take 1 tablet (500 mg total) by mouth every 4 (four) hours as needed. 20 tablet 7/19/2024 7/24/2024 Lucy Gallegos PA-C    benzonatate (TESSALON) 100 MG capsule Take 1 capsule (100 mg total) by mouth 3 (three) times daily as needed. 20 capsule 7/19/2024 7/26/2024 Lucy Gallegos PA-C    promethazine-dextromethorphan (PROMETHAZINE-DM) 6.25-15 mg/5 mL Syrp Take 5 mLs by mouth every 4 (four) hours as needed. MAY CAUSE DROWSINESS 100 mL 7/19/2024 7/22/2024 Lucy Gallegos PA-C    albuterol (PROVENTIL/VENTOLIN HFA) 90 mcg/actuation inhaler Inhale 1-2 puffs into the lungs every 6 (six) hours as needed for Wheezing or Shortness of Breath. Rescue 18 g 7/19/2024 8/18/2024 Lucy Gallegos PA-C          Follow-up Information       Follow up With Specialties Details Why Contact Info    Mindi Donahue MD Family Medicine Schedule an appointment as soon as possible for a visit in 2 days for follow up [address]  [address]  [facility] [address]  [phone]      [facility] ED Emergency Medicine Go to  As needed, If symptoms worsen [address] [address] [phone]             Lucy Gallegos PA-C  07/19/24 1249

## 2024-07-19 NOTE — Clinical Note
"Marlyn"Dori Camacho was seen and treated in our emergency department on 7/19/2024.     COVID-19 is present in our communities across the state. There is limited testing for COVID at this time, so not all patients can be tested. In this situation, your employee meets the following criteria:    Marlyn Camacho has met the criteria for COVID-19 testing and has a POSITIVE result. She can return to work once they are asymptomatic for 24 hours without the use of fever reducing medications AND at least five days from the first positive result. A mask is recommended for 5 days post quarantine.     If you have any questions or concerns, or if I can be of further assistance, please do not hesitate to contact me.    Sincerely,             Lucy Gallegos PA-C"

## 2024-08-05 ENCOUNTER — LAB VISIT (OUTPATIENT)
Dept: LAB | Facility: HOSPITAL | Age: 44
End: 2024-08-05
Attending: FAMILY MEDICINE
Payer: COMMERCIAL

## 2024-08-05 DIAGNOSIS — I10 ESSENTIAL HYPERTENSION: ICD-10-CM

## 2024-08-05 DIAGNOSIS — E78.2 MIXED HYPERLIPIDEMIA: ICD-10-CM

## 2024-08-05 LAB
ALBUMIN SERPL BCP-MCNC: 3.7 G/DL (ref 3.5–5.2)
ALP SERPL-CCNC: 62 U/L (ref 55–135)
ALT SERPL W/O P-5'-P-CCNC: 46 U/L (ref 10–44)
ANION GAP SERPL CALC-SCNC: 8 MMOL/L (ref 8–16)
AST SERPL-CCNC: 29 U/L (ref 10–40)
BILIRUB SERPL-MCNC: 0.6 MG/DL (ref 0.1–1)
BUN SERPL-MCNC: 11 MG/DL (ref 6–20)
CALCIUM SERPL-MCNC: 9.8 MG/DL (ref 8.7–10.5)
CHLORIDE SERPL-SCNC: 104 MMOL/L (ref 95–110)
CHOLEST SERPL-MCNC: 151 MG/DL (ref 120–199)
CHOLEST/HDLC SERPL: 3.4 {RATIO} (ref 2–5)
CO2 SERPL-SCNC: 25 MMOL/L (ref 23–29)
CREAT SERPL-MCNC: 0.9 MG/DL (ref 0.5–1.4)
EST. GFR  (NO RACE VARIABLE): >60 ML/MIN/1.73 M^2
GLUCOSE SERPL-MCNC: 76 MG/DL (ref 70–110)
HDLC SERPL-MCNC: 45 MG/DL (ref 40–75)
HDLC SERPL: 29.8 % (ref 20–50)
LDLC SERPL CALC-MCNC: 93.4 MG/DL (ref 63–159)
NONHDLC SERPL-MCNC: 106 MG/DL
POTASSIUM SERPL-SCNC: 3.9 MMOL/L (ref 3.5–5.1)
PROT SERPL-MCNC: 7.1 G/DL (ref 6–8.4)
SODIUM SERPL-SCNC: 137 MMOL/L (ref 136–145)
TRIGL SERPL-MCNC: 63 MG/DL (ref 30–150)

## 2024-08-05 PROCEDURE — 80053 COMPREHEN METABOLIC PANEL: CPT | Performed by: FAMILY MEDICINE

## 2024-08-05 PROCEDURE — 36415 COLL VENOUS BLD VENIPUNCTURE: CPT | Mod: PO | Performed by: FAMILY MEDICINE

## 2024-08-05 PROCEDURE — 80061 LIPID PANEL: CPT | Performed by: FAMILY MEDICINE

## 2024-08-06 ENCOUNTER — OFFICE VISIT (OUTPATIENT)
Dept: FAMILY MEDICINE | Facility: CLINIC | Age: 44
End: 2024-08-06
Attending: FAMILY MEDICINE
Payer: COMMERCIAL

## 2024-08-06 VITALS
DIASTOLIC BLOOD PRESSURE: 80 MMHG | HEART RATE: 75 BPM | WEIGHT: 215 LBS | SYSTOLIC BLOOD PRESSURE: 120 MMHG | BODY MASS INDEX: 40.59 KG/M2 | HEIGHT: 61 IN

## 2024-08-06 DIAGNOSIS — R60.0 BILATERAL LEG EDEMA: Primary | ICD-10-CM

## 2024-08-06 DIAGNOSIS — E87.6 HYPOKALEMIA: ICD-10-CM

## 2024-08-06 DIAGNOSIS — E66.01 OBESITY, MORBID, BMI 40.0-49.9: ICD-10-CM

## 2024-08-06 DIAGNOSIS — I10 ESSENTIAL HYPERTENSION: ICD-10-CM

## 2024-08-06 PROCEDURE — 3079F DIAST BP 80-89 MM HG: CPT | Mod: CPTII,95,, | Performed by: FAMILY MEDICINE

## 2024-08-06 PROCEDURE — 3074F SYST BP LT 130 MM HG: CPT | Mod: CPTII,95,, | Performed by: FAMILY MEDICINE

## 2024-08-06 PROCEDURE — 3044F HG A1C LEVEL LT 7.0%: CPT | Mod: CPTII,95,, | Performed by: FAMILY MEDICINE

## 2024-08-06 PROCEDURE — 3008F BODY MASS INDEX DOCD: CPT | Mod: CPTII,95,, | Performed by: FAMILY MEDICINE

## 2024-08-06 PROCEDURE — 99214 OFFICE O/P EST MOD 30 MIN: CPT | Mod: 95,,, | Performed by: FAMILY MEDICINE

## 2024-08-06 RX ORDER — FUROSEMIDE 20 MG/1
10 TABLET ORAL 2 TIMES DAILY
Qty: 90 TABLET | Refills: 0 | Status: SHIPPED | OUTPATIENT
Start: 2024-08-06 | End: 2025-08-06

## 2024-09-15 RX ORDER — SPIRONOLACTONE 50 MG/1
TABLET, FILM COATED ORAL
Qty: 90 TABLET | Refills: 3 | Status: SHIPPED | OUTPATIENT
Start: 2024-09-15

## 2024-09-15 NOTE — TELEPHONE ENCOUNTER
Refill Decision Note   Marlyn Camacho  is requesting a refill authorization.  Brief Assessment and Rationale for Refill:  Approve     Medication Therapy Plan:        Comments:     Note composed:2:20 PM 09/15/2024           
No care due was identified.  Health [facility] Embedded Care Due Messages. Reference number: [id number].   9/14/2024 10:13:19 AM CDT  
139
[place]

## 2024-10-07 RX ORDER — FLUCONAZOLE 150 MG/1
TABLET ORAL
Qty: 2 TABLET | Refills: 0 | Status: SHIPPED | OUTPATIENT
Start: 2024-10-07

## 2024-10-23 ENCOUNTER — HOSPITAL ENCOUNTER (EMERGENCY)
Facility: HOSPITAL | Age: 44
Discharge: HOME | End: 2024-10-23
Attending: EMERGENCY MEDICINE
Payer: COMMERCIAL

## 2024-10-23 VITALS
TEMPERATURE: 98 F | WEIGHT: 198 LBS | BODY MASS INDEX: 37.38 KG/M2 | RESPIRATION RATE: 18 BRPM | OXYGEN SATURATION: 100 % | DIASTOLIC BLOOD PRESSURE: 80 MMHG | HEART RATE: 76 BPM | HEIGHT: 61 IN | SYSTOLIC BLOOD PRESSURE: 117 MMHG

## 2024-10-23 DIAGNOSIS — M54.12 CERVICAL RADICULOPATHY: Primary | ICD-10-CM

## 2024-10-23 PROCEDURE — 96374 THER/PROPH/DIAG INJ IV PUSH: CPT

## 2024-10-23 PROCEDURE — 99285 EMERGENCY DEPT VISIT HI MDM: CPT | Mod: 25

## 2024-10-23 PROCEDURE — 96375 TX/PRO/DX INJ NEW DRUG ADDON: CPT

## 2024-10-23 PROCEDURE — 63600175 PHARM REV CODE 636 W HCPCS: Performed by: PHYSICIAN ASSISTANT

## 2024-10-23 PROCEDURE — 25000003 PHARM REV CODE 250: Performed by: PHYSICIAN ASSISTANT

## 2024-10-23 RX ORDER — METHOCARBAMOL 500 MG/1
1000 TABLET, FILM COATED ORAL 3 TIMES DAILY
Qty: 30 TABLET | Refills: 0 | Status: SHIPPED | OUTPATIENT
Start: 2024-10-23 | End: 2024-10-28

## 2024-10-23 RX ORDER — KETOROLAC TROMETHAMINE 30 MG/ML
10 INJECTION, SOLUTION INTRAMUSCULAR; INTRAVENOUS
Status: COMPLETED | OUTPATIENT
Start: 2024-10-23 | End: 2024-10-23

## 2024-10-23 RX ORDER — ACETAMINOPHEN 500 MG
1000 TABLET ORAL
Status: COMPLETED | OUTPATIENT
Start: 2024-10-23 | End: 2024-10-23

## 2024-10-23 RX ORDER — METHYLPREDNISOLONE 4 MG/1
TABLET ORAL
Qty: 1 EACH | Refills: 0 | Status: SHIPPED | OUTPATIENT
Start: 2024-10-23 | End: 2024-11-13

## 2024-10-23 RX ORDER — DEXAMETHASONE SODIUM PHOSPHATE 4 MG/ML
8 INJECTION, SOLUTION INTRA-ARTICULAR; INTRALESIONAL; INTRAMUSCULAR; INTRAVENOUS; SOFT TISSUE
Status: COMPLETED | OUTPATIENT
Start: 2024-10-23 | End: 2024-10-23

## 2024-10-23 RX ORDER — NAPROXEN 500 MG/1
500 TABLET ORAL 2 TIMES DAILY WITH MEALS
Qty: 28 TABLET | Refills: 0 | Status: SHIPPED | OUTPATIENT
Start: 2024-10-23 | End: 2024-11-06

## 2024-10-23 RX ORDER — LORAZEPAM 1 MG/1
1 TABLET ORAL
Status: COMPLETED | OUTPATIENT
Start: 2024-10-23 | End: 2024-10-23

## 2024-10-23 RX ADMIN — LORAZEPAM 1 MG: 1 TABLET ORAL at 01:10

## 2024-10-23 RX ADMIN — DEXAMETHASONE SODIUM PHOSPHATE 8 MG: 4 INJECTION INTRA-ARTICULAR; INTRALESIONAL; INTRAMUSCULAR; INTRAVENOUS; SOFT TISSUE at 11:10

## 2024-10-23 RX ADMIN — KETOROLAC TROMETHAMINE 10 MG: 30 INJECTION, SOLUTION INTRAMUSCULAR; INTRAVENOUS at 11:10

## 2024-10-23 RX ADMIN — ACETAMINOPHEN 1000 MG: 500 TABLET ORAL at 11:10

## 2024-11-09 NOTE — TELEPHONE ENCOUNTER
No care due was identified.  Health [facility] Embedded Care Due Messages. Reference number: [id number].   11/09/2024 3:15:02 PM CST

## 2024-11-11 RX ORDER — FUROSEMIDE 20 MG/1
TABLET ORAL
Qty: 90 TABLET | Refills: 0 | Status: SHIPPED | OUTPATIENT
Start: 2024-11-11

## 2024-11-29 ENCOUNTER — TELEPHONE (OUTPATIENT)
Dept: GENETICS | Facility: CLINIC | Age: 44
End: 2024-11-29
Payer: COMMERCIAL

## 2024-11-29 NOTE — TELEPHONE ENCOUNTER
LVM informing pt to call office callback number [phone] to schedule appt with  /Herman hx of strokes .

## 2024-12-03 ENCOUNTER — OCHSNER VIRTUAL EMERGENCY DEPARTMENT (OUTPATIENT)
Facility: CLINIC | Age: 44
End: 2024-12-03

## 2024-12-03 ENCOUNTER — ON-DEMAND VIRTUAL (OUTPATIENT)
Dept: URGENT CARE | Facility: CLINIC | Age: 44
End: 2024-12-03
Payer: COMMERCIAL

## 2024-12-03 ENCOUNTER — E-CONSULT (OUTPATIENT)
Facility: CLINIC | Age: 44
End: 2024-12-03
Payer: COMMERCIAL

## 2024-12-03 DIAGNOSIS — K62.5 RECTAL BLEEDING: ICD-10-CM

## 2024-12-03 DIAGNOSIS — K64.9 HEMORRHOIDS, UNSPECIFIED HEMORRHOID TYPE: Primary | ICD-10-CM

## 2024-12-03 DIAGNOSIS — K59.00 CONSTIPATION, UNSPECIFIED CONSTIPATION TYPE: ICD-10-CM

## 2024-12-03 DIAGNOSIS — K62.5 BRIGHT RED BLOOD PER RECTUM: Primary | ICD-10-CM

## 2024-12-03 PROCEDURE — 99214 OFFICE O/P EST MOD 30 MIN: CPT | Mod: 95,,, | Performed by: NURSE PRACTITIONER

## 2024-12-03 PROCEDURE — 99451 NTRPROF PH1/NTRNET/EHR 5/>: CPT | Mod: S$GLB,,, | Performed by: EMERGENCY MEDICINE

## 2024-12-03 RX ORDER — HYDROCORTISONE ACETATE PRAMOXINE HCL 1; 1 G/100G; G/100G
CREAM TOPICAL 3 TIMES DAILY
Qty: 30 G | Refills: 0 | Status: SHIPPED | OUTPATIENT
Start: 2024-12-03

## 2024-12-03 NOTE — PROGRESS NOTES
Subjective:      Patient ID: Marlyn Camacho is a 44 y.o. female.    Vitals:  vitals were not taken for this visit.     Chief Complaint: Rectal Bleeding      Visit Type: TELE AUDIOVISUAL - This visit was conducted virtually based on  subjective information and limited objective exam    Present with the patient at the time of consultation: TELEMED PRESENT WITH PATIENT: None  Two patient identifiers used to verify patient- saying out date of birth and full name.       Past Medical History:   Diagnosis Date    Abnormal Pap smear of cervix     Allergy     Hypertension     Joint pain     Keloid cicatrix     Stroke 05/2023    Possible     Past Surgical History:   Procedure Laterality Date    CKC, ECC, fractional D&C      COLONOSCOPY N/A 6/5/2024    Procedure: COLONOSCOPY;  Surgeon: Abebe Saenz MD;  Location: [facility] ENDOSCOPY;  Service: Endoscopy;  Laterality: N/A;    CONIZATION-CERVIX  02/2015    DILATION AND CURETTAGE OF UTERUS      ESOPHAGOGASTRODUODENOSCOPY N/A 6/5/2024    Procedure: EGD (ESOPHAGOGASTRODUODENOSCOPY);  Surgeon: Abebe Saenz MD;  Location: [facility] ENDOSCOPY;  Service: Endoscopy;  Laterality: N/A;  clinic pt of dr. saenz; peg sent to walsiddharth lakhani via portal; AP  5/28/24- pc complete. DBM    HYSTERECTOMY  03/2017    AUB, fibroids    TUBAL LIGATION       Review of patient's allergies indicates:  No Known Allergies  Current Outpatient Medications on File Prior to Visit   Medication Sig Dispense Refill    albuterol (PROVENTIL/VENTOLIN HFA) 90 mcg/actuation inhaler Inhale 1-2 puffs into the lungs every 6 (six) hours as needed for Wheezing or Shortness of Breath. Rescue 18 g 0    aspirin (ECOTRIN) 81 MG EC tablet Take 1 tablet (81 mg total) by mouth once daily. For stroke prevention 90 tablet 3    atorvastatin (LIPITOR) 20 MG tablet Take 1 tablet (20 mg total) by mouth once daily. 30 tablet 3    azelastine (ASTELIN) 137 mcg (0.1 %) nasal spray 2 sprays (274 mcg total) by Nasal route 2 (two)  times daily. 30 mL 0    clotrimazole-betamethasone 1-0.05% (LOTRISONE) cream Apply topically 2 (two) times daily. 45 g 0    fluconazole (DIFLUCAN) 150 MG Tab 1 po now and repeat in 1 week 2 tablet 0    fluticasone propionate (FLONASE) 50 mcg/actuation nasal spray 2 sprays (100 mcg total) by Each Nostril route once daily. 15 g 0    furosemide (LASIX) 20 MG tablet TAKE 1/2 TABLET(10 MG) BY MOUTH TWICE DAILY 90 tablet 0    LINZESS 145 mcg Cap capsule TAKE 1 CAPSULE(145 MCG) BY MOUTH BEFORE BREAKFAST 30 capsule 2    omeprazole (PRILOSEC) 40 MG capsule Take 1 capsule (40 mg total) by mouth 2 (two) times daily before meals. for 10 days 20 capsule 0    spironolactone (ALDACTONE) 50 MG tablet TAKE 1 TABLET(50 MG) BY MOUTH DAILY 90 tablet 3     No current facility-administered medications on file prior to visit.     Family History   Problem Relation Name Age of Onset    Eczema Mother      Ovarian cancer Mother      Hypertension Mother      Breast cancer Maternal Aunt          THREE AUNTS    Breast cancer Maternal Aunt      Breast cancer Maternal Aunt      Eczema Maternal Grandmother      Eczema Son      Eczema Son      Melanoma Neg Hx      Lupus Neg Hx      Psoriasis Neg Hx      Colon cancer Neg Hx             Ohs Peq Odvv Intake    12/3/2024 12:45 PM CST - Filed by Patient   What is your current physical address in the event of a medical emergency? [address]   Are you able to take your vital signs? No   Please attach any relevant images or files    Is your employer contracted with [facility]? No         43 yo female with c/o rectal bleeding . She states incident occurred last week and again today. She states enough to turn the toliet red. She states she has hx of constipation and vaginal births x 3. She denies rectal pain and itching. She denies recent change in diet. She has been constipated lately. She does not bleed when not having a bm. She states 3 bms today first one being soft and others firm. She  states blood enough to color the toliet - bright red in color. No abdominal pain, weakness or fatigue. She is currently having sinus congestion.     Rectal Bleeding        Constitution: Negative.   HENT: Negative.     Cardiovascular: Negative.    Respiratory: Negative.     Gastrointestinal:  Positive for bright red blood in stool.   Endocrine: negative.   Genitourinary: Negative.  Negative for frequency and urgency.   Musculoskeletal: Negative.    Skin: Negative.    Allergic/Immunologic: Negative.    Neurological: Negative.    Hematologic/Lymphatic: Negative.    Psychiatric/Behavioral: Negative.          Objective:   The physical exam was conducted virtually.  LOCATION OF PATIENT [place]  AAO x 3 ; no acute distress noted; appearance normal; mood and behavior normal; thought process normal  Head- normocephalic  Nose- appears normal, no discharge or erythema  Eyes- pupils appear normal in size, no drainage, no erythema  Ears- normal appearing; no discharge, no erythema  Mouth- appears normal  Oropharynx- no erythema, lesions  Lungs- breathing at a normal rate, no acute distress noted  Heart- no reports of tachycardia, palpitations, chest pain  Abdomen- non distended, non tender reported by patient  Skin- warm and dry, no erythema or edema noted by patient or visualized  Psych- as above; no si/hi    Results  Colonoscopy (Order [id number])  Result Information    Status: Final result (Collected: 6/5/2024 07:22) Provider Status: Reviewed    Reviewed By    Mindi Donahue MD on 6/14/2024 09:48   Abebe Saenz MD on 6/5/2024 10:26      PACS Images     Show images for Colonoscopy  Colonoscopy  Order: [id number]  Status: Final result       Visible to patient: Yes (seen)       Next appt: None    0 Result Notes       1 HM Topic        Narrative  Performed by: LORENA Aquino  Patient Name: Marlyn Camacho  Procedure Date: 6/5/2024 7:22 AM  MRN: [id number]  Account Number: [id number]  YOB: 1980  Age:  44  Room: Room 1  Gender: Female  Attending MD: Abebe Saenz MD, [id number]  Procedure:             Colonoscopy  Indications:           Screening for colorectal malignant neoplasm  Providers:             Abebe Saenz MD, Veronica Samayoa RN, Lesley Soto CRNA, Moshe Santacruz  Complications:         No immediate complications.  Procedure:             Pre-Anesthesia Assessment:                         - The risks and benefits of the procedure and the                         sedation options and risks were discussed with the                         patient. All questions were answered and informed                         consent was obtained.                         - Airway Examination: normal oropharyngeal airway                         and neck mobility.                         - CV Examination: normal.                         - Respiratory Examination: clear to auscultation.                         - ASA Grade Assessment: Per anesthesia.                         - After reviewing the risks and benefits, the                         patient was deemed in satisfactory condition to                         undergo the procedure.                         - General anesthesia under the supervision of an                         anesthesiologist was determined to be medically                         necessary for this procedure based on review of                         the patient's medical history, medications, and                         prior anesthesia history.                         After I obtained informed consent, the scope was                         passed under direct vision. Throughout the                         procedure, the patient's blood pressure, pulse,                         and oxygen saturations were monitored continuously.                         The Olympus scope CF-[id number] ([id number]) was                         introduced through the anus and advanced to the                          cecum, identified by appendiceal orifice and                         ileocecal valve. The colonoscopy was performed                         without difficulty. The patient tolerated the                         procedure well. The quality of the bowel                         preparation was good. The ileocecal valve, the                         appendiceal orifice and the rectum were                         photographed.  Findings:       Hemorrhoids were found on perianal exam.       Two sessile polyps were found in the transverse colon. The polyps       were 1 to 2 mm in size. These polyps were removed with a cold snare.       Resection and retrieval were complete. Estimated blood loss was       minimal.       The exam was otherwise normal throughout the examined colon.  Impression:            - Hemorrhoids found on perianal exam.                         - Two small colon polyps were resected and                         retrieved         Assessment:     No diagnosis found.    Plan:   Discussed with JUNIOR  Patient has not been on linzess for a while due to no refills. Will restart linzess and do proctocream   Advised stool softener and fiber and exercise and water    Appt made for pcp on dec 6 and crs on di 3      Thank you for choosing [facility]!    Our goal in the [facility] is to always provide outstanding medical care. You may receive a survey by mail or e-mail in the next week regarding your experience today. We would greatly appreciate you completing and returning the survey. Your feedback provides us with a way to recognize our staff who provide very good care, and it helps us learn how to improve when your experience was below our aspiration of excellence.         We appreciate you trusting us with your medical care. We hope you feel better soon. We will be happy to take care of you for all of your future medical needs.    You must understand that you've  received an Urgent Care treatment only and that you may be released before all your medical problems are known or treated. You, the patient, will arrange for follow up care as instructed.    Follow up with your PCP or specialty clinic as directed in the next 1-2 weeks if not improved or as needed.  You can call [phone] to schedule an appointment with the appropriate provider.    If your condition worsens we recommend that you receive another evaluation in person, with your primary care provider, urgent care or at the emergency room immediately or contact your primary medical clinics after hours call service to discuss your concerns.         There are no diagnoses linked to this encounter.

## 2024-12-03 NOTE — CONSULTS
Virtual Visit - Urgent Care  Response for E-Consult     Patient Name: Marlyn Camacho  MRN: [id number]  Primary Care Provider: Mindi Donahue MD   Requesting Provider: Salena Powell FNP  E-Consult to [facility]  Consult performed by: Bernice Chauhan MD  Consult ordered by: Salena Powell FNP  Reason for consult: BRBPR    45 yo female with c/o BRBPR- one last week, then again today x 3 episodes.  Associated with constipation, took stool softner-   first stool was softer second two were firm. she states no hx of hemorrhoids. No abdominal pain, weakness, or syncope. No rectal or anal pain.  Does feel slight bulged at anus when defecating.     Per chart review-   Colonoscopy 6/5/24:  Impression:              - Hemorrhoids found on perianal exam.    - Two small colon polyps were resected and retrieved.       Recommendation:  Proctofoam, stool softener, high fiber diet. PCP appointment scheduled for 12/6 for in person evaluation.     If bleeding gets worse, becomes lightheaded/dizzy/faint- report to the emergency department immediately    Contingency that warrants a repeat eConsult or referral:  continued bleeding    Total time of Consultation: 15 minute    I did not speak to the requesting provider verbally about this.     *This eConsult is based on the clinical data available to me and is furnished without benefit of a physical examination. The eConsult will need to be interpreted in light of any clinical issues or changes in patient status not available to me at the time of filing this eConsults. Significant changes in patient condition or level of acuity should result in immediate formal consultation and reevaluation. Please alert me if you have further questions.    Thank you for this eConsult referral.     Bernice Chauhan MD  [facility] Visit - Urgent Care

## 2024-12-06 ENCOUNTER — OFFICE VISIT (OUTPATIENT)
Dept: FAMILY MEDICINE | Facility: CLINIC | Age: 44
End: 2024-12-06
Attending: FAMILY MEDICINE
Payer: COMMERCIAL

## 2024-12-06 ENCOUNTER — LAB VISIT (OUTPATIENT)
Dept: LAB | Facility: HOSPITAL | Age: 44
End: 2024-12-06
Attending: FAMILY MEDICINE
Payer: COMMERCIAL

## 2024-12-06 VITALS
DIASTOLIC BLOOD PRESSURE: 80 MMHG | HEART RATE: 71 BPM | WEIGHT: 202 LBS | BODY MASS INDEX: 38.17 KG/M2 | OXYGEN SATURATION: 100 % | SYSTOLIC BLOOD PRESSURE: 130 MMHG

## 2024-12-06 DIAGNOSIS — R58 BLOOD IN TOILET BOWL: ICD-10-CM

## 2024-12-06 DIAGNOSIS — K59.00 CONSTIPATION, UNSPECIFIED CONSTIPATION TYPE: Primary | ICD-10-CM

## 2024-12-06 DIAGNOSIS — K62.89 RECTAL PAIN: ICD-10-CM

## 2024-12-06 DIAGNOSIS — I10 ESSENTIAL HYPERTENSION: ICD-10-CM

## 2024-12-06 DIAGNOSIS — E78.2 MIXED HYPERLIPIDEMIA: ICD-10-CM

## 2024-12-06 LAB
ALBUMIN SERPL BCP-MCNC: 3.8 G/DL (ref 3.5–5.2)
ALP SERPL-CCNC: 58 U/L (ref 40–150)
ALT SERPL W/O P-5'-P-CCNC: 21 U/L (ref 10–44)
ANION GAP SERPL CALC-SCNC: 10 MMOL/L (ref 8–16)
AST SERPL-CCNC: 30 U/L (ref 10–40)
BASOPHILS # BLD AUTO: 0.05 K/UL (ref 0–0.2)
BASOPHILS NFR BLD: 0.6 % (ref 0–1.9)
BILIRUB SERPL-MCNC: 0.4 MG/DL (ref 0.1–1)
BUN SERPL-MCNC: 7 MG/DL (ref 6–20)
CALCIUM SERPL-MCNC: 9.3 MG/DL (ref 8.7–10.5)
CHLORIDE SERPL-SCNC: 104 MMOL/L (ref 95–110)
CO2 SERPL-SCNC: 25 MMOL/L (ref 23–29)
CREAT SERPL-MCNC: 0.8 MG/DL (ref 0.5–1.4)
DIFFERENTIAL METHOD BLD: ABNORMAL
EOSINOPHIL # BLD AUTO: 0.1 K/UL (ref 0–0.5)
EOSINOPHIL NFR BLD: 1.3 % (ref 0–8)
ERYTHROCYTE [DISTWIDTH] IN BLOOD BY AUTOMATED COUNT: 13.2 % (ref 11.5–14.5)
EST. GFR  (NO RACE VARIABLE): >60 ML/MIN/1.73 M^2
GLUCOSE SERPL-MCNC: 94 MG/DL (ref 70–110)
HCT VFR BLD AUTO: 39.6 % (ref 37–48.5)
HGB BLD-MCNC: 13.3 G/DL (ref 12–16)
IMM GRANULOCYTES # BLD AUTO: 0.05 K/UL (ref 0–0.04)
IMM GRANULOCYTES NFR BLD AUTO: 0.6 % (ref 0–0.5)
LYMPHOCYTES # BLD AUTO: 2.6 K/UL (ref 1–4.8)
LYMPHOCYTES NFR BLD: 32.8 % (ref 18–48)
MCH RBC QN AUTO: 32 PG (ref 27–31)
MCHC RBC AUTO-ENTMCNC: 33.6 G/DL (ref 32–36)
MCV RBC AUTO: 95 FL (ref 82–98)
MONOCYTES # BLD AUTO: 0.5 K/UL (ref 0.3–1)
MONOCYTES NFR BLD: 5.7 % (ref 4–15)
NEUTROPHILS # BLD AUTO: 4.6 K/UL (ref 1.8–7.7)
NEUTROPHILS NFR BLD: 59 % (ref 38–73)
NRBC BLD-RTO: 0 /100 WBC
PLATELET # BLD AUTO: 329 K/UL (ref 150–450)
PMV BLD AUTO: 9.9 FL (ref 9.2–12.9)
POTASSIUM SERPL-SCNC: 3.5 MMOL/L (ref 3.5–5.1)
PROT SERPL-MCNC: 7.1 G/DL (ref 6–8.4)
RBC # BLD AUTO: 4.15 M/UL (ref 4–5.4)
SODIUM SERPL-SCNC: 139 MMOL/L (ref 136–145)
WBC # BLD AUTO: 7.84 K/UL (ref 3.9–12.7)

## 2024-12-06 PROCEDURE — 99999 PR PBB SHADOW E&M-EST. PATIENT-LVL III: CPT | Mod: PBBFAC,,, | Performed by: FAMILY MEDICINE

## 2024-12-06 PROCEDURE — 3075F SYST BP GE 130 - 139MM HG: CPT | Mod: CPTII,S$GLB,, | Performed by: FAMILY MEDICINE

## 2024-12-06 PROCEDURE — 80053 COMPREHEN METABOLIC PANEL: CPT | Performed by: FAMILY MEDICINE

## 2024-12-06 PROCEDURE — 3008F BODY MASS INDEX DOCD: CPT | Mod: CPTII,S$GLB,, | Performed by: FAMILY MEDICINE

## 2024-12-06 PROCEDURE — 36415 COLL VENOUS BLD VENIPUNCTURE: CPT | Mod: PO | Performed by: FAMILY MEDICINE

## 2024-12-06 PROCEDURE — 99214 OFFICE O/P EST MOD 30 MIN: CPT | Mod: S$GLB,,, | Performed by: FAMILY MEDICINE

## 2024-12-06 PROCEDURE — 3079F DIAST BP 80-89 MM HG: CPT | Mod: CPTII,S$GLB,, | Performed by: FAMILY MEDICINE

## 2024-12-06 PROCEDURE — 3044F HG A1C LEVEL LT 7.0%: CPT | Mod: CPTII,S$GLB,, | Performed by: FAMILY MEDICINE

## 2024-12-06 PROCEDURE — 85025 COMPLETE CBC W/AUTO DIFF WBC: CPT | Performed by: FAMILY MEDICINE

## 2024-12-13 ENCOUNTER — TELEPHONE (OUTPATIENT)
Dept: GENETICS | Facility: CLINIC | Age: 44
End: 2024-12-13
Payer: COMMERCIAL

## 2024-12-13 NOTE — TELEPHONE ENCOUNTER
Spoke with Pt to schedule genetics appt.Pt Scheduled in person genetics appt for  12/26 at 9am. Pt understood and confirmed appt.       ----- Message from More sent at 12/13/2024  9:41 AM CST -----  Contact: Marlyn   Patient is returning a phone call.    Who left a message for the patient: Erlinda     Does patient know what this is regarding:  appt scheduling     Would you like a call back, or a response through your [facility] portal?:   call back     Comments:

## 2024-12-13 NOTE — TELEPHONE ENCOUNTER
LVM informing pt to call office callback number [phone] to schedule appt with  /Herman hx of strokes .

## 2024-12-16 ENCOUNTER — PATIENT OUTREACH (OUTPATIENT)
Dept: ADMINISTRATIVE | Facility: HOSPITAL | Age: 44
End: 2024-12-16
Payer: COMMERCIAL

## 2024-12-16 ENCOUNTER — PATIENT MESSAGE (OUTPATIENT)
Dept: ADMINISTRATIVE | Facility: HOSPITAL | Age: 44
End: 2024-12-16
Payer: COMMERCIAL

## 2024-12-16 DIAGNOSIS — Z12.31 ENCOUNTER FOR SCREENING MAMMOGRAM FOR MALIGNANT NEOPLASM OF BREAST: Primary | ICD-10-CM

## 2024-12-22 NOTE — PROGRESS NOTES
"Subjective:       Patient ID: Marlyn Camacho is a 44 y.o. female.    Chief Complaint: Rectal Bleeding    Rectal Bleeding  Pertinent negatives include no abdominal pain, chest pain, chills, coughing, fatigue or fever.     Pt is here for c/o rectal bleeding intermittent feels pressure at times with defecation no unexplained weight loss no abd pain  Pt has htn bp fine no sob/cp on lasix no muscle cramps  Pt has hypercholesterolemia on statin no muscle aches  Review of Systems   Constitutional:  Negative for chills, fatigue and fever.   Respiratory:  Negative for cough, chest tightness and shortness of breath.    Cardiovascular:  Negative for chest pain and palpitations.   Gastrointestinal:  Positive for hematochezia. Negative for abdominal distention, abdominal pain and blood in stool.       Objective:    /80   Pulse 71   Wt 91.6 kg (202 lb)   LMP 02/15/2017 (Approximate)   SpO2 100%   BMI 38.17 kg/m²     Physical Exam  Constitutional:       Appearance: Normal appearance. She is not ill-appearing.   Cardiovascular:      Rate and Rhythm: Normal rate and regular rhythm.      Heart sounds:      No gallop.   Pulmonary:      Effort: Pulmonary effort is normal. No respiratory distress.   Neurological:      General: No focal deficit present.      Mental Status: She is alert and oriented to person, place, and time.      Cranial Nerves: No cranial nerve deficit.      Coordination: Coordination normal.   Psychiatric:         Mood and Affect: Mood normal.         Behavior: Behavior normal.         Thought Content: Thought content normal.         Judgment: Judgment normal.       H/h 13/39 in 4/2024  Assessment:       1. Constipation, unspecified constipation type    2. Rectal pain    3. Blood in toilet bowl      4. Htn  5. hypercholesterolemia  Plan:     Orders cbc  fit kit  Low fat diet  Low salt diet    F/u colorectal  High natural fiber diet  Increase water intake  Rtc 3-6 months       "This note will not be " "shared with the patient."     "

## 2024-12-26 ENCOUNTER — OFFICE VISIT (OUTPATIENT)
Dept: GENETICS | Facility: CLINIC | Age: 44
End: 2024-12-26
Payer: COMMERCIAL

## 2024-12-26 ENCOUNTER — LAB VISIT (OUTPATIENT)
Dept: LAB | Facility: HOSPITAL | Age: 44
End: 2024-12-26
Attending: FAMILY MEDICINE
Payer: COMMERCIAL

## 2024-12-26 DIAGNOSIS — R29.90 STROKE-LIKE EPISODE: ICD-10-CM

## 2024-12-26 DIAGNOSIS — G43.411 INTRACTABLE HEMIPLEGIC MIGRAINE WITH STATUS MIGRAINOSUS: Primary | ICD-10-CM

## 2024-12-26 DIAGNOSIS — I10 ESSENTIAL HYPERTENSION: ICD-10-CM

## 2024-12-26 DIAGNOSIS — E66.01 OBESITY, MORBID, BMI 40.0-49.9: ICD-10-CM

## 2024-12-26 DIAGNOSIS — E87.6 HYPOKALEMIA: ICD-10-CM

## 2024-12-26 DIAGNOSIS — G43.411 INTRACTABLE HEMIPLEGIC MIGRAINE WITH STATUS MIGRAINOSUS: ICD-10-CM

## 2024-12-26 DIAGNOSIS — Z80.41 FAMILY HISTORY OF OVARIAN CANCER: ICD-10-CM

## 2024-12-26 DIAGNOSIS — R60.0 BILATERAL LEG EDEMA: ICD-10-CM

## 2024-12-26 DIAGNOSIS — Z80.3 FAMILY HISTORY OF BREAST CANCER: ICD-10-CM

## 2024-12-26 LAB
ANION GAP SERPL CALC-SCNC: 7 MMOL/L (ref 8–16)
BUN SERPL-MCNC: 8 MG/DL (ref 6–20)
CALCIUM SERPL-MCNC: 9.7 MG/DL (ref 8.7–10.5)
CHLORIDE SERPL-SCNC: 103 MMOL/L (ref 95–110)
CO2 SERPL-SCNC: 28 MMOL/L (ref 23–29)
CREAT SERPL-MCNC: 0.9 MG/DL (ref 0.5–1.4)
EST. GFR  (NO RACE VARIABLE): >60 ML/MIN/1.73 M^2
GLUCOSE SERPL-MCNC: 78 MG/DL (ref 70–110)
MISCELLANEOUS GENETIC TEST NAME: NORMAL
POTASSIUM SERPL-SCNC: 4.3 MMOL/L (ref 3.5–5.1)
SODIUM SERPL-SCNC: 138 MMOL/L (ref 136–145)

## 2024-12-26 PROCEDURE — 80048 BASIC METABOLIC PNL TOTAL CA: CPT | Performed by: FAMILY MEDICINE

## 2024-12-26 PROCEDURE — 36415 COLL VENOUS BLD VENIPUNCTURE: CPT

## 2024-12-26 PROCEDURE — 96040 PR GENETIC COUNSELING, EACH 30 MIN: CPT | Mod: S$GLB,,,

## 2024-12-26 PROCEDURE — 99999 PR PBB SHADOW E&M-EST. PATIENT-LVL II: CPT | Mod: PBBFAC,,,

## 2024-12-26 NOTE — PROGRESS NOTES
NEW PATIENT GENETIC COUNSELING CONSULTATION     Marlyn Camacho  DOS: 2024  : 1980  MRN: [id number]     REFERRING MD: Aaareferral Self     REASON FOR CONSULT: Our Genetic Counseling Service was asked to consult this 44 y.o.  female  regarding a personal history of left sided weakness, paresthesia, syncope, and chronic headaches and a family history of stroke in maternal relatives. She is not accompanied for today's genetic counseling appointment.     HISTORY OF PRESENT ILLNESS: Marlyn Camacho  is a 44 y.o.  female  referred to [facility] regarding a personal history of left sided weakness, paresthesia, syncope, and chronic headaches and a family history of stroke in maternal relatives.    Marlyn came to medical attention on 23 for complaints of one sided weakness, hemiplegia, chest pain, headache, and slurred speech raising concern of possible stroke. Imaging completed during admission (CTA Head & Neck, Head CT, MRI Brain & Spine) were unremarkable. She was discharged on 23, while admitted she received TNK therapy and per note from vascular neurology would have resolved any potential transient ischemic event. She came to medical attention again on 23 for a syncopal event, on admission dehydration vs occult arrhythmia were suspected. She was seen by cardiology for follow-up (Dr. Hogan) on 24 who attributed the event to likely orthostatic hypotension given unremarkable cardiac workup.    Marlyn was seen by neurology following her May 2023 admission (Joie Wynn PA-C) who referred her to optometry to rule out papilledema. Her last eye exam completed 10/06/23 (Dr. Lazaro) was normal, however Marlyn reports more recent changes in her vision over the past few months, noting she needs to refocus or make text font larger to see clearly at times. She has not been formally diagnosed with migraines but endorses frequent headaches at least 2x/month which  improves with turning off lights. She additionally reports muscle weakness around her back and shoulder over the past few months. She denies any current numbness or tingling in extremities. She reports noticing some slurred speech is still present. Marlyn's family history is notable for strokes in two maternal aunts, one of whom has had three stroke episodes in her 20s, 30s, and 40s, and one paternal uncle. Her family history is additionally significant for cancer in multiple relatives, including early-onset breast cancer in her half-sisters, maternal aunt, and maternal grandmother as well as ovarian cancer in her mother.      Additional medical concerns reported during today's visit include chronic constipation, internal hemorrhoids, and two colon polyps identified on her most recent colonoscopy from 06/05/24. She additionally reports a bump on the inside of her toe over the past year and a half which occasionally impacts her ability to walk when inflamed.      MEDICAL HISTORY:   Active Ambulatory Problems     Diagnosis Date Noted    Constipation, chronic 10/29/2012    Eczema 04/01/2013    Obesity, morbid, BMI 40.0-49.9 07/22/2022    Essential hypertension 11/09/2022    Acute pain of both knees 11/09/2022    Left hip pain 11/21/2022    Patellar instability of both knees 11/21/2022    Stroke aborted by administration of thrombolytic agent 05/26/2023    Weakness of left upper extremity 05/26/2023    Weakness of left lower extremity 05/26/2023    Paresthesia 05/26/2023    Mixed hyperlipidemia 06/06/2023    Syncope 08/07/2023    Headache 08/07/2023    Chest pain 01/26/2024    Pulmonary nodule 02/05/2024    Viral URI with cough 02/24/2024     Resolved Ambulatory Problems     Diagnosis Date Noted    Moderate cervical dysplasia 02/19/2014    Abnormal uterine bleeding (AUB) 12/23/2016    Fibroid, uterine 12/23/2016    S/P TLH/BS 03/13/2017    Chest pain 05/26/2023    Decreased sensation 05/26/2023    Hypokalemia  05/26/2023    Elevated d-dimer 08/07/2023     Past Medical History:   Diagnosis Date    Abnormal Pap smear of cervix     Allergy     Hypertension     Joint pain     Keloid cicatrix     Stroke 05/2023       RELEVANT LABS/IMAGING:    Colonoscopy (06/05/24):  Impression:     - Hemorrhoids found on perianal exam.                          - Two small colon polyps were resected and                          retrieved.     EKG (02/23/24):  Vent. Rate : 102 BPM     Atrial Rate : 102 BPM      P-R Int : 146 ms          QRS Dur : 084 ms       QT Int : 340 ms       P-R-T Axes : 048 062 038 degrees      QTc Int : 443 ms     Sinus tachycardia   Otherwise normal ECG   When compared with ECG of 26-JAN-2024 09:58,   No significant change was found    Cardiac event monitor (08/31/23):    Baseline rhythm was normal sinus rhythm with normal intervals and an initial heart rate of 66 bpm.    There were 48 patient-triggered episodes with reported symptoms of dizziness. These episodes corresponded to periods of normal sinus rhythm and sinus tachycardia with very rare ectopy.    There were very rare PVCs and PACs    There were no atrial or ventricular arrhythmias.     Transthoracic echo (TTE) complete (08/07/23):   Left Ventricle: The left ventricle is normal in size. Normal wall thickness. Normal wall motion. There is normal systolic function with a visually estimated ejection fraction of 60 - 65%. There is normal diastolic function.   Left Atrium: Left atrium is moderately dilated.   Right Ventricle: Normal right ventricular cavity size. Systolic function is normal.   Pulmonary Artery: The estimated pulmonary artery systolic pressure is 20 mmHg.   IVC/SVC: Normal venous pressure at 3 mmHg.    EKG (08/06/23):  Vent. Rate : 087 BPM     Atrial Rate : 087 BPM      P-R Int : 160 ms          QRS Dur : 086 ms       QT Int : 380 ms       P-R-T Axes : 070 054 072 degrees      QTc Int : 457 ms     Normal sinus rhythm   Normal ECG   When compared  with ECG of 25-MAY-2023 19:48,   No significant change was found     CTA Chest Non-Coronary (PE Studies) (08/06/23):  Impression:     No evidence of acute pulmonary embolism.    CT Head Without Contrast (08/06/23):  Impression:     There is no evidence for acute intracranial process.       MRI Cervical Spine Without Contrast (05/27/23):  Impression:     No focal protrusion or extrusion of disc material.  No evidence for central or foraminal stenosis.    MRI Brain W WO Contrast (05/26/23):  FINDINGS:  Ventricles are normal in size for age. No hydrocephalus.     The brain demonstrates normal signal intensity. No parenchymal mass, hemorrhage, edema or recent or remote major vascular distribution infarct. No abnormal enhancing lesions. Structures in the sellar region and craniocervical junction show no significant abnormalities.     No extra-axial blood or fluid collections.     The T2 skull base flow voids are preserved. Bone marrow signal intensity unremarkable.     Impression:     No acute intracranial abnormalities.    CT Head Without Contrast (05/26/23):  FINDINGS:  BRAIN: The brain parenchyma demonstrates no significant abnormality. No intracranial mass or hemorrhage. No evidence of acute infarction.     CSF SPACES: Ventricles, sulci, and cisterns are normal in size and configuration.     VESSELS: The major intracranial vessels are unremarkable.     BONES: No fracture or focal osseous lesion. Paranasal sinuses and mastoid air cells are well aerated.     SOFT TISSUES: Extracranial soft tissues are unremarkable.    Impression:     No intracranial mass, hemorrhage, or evidence of acute infarction.    CTA Head and Neck (xpd) (05/25/23):  Impression:     No acute abnormality. No large vessel occlusion.      FAMILY HISTORY:   Please see scanned pedigree in patient's chart under media.    Marlyn has one daughter (29yo) with a history of lupus and two sons (24yo & 20yo), the oldest of which has asthma. She has one  maternal half-sister (40yo) with a history of breast and cervical cancer diagnosed at 35yo. Her paternal half-sister (48yo) has a history of congestive heart failure and breast cancer diagnosed in her 30s. Marlyn's mother (65yo) is reported to have a history of bladder cancer diagnosed between 59-61yo and ovarian cancer diagnosed in her 40s; she also has a history of hypertension and arthritis. One maternal aunt (50s) has a reported history of congestive heart failure and three strokes in her 20s, 30s, and 40s. Another maternal aunt (70s) has a history of one stroke, congestive heart failure, and breast cancer diagnosed in her 30s. Two maternal uncle (identical twins)  at a few months old, cause of death unknown. Maternal grandmother  at 74yo from a carcinoma affecting the bone, also was reported to have breast cancer diagnosed in her 40s, congestive heart failure, and hypertension. Maternal grandfather  in his 60s. Marlyn's father  at 21yo. One paternal uncle (70s) has a history of stroke and heart attack. One paternal cousin  in her early 20s from an unspecified cancer. Paternal grandmother  in her 70s, was reported to have heart issues. No information is available on paternal grandfather.     Intellectual disability, developmental delays, learning disabilities, autism spectrum disorder, birth defects, recurrent miscarriage, and stillbirth were denied. Consanguinity was denied.     IMPRESSION: Marlyn Camacho  is a 44 y.o.  female  with a personal history of left sided weakness, paresthesia, syncope, and chronic headaches and a family history of stroke in maternal relatives. She also has a strong family history of cancer in multiple maternal and paternal relatives.    We discussed that given Marlyn's phenotype and family history, a genetic etiology is possible. Possible genetic causes include copy number variants, single gene disorders, metabolic conditions, and  multifactorial inheritance. Marlyn's symptoms could also be multifactorial in nature, or caused by a number of genetic and environmental factors. Without a known diagnosis, recurrence risk is difficult to determine.     Education and counseling were provided about genetics and possible types of genetic testing that may be recommended for Marlyn, including panel testing for genes associated with hemiplegic migraine and cerebral small vessel disease. A positive genetic test result may explain Marlyn's symptoms and can be informative for the family. A negative genetic test result does not rule out that the underlying condition is heritable in nature and reanalysis or additional genetic testing may be possible in the future. Variants of uncertain significance (VUS), or changes in a gene with unknown clinical significance are also possible. Please see Dr. Campos's note for physical exam information, recommendations, and additional counseling.     We also discussed the family history of cancer. Reviewed that while testing a relative who was diagnosed with cancer, such as her mother or half-sisters, would be the most informative for the family, the option of meeting with our cancer genetic counseling team for evaluation and consideration of genetic testing for hereditary cancer conditions is available to clarify her risk, especially given the history of young-onset cancers on both sides of the family. Marlyn expressed interest in being referred.     RECOMMENDATIONS/PLAN:  [employer] Cerebral Small Vessel Disease Panel & Hemiplegic migraine panel; blood drawn today  Referral to Cancer Genetics  See Dr. Campos's note for additional recommendations     TIME SPENT: 38 minutes with over 50% spent counseling    Jose Smith MS, [facility] [place]  Certified Genetic Counselor   [facility]

## 2024-12-26 NOTE — PROGRESS NOTES
[facility] Evaluation - New Patient   Genetics History:  Marlyn Camacho is a 44 y.o. old female who is sent for consultation at the request of Self, Aaareferral for genetics evaluation of stroke like episodes and hemiplegic migraines. This patient was seen in [facility] by physician Dr. Loli Campos and Genetic counselor Jose Smith.     REASON FOR CONSULT: Our Genetic Counseling Service was asked to consult this 44 y.o.  female  regarding a personal history of left sided weakness, paresthesia, syncope, and chronic headaches and a family history of stroke in maternal relatives. She is not accompanied for today's genetic counseling appointment.     HISTORY OF PRESENT ILLNESS: Marlyn Camacho  is a 44 y.o.  female  referred to [facility] regarding a personal history of left sided weakness, paresthesia, syncope, and chronic headaches and a family history of stroke in maternal relatives.     Marlyn came to medical attention on 05/25/23 for complaints of one sided weakness, hemiplegia, chest pain, headache, and slurred speech raising concern of possible stroke. Imaging completed during admission (CTA Head & Neck, Head CT, MRI Brain & Spine) were unremarkable. She was discharged on 05/28/23, while admitted she received TNK therapy and per note from vascular neurology would have resolved any potential transient ischemic event. She came to medical attention again on 08/06/23 for a syncopal event, on admission dehydration vs occult arrhythmia were suspected. She was seen by cardiology for follow-up (Dr. Hogan) on 08/17/24 who attributed the event to likely orthostatic hypotension given unremarkable cardiac workup.     Marlyn was seen by neurology following her May 2023 admission (Joie Wynn PA-C) who referred her to optometry to rule out papilledema. Her last eye exam completed 10/06/23 (Dr. Lazaro) was normal, however Marlyn  reports more recent changes in her vision over the past few months, noting she needs to refocus or make text font larger to see clearly at times. She has not been formally diagnosed with migraines but endorses frequent headaches at least 2x/month which improves with turning off lights. She additionally reports muscle weakness around her back and shoulder over the past few months. She denies any current numbness or tingling in extremities. She reports noticing some slurred speech is still present. Marlyn's family history is notable for strokes in two maternal aunts, one of whom has had three stroke episodes in her 20s, 30s, and 40s, and one paternal uncle. Her family history is additionally significant for cancer in multiple relatives, including early-onset breast cancer in her half-sisters, maternal aunt, and maternal grandmother as well as ovarian cancer in her mother.       Additional medical concerns reported during today's visit include chronic constipation, internal hemorrhoids, and two colon polyps identified on her most recent colonoscopy from 06/05/24. She additionally reports a bump on the inside of her toe over the past year and a half which occasionally impacts her ability to walk when inflamed. ( Copied from  Jose Smith's note)     Previous Genetic Testing:   None     Review of systems:   Review of Systems   Constitutional:  Positive for diaphoresis and malaise/fatigue. Negative for fever and weight loss.   HENT:  Negative for congestion, ear pain, hearing loss and nosebleeds.    Eyes:  Positive for blurred vision, double vision and pain.   Respiratory:  Negative for cough, hemoptysis and wheezing.    Cardiovascular:  Positive for leg swelling. Negative for chest pain and palpitations.   Gastrointestinal:  Positive for abdominal pain, constipation and heartburn. Negative for blood in stool, diarrhea, melena, nausea and vomiting.   Genitourinary:  Negative for dysuria and hematuria.    Musculoskeletal:  Positive for back pain, joint pain, myalgias and neck pain.   Skin:  Negative for rash.   Neurological:  Positive for focal weakness and weakness. Negative for dizziness, tingling, tremors, seizures and headaches.   Endo/Heme/Allergies:  Bruises/bleeds easily.   Psychiatric/Behavioral:  Negative for depression.     Complete ROS performed and otherwise negative except as noted above in the history (systems covered: General, Eyes, ENT, CV, Resp, GI, , MSK, Derm, Neuro, Psych, Endo, Heme/lymph, Allergic / Immunologic).    Histories:    Birth History:  Marlyn Camacho was born at Gestational Age: <None> on 1980.    Past Medical History:  Past Medical History:   Diagnosis Date    Abnormal Pap smear of cervix     Allergy     Hypertension     Joint pain     Keloid cicatrix     Stroke 05/2023    Possible        Past Surgical History:  Past Surgical History:   Procedure Laterality Date    CKC, ECC, fractional D&C      COLONOSCOPY N/A 6/5/2024    Procedure: COLONOSCOPY;  Surgeon: Abebe Saenz MD;  Location: [facility] ENDOSCOPY;  Service: Endoscopy;  Laterality: N/A;    CONIZATION-CERVIX  02/2015    DILATION AND CURETTAGE OF UTERUS      ESOPHAGOGASTRODUODENOSCOPY N/A 6/5/2024    Procedure: EGD (ESOPHAGOGASTRODUODENOSCOPY);  Surgeon: Abebe Saenz MD;  Location: [facility] ENDOSCOPY;  Service: Endoscopy;  Laterality: N/A;  clinic pt of dr. saenz; peg sent to [facility]; instr via portal; AP  5/28/24- pc complete. DBM    HYSTERECTOMY  03/2017    AUB, fibroids    TUBAL LIGATION       Family hx: Marlyn has one daughter (29yo) with a history of lupus and two sons (22yo & 22yo), the oldest of which has asthma. She has one maternal half-sister (38yo) with a history of breast and cervical cancer diagnosed at 33yo. Her paternal half-sister (50yo) has a history of congestive heart failure and breast cancer diagnosed in her 30s. Marlyn's mother (65yo) is reported to have a history of bladder  cancer diagnosed between 59-61yo and ovarian cancer diagnosed in her 40s; she also has a history of hypertension and arthritis. One maternal aunt (50s) has a reported history of congestive heart failure and three strokes in her 20s, 30s, and 40s. Another maternal aunt (70s) has a history of one stroke, congestive heart failure, and breast cancer diagnosed in her 30s. Two maternal uncle (identical twins)  at a few months old, cause of death unknown. Maternal grandmother  at 76yo from a carcinoma affecting the bone, also was reported to have breast cancer diagnosed in her 40s, congestive heart failure, and hypertension. Maternal grandfather  in his 60s. Marlyn's father  at 19yo. One paternal uncle (70s) has a history of stroke and heart attack. One paternal cousin  in her early 20s from an unspecified cancer. Paternal grandmother  in her 70s, was reported to have heart issues. No information is available on paternal grandfather.      Intellectual disability, developmental delays, learning disabilities, autism spectrum disorder, birth defects, recurrent miscarriage, and stillbirth were denied. Consanguinity was denied.    Social History:  Works at [employer]     Immunizations:  Up to date     Allergies:  Review of patient's allergies indicates:  No Known Allergies      Medications:    Current Outpatient Medications:     albuterol (PROVENTIL/VENTOLIN HFA) 90 mcg/actuation inhaler, Inhale 1-2 puffs into the lungs every 6 (six) hours as needed for Wheezing or Shortness of Breath. Rescue, Disp: 18 g, Rfl: 0    atorvastatin (LIPITOR) 20 MG tablet, Take 1 tablet (20 mg total) by mouth once daily., Disp: 30 tablet, Rfl: 3    clotrimazole-betamethasone 1-0.05% (LOTRISONE) cream, Apply topically 2 (two) times daily., Disp: 45 g, Rfl: 0    fluconazole (DIFLUCAN) 150 MG Tab, 1 po now and repeat in 1 week, Disp: 2 tablet, Rfl: 0    fluticasone propionate (FLONASE) 50 mcg/actuation  nasal spray, 2 sprays (100 mcg total) by Each Nostril route once daily., Disp: 15 g, Rfl: 0    furosemide (LASIX) 20 MG tablet, TAKE 1/2 TABLET(10 MG) BY MOUTH TWICE DAILY, Disp: 90 tablet, Rfl: 0    linaCLOtide (LINZESS) 145 mcg Cap capsule, Take 1 capsule (145 mcg total) by mouth before breakfast., Disp: 30 capsule, Rfl: 0    LINZESS 145 mcg Cap capsule, TAKE 1 CAPSULE(145 MCG) BY MOUTH BEFORE BREAKFAST, Disp: 30 capsule, Rfl: 2    omeprazole (PRILOSEC) 40 MG capsule, Take 1 capsule (40 mg total) by mouth 2 (two) times daily before meals. for 10 days, Disp: 20 capsule, Rfl: 0    pramoxine-hydrocortisone (PROCTOCREAM-HC) 1-1 % rectal cream, Place rectally 3 (three) times daily., Disp: 30 g, Rfl: 0    spironolactone (ALDACTONE) 50 MG tablet, TAKE 1 TABLET(50 MG) BY MOUTH DAILY, Disp: 90 tablet, Rfl: 3    Physical exam:  Growth Parameters:  - Weight: .FLOWAMB[14 , Facility age limit for growth %russell is 20 years from contact on 12/6/2024.  - Height: .[facility][11 , Facility age limit for growth %russell is 20 years.  - Head circumference: not measured   - Weight for Length Percentile: Facility age limit for growth %russell is 20 years from contact on 12/6/2024.  - BMI: No height and weight on file for this encounter.        Examination:  General:  Alert and oriented, No acute distress.    Appearance: Well nourished, normally developed.   Behavior: Within normal limits.    Skin: Normal for ethnicity,hair is normal in texture and distribution, bilateral symmetric hyperpigmentation in both upper arms     Craniofacial exam:         - Normal head shape        - Eyes are symmetric and normal, with normal spacing and shape; eyelashes are normal        - Ears: normal in appearance and placement        - Nose: normal appearance, with normal philtrum        - Mouth: normal shape; normal lips; intact palate with normal shape; teeth are normal in appearance    Neck:  Supple, normal range of motion; no thyromegaly.    Chest:  Normal  appearance, no pectus. Nipples are normal in appearance and placement.    Cardiovascular: CV:  RRR without murmur, PMI normal  Pulses 2+  Respiratory:  Respirations are non-labored.    Abdomen: Soft, non-tender, with no hepatosplenomegaly  Musculoskeletal:  Normal range of motion both in upper and lower extremities 5/5. Normal strength.  No tenderness.  No deformity. Sensation and pain in left elbow; no redness-edema. Sensation is asymmetrical; she has better right sided sensation radial side of upper extremities and in lower extremities.   Mobility/gait: within normal limits; appropriate for age.   Upper extremity exam: normal - digits appear normal with normal palmar and digital creases and fingernails.   Lower extremity exam: normal, toes appear normal with normal toe nails. Left lower leg edema prominent compared to right   Neurologic: No focal deficits noted    Diagnostic Studies Reviewed:  Doppler USG(2023): No venous thrombosis   Cranial CT and MRI brain no significant finding  Echo normal     Assessment:  Marlyn is a 44 y.o. old female who needed admission and evaluation for one sided weakness, hemiplegia, chest pain, headache. She received anti thrombolytic medication during admission. Cranial CT and cranial and cervical MRI are unremarkable. Echo and Doppler Usg normal. Hypercoagulable work up negative. She was on B12 injection seemed to work for numbness. She does not have a formal dx of migraine however she experiences frequent headache which improves with turning off lights, resting and do not respond to medications. Family hx is significant strokes in maternal sides.   Her physical exam is positive for nonsyndromic female, left elbow pain-tenderness without swelling. She has left lower legs edema prominent in left side. Asymmetrical sensory exam. Right side is better. Muscle strength is positive in both upper and lower extremities.   She still does not have any formal dx of the symptoms and  admissions regardless of detailed neurological and cardiac work up. Differential diagnosis; stroke like episodes and hemiplegic migraines. (Also mentioned in the previous neurology note) Due to recurrent episodes and positive family hx; I find it reasonable to send gene panels for initial evaluation.     Mitochondrial disorders and inborn error of metabolism might present with stroke/ stroke like episodes as well. If she needs another admission or ED visit with similar episodes; please obtain the blood samples which I will put the order and inform me if there is any concerning labs.     She is in agreement with the plan.     I also encouraged her to continue follow up with neurology as she has been experiencing slurred speech for couple months and to discuss hemiplegic migraine as a diagnosis.     --Due to significant maternal family hx of breast and ovarian cancer, patient is referred to cancer genetics. Please see [facility] Jose Smith's note.     Plan:  - Send genetics panel for [facility] Hemiplegic Migraine and [facility] Hereditary Cerebral Small Vessel Disease Panel.   - Result discussion in 1 month either in TH or in person   - Please obtain these blood samples when patient is experiencing symptoms   --Ammonium, lactate  --Plasma aa  --Urine organic acid   --Acylcarnitine profile   --Free and total carnitine  --Plasma homocysteine   --Urine organic acid ( the orders are ready in epic)    - Continue follow up with neurology     - If patient needs admission, please contact Dr. Campos. I might consider doing whole exome/ adelina analysis while in house based on presentation.     Face to Face time with patient: 50 minutes  120 minutes of total time spent on the encounter, which includes face to face time and non-face to face time preparing to see the patient (eg, review of tests), literature review for stroke like episodes / metabolic disorders 30 minutes ) Obtaining and/or reviewing separately obtained history, Documenting  clinical information in the electronic or other health record, Independently interpreting results (not separately reported) and communicating results to the patient/family/caregiver, or Care coordination (not separately reported).      Loli Campos MD  Medical Genetics  [facility]    It was a pleasure to see Oscar today. We would like to see her back in Genetics clinic 1 months or sooner as needed/pending results of the workup above. Should any questions or concerns arise following today's visit, we encourage the patient to contact the Genetics Office.

## 2024-12-30 ENCOUNTER — TELEPHONE (OUTPATIENT)
Dept: SURGERY | Facility: CLINIC | Age: 44
End: 2024-12-30
Payer: COMMERCIAL

## 2025-01-03 ENCOUNTER — OFFICE VISIT (OUTPATIENT)
Dept: SURGERY | Facility: CLINIC | Age: 45
End: 2025-01-03
Payer: COMMERCIAL

## 2025-01-03 VITALS
HEART RATE: 73 BPM | BODY MASS INDEX: 39.08 KG/M2 | RESPIRATION RATE: 16 BRPM | WEIGHT: 207 LBS | DIASTOLIC BLOOD PRESSURE: 86 MMHG | SYSTOLIC BLOOD PRESSURE: 127 MMHG | HEIGHT: 61 IN

## 2025-01-03 DIAGNOSIS — K64.8 HEMORRHOID PROLAPSE: ICD-10-CM

## 2025-01-03 DIAGNOSIS — K64.4 EXTERNAL HEMORRHOIDS: ICD-10-CM

## 2025-01-03 DIAGNOSIS — K62.5 RECTAL BLEEDING: Primary | ICD-10-CM

## 2025-01-03 DIAGNOSIS — K64.8 INTERNAL HEMORRHOIDS: ICD-10-CM

## 2025-01-03 PROCEDURE — 99999 PR PBB SHADOW E&M-EST. PATIENT-LVL IV: CPT | Mod: PBBFAC,,, | Performed by: NURSE PRACTITIONER

## 2025-01-03 RX ORDER — HYDROCORTISONE ACETATE 25 MG/1
25 SUPPOSITORY RECTAL 2 TIMES DAILY
Qty: 24 SUPPOSITORY | Refills: 1 | Status: SHIPPED | OUTPATIENT
Start: 2025-01-03 | End: 2025-01-27

## 2025-01-03 RX ORDER — HYDROCORTISONE 25 MG/G
CREAM TOPICAL 2 TIMES DAILY
Qty: 28 G | Refills: 2 | Status: SHIPPED | OUTPATIENT
Start: 2025-01-03

## 2025-01-03 NOTE — PATIENT INSTRUCTIONS
Anusol suppositories 2x/day for 12 days  Hydrocortisone cream externally 2x/day for 12 days.  Continue linzess daily  Add miralax daily.  Can adjust as needed for soft formed easy to pass stools every other day ideally.  If bleeding returns, can consider a hemorrhoid banding

## 2025-01-03 NOTE — PROGRESS NOTES
"CRS Office Visit History and Physical    Referring Md:   Salena Powell, Fnp  [address]  [place] [address]    SUBJECTIVE:     Chief Complaint: constipation and bleeding    History of Present Illness:  The patient is new patient to this practice.   Course is as follows:  Patient is a 44 y.o. female presents with constipation and rectal bleeding.  Symptoms have been present for "a very long time".  Recently noted an incrase in BRBPR with a hard/difficult to pass bowelmovement.  This happens with every bm. Occ sharp pain with bm.   Only having a bm 1x q2 weeks. This is while taking linzess 145 mcg daily.   Having "pellet" like stools.   Has tried miralax and benefiber in past which improved constipation.     Also reports feeling "entire rectum" bulge with bowel movement.    Last Colonoscopy completed on 6/5/2024  - Hemorrhoids found on perianal exam.   - Two small colon polyps were resected and retrieved.   - repeat in 7 yrs      Review of patient's allergies indicates:  No Known Allergies    Past Medical History:   Diagnosis Date    Abnormal Pap smear of cervix     Allergy     Hypertension     Joint pain     Keloid cicatrix     Stroke 05/2023    Possible     Past Surgical History:   Procedure Laterality Date    CKC, ECC, fractional D&C      COLONOSCOPY N/A 6/5/2024    Procedure: COLONOSCOPY;  Surgeon: Abebe Marc MD;  Location: [facility] ENDOSCOPY;  Service: Endoscopy;  Laterality: N/A;    CONIZATION-CERVIX  02/2015    DILATION AND CURETTAGE OF UTERUS      ESOPHAGOGASTRODUODENOSCOPY N/A 6/5/2024    Procedure: EGD (ESOPHAGOGASTRODUODENOSCOPY);  Surgeon: Abebe Marc MD;  Location: [facility] ENDOSCOPY;  Service: Endoscopy;  Laterality: N/A;  clinic pt of dr. marc; peg sent to [facility]; instr via portal; AP  5/28/24- pc complete. DBM    HYSTERECTOMY  03/2017    AUB, fibroids    TUBAL LIGATION       Family History   Problem Relation Name Age of Onset    Eczema Mother      Ovarian cancer Mother      " "Hypertension Mother      Breast cancer Maternal Aunt          THREE AUNTS    Breast cancer Maternal Aunt      Breast cancer Maternal Aunt      Eczema Maternal Grandmother      Eczema Son      Eczema Son      Melanoma Neg Hx      Lupus Neg Hx      Psoriasis Neg Hx      Colon cancer Neg Hx       Social History     Tobacco Use    Smoking status: Never     Passive exposure: Never    Smokeless tobacco: Never   Substance Use Topics    Alcohol use: Yes     Comment: occasional    Drug use: No        Review of Systems:  Review of Systems   Gastrointestinal:  Positive for blood in stool and constipation.        Anal bulge       OBJECTIVE:     Vital Signs (Most Recent)  /86 (BP Location: Left arm, Patient Position: Sitting)   Pulse 73   Resp 16   Ht 5' 1" (1.549 m)   Wt 93.9 kg (207 lb 0.2 oz)   LMP 02/15/2017 (Approximate)   BMI 39.11 kg/m²     Physical Exam:  General: Black or  female in no distress   Neuro: Alert and oriented to person, place, and time.  Moves all extremities.     HEENT: No icterus.  Trachea midline  Respiratory: Respirations are even and unlabored, no cough or audible wheezing  Skin: Warm dry and intact, No visible rashes, no jaundice    Labs reviewed today:  Lab Results   Component Value Date    WBC 7.84 12/06/2024    HGB 13.3 12/06/2024    HCT 39.6 12/06/2024     12/06/2024    CHOL 151 08/05/2024    TRIG 63 08/05/2024    HDL 45 08/05/2024    ALT 21 12/06/2024    AST 30 12/06/2024     12/26/2024    K 4.3 12/26/2024     12/26/2024    CREATININE 0.9 12/26/2024    BUN 8 12/26/2024    CO2 28 12/26/2024    TSH 0.495 04/30/2024    INR 0.9 05/25/2023    HGBA1C 5.2 04/30/2024       Imaging reviewed today:  none    Endoscopy reviewed today:  Last Colonoscopy completed on 6/5/2024  - Hemorrhoids found on perianal exam.   - Two small colon polyps were resected and retrieved.   - repeat in 7 yrs    Anorectal Exam:    Anal Skin: External hemorrhoids, largest to RAL; no " rectal prolapse with bear down.    Digital Rectal Exam:  Resting Tone normal  Mass none  Tenderness  absent    Anoscopy:  Verbal consent was obtained.   Clear plastic anoscope inserted.    Hemorrhoids  present  Stigmata of bleeding  present  Stigmata of prolapsed present  Distal rectal mucosa normal      ASSESSMENT/PLAN:     Diagnoses and all orders for this visit:    Rectal bleeding  -     hydrocortisone (ANUSOL-HC) 25 mg suppository; Place 1 suppository (25 mg total) rectally 2 (two) times daily. for 24 days    Internal hemorrhoids  -     hydrocortisone (ANUSOL-HC) 25 mg suppository; Place 1 suppository (25 mg total) rectally 2 (two) times daily. for 24 days    External hemorrhoids  -     hydrocortisone 2.5 % cream; Apply topically 2 (two) times daily.    Hemorrhoid prolapse        The patient was instructed to:  Anusol suppositories 2x/day for 12 days  Hydrocortisone cream externally 2x/day for 12 days.  Continue linzess daily  Add miralax daily.  Can adjust as needed for soft formed easy to pass stools every other day ideally.  If bleeding returns, can consider a hemorrhoid banding      DAKSHA Jules-LESLY  Colon and Rectal Surgery

## 2025-01-22 ENCOUNTER — TELEPHONE (OUTPATIENT)
Dept: GENETICS | Facility: CLINIC | Age: 45
End: 2025-01-22
Payer: COMMERCIAL

## 2025-01-23 ENCOUNTER — OFFICE VISIT (OUTPATIENT)
Dept: GENETICS | Facility: CLINIC | Age: 45
End: 2025-01-23
Payer: COMMERCIAL

## 2025-01-23 ENCOUNTER — PATIENT MESSAGE (OUTPATIENT)
Dept: GENETICS | Facility: CLINIC | Age: 45
End: 2025-01-23

## 2025-01-23 DIAGNOSIS — R29.898 WEAKNESS OF LEFT LOWER EXTREMITY: ICD-10-CM

## 2025-01-23 DIAGNOSIS — R29.898 WEAKNESS OF LEFT UPPER EXTREMITY: ICD-10-CM

## 2025-01-23 DIAGNOSIS — R55 SYNCOPE, UNSPECIFIED SYNCOPE TYPE: ICD-10-CM

## 2025-01-23 DIAGNOSIS — I63.9 STROKE ABORTED BY ADMINISTRATION OF THROMBOLYTIC AGENT: Primary | ICD-10-CM

## 2025-01-23 PROCEDURE — 98007 SYNCH AUDIO-VIDEO EST HI 40: CPT | Mod: 95,,,

## 2025-01-23 NOTE — PROGRESS NOTES
[facility] General Genetics Evaluation - Follow up - Result discussion   The patient location is: Home  The chief complaint leading to consultation is: Follow-up     Visit type: audiovisual --> audio only     Face to Face time with patient: 15 minutes   50 minutes of total time spent on the date of the encounter, which includes face to face time and non-face to face time preparing to see the patient (eg, review of tests), Obtaining and/or reviewing separately obtained history, Documenting clinical information in the electronic or other health record, Independently interpreting results (not separately reported) and communicating results to the patient/family/caregiver, or Care coordination (not separately reported).      Each patient to whom he or she provides medical services by telemedicine is:  (1) informed of the relationship between the physician and patient and the respective role of any other health care provider with respect to management of the patient; and (2) notified that he or she may decline to receive medical services by telemedicine and may withdraw from such care at any time.    HPI summary:   Genetic counseling (Carmelita Gifford, [facility]) and medical genetics (Loli Campos MD) met virtually with Marlyn Camacho on 01/23/2025. Marlyn was previously seen by Genetics to discuss left sided weakness, paresthesia, syncope, and chronic headaches and a family history of stroke in maternal relatives. She returns to virtual clinic today for results review follow-up care. The patient was unaccompanied today.       Interim history:   Marlyn expressed the following updates, to be discussed with the physician today:  Recommended evaluations: Marlyn has not yet scheduled follow-up with Neurology and has not yet scheduled new patient appointment with Cancer Genetic Counseling.   New symptoms: neck pain R>L affecting sleep/sleep positioning.   Stable/no changes in symptom burden: fatigue, vision  changes, slurred speech, migraines/headaches, constipation, lower extremity edema, muscle weakness. No new episodes: syncope, stroke-like episodes.    (copied from [facility] Carmelita Gifford's note)     Review of systems:  Complete ROS performed and otherwise negative except as noted above in the history (systems covered: General, Eyes, ENT, CV, Resp, GI, , MSK, Derm, Neuro, Psych, Endo, Heme/lymph, Allergic / Immunologic). See HPI     Histories:  Past Medical History:  Past Medical History:   Diagnosis Date    Abnormal Pap smear of cervix     Allergy     Hypertension     Joint pain     Keloid cicatrix     Stroke 05/2023    Possible      Past Surgical History:  Past Surgical History:   Procedure Laterality Date    CKC, ECC, fractional D&C      COLONOSCOPY N/A 6/5/2024    Procedure: COLONOSCOPY;  Surgeon: Abebe Saenz MD;  Location: [facility] ENDOSCOPY;  Service: Endoscopy;  Laterality: N/A;    CONIZATION-CERVIX  02/2015    DILATION AND CURETTAGE OF UTERUS      ESOPHAGOGASTRODUODENOSCOPY N/A 6/5/2024    Procedure: EGD (ESOPHAGOGASTRODUODENOSCOPY);  Surgeon: Abebe Saenz MD;  Location: [facility] ENDOSCOPY;  Service: Endoscopy;  Laterality: N/A;  clinic pt of dr. saenz; [facility]; AP  5/28/24- pc complete. DBM    HYSTERECTOMY  03/2017    AUB, fibroids    TUBAL LIGATION       Family History: see initial note     Immunizations:  Up to date    Allergies:  Review of patient's allergies indicates:  No Known Allergies    Medications:    Current Outpatient Medications:     albuterol (PROVENTIL/VENTOLIN HFA) 90 mcg/actuation inhaler, Inhale 1-2 puffs into the lungs every 6 (six) hours as needed for Wheezing or Shortness of Breath. Rescue, Disp: 18 g, Rfl: 0    hydrocortisone (ANUSOL-HC) 25 mg suppository, Place 1 suppository (25 mg total) rectally 2 (two) times daily. for 24 days, Disp: 24 suppository, Rfl: 1    hydrocortisone 2.5 % cream, Apply topically 2 (two) times daily., Disp: 28 g, Rfl: 2     LINZESS 145 mcg Cap capsule, TAKE 1 CAPSULE(145 MCG) BY MOUTH BEFORE BREAKFAST, Disp: 30 capsule, Rfl: 2    omeprazole (PRILOSEC) 40 MG capsule, Take 1 capsule (40 mg total) by mouth 2 (two) times daily before meals. for 10 days, Disp: 20 capsule, Rfl: 0    spironolactone (ALDACTONE) 50 MG tablet, TAKE 1 TABLET(50 MG) BY MOUTH DAILY, Disp: 90 tablet, Rfl: 3    Physical exam: (Limited due to TH visit)   Examination:  General:  Alert and oriented, No acute distress.    Appearance: Well nourished, normally developed.    Behavior: Within normal limits.    Skin: Normal for ethnicity  Respiratory:  Respirations are non-labored.      Diagnostic Studies Reviewed:  Cranial CT and cranial and cervical MRI are unremarkable. Echo and Doppler Usg normal. Hypercoagulable work up negative.     Assessment:  Marlyn is a 44 y.o. old female with a chronic history of fatigue, vision changes, slurred speech, migraines/headaches, constipation, lower extremity edema, muscle weakness. Admission hx with syncope, stroke-like episodes. Broad evaluation which includes Cranial CT, cranial and cervical MRI, Echo, Doppler Usg and Hypercoagulable work up negative. No new admission with similar presentation. No confirmed diagnosis. [facility] Hemiplegic Migraine and [facility] Hereditary Cerebral Small Vessel Disease Panel (negative/normal). She thinks her muscle strength is decreased compared to past.     -- As I discussed with her in my initial visit; she should be seen by neurology again to rule out hemiplegic migraine or other conditions which might present like this. Negative genetic test result does not rule out the clinical diagnosis.   As she has mentioning worsening muscle strength and positive asymmetric sensory exam in my first visit; I will reach out neuro muscular team and ask if she needs evaluation.     -- Her daughter recently dx with Lupus. She experienced rash before dx. Oscar denies any rash; but Lupus might present in different  presentation. With positive symptoms; muscle weakness, admission to  stroke like episodes, vision problems; I find it reasonable to make a referral to Rheumatology.     I discussed with her; before going for more extended genetic testings; I need more opinions from other specialities to guide me better.     She is in agreement with the plan.     Plan:  - Referral to Neurology   - Referral to Rheumatology   - If she needs admission, please contact me; I might consider going with genome and adelina analysis based on presentation.   - Please obtain these blood samples when patient is experiencing symptoms:   --Ammonium, lactate  --Plasma aa  --Urine organic acid   --Acylcarnitine profile   --Free and total carnitine  --Plasma homocysteine   --Urine organic acid ( the orders are ready in epic)    Loli Campos MD  Medical Genetics  [facility]

## 2025-01-23 NOTE — PROGRESS NOTES
The patient location is: Home  The chief complaint leading to consultation is: Follow-up     Visit type: audiovisual --> audio only     Face to Face time with patient: 10 minutes   20 minutes of total time spent on the date of the encounter, which includes face to face time and non-face to face time preparing to see the patient (eg, review of tests), Obtaining and/or reviewing separately obtained history, Documenting clinical information in the electronic or other health record, Independently interpreting results (not separately reported) and communicating results to the patient/family/caregiver, or Care coordination (not separately reported).      Each patient to whom he or she provides medical services by telemedicine is:  (1) informed of the relationship between the physician and patient and the respective role of any other health care provider with respect to management of the patient; and (2) notified that he or she may decline to receive medical services by telemedicine and may withdraw from such care at any time.    ~~~    Patient Name: Marlyn Camacho  Medical Records Number: [id number]  YOB: 1980  Date of Appointment: 01/23/2025    Genetic counseling (Carmelita Gifford, [facility], [facility]) and medical genetics (Loli Campos MD) met virtually with Marlyn Camacho on 01/23/2025. Marlyn was previously seen by Genetics to discuss left sided weakness, paresthesia, syncope, and chronic headaches and a family history of stroke in maternal relatives. She returns to virtual clinic today for results review follow-up care. The patient was unaccompanied today.      Face to Face time with patient: 10 minutes   20 minutes of total time spent on the date of the encounter, which includes face to face time and non-face to face time preparing to see the patient (eg, review of tests), Obtaining and/or reviewing separately obtained history, Documenting clinical information in the electronic or other health record,  Independently interpreting results (not separately reported) and communicating results to the patient/family/caregiver, or Care coordination (not separately reported).     This document provides a written summary of topics discussed.     Documentation of Leona previous evaluation, containing an assessment of personal and family history, as well as a pedigree, is available in the electronic medical record. The following testing recommendations were made at her previous visit:   [facility] Hemiplegic Migraine and [facility] Hereditary Cerebral Small Vessel Disease Panel (negative/normal)  When symptomatic, collect: ammonium, lactate, plasma amino acids, urine organic acids, plasma acylcarnitine profile, free and total homocysteine (not yet collected)    Marlyn expressed the following updates, to be discussed with the physician today:  Recommended evaluations: Marlyn has not yet scheduled follow-up with Neurology and has not yet scheduled new patient appointment with Cancer Genetic Counseling.   New symptoms: neck pain R>L affecting sleep/sleep positioning.   Stable/no changes in symptom burden: fatigue, vision changes, slurred speech, migraines/headaches, constipation, lower extremity edema, muscle weakness. No new episodes: syncope, stroke-like episodes.     Recommendations  Please see documentation from Dr. Campos for complete medical management recommendations and referrals.     It was a pleasure meeting virtually with Marlyn. She is encouraged to contact the Department of Genetics with any questions or concerns.        Carmelita Gifford, [facility], [facility]  Senior Genetic Counselor  [facility]  [email or web address]

## 2025-02-07 ENCOUNTER — OFFICE VISIT (OUTPATIENT)
Dept: PULMONOLOGY | Facility: CLINIC | Age: 45
End: 2025-02-07
Payer: COMMERCIAL

## 2025-02-07 ENCOUNTER — HOSPITAL ENCOUNTER (OUTPATIENT)
Dept: RADIOLOGY | Facility: HOSPITAL | Age: 45
Discharge: HOME | End: 2025-02-07
Attending: INTERNAL MEDICINE
Payer: COMMERCIAL

## 2025-02-07 VITALS
OXYGEN SATURATION: 96 % | WEIGHT: 199.75 LBS | BODY MASS INDEX: 37.74 KG/M2 | SYSTOLIC BLOOD PRESSURE: 124 MMHG | HEART RATE: 73 BPM | DIASTOLIC BLOOD PRESSURE: 84 MMHG

## 2025-02-07 DIAGNOSIS — R91.1 PULMONARY NODULE: ICD-10-CM

## 2025-02-07 DIAGNOSIS — R91.1 PULMONARY NODULE: Primary | ICD-10-CM

## 2025-02-07 PROBLEM — E66.01 OBESITY, MORBID, BMI 40.0-49.9: Status: RESOLVED | Noted: 2022-07-22 | Resolved: 2025-02-07

## 2025-02-07 PROCEDURE — 3079F DIAST BP 80-89 MM HG: CPT | Mod: CPTII,S$GLB,, | Performed by: INTERNAL MEDICINE

## 2025-02-07 PROCEDURE — 1159F MED LIST DOCD IN RCRD: CPT | Mod: CPTII,S$GLB,, | Performed by: INTERNAL MEDICINE

## 2025-02-07 PROCEDURE — 3008F BODY MASS INDEX DOCD: CPT | Mod: CPTII,S$GLB,, | Performed by: INTERNAL MEDICINE

## 2025-02-07 PROCEDURE — 99999 PR PBB SHADOW E&M-EST. PATIENT-LVL III: CPT | Mod: PBBFAC,,, | Performed by: INTERNAL MEDICINE

## 2025-02-07 PROCEDURE — 3074F SYST BP LT 130 MM HG: CPT | Mod: CPTII,S$GLB,, | Performed by: INTERNAL MEDICINE

## 2025-02-07 PROCEDURE — 71250 CT THORAX DX C-: CPT | Mod: TC

## 2025-02-07 PROCEDURE — 71250 CT THORAX DX C-: CPT | Mod: 26,,, | Performed by: RADIOLOGY

## 2025-02-07 PROCEDURE — 99213 OFFICE O/P EST LOW 20 MIN: CPT | Mod: S$GLB,,, | Performed by: INTERNAL MEDICINE

## 2025-02-07 NOTE — ASSESSMENT & PLAN NOTE
6mm largest nodule in LLL; low suspicion visually. No risk factors for malignancy. Stable over 18 months. No further imaging needed. Important to minimize radiation exposure

## 2025-02-07 NOTE — PROGRESS NOTES
Subjective:       Patient ID: Marlyn Camacho is a 44 y.o. female.  The patient's last visit with me was on 2/5/2024.     No new complaints. Discussed stable imaging.  Review of Systems    Objective:      Vitals:    02/07/25 1422   BP: 124/84   Pulse: 73     Wt Readings from Last 3 Encounters:   02/07/25 90.6 kg (199 lb 11.8 oz)   01/03/25 93.9 kg (207 lb 0.2 oz)   12/06/24 91.6 kg (202 lb)     Temp Readings from Last 3 Encounters:   10/23/24 97.8 °F (36.6 °C) (Oral)   07/19/24 98.2 °F (36.8 °C) (Oral)   06/05/24 97.9 °F (36.6 °C) (Temporal)     BP Readings from Last 3 Encounters:   02/07/25 124/84   01/03/25 127/86   12/06/24 130/80     Pulse Readings from Last 3 Encounters:   02/07/25 73   01/03/25 73   12/06/24 71       Physical Exam    CBC  Lab Results   Component Value Date    WBC 7.84 12/06/2024    HGB 13.3 12/06/2024    HCT 39.6 12/06/2024    MCV 95 12/06/2024     12/06/2024         CMP  Sodium   Date Value Ref Range Status   12/26/2024 138 136 - 145 mmol/L Final     Potassium   Date Value Ref Range Status   12/26/2024 4.3 3.5 - 5.1 mmol/L Final     Chloride   Date Value Ref Range Status   12/26/2024 103 95 - 110 mmol/L Final     CO2   Date Value Ref Range Status   12/26/2024 28 23 - 29 mmol/L Final     Glucose   Date Value Ref Range Status   12/26/2024 78 70 - 110 mg/dL Final     BUN   Date Value Ref Range Status   12/26/2024 8 6 - 20 mg/dL Final     Creatinine   Date Value Ref Range Status   12/26/2024 0.9 0.5 - 1.4 mg/dL Final     Calcium   Date Value Ref Range Status   12/26/2024 9.7 8.7 - 10.5 mg/dL Final     Total Protein   Date Value Ref Range Status   12/06/2024 7.1 6.0 - 8.4 g/dL Final     Albumin   Date Value Ref Range Status   12/06/2024 3.8 3.5 - 5.2 g/dL Final     Total Bilirubin   Date Value Ref Range Status   12/06/2024 0.4 0.1 - 1.0 mg/dL Final     Comment:     For infants and newborns, interpretation of results should be based  on gestational age, weight and in agreement with  "clinical  observations.    Premature Infant recommended reference ranges:  Up to 24 hours.............<8.0 mg/dL  Up to 48 hours............<12.0 mg/dL  3-5 days..................<15.0 mg/dL  6-29 days.................<15.0 mg/dL       Alkaline Phosphatase   Date Value Ref Range Status   2024 58 40 - 150 U/L Final     AST   Date Value Ref Range Status   2024 30 10 - 40 U/L Final     ALT   Date Value Ref Range Status   2024 21 10 - 44 U/L Final     Anion Gap   Date Value Ref Range Status   2024 7 (L) 8 - 16 mmol/L Final     eGFR   Date Value Ref Range Status   2024 >60.0 >60 mL/min/1.73 m^2 Final       ABG  No results found for: "PH", "PO2", "PCO2"        Personal Diagnostic Review  I have personally reviewed the following data and added my own interpretation as below:  CT Chest images personally reviewed and stable ALHAJI nodule adjacent to the fissure.       2025     2:22 PM 1/3/2025     8:52 AM 2024     1:14 PM 10/23/2024     2:28 PM 10/23/2024    10:15 AM 2024     9:05 AM 2024     3:10 PM   Pulmonary Function Tests   SpO2 96 %  100 % 100 % 100 %  97 %   Height  5' 1" (1.549 m)   5' 1" (1.549 m) 5' 1" (1.549 m)    Weight 90.6 kg (199 lb 11.8 oz) 93.9 kg (207 lb 0.2 oz) 91.6 kg (202 lb)  89.8 kg (198 lb) 97.5 kg (215 lb)    BMI (Calculated)  39.1   37.4 40.6          Assessment:       1. Pulmonary nodule        Outpatient Encounter Medications as of 2025   Medication Sig Dispense Refill    hydrocortisone 2.5 % cream Apply topically 2 (two) times daily. 28 g 2    spironolactone (ALDACTONE) 50 MG tablet TAKE 1 TABLET(50 MG) BY MOUTH DAILY 90 tablet 3    albuterol (PROVENTIL/VENTOLIN HFA) 90 mcg/actuation inhaler Inhale 1-2 puffs into the lungs every 6 (six) hours as needed for Wheezing or Shortness of Breath. Rescue 18 g 0    [] hydrocortisone (ANUSOL-HC) 25 mg suppository Place 1 suppository (25 mg total) rectally 2 (two) times daily. for 24 days 24 " suppository 1    LINZESS 145 mcg Cap capsule TAKE 1 CAPSULE(145 MCG) BY MOUTH BEFORE BREAKFAST (Patient not taking: Reported on 2/7/2025) 30 capsule 2    omeprazole (PRILOSEC) 40 MG capsule Take 1 capsule (40 mg total) by mouth 2 (two) times daily before meals. for 10 days 20 capsule 0     No facility-administered encounter medications on file as of 2/7/2025.     1. Pulmonary nodule    Plan:     Problem List Items Addressed This Visit          Pulmonary    Pulmonary nodule - Primary    Current Assessment & Plan     6mm largest nodule in LLL; low suspicion visually. No risk factors for malignancy. Stable over 18 months. No further imaging needed. Important to minimize radiation exposure            Please note Overview Notes are historic documentation. Please review A/P for current updates.  No follow-ups on file.    Future Appointments   Date Time Provider Department Center   3/10/2025  6:45 AM [facility], [place] [place] [employer] Fredrick Barron MD

## 2025-03-10 ENCOUNTER — HOSPITAL ENCOUNTER (OUTPATIENT)
Dept: RADIOLOGY | Facility: OTHER | Age: 45
Discharge: HOME | End: 2025-03-10
Attending: FAMILY MEDICINE
Payer: COMMERCIAL

## 2025-03-10 DIAGNOSIS — Z12.31 ENCOUNTER FOR SCREENING MAMMOGRAM FOR MALIGNANT NEOPLASM OF BREAST: ICD-10-CM

## 2025-03-10 PROCEDURE — 77063 BREAST TOMOSYNTHESIS BI: CPT | Mod: 26,,, | Performed by: RADIOLOGY

## 2025-03-10 PROCEDURE — 77067 SCR MAMMO BI INCL CAD: CPT | Mod: 26,,, | Performed by: RADIOLOGY

## 2025-03-10 PROCEDURE — 77063 BREAST TOMOSYNTHESIS BI: CPT | Mod: TC

## 2025-04-30 ENCOUNTER — HOSPITAL ENCOUNTER (EMERGENCY)
Facility: HOSPITAL | Age: 45
Discharge: HOME | End: 2025-04-30
Attending: STUDENT IN AN ORGANIZED HEALTH CARE EDUCATION/TRAINING PROGRAM
Payer: COMMERCIAL

## 2025-04-30 VITALS
OXYGEN SATURATION: 98 % | WEIGHT: 207 LBS | BODY MASS INDEX: 39.08 KG/M2 | HEART RATE: 79 BPM | DIASTOLIC BLOOD PRESSURE: 89 MMHG | HEIGHT: 61 IN | SYSTOLIC BLOOD PRESSURE: 142 MMHG | RESPIRATION RATE: 16 BRPM | TEMPERATURE: 98 F

## 2025-04-30 DIAGNOSIS — R51.9 RIGHT-SIDED HEADACHE: Primary | ICD-10-CM

## 2025-04-30 DIAGNOSIS — R53.83 FATIGUE, UNSPECIFIED TYPE: ICD-10-CM

## 2025-04-30 DIAGNOSIS — M79.10 MYALGIA: ICD-10-CM

## 2025-04-30 LAB
CTP QC/QA: YES
CTP QC/QA: YES
GLUCOSE SERPL-MCNC: 71 MG/DL (ref 70–110)
POC MOLECULAR INFLUENZA A AGN: NEGATIVE
POC MOLECULAR INFLUENZA B AGN: NEGATIVE
SARS-COV-2 RDRP RESP QL NAA+PROBE: NEGATIVE

## 2025-04-30 PROCEDURE — 87635 SARS-COV-2 COVID-19 AMP PRB: CPT | Performed by: PHYSICIAN ASSISTANT

## 2025-04-30 PROCEDURE — 99284 EMERGENCY DEPT VISIT MOD MDM: CPT

## 2025-04-30 PROCEDURE — 82962 GLUCOSE BLOOD TEST: CPT

## 2025-04-30 PROCEDURE — 25000003 PHARM REV CODE 250: Performed by: PHYSICIAN ASSISTANT

## 2025-04-30 PROCEDURE — 87502 INFLUENZA DNA AMP PROBE: CPT

## 2025-04-30 RX ORDER — ONDANSETRON 4 MG/1
4 TABLET, ORALLY DISINTEGRATING ORAL EVERY 6 HOURS PRN
Qty: 12 TABLET | Refills: 0 | Status: SHIPPED | OUTPATIENT
Start: 2025-04-30

## 2025-04-30 RX ORDER — ONDANSETRON 4 MG/1
4 TABLET, ORALLY DISINTEGRATING ORAL
Status: COMPLETED | OUTPATIENT
Start: 2025-04-30 | End: 2025-04-30

## 2025-04-30 RX ORDER — CETIRIZINE HYDROCHLORIDE 10 MG/1
10 TABLET ORAL DAILY
Qty: 14 TABLET | Refills: 0 | Status: SHIPPED | OUTPATIENT
Start: 2025-04-30 | End: 2025-05-14

## 2025-04-30 RX ORDER — AZELASTINE 1 MG/ML
1 SPRAY, METERED NASAL 2 TIMES DAILY PRN
Qty: 30 ML | Refills: 0 | Status: SHIPPED | OUTPATIENT
Start: 2025-04-30 | End: 2026-04-30

## 2025-04-30 RX ADMIN — ONDANSETRON 4 MG: 4 TABLET, ORALLY DISINTEGRATING ORAL at 09:04

## 2025-05-01 LAB — POCT GLUCOSE: 71 MG/DL (ref 70–110)

## 2025-05-01 NOTE — ED PROVIDER NOTES
"Encounter Date: 4/30/2025       History     Chief Complaint   Patient presents with    COVID-19 Concerns     Arrives c/o covid concerns after being exposed to a Pt who tested positive for covid last night. Pt endorses feeling "jittery," weak, and body aches since 12p.      44yo F presents to ED with chief complaint fatigue, generalized weakness, right-sided headache, myalgias, generally feeling unwell.    Patient states has been in usual state of health.  Woke today without any noticeable symptoms.  Got to work around 7:00 a.m..  States around noon she began with myalgias, fatigue, right-sided headache, generally feeling unwell.  Does admit to decreased appetite and intake today.  No nausea vomiting---but did become nauseous while in the ED. no recent diarrhea.  No urinary complaints.  No abdominal pain. Does admit to COVID positive patient encounter recently; admits to similar symptoms when she was diagnosed with COVID in the past. She does admit to history of sinus issues, but denies any recent illness, denies sinus pain or pressure.  Does admit to sneezing from time to time, but not complaining of any new congestion or rhinorrhea, denies postnasal drip.    Admits to right frontal, temporal, and parietal headache.  Headache initially mild, worsening over the course of the day.  Took Tylenol without improvement.  Denies history of similar headaches.  Denies frequent headaches.  No associated arm or leg weakness, slurred speech, facial droop, unsteady gait, aphasia.  No visual disturbance.  Does admit to CVA in the past for which she was treated with thrombolytic agent---she states was experiencing slurred speech, left upper and lower extremity weakness----her symptoms did resolve over approx 1 month following thrombolytic agent.  She denies any persistent or chronic deficits or weakness.  She denies feeling like she is having a stroke or any stroke-like symptoms.  There is no associated fever or chills.  No neck " stiffness.  No photophobia.  No visual disturbance.      No EtOH abuse, connective tissue disorder, or family history of subarachnoid hemorrhage or cerebral aneurysm.      PMH:  Stroke aborted by administration of thrombolytic agent  Paresthesia  Eczema  Pulmonary nodule  Essential hypertension  Mixed hyperlipidemia  Obesity  Chronic constipation  Patellar instability of both knees      Review of patient's allergies indicates:  No Known Allergies  Past Medical History:   Diagnosis Date    Abnormal Pap smear of cervix     Allergy     Hypertension     Joint pain     Keloid cicatrix     Stroke 05/2023    Possible     Past Surgical History:   Procedure Laterality Date    CKC, ECC, fractional D&C      COLONOSCOPY N/A 6/5/2024    Procedure: COLONOSCOPY;  Surgeon: Abebe Saenz MD;  Location: [facility] ENDOSCOPY;  Service: Endoscopy;  Laterality: N/A;    CONIZATION-CERVIX  02/2015    DILATION AND CURETTAGE OF UTERUS      ESOPHAGOGASTRODUODENOSCOPY N/A 6/5/2024    Procedure: EGD (ESOPHAGOGASTRODUODENOSCOPY);  Surgeon: Abebe Saenz MD;  Location: [facility] ENDOSCOPY;  Service: Endoscopy;  Laterality: N/A;  clinic pt of dr. saenz; peg sent to [facility]; instr via portal; [facility]  5/28/24- pc complete. DBM    HYSTERECTOMY  03/2017    AUB, fibroids    TUBAL LIGATION       Family History   Problem Relation Name Age of Onset    Eczema Mother      Ovarian cancer Mother      Hypertension Mother      Breast cancer Maternal Aunt          THREE AUNTS    Breast cancer Maternal Aunt      Breast cancer Maternal Aunt      Eczema Maternal Grandmother      Eczema Son      Eczema Son      Melanoma Neg Hx      Lupus Neg Hx      Psoriasis Neg Hx      Colon cancer Neg Hx       Social History[1]  Review of Systems   Constitutional:  Positive for appetite change and fatigue. Negative for chills and fever.   HENT:  Positive for sneezing. Negative for congestion, ear pain, facial swelling, postnasal drip, rhinorrhea and sore throat.    Eyes:  Negative  for discharge, redness and visual disturbance.   Respiratory:  Negative for cough and shortness of breath.    Cardiovascular:  Negative for chest pain.   Gastrointestinal:  Negative for abdominal pain, diarrhea, nausea and vomiting.   Genitourinary:  Negative for dysuria, frequency and hematuria.   Musculoskeletal:  Positive for myalgias. Negative for gait problem, neck pain and neck stiffness.   Neurological:  Positive for headaches. Negative for dizziness, syncope, facial asymmetry, speech difficulty, weakness and numbness.       Physical Exam     Initial Vitals [04/30/25 2018]   BP Pulse Resp Temp SpO2   (!) 142/89 79 16 98 °F (36.7 °C) 98 %      MAP       --         Physical Exam    Nursing note and vitals reviewed.  Constitutional: She appears well-developed and well-nourished. She is not diaphoretic. No distress.   Ambulates with normal, steady gait   HENT:   Head: Normocephalic and atraumatic.   Boggy nasal mucosa, associated turbinate edema.    Face symmetric, tongue midline   Eyes: EOM are normal. Pupils are equal, round, and reactive to light.   No nystagmus   Neck: Neck supple.   Normal range of motion.  Cardiovascular:  Normal rate and regular rhythm.           Pulmonary/Chest: No respiratory distress.   Musculoskeletal:         General: Normal range of motion.      Cervical back: Normal range of motion and neck supple.     Neurological: She is alert and oriented to person, place, and time. She has normal strength.   Grossly symmetric upper and lower extremity strength.  Negative Romberg.  No pronator drift.  No truncal ataxia. Normal, symmetric finger-to-nose.  Normal heel-to-toe walk.   Psychiatric: She has a normal mood and affect. Thought content normal.         ED Course   Procedures  Labs Reviewed   SARS-COV-2 RDRP GENE       Result Value    POC Rapid COVID Negative       Acceptable Yes     POCT INFLUENZA A/B MOLECULAR    POC Molecular Influenza A Ag Negative      POC Molecular  Influenza B Ag Negative       Acceptable Yes            Imaging Results    None          Medications   ondansetron disintegrating tablet 4 mg (4 mg Oral Given 4/30/25 2143)     Medical Decision Making  Differential diagnosis:  Headache disorder, migraine, dehydration, viral illness, CVA    Amount and/or Complexity of Data Reviewed  External Data Reviewed: notes.  Labs: ordered. Decision-making details documented in ED Course.  Discussion of management or test interpretation with external provider(s): Symptoms began around noon today; outside of initial tPA window.  Denies any stroke-like weakness or suspicion that she is having a stroke.  There are no focal deficits on exam.  She is ambulatory with normal, steady gait without any ataxia.  There is no appreciable weakness on exam.  Negative Romberg, no pronator drift.  Despite the right-sided headache, despite not having a history of similar headaches in the past, given no sudden onset severe headache, I think intracranial/cerebral hemorrhage is unlikely at this time. Given associated fatigue, myalgias, think CVA also unlikely at this time. Long discussion concerning need for return if she does develop any stroke-like symptoms such as unilateral weakness, worsening headache, speech difficulty, facial droop, etc..  Questionable false negative COVID or early viral illness.  Advised return if any persistent symptoms despite current plan.    Risk  OTC drugs.  Prescription drug management.                                      Clinical Impression:  Final diagnoses:  [R51.9] Right-sided headache (Primary)  [R53.83] Fatigue, unspecified type  [M79.10] Myalgia          ED Disposition Condition    Discharge Stable          ED Prescriptions       Medication Sig Dispense Start Date End Date Auth. Provider    azelastine (ASTELIN) 137 mcg (0.1 %) nasal spray 1 spray (137 mcg total) by Nasal route 2 (two) times daily as needed (Nasal congestion). 30 mL 4/30/2025  4/30/2026 Antony Chatterjee PA-C    cetirizine (ZYRTEC) 10 MG tablet Take 1 tablet (10 mg total) by mouth once daily. for 14 days 14 tablet 4/30/2025 5/14/2025 Antony Chatterjee PA-C    ondansetron (ZOFRAN-ODT) 4 MG TbDL Take 1 tablet (4 mg total) by mouth every 6 (six) hours as needed (nausea). 12 tablet 4/30/2025 -- Antony Chatterjee PA-C          Follow-up Information       Follow up With Specialties Details Why Contact Info    Mindi Donahue MD Family Medicine Schedule an appointment as soon as possible for a visit  For reevaluation, If symptoms persist [address]  [address]  [place] [address]  [phone]      [facility] - Emergency Dept Emergency Medicine  As needed, If symptoms worsen [address] [facility] [place] [address] [phone]                        [1]   Social History  Tobacco Use    Smoking status: Never     Passive exposure: Never    Smokeless tobacco: Never   Substance Use Topics    Alcohol use: Yes     Comment: occasional    Drug use: No        Antony Chatterjee PA-C  05/01/25 0453

## 2025-05-01 NOTE — DISCHARGE INSTRUCTIONS
Continue with Tylenol as needed for headache.  Astelin for continued nasal congestion.  Zyrtec once daily.  Get some good rest.  Make sure you are drinking plenty of fluids if you are not eating as much.    Follow up with a primary care provider for repeat exam, for re-evaluation of persistent symptoms.    Return to this ED if worsening headache, if you develop arm or leg weakness, if you begin with dizziness, if unable to walk or bear weight, if no improvement with current plan, if any other problems occur.

## 2025-05-08 ENCOUNTER — PATIENT OUTREACH (OUTPATIENT)
Dept: ADMINISTRATIVE | Facility: HOSPITAL | Age: 45
End: 2025-05-08
Payer: COMMERCIAL

## 2025-05-29 ENCOUNTER — PATIENT MESSAGE (OUTPATIENT)
Dept: GENETICS | Facility: CLINIC | Age: 45
End: 2025-05-29
Payer: COMMERCIAL

## 2025-05-29 DIAGNOSIS — R51.9 CHRONIC NONINTRACTABLE HEADACHE, UNSPECIFIED HEADACHE TYPE: ICD-10-CM

## 2025-05-29 DIAGNOSIS — G89.29 CHRONIC NONINTRACTABLE HEADACHE, UNSPECIFIED HEADACHE TYPE: ICD-10-CM

## 2025-05-29 DIAGNOSIS — R29.898 WEAKNESS OF LEFT LOWER EXTREMITY: Primary | ICD-10-CM

## 2025-05-29 DIAGNOSIS — R29.898 WEAKNESS OF LEFT UPPER EXTREMITY: ICD-10-CM

## 2025-07-24 ENCOUNTER — OFFICE VISIT (OUTPATIENT)
Dept: NEUROLOGY | Facility: CLINIC | Age: 45
End: 2025-07-24
Payer: COMMERCIAL

## 2025-07-24 VITALS
HEART RATE: 70 BPM | BODY MASS INDEX: 41.49 KG/M2 | WEIGHT: 219.56 LBS | SYSTOLIC BLOOD PRESSURE: 138 MMHG | DIASTOLIC BLOOD PRESSURE: 93 MMHG

## 2025-07-24 DIAGNOSIS — G43.109 COMPLICATED MIGRAINE: ICD-10-CM

## 2025-07-24 DIAGNOSIS — E66.01 SEVERE OBESITY: ICD-10-CM

## 2025-07-24 DIAGNOSIS — G44.40 MEDICATION OVERUSE HEADACHE: ICD-10-CM

## 2025-07-24 DIAGNOSIS — M79.7 FIBROMYALGIA: ICD-10-CM

## 2025-07-24 DIAGNOSIS — M54.2 CERVICALGIA: ICD-10-CM

## 2025-07-24 DIAGNOSIS — G43.709 CHRONIC MIGRAINE W/O AURA W/O STATUS MIGRAINOSUS, NOT INTRACTABLE: Primary | ICD-10-CM

## 2025-07-24 DIAGNOSIS — G62.9 NEUROPATHY: ICD-10-CM

## 2025-07-24 PROCEDURE — 3075F SYST BP GE 130 - 139MM HG: CPT | Mod: CPTII,S$GLB,, | Performed by: STUDENT IN AN ORGANIZED HEALTH CARE EDUCATION/TRAINING PROGRAM

## 2025-07-24 PROCEDURE — 3080F DIAST BP >= 90 MM HG: CPT | Mod: CPTII,S$GLB,, | Performed by: STUDENT IN AN ORGANIZED HEALTH CARE EDUCATION/TRAINING PROGRAM

## 2025-07-24 PROCEDURE — 99999 PR PBB SHADOW E&M-EST. PATIENT-LVL III: CPT | Mod: PBBFAC,,, | Performed by: STUDENT IN AN ORGANIZED HEALTH CARE EDUCATION/TRAINING PROGRAM

## 2025-07-24 PROCEDURE — 3008F BODY MASS INDEX DOCD: CPT | Mod: CPTII,S$GLB,, | Performed by: STUDENT IN AN ORGANIZED HEALTH CARE EDUCATION/TRAINING PROGRAM

## 2025-07-24 PROCEDURE — 1159F MED LIST DOCD IN RCRD: CPT | Mod: CPTII,S$GLB,, | Performed by: STUDENT IN AN ORGANIZED HEALTH CARE EDUCATION/TRAINING PROGRAM

## 2025-07-24 PROCEDURE — 99215 OFFICE O/P EST HI 40 MIN: CPT | Mod: S$GLB,,, | Performed by: STUDENT IN AN ORGANIZED HEALTH CARE EDUCATION/TRAINING PROGRAM

## 2025-07-24 RX ORDER — ERENUMAB-AOOE 70 MG/ML
70 INJECTION SUBCUTANEOUS
Qty: 1 ML | Refills: 5 | Status: SHIPPED | OUTPATIENT
Start: 2025-07-24 | End: 2026-01-20

## 2025-07-24 RX ORDER — LANOLIN ALCOHOL/MO/W.PET/CERES
400 CREAM (GRAM) TOPICAL DAILY
Qty: 30 TABLET | Refills: 5 | Status: SHIPPED | OUTPATIENT
Start: 2025-07-24 | End: 2026-01-20

## 2025-07-24 NOTE — PROGRESS NOTES
Chief Complaint and Duration     Chief Complaint   Patient presents with    Consult     Referred by PARAM Nunn   Numbness/tingling in the upper/lower extremities      History of Present Illness     Marlyn Camacho is a 45 y.o. female referred for numbness and tingling of bilateral upper and lower extremities. Also neck and lower back pain.  Saw neurology in June 2023.   Did not followup at that time, workup was ordered for neuropathy and paraesthesias.     Endorsed long term migraine history - + photosensitivity, phonophobia, and nausea.   Can have tenderness on R side, 2x/week.  Motrin / ibuprofen. PRN.     Sleep - states sleep okay.  Does have the neck and back pain.  No vision changes or blurry vision or vision loss.  Does have component worse when bending over. Headaches not affected or worsen with laying down. Gets worse with activity throughout the day.    Review of patient's allergies indicates:  No Known Allergies  Current Medications[1]    Medical History     Past Medical History:   Diagnosis Date    Abnormal Pap smear of cervix     Allergy     Hypertension     Joint pain     Keloid cicatrix     Stroke 05/2023    Possible     Past Surgical History:   Procedure Laterality Date    CKC, ECC, fractional D&C      COLONOSCOPY N/A 6/5/2024    Procedure: COLONOSCOPY;  Surgeon: Abebe Saenz MD;  [facility];  Service: Endoscopy;  Laterality: N/A;    CONIZATION-CERVIX  02/2015    DILATION AND CURETTAGE OF UTERUS      ESOPHAGOGASTRODUODENOSCOPY N/A 6/5/2024    Procedure: EGD (ESOPHAGOGASTRODUODENOSCOPY);  Surgeon: Abebe Saenz MD;  [facility];  Service: Endoscopy;  Laterality: N/A;  clinic pt of dr. saenz; peg sent to [facility] via portal; [facility]  5/28/24- pc complete. DBM    HYSTERECTOMY  03/2017    AUB, fibroids    TUBAL LIGATION       Family History   Problem Relation Name Age of Onset    Eczema Mother      Ovarian cancer Mother      Hypertension Mother       Breast cancer Maternal Aunt          THREE AUNTS    Breast cancer Maternal Aunt      Breast cancer Maternal Aunt      Eczema Maternal Grandmother      Eczema Son      Eczema Son      Melanoma Neg Hx      Lupus Neg Hx      Psoriasis Neg Hx      Colon cancer Neg Hx       Social History[2]    Exam     Vitals:    07/24/25 0723   BP: (!) 138/93   Pulse: 70      Physical Exam:  General: Not in acute distress. Not ill-appearing.   HENT: Normocephalic and atraumatic. Moist mucous membranes.  Eyes: Conjunctivae normal.   Pulmonary: Pulmonary effort is normal.   Skin: Skin is warm and dry. No rashes.   Psychiatric: Mood normal.        Neurologic Exam   Mental status: oriented to person, place, and time  Attention: Normal. Concentration: normal.  Speech: speech is normal.  Cranial Nerves: EOMI intact, V1-V3 Facial sensation intact. Symmetric facies. Hearing grossly intact. Palate and uvula midline, symmetric. No tongue deviation. Trapezius strength intact.     Motor exam: bulk and tone normal. Strength 5/5 in bilateral upper extremities: deltoids, biceps, triceps, wrist flexion/extension, finger abduction/adduction. Strength 5/5 in bilateral lower extremities: hip flexion/extension, thigh adduction/abduction, knee flexion/extension, dorsiflexion/plantarflexion, foot eversion/inversion.    Reflexes: 2+ in bilateral upper extremities: biceps and brachiaradialis, 2+ in bilateral lower extremities: patellar and achilles  Plantar reflex: normal  Espino's/Clonus: negative    Sensory exam: light touch intact    Gait exam: normal  Romberg: negative  Coordination: normal    Tremor: none  Cogwheel rigidity: none    Labs and Imaging     Labs: reviewed  Lab Results   Component Value Date    TSH 0.495 04/30/2024    HGBA1C 5.2 04/30/2024    LDLCALC 93.4 08/05/2024    [id number] 649 04/08/2024       Imaging:   I have personally reviewed the images performed.   MRI cervical spine 2024 - no significant canal stenosis  MRI brain 2023 - no  acute intracranial process  CTA H&N 2023 - no acute abnormality    Assessment and Plan     Problem List Items Addressed This Visit          Neuro    Medication overuse headache    Complicated migraine    Chronic migraine w/o aura w/o status migrainosus, not intractable - Primary    Relevant Medications    magnesium oxide (MAG-OX) 400 mg (241.3 mg magnesium) tablet    erenumab-aooe (AIMOVIG AUTOINJECTOR) 70 mg/mL autoinjector    ubrogepant (UBRELVY) 100 mg tablet    Neuropathy    Relevant Orders    Protein Electrophoresis, Serum    EMG W/ ULTRASOUND AND NERVE CONDUCTION TEST 4 Extremities    Vitamin D       Endocrine    Severe obesity       Orthopedic    Cervicalgia    Fibromyalgia     Patient is new to me.    Neuropathy workup - long term hx of numbness tingling. Has chronic neck and back pain - mri c spine reviewed.  Labs + EMG    Chronic migraine - will do CGRP. Send for aimovig monthly injections, ubrelvy PRN.   2x/week. Has been doing PRN OTC - discussed component of medication overuse.     Meds failed: topiramate (numbness tingling). Elavil CI (weight gain).  No triptan concern in past for hemiplegic component or associations w the migraine.     Saw genetics - [employer] Hemiplegic Migraine and [employer] Hereditary Cerebral Small Vessel Disease Panel (negative/normal). Got TPA at one point w concern for stroke.  Thought to be complicated migraine.    There was a concern for IIH in past - initiated topamax and stopped. Did cause worsening of numbness and tingling. Denies vision changes or waking up significant pressure.  Hold off and monitor at this time. Discussed weight loss.     Discussed w patient multifactorial nature of symptoms. Discussed lifestyle modifications including diet and exercise.    Questions answered w patient. Appreciate opportunity to care for this patient.    Follow-up: will see on EMG for followup    Time spent on this encounter: 40 minutes. This includes face to face time and non-face to face  time preparing to see the patient (eg, review of tests), obtaining and/or reviewing separately obtained history, documenting clinical information in the electronic or other health record, independently interpreting results and communicating results to the patient/family/caregiver, or care coordinator.     This note was created by combination of typed  and M-Modal dictation. Transcription and phonetic errors may be present.  If there are any questions, please contact me.         [1]   Current Outpatient Medications   Medication Sig Dispense Refill    azelastine (ASTELIN) 137 mcg (0.1 %) nasal spray 1 spray (137 mcg total) by Nasal route 2 (two) times daily as needed (Nasal congestion). 30 mL 0    hydrocortisone 2.5 % cream Apply topically 2 (two) times daily. 28 g 2    LINZESS 145 mcg Cap capsule TAKE 1 CAPSULE(145 MCG) BY MOUTH BEFORE BREAKFAST 30 capsule 2    ondansetron (ZOFRAN-ODT) 4 MG TbDL Take 1 tablet (4 mg total) by mouth every 6 (six) hours as needed (nausea). 12 tablet 0    spironolactone (ALDACTONE) 50 MG tablet TAKE 1 TABLET(50 MG) BY MOUTH DAILY 90 tablet 3    albuterol (PROVENTIL/VENTOLIN HFA) 90 mcg/actuation inhaler Inhale 1-2 puffs into the lungs every 6 (six) hours as needed for Wheezing or Shortness of Breath. Rescue 18 g 0    cetirizine (ZYRTEC) 10 MG tablet Take 1 tablet (10 mg total) by mouth once daily. for 14 days 14 tablet 0    erenumab-aooe (AIMOVIG AUTOINJECTOR) 70 mg/mL autoinjector Inject 1 mL (70 mg total) into the skin every 28 days. 1 mL 5    magnesium oxide (MAG-OX) 400 mg (241.3 mg magnesium) tablet Take 1 tablet (400 mg total) by mouth once daily. 30 tablet 5    omeprazole (PRILOSEC) 40 MG capsule Take 1 capsule (40 mg total) by mouth 2 (two) times daily before meals. for 10 days 20 capsule 0    ubrogepant (UBRELVY) 100 mg tablet Take 1 tablet (100 mg total) by mouth as needed for Migraine. Take 2nd tablet if need more relief in 2 hrs, no more than 2 in 24 hrs. 16 tablet  2     No current facility-administered medications for this visit.   [2]   Social History  Socioeconomic History    Marital status:    Tobacco Use    Smoking status: Never     Passive exposure: Never    Smokeless tobacco: Never   Substance and Sexual Activity    Alcohol use: Yes     Comment: occasional    Drug use: No    Sexual activity: Yes     Partners: Male     Birth control/protection: Other-see comments, See Surgical Hx     Comment: Tubal Ligation/Hysterectomy   Other Topics Concern    Are you pregnant or think you may be? No    Breast-feeding No     Social Drivers of Health     Financial Resource Strain: Medium Risk (6/10/2025)    Overall Financial Resource Strain (CARDIA)     Difficulty of Paying Living Expenses: Somewhat hard   Food Insecurity: Food Insecurity Present (6/10/2025)    Hunger Vital Sign     Worried About Running Out of Food in the Last Year: Sometimes true     Ran Out of Food in the Last Year: Sometimes true   Transportation Needs: No Transportation Needs (6/10/2025)    PRAPARE - Transportation     Lack of Transportation (Medical): No     Lack of Transportation (Non-Medical): No   Physical Activity: Insufficiently Active (6/10/2025)    Exercise Vital Sign     Days of Exercise per Week: 2 days     Minutes of Exercise per Session: 60 min   Stress: Stress Concern Present (6/10/2025)    [employer] - Occupational Stress Questionnaire     Feeling of Stress : To some extent   Housing Stability: High Risk (6/10/2025)    Housing Stability Vital Sign     Unable to Pay for Housing in the Last Year: Yes     Number of Times Moved in the Last Year: 1     Homeless in the Last Year: No

## 2025-07-29 ENCOUNTER — RESULTS FOLLOW-UP (OUTPATIENT)
Dept: NEUROLOGY | Facility: CLINIC | Age: 45
End: 2025-07-29
Payer: COMMERCIAL

## 2025-07-29 ENCOUNTER — LAB VISIT (OUTPATIENT)
Dept: LAB | Facility: HOSPITAL | Age: 45
End: 2025-07-29
Attending: STUDENT IN AN ORGANIZED HEALTH CARE EDUCATION/TRAINING PROGRAM
Payer: COMMERCIAL

## 2025-07-29 DIAGNOSIS — G62.9 NEUROPATHY: ICD-10-CM

## 2025-07-29 LAB — 25(OH)D3+25(OH)D2 SERPL-MCNC: 10 NG/ML (ref 30–96)

## 2025-07-29 PROCEDURE — 84165 PROTEIN E-PHORESIS SERUM: CPT

## 2025-07-29 PROCEDURE — 36415 COLL VENOUS BLD VENIPUNCTURE: CPT

## 2025-07-29 PROCEDURE — 82306 VITAMIN D 25 HYDROXY: CPT

## 2025-07-29 PROCEDURE — 84165 PROTEIN E-PHORESIS SERUM: CPT | Mod: 26,,, | Performed by: PATHOLOGY

## 2025-07-30 LAB
ALBUMIN, SPE (OHS): 4.08 G/DL (ref 3.35–5.55)
ALPHA 1 GLOB (OHS): 0.26 GM/DL (ref 0.17–0.41)
ALPHA 2 GLOB (OHS): 0.64 GM/DL (ref 0.43–0.99)
BETA GLOB (OHS): 0.88 GM/DL (ref 0.5–1.1)
GAMMA GLOBULIN (OHS): 1.14 GM/DL (ref 0.67–1.58)
PATHOLOGIST REVIEW - SPE (OHS): NORMAL
PROT SERPL-MCNC: 7 GM/DL (ref 6–8.4)

## 2025-08-04 ENCOUNTER — PATIENT MESSAGE (OUTPATIENT)
Dept: NEUROLOGY | Facility: CLINIC | Age: 45
End: 2025-08-04
Payer: COMMERCIAL

## 2025-08-04 ENCOUNTER — TELEPHONE (OUTPATIENT)
Dept: NEUROLOGY | Facility: CLINIC | Age: 45
End: 2025-08-04
Payer: COMMERCIAL

## 2025-08-04 ENCOUNTER — PATIENT MESSAGE (OUTPATIENT)
Dept: ADMINISTRATIVE | Facility: HOSPITAL | Age: 45
End: 2025-08-04
Payer: COMMERCIAL

## 2025-08-04 NOTE — TELEPHONE ENCOUNTER
Called patient and she didn't answer left voicemail saying patient can call pharmacy and have them change prescription from pharmacy to pharmacy .

## 2025-08-28 ENCOUNTER — PROCEDURE VISIT (OUTPATIENT)
Dept: NEUROLOGY | Facility: CLINIC | Age: 45
End: 2025-08-28
Payer: COMMERCIAL

## 2025-08-28 DIAGNOSIS — R20.2 PARESTHESIA: Primary | ICD-10-CM

## 2025-08-28 DIAGNOSIS — G43.709 CHRONIC MIGRAINE W/O AURA W/O STATUS MIGRAINOSUS, NOT INTRACTABLE: ICD-10-CM

## 2025-08-28 RX ORDER — ERENUMAB-AOOE 70 MG/ML
70 INJECTION SUBCUTANEOUS
Qty: 1 ML | Refills: 5 | Status: SHIPPED | OUTPATIENT
Start: 2025-08-28 | End: 2026-02-24

## (undated) DEVICE — SYR 10CC LUER LOCK

## (undated) DEVICE — CANNULA ENDOPATH XCEL 5X100MM

## (undated) DEVICE — SOL IRR NACL .9% 3000ML

## (undated) DEVICE — SEE MEDLINE ITEM 152622

## (undated) DEVICE — SOL 9P NACL IRR PIC IL

## (undated) DEVICE — TIP RUMI GREEN DISPOSABLE6.7MM

## (undated) DEVICE — FOOTSWITCH SUCTION IRRIGATION

## (undated) DEVICE — KIT WING PAD POSITIONING

## (undated) DEVICE — SUT 0 8-27IN VICRYL PL CT-1

## (undated) DEVICE — PAD PREP 50/CA

## (undated) DEVICE — TROCAR ENDOPATH XCEL 11MM 10CM

## (undated) DEVICE — SYR 50CC LL

## (undated) DEVICE — CLOSURE SKIN STERI STRIP 1/2X4

## (undated) DEVICE — SPONGE LAP 18X18 PREWASHED

## (undated) DEVICE — SEE MEDLINE ITEM 156930

## (undated) DEVICE — SEALER LIGASURE MARYLAND 37CM

## (undated) DEVICE — GLOVE BIOGEL SKINSENSE PI 6.0

## (undated) DEVICE — SEE MEDLINE ITEM 157117

## (undated) DEVICE — DRESSING LEUKOPLAST FLEX 1X3IN

## (undated) DEVICE — JELLY KY LUBRICATING 5G PACKET

## (undated) DEVICE — TIP RUMI KOH-EFFICIENT 3.0

## (undated) DEVICE — SUT VICRYL 0 27 CT-2

## (undated) DEVICE — TROCAR ENDOPATH XCEL 5X100MM

## (undated) DEVICE — ELECTRODE ELECSURG LAPSCP SPAT

## (undated) DEVICE — ELECTRODE REM PLYHSV RETURN 9

## (undated) DEVICE — PACK LAPAROSCOPY BAPTIST

## (undated) DEVICE — NDL INSUF ULTRA VERESS 120MM

## (undated) DEVICE — SCISSOR 5MMX35CM DIRECT DRIVE

## (undated) DEVICE — SOL CLEARIFY VISUALIZATION LAP